# Patient Record
Sex: FEMALE | Race: WHITE | Employment: OTHER | URBAN - METROPOLITAN AREA
[De-identification: names, ages, dates, MRNs, and addresses within clinical notes are randomized per-mention and may not be internally consistent; named-entity substitution may affect disease eponyms.]

---

## 2017-01-11 DIAGNOSIS — E11.8 TYPE 2 DIABETES MELLITUS WITH COMPLICATION, WITHOUT LONG-TERM CURRENT USE OF INSULIN (HCC): ICD-10-CM

## 2017-01-11 DIAGNOSIS — I10 ESSENTIAL HYPERTENSION: ICD-10-CM

## 2017-01-11 DIAGNOSIS — E78.2 MIXED HYPERLIPIDEMIA: ICD-10-CM

## 2017-01-11 DIAGNOSIS — E03.9 ACQUIRED HYPOTHYROIDISM: ICD-10-CM

## 2017-01-11 LAB
ALBUMIN SERPL-MCNC: 3.8 G/DL (ref 3.9–4.9)
ALP BLD-CCNC: 85 U/L (ref 40–130)
ALT SERPL-CCNC: 8 U/L (ref 0–33)
ANION GAP SERPL CALCULATED.3IONS-SCNC: 12 MEQ/L (ref 7–13)
AST SERPL-CCNC: 10 U/L (ref 0–35)
BILIRUB SERPL-MCNC: 0.2 MG/DL (ref 0–1.2)
BUN BLDV-MCNC: 16 MG/DL (ref 8–23)
CALCIUM SERPL-MCNC: 9.1 MG/DL (ref 8.6–10.2)
CHLORIDE BLD-SCNC: 97 MEQ/L (ref 98–107)
CHOLESTEROL, TOTAL: 160 MG/DL (ref 0–199)
CO2: 31 MEQ/L (ref 22–29)
CREAT SERPL-MCNC: 0.54 MG/DL (ref 0.5–0.9)
CREATININE URINE: 80.9 MG/DL
GFR AFRICAN AMERICAN: >60
GFR NON-AFRICAN AMERICAN: >60
GLOBULIN: 3.4 G/DL (ref 2.3–3.5)
GLUCOSE BLD-MCNC: 153 MG/DL (ref 74–109)
HBA1C MFR BLD: 6.5 % (ref 4.8–5.9)
HDLC SERPL-MCNC: 37 MG/DL (ref 40–59)
LDL CHOLESTEROL CALCULATED: 90 MG/DL (ref 0–129)
MICROALBUMIN UR-MCNC: 4 MG/DL
MICROALBUMIN/CREAT UR-RTO: 49.4 MG/G (ref 0–30)
POTASSIUM SERPL-SCNC: 4.7 MEQ/L (ref 3.5–5.1)
SODIUM BLD-SCNC: 140 MEQ/L (ref 132–144)
T4 FREE: 1.28 NG/DL (ref 0.93–1.7)
TOTAL PROTEIN: 7.2 G/DL (ref 6.4–8.1)
TRIGL SERPL-MCNC: 167 MG/DL (ref 0–200)
TSH SERPL DL<=0.05 MIU/L-ACNC: 0.66 UIU/ML (ref 0.27–4.2)

## 2017-01-18 ENCOUNTER — OFFICE VISIT (OUTPATIENT)
Dept: FAMILY MEDICINE CLINIC | Age: 62
End: 2017-01-18

## 2017-01-18 VITALS
SYSTOLIC BLOOD PRESSURE: 138 MMHG | DIASTOLIC BLOOD PRESSURE: 70 MMHG | TEMPERATURE: 97.7 F | RESPIRATION RATE: 18 BRPM | HEIGHT: 66 IN | HEART RATE: 72 BPM | BODY MASS INDEX: 47.09 KG/M2 | WEIGHT: 293 LBS

## 2017-01-18 DIAGNOSIS — M54.16 LUMBAR RADICULOPATHY: ICD-10-CM

## 2017-01-18 DIAGNOSIS — I10 ESSENTIAL HYPERTENSION: ICD-10-CM

## 2017-01-18 DIAGNOSIS — E78.2 MIXED HYPERLIPIDEMIA: ICD-10-CM

## 2017-01-18 DIAGNOSIS — E11.8 TYPE 2 DIABETES MELLITUS WITH COMPLICATION, WITHOUT LONG-TERM CURRENT USE OF INSULIN (HCC): Primary | ICD-10-CM

## 2017-01-18 DIAGNOSIS — E03.9 ACQUIRED HYPOTHYROIDISM: ICD-10-CM

## 2017-01-18 DIAGNOSIS — R53.82 CHRONIC FATIGUE: ICD-10-CM

## 2017-01-18 PROCEDURE — 99214 OFFICE O/P EST MOD 30 MIN: CPT | Performed by: FAMILY MEDICINE

## 2017-01-18 RX ORDER — BLOOD-GLUCOSE METER
EACH MISCELLANEOUS
Qty: 1 KIT | Refills: 0 | Status: SHIPPED | OUTPATIENT
Start: 2017-01-18 | End: 2017-12-15 | Stop reason: ALTCHOICE

## 2017-01-18 RX ORDER — SIMVASTATIN 40 MG
TABLET ORAL
Qty: 90 TABLET | Refills: 0 | Status: SHIPPED | OUTPATIENT
Start: 2017-01-18 | End: 2017-12-15 | Stop reason: SDUPTHER

## 2017-01-18 RX ORDER — OXYBUTYNIN CHLORIDE 10 MG/1
10 TABLET, EXTENDED RELEASE ORAL DAILY
Qty: 90 TABLET | Refills: 0 | Status: SHIPPED | OUTPATIENT
Start: 2017-01-18 | End: 2017-04-18 | Stop reason: SDUPTHER

## 2017-01-24 ENCOUNTER — HOSPITAL ENCOUNTER (OUTPATIENT)
Dept: PHYSICAL THERAPY | Age: 62
Setting detail: THERAPIES SERIES
Discharge: HOME | End: 2017-01-24
Payer: COMMERCIAL

## 2017-01-24 PROCEDURE — G8978 MOBILITY CURRENT STATUS: HCPCS

## 2017-01-24 PROCEDURE — G8979 MOBILITY GOAL STATUS: HCPCS

## 2017-01-24 PROCEDURE — 97162 PT EVAL MOD COMPLEX 30 MIN: CPT

## 2017-01-24 ASSESSMENT — PAIN DESCRIPTION - LOCATION: LOCATION: BACK

## 2017-01-24 ASSESSMENT — PAIN DESCRIPTION - FREQUENCY: FREQUENCY: CONTINUOUS

## 2017-01-24 ASSESSMENT — PAIN DESCRIPTION - DESCRIPTORS: DESCRIPTORS: CONSTANT;RADIATING

## 2017-01-24 ASSESSMENT — PAIN SCALES - GENERAL: PAINLEVEL_OUTOF10: 5

## 2017-01-24 ASSESSMENT — PAIN DESCRIPTION - PAIN TYPE: TYPE: CHRONIC PAIN

## 2017-02-01 ENCOUNTER — TELEPHONE (OUTPATIENT)
Dept: FAMILY MEDICINE CLINIC | Age: 62
End: 2017-02-01

## 2017-02-09 ENCOUNTER — HOSPITAL ENCOUNTER (OUTPATIENT)
Dept: PHYSICAL THERAPY | Age: 62
Discharge: HOME | End: 2017-02-09

## 2017-02-13 ENCOUNTER — HOSPITAL ENCOUNTER (OUTPATIENT)
Dept: PHYSICAL THERAPY | Age: 62
Setting detail: THERAPIES SERIES
Discharge: HOME | End: 2017-02-13
Payer: COMMERCIAL

## 2017-02-13 PROCEDURE — 97110 THERAPEUTIC EXERCISES: CPT

## 2017-02-13 ASSESSMENT — PAIN SCALES - GENERAL: PAINLEVEL_OUTOF10: 9

## 2017-02-13 ASSESSMENT — PAIN DESCRIPTION - PAIN TYPE: TYPE: CHRONIC PAIN

## 2017-02-13 ASSESSMENT — PAIN DESCRIPTION - ORIENTATION: ORIENTATION: LOWER;LEFT;RIGHT

## 2017-02-13 ASSESSMENT — PAIN DESCRIPTION - LOCATION: LOCATION: BACK;LEG

## 2017-02-20 ENCOUNTER — HOSPITAL ENCOUNTER (OUTPATIENT)
Dept: PHYSICAL THERAPY | Age: 62
Setting detail: THERAPIES SERIES
Discharge: HOME | End: 2017-02-20
Payer: COMMERCIAL

## 2017-02-20 PROCEDURE — 97110 THERAPEUTIC EXERCISES: CPT

## 2017-02-20 ASSESSMENT — PAIN SCALES - GENERAL: PAINLEVEL_OUTOF10: 5

## 2017-02-20 ASSESSMENT — PAIN DESCRIPTION - LOCATION: LOCATION: BACK

## 2017-02-20 ASSESSMENT — PAIN DESCRIPTION - ORIENTATION: ORIENTATION: LOWER

## 2017-02-20 ASSESSMENT — PAIN DESCRIPTION - DESCRIPTORS: DESCRIPTORS: ACHING

## 2017-02-21 ENCOUNTER — OFFICE VISIT (OUTPATIENT)
Dept: FAMILY MEDICINE CLINIC | Age: 62
End: 2017-02-21

## 2017-02-21 VITALS
HEART RATE: 72 BPM | SYSTOLIC BLOOD PRESSURE: 118 MMHG | DIASTOLIC BLOOD PRESSURE: 70 MMHG | WEIGHT: 293 LBS | TEMPERATURE: 97.4 F | BODY MASS INDEX: 47.09 KG/M2 | RESPIRATION RATE: 18 BRPM | HEIGHT: 66 IN

## 2017-02-21 DIAGNOSIS — G89.29 CHRONIC RIGHT-SIDED LOW BACK PAIN WITH RIGHT-SIDED SCIATICA: ICD-10-CM

## 2017-02-21 DIAGNOSIS — E11.8 TYPE 2 DIABETES MELLITUS WITH COMPLICATION, WITHOUT LONG-TERM CURRENT USE OF INSULIN (HCC): Primary | ICD-10-CM

## 2017-02-21 DIAGNOSIS — I10 ESSENTIAL HYPERTENSION: ICD-10-CM

## 2017-02-21 DIAGNOSIS — M54.41 CHRONIC RIGHT-SIDED LOW BACK PAIN WITH RIGHT-SIDED SCIATICA: ICD-10-CM

## 2017-02-21 DIAGNOSIS — M54.16 LUMBAR RADICULOPATHY: ICD-10-CM

## 2017-02-21 DIAGNOSIS — E66.01 MORBID OBESITY WITH BMI OF 45.0-49.9, ADULT (HCC): ICD-10-CM

## 2017-02-21 DIAGNOSIS — E03.9 ACQUIRED HYPOTHYROIDISM: ICD-10-CM

## 2017-02-21 PROCEDURE — 99214 OFFICE O/P EST MOD 30 MIN: CPT | Performed by: FAMILY MEDICINE

## 2017-02-21 RX ORDER — PERPHENAZINE 8 MG
8 TABLET ORAL DAILY
COMMUNITY
Start: 2017-01-19 | End: 2018-03-29 | Stop reason: DRUGHIGH

## 2017-02-21 RX ORDER — LOSARTAN POTASSIUM 25 MG/1
25 TABLET ORAL DAILY
Qty: 90 TABLET | Refills: 1 | Status: SHIPPED | OUTPATIENT
Start: 2017-02-21 | End: 2017-10-05 | Stop reason: SDUPTHER

## 2017-02-21 RX ORDER — ARIPIPRAZOLE 5 MG/1
5 TABLET ORAL DAILY
COMMUNITY
Start: 2017-02-17 | End: 2017-04-21 | Stop reason: SINTOL

## 2017-02-21 RX ORDER — ARIPIPRAZOLE 2 MG/1
2 TABLET ORAL DAILY
COMMUNITY
Start: 2017-02-17 | End: 2017-02-21 | Stop reason: DRUGHIGH

## 2017-02-21 RX ORDER — PERPHENAZINE 4 MG/1
4 TABLET, FILM COATED ORAL DAILY
COMMUNITY
Start: 2017-02-17 | End: 2018-05-14 | Stop reason: ALTCHOICE

## 2017-02-21 RX ORDER — IBUPROFEN 600 MG/1
600 TABLET ORAL EVERY 8 HOURS PRN
Qty: 90 TABLET | Refills: 1 | Status: SHIPPED | OUTPATIENT
Start: 2017-02-21 | End: 2017-08-30 | Stop reason: ALTCHOICE

## 2017-02-21 RX ORDER — DULOXETIN HYDROCHLORIDE 60 MG/1
60 CAPSULE, DELAYED RELEASE ORAL DAILY
COMMUNITY
Start: 2017-02-17

## 2017-02-23 ENCOUNTER — TELEPHONE (OUTPATIENT)
Dept: FAMILY MEDICINE CLINIC | Age: 62
End: 2017-02-23

## 2017-02-23 ENCOUNTER — HOSPITAL ENCOUNTER (OUTPATIENT)
Dept: PHYSICAL THERAPY | Age: 62
Setting detail: THERAPIES SERIES
Discharge: HOME | End: 2017-02-23
Payer: COMMERCIAL

## 2017-02-23 DIAGNOSIS — M54.9 BACK PAIN, UNSPECIFIED BACK LOCATION, UNSPECIFIED BACK PAIN LATERALITY, UNSPECIFIED CHRONICITY: ICD-10-CM

## 2017-02-23 DIAGNOSIS — R26.81 UNSTEADY GAIT: Primary | ICD-10-CM

## 2017-02-23 PROCEDURE — G8978 MOBILITY CURRENT STATUS: HCPCS

## 2017-02-23 PROCEDURE — 97110 THERAPEUTIC EXERCISES: CPT

## 2017-02-23 PROCEDURE — G8979 MOBILITY GOAL STATUS: HCPCS

## 2017-02-23 ASSESSMENT — PAIN DESCRIPTION - LOCATION: LOCATION: BACK

## 2017-02-23 ASSESSMENT — PAIN DESCRIPTION - ORIENTATION: ORIENTATION: LOWER

## 2017-02-23 ASSESSMENT — PAIN DESCRIPTION - PAIN TYPE: TYPE: CHRONIC PAIN

## 2017-03-13 ENCOUNTER — HOSPITAL ENCOUNTER (OUTPATIENT)
Dept: PHYSICAL THERAPY | Age: 62
Setting detail: THERAPIES SERIES
Discharge: HOME | End: 2017-03-13
Payer: COMMERCIAL

## 2017-03-13 PROCEDURE — 97110 THERAPEUTIC EXERCISES: CPT

## 2017-03-13 ASSESSMENT — PAIN SCALES - GENERAL: PAINLEVEL_OUTOF10: 0

## 2017-03-21 ENCOUNTER — HOSPITAL ENCOUNTER (OUTPATIENT)
Dept: PHYSICAL THERAPY | Age: 62
Discharge: HOME | End: 2017-03-21

## 2017-03-23 ENCOUNTER — HOSPITAL ENCOUNTER (OUTPATIENT)
Dept: PHYSICAL THERAPY | Age: 62
Setting detail: THERAPIES SERIES
Discharge: HOME | End: 2017-03-23
Payer: COMMERCIAL

## 2017-03-23 PROCEDURE — 97110 THERAPEUTIC EXERCISES: CPT

## 2017-03-23 ASSESSMENT — PAIN SCALES - GENERAL: PAINLEVEL_OUTOF10: 0

## 2017-03-27 PROCEDURE — G8979 MOBILITY GOAL STATUS: HCPCS

## 2017-03-27 PROCEDURE — G8980 MOBILITY D/C STATUS: HCPCS

## 2017-04-04 ENCOUNTER — TELEPHONE (OUTPATIENT)
Dept: FAMILY MEDICINE CLINIC | Age: 62
End: 2017-04-04

## 2017-04-18 RX ORDER — OXYBUTYNIN CHLORIDE 10 MG/1
TABLET, EXTENDED RELEASE ORAL
Qty: 90 TABLET | Refills: 0 | Status: SHIPPED | OUTPATIENT
Start: 2017-04-18 | End: 2017-07-18 | Stop reason: SDUPTHER

## 2017-04-21 ENCOUNTER — OFFICE VISIT (OUTPATIENT)
Dept: FAMILY MEDICINE CLINIC | Age: 62
End: 2017-04-21

## 2017-04-21 VITALS
HEIGHT: 66 IN | HEART RATE: 84 BPM | SYSTOLIC BLOOD PRESSURE: 126 MMHG | BODY MASS INDEX: 47.09 KG/M2 | WEIGHT: 293 LBS | TEMPERATURE: 98.4 F | DIASTOLIC BLOOD PRESSURE: 78 MMHG | RESPIRATION RATE: 18 BRPM

## 2017-04-21 DIAGNOSIS — F33.1 MODERATE EPISODE OF RECURRENT MAJOR DEPRESSIVE DISORDER (HCC): ICD-10-CM

## 2017-04-21 DIAGNOSIS — I10 ESSENTIAL HYPERTENSION: ICD-10-CM

## 2017-04-21 DIAGNOSIS — G25.81 RESTLESS LEG SYNDROME: ICD-10-CM

## 2017-04-21 DIAGNOSIS — G47.33 OSA (OBSTRUCTIVE SLEEP APNEA): ICD-10-CM

## 2017-04-21 DIAGNOSIS — T14.8XXA WOUND INFECTION: ICD-10-CM

## 2017-04-21 DIAGNOSIS — L08.9 WOUND INFECTION: ICD-10-CM

## 2017-04-21 DIAGNOSIS — M10.072 ACUTE IDIOPATHIC GOUT OF LEFT FOOT: ICD-10-CM

## 2017-04-21 DIAGNOSIS — J44.9 COPD WITH CHRONIC BRONCHITIS (HCC): ICD-10-CM

## 2017-04-21 DIAGNOSIS — E66.01 MORBID OBESITY WITH BMI OF 50.0-59.9, ADULT (HCC): ICD-10-CM

## 2017-04-21 DIAGNOSIS — T81.31XA WOUND DEHISCENCE, INITIAL ENCOUNTER: ICD-10-CM

## 2017-04-21 DIAGNOSIS — M54.16 LUMBAR RADICULOPATHY: Primary | ICD-10-CM

## 2017-04-21 DIAGNOSIS — L03.311 CELLULITIS, ABDOMINAL WALL: ICD-10-CM

## 2017-04-21 PROCEDURE — 99214 OFFICE O/P EST MOD 30 MIN: CPT | Performed by: FAMILY MEDICINE

## 2017-04-21 RX ORDER — CEPHALEXIN 500 MG/1
500 CAPSULE ORAL 3 TIMES DAILY
Qty: 30 CAPSULE | Refills: 0 | Status: SHIPPED | OUTPATIENT
Start: 2017-04-21 | End: 2017-05-19 | Stop reason: ALTCHOICE

## 2017-04-21 RX ORDER — GENTAMICIN SULFATE 1 MG/G
CREAM TOPICAL
Qty: 15 G | Refills: 2 | Status: SHIPPED | OUTPATIENT
Start: 2017-04-21 | End: 2017-12-15 | Stop reason: ALTCHOICE

## 2017-04-21 RX ORDER — COLCHICINE 0.6 MG/1
TABLET ORAL
Qty: 20 TABLET | Refills: 0 | Status: SHIPPED | OUTPATIENT
Start: 2017-04-21 | End: 2017-05-19 | Stop reason: ALTCHOICE

## 2017-04-22 PROBLEM — G47.33 OSA (OBSTRUCTIVE SLEEP APNEA): Status: ACTIVE | Noted: 2017-04-22

## 2017-04-22 PROBLEM — E66.01 MORBID OBESITY WITH BMI OF 50.0-59.9, ADULT (HCC): Status: ACTIVE | Noted: 2017-04-22

## 2017-04-22 PROBLEM — F33.1 MODERATE EPISODE OF RECURRENT MAJOR DEPRESSIVE DISORDER (HCC): Status: ACTIVE | Noted: 2017-04-22

## 2017-04-22 PROBLEM — G25.81 RESTLESS LEG SYNDROME: Status: ACTIVE | Noted: 2017-04-22

## 2017-05-12 DIAGNOSIS — I10 ESSENTIAL HYPERTENSION: ICD-10-CM

## 2017-05-12 DIAGNOSIS — E03.9 ACQUIRED HYPOTHYROIDISM: ICD-10-CM

## 2017-05-12 DIAGNOSIS — M10.072 ACUTE IDIOPATHIC GOUT OF LEFT FOOT: ICD-10-CM

## 2017-05-12 DIAGNOSIS — R53.82 CHRONIC FATIGUE: ICD-10-CM

## 2017-05-12 DIAGNOSIS — E11.8 TYPE 2 DIABETES MELLITUS WITH COMPLICATION, WITHOUT LONG-TERM CURRENT USE OF INSULIN (HCC): ICD-10-CM

## 2017-05-12 DIAGNOSIS — E78.2 MIXED HYPERLIPIDEMIA: ICD-10-CM

## 2017-05-12 LAB
ALBUMIN SERPL-MCNC: 3.7 G/DL (ref 3.9–4.9)
ALP BLD-CCNC: 95 U/L (ref 40–130)
ALT SERPL-CCNC: 11 U/L (ref 0–33)
ANION GAP SERPL CALCULATED.3IONS-SCNC: 12 MEQ/L (ref 7–13)
AST SERPL-CCNC: 10 U/L (ref 0–35)
BASOPHILS ABSOLUTE: 0 K/UL (ref 0–0.2)
BASOPHILS RELATIVE PERCENT: 0.5 %
BILIRUB SERPL-MCNC: 0.3 MG/DL (ref 0–1.2)
BUN BLDV-MCNC: 19 MG/DL (ref 8–23)
CALCIUM SERPL-MCNC: 9.3 MG/DL (ref 8.6–10.2)
CHLORIDE BLD-SCNC: 98 MEQ/L (ref 98–107)
CHOLESTEROL, TOTAL: 130 MG/DL (ref 0–199)
CO2: 29 MEQ/L (ref 22–29)
CREAT SERPL-MCNC: 0.66 MG/DL (ref 0.5–0.9)
EOSINOPHILS ABSOLUTE: 0.1 K/UL (ref 0–0.7)
EOSINOPHILS RELATIVE PERCENT: 0.9 %
GFR AFRICAN AMERICAN: >60
GFR NON-AFRICAN AMERICAN: >60
GLOBULIN: 3.8 G/DL (ref 2.3–3.5)
GLUCOSE BLD-MCNC: 138 MG/DL (ref 74–109)
HBA1C MFR BLD: 6 % (ref 4.8–5.9)
HCT VFR BLD CALC: 44 % (ref 37–47)
HDLC SERPL-MCNC: 39 MG/DL (ref 40–59)
HEMOGLOBIN: 14.3 G/DL (ref 12–16)
LDL CHOLESTEROL CALCULATED: 71 MG/DL (ref 0–129)
LYMPHOCYTES ABSOLUTE: 2.4 K/UL (ref 1–4.8)
LYMPHOCYTES RELATIVE PERCENT: 24.1 %
MCH RBC QN AUTO: 30.6 PG (ref 27–31.3)
MCHC RBC AUTO-ENTMCNC: 32.5 % (ref 33–37)
MCV RBC AUTO: 94.3 FL (ref 82–100)
MONOCYTES ABSOLUTE: 0.6 K/UL (ref 0.2–0.8)
MONOCYTES RELATIVE PERCENT: 5.5 %
NEUTROPHILS ABSOLUTE: 6.8 K/UL (ref 1.4–6.5)
NEUTROPHILS RELATIVE PERCENT: 69 %
PDW BLD-RTO: 15.1 % (ref 11.5–14.5)
PLATELET # BLD: 175 K/UL (ref 130–400)
POTASSIUM SERPL-SCNC: 4.4 MEQ/L (ref 3.5–5.1)
RBC # BLD: 4.67 M/UL (ref 4.2–5.4)
SODIUM BLD-SCNC: 139 MEQ/L (ref 132–144)
T4 FREE: 1.43 NG/DL (ref 0.93–1.7)
TOTAL PROTEIN: 7.5 G/DL (ref 6.4–8.1)
TRIGL SERPL-MCNC: 100 MG/DL (ref 0–200)
TSH SERPL DL<=0.05 MIU/L-ACNC: 0.62 UIU/ML (ref 0.27–4.2)
URIC ACID, SERUM: 6.2 MG/DL (ref 2.4–5.7)
WBC # BLD: 9.9 K/UL (ref 4.8–10.8)

## 2017-05-13 LAB
FOLATE: >20 NG/ML (ref 7.3–26.1)
VITAMIN B-12: 412 PG/ML (ref 211–946)

## 2017-05-19 ENCOUNTER — OFFICE VISIT (OUTPATIENT)
Dept: FAMILY MEDICINE CLINIC | Age: 62
End: 2017-05-19

## 2017-05-19 VITALS
WEIGHT: 293 LBS | DIASTOLIC BLOOD PRESSURE: 64 MMHG | SYSTOLIC BLOOD PRESSURE: 112 MMHG | TEMPERATURE: 97.8 F | HEIGHT: 66 IN | RESPIRATION RATE: 16 BRPM | HEART RATE: 84 BPM | BODY MASS INDEX: 47.09 KG/M2

## 2017-05-19 DIAGNOSIS — E03.9 ACQUIRED HYPOTHYROIDISM: ICD-10-CM

## 2017-05-19 DIAGNOSIS — Z12.11 COLON CANCER SCREENING: ICD-10-CM

## 2017-05-19 DIAGNOSIS — E11.8 TYPE 2 DIABETES MELLITUS WITH COMPLICATION, WITHOUT LONG-TERM CURRENT USE OF INSULIN (HCC): Primary | ICD-10-CM

## 2017-05-19 DIAGNOSIS — Z12.31 ENCOUNTER FOR SCREENING MAMMOGRAM FOR BREAST CANCER: ICD-10-CM

## 2017-05-19 DIAGNOSIS — E78.2 MIXED HYPERLIPIDEMIA: ICD-10-CM

## 2017-05-19 DIAGNOSIS — Z12.4 SCREENING FOR CERVICAL CANCER: ICD-10-CM

## 2017-05-19 DIAGNOSIS — I10 ESSENTIAL HYPERTENSION: ICD-10-CM

## 2017-05-19 DIAGNOSIS — M1A.0710 IDIOPATHIC CHRONIC GOUT OF RIGHT FOOT WITHOUT TOPHUS: ICD-10-CM

## 2017-05-19 DIAGNOSIS — J44.9 COPD WITH CHRONIC BRONCHITIS (HCC): ICD-10-CM

## 2017-05-19 PROCEDURE — 99214 OFFICE O/P EST MOD 30 MIN: CPT | Performed by: FAMILY MEDICINE

## 2017-05-19 RX ORDER — UNDERPADS 23" X 36"
EACH MISCELLANEOUS
COMMUNITY
End: 2019-03-11 | Stop reason: ALTCHOICE

## 2017-05-19 RX ORDER — ALBUTEROL SULFATE 90 UG/1
2 AEROSOL, METERED RESPIRATORY (INHALATION) 3 TIMES DAILY
Qty: 3 INHALER | Refills: 5 | Status: SHIPPED | OUTPATIENT
Start: 2017-05-19 | End: 2017-10-18 | Stop reason: ALTCHOICE

## 2017-05-19 ASSESSMENT — PATIENT HEALTH QUESTIONNAIRE - PHQ9
SUM OF ALL RESPONSES TO PHQ9 QUESTIONS 1 & 2: 0
1. LITTLE INTEREST OR PLEASURE IN DOING THINGS: 0
SUM OF ALL RESPONSES TO PHQ QUESTIONS 1-9: 0
2. FEELING DOWN, DEPRESSED OR HOPELESS: 0

## 2017-05-25 ENCOUNTER — HOSPITAL ENCOUNTER (OUTPATIENT)
Dept: WOMENS IMAGING | Age: 62
Discharge: HOME | End: 2017-05-25
Payer: COMMERCIAL

## 2017-05-25 DIAGNOSIS — Z12.31 ENCOUNTER FOR SCREENING MAMMOGRAM FOR BREAST CANCER: ICD-10-CM

## 2017-05-25 DIAGNOSIS — Z12.11 COLON CANCER SCREENING: ICD-10-CM

## 2017-05-25 LAB
CONTROL: NORMAL
HEMOCCULT STL QL: NEGATIVE

## 2017-05-25 PROCEDURE — G0328 FECAL BLOOD SCRN IMMUNOASSAY: HCPCS | Performed by: FAMILY MEDICINE

## 2017-05-25 PROCEDURE — G0202 SCR MAMMO BI INCL CAD: HCPCS

## 2017-05-30 LAB — HBA1C MFR BLD: 6.4 %

## 2017-06-13 ENCOUNTER — OFFICE VISIT (OUTPATIENT)
Dept: OBGYN | Age: 62
End: 2017-06-13

## 2017-06-13 VITALS
HEIGHT: 67 IN | BODY MASS INDEX: 45.99 KG/M2 | DIASTOLIC BLOOD PRESSURE: 78 MMHG | SYSTOLIC BLOOD PRESSURE: 110 MMHG | WEIGHT: 293 LBS

## 2017-06-13 DIAGNOSIS — Z01.419 WELL WOMAN EXAM WITH ROUTINE GYNECOLOGICAL EXAM: ICD-10-CM

## 2017-06-13 DIAGNOSIS — Z11.51 SCREENING FOR HPV (HUMAN PAPILLOMAVIRUS): ICD-10-CM

## 2017-06-13 DIAGNOSIS — Z01.419 PAP SMEAR, AS PART OF ROUTINE GYNECOLOGICAL EXAMINATION: Primary | ICD-10-CM

## 2017-06-13 DIAGNOSIS — Z13.820 SCREENING FOR OSTEOPOROSIS: ICD-10-CM

## 2017-06-13 PROCEDURE — G0101 CA SCREEN;PELVIC/BREAST EXAM: HCPCS | Performed by: NURSE PRACTITIONER

## 2017-06-20 DIAGNOSIS — Z11.51 SCREENING FOR HPV (HUMAN PAPILLOMAVIRUS): ICD-10-CM

## 2017-06-20 DIAGNOSIS — Z01.419 PAP SMEAR, AS PART OF ROUTINE GYNECOLOGICAL EXAMINATION: ICD-10-CM

## 2017-06-29 ENCOUNTER — HOSPITAL ENCOUNTER (OUTPATIENT)
Dept: GENERAL RADIOLOGY | Age: 62
Discharge: HOME | End: 2017-06-29
Payer: COMMERCIAL

## 2017-06-29 DIAGNOSIS — Z13.820 SCREENING FOR OSTEOPOROSIS: ICD-10-CM

## 2017-06-29 PROCEDURE — 77080 DXA BONE DENSITY AXIAL: CPT

## 2017-07-03 ENCOUNTER — TELEPHONE (OUTPATIENT)
Dept: OBGYN | Age: 62
End: 2017-07-03

## 2017-07-18 RX ORDER — OXYBUTYNIN CHLORIDE 10 MG/1
TABLET, EXTENDED RELEASE ORAL
Qty: 90 TABLET | Refills: 1 | Status: SHIPPED | OUTPATIENT
Start: 2017-07-18 | End: 2017-12-15 | Stop reason: SDUPTHER

## 2017-08-23 DIAGNOSIS — E11.8 TYPE 2 DIABETES MELLITUS WITH COMPLICATION, WITHOUT LONG-TERM CURRENT USE OF INSULIN (HCC): ICD-10-CM

## 2017-08-23 DIAGNOSIS — I10 ESSENTIAL HYPERTENSION: ICD-10-CM

## 2017-08-23 DIAGNOSIS — E78.2 MIXED HYPERLIPIDEMIA: ICD-10-CM

## 2017-08-23 DIAGNOSIS — E03.9 ACQUIRED HYPOTHYROIDISM: ICD-10-CM

## 2017-08-23 LAB
ALBUMIN SERPL-MCNC: 3.4 G/DL (ref 3.9–4.9)
ALP BLD-CCNC: 99 U/L (ref 40–130)
ALT SERPL-CCNC: 10 U/L (ref 0–33)
ANION GAP SERPL CALCULATED.3IONS-SCNC: 15 MEQ/L (ref 7–13)
AST SERPL-CCNC: 11 U/L (ref 0–35)
BILIRUB SERPL-MCNC: 0.3 MG/DL (ref 0–1.2)
BUN BLDV-MCNC: 15 MG/DL (ref 8–23)
CALCIUM SERPL-MCNC: 9.4 MG/DL (ref 8.6–10.2)
CHLORIDE BLD-SCNC: 98 MEQ/L (ref 98–107)
CHOLESTEROL, TOTAL: 118 MG/DL (ref 0–199)
CO2: 32 MEQ/L (ref 22–29)
CREAT SERPL-MCNC: 0.64 MG/DL (ref 0.5–0.9)
GFR AFRICAN AMERICAN: >60
GFR NON-AFRICAN AMERICAN: >60
GLOBULIN: 4 G/DL (ref 2.3–3.5)
GLUCOSE BLD-MCNC: 110 MG/DL (ref 74–109)
HBA1C MFR BLD: 6.4 % (ref 4.8–5.9)
HDLC SERPL-MCNC: 31 MG/DL (ref 40–59)
LDL CHOLESTEROL CALCULATED: 55 MG/DL (ref 0–129)
POTASSIUM SERPL-SCNC: 4.2 MEQ/L (ref 3.5–5.1)
SODIUM BLD-SCNC: 145 MEQ/L (ref 132–144)
TOTAL PROTEIN: 7.4 G/DL (ref 6.4–8.1)
TRIGL SERPL-MCNC: 159 MG/DL (ref 0–200)
TSH SERPL DL<=0.05 MIU/L-ACNC: 3.79 UIU/ML (ref 0.27–4.2)

## 2017-08-30 ENCOUNTER — OFFICE VISIT (OUTPATIENT)
Dept: FAMILY MEDICINE CLINIC | Age: 62
End: 2017-08-30

## 2017-08-30 VITALS
TEMPERATURE: 97.6 F | HEIGHT: 66 IN | BODY MASS INDEX: 47.09 KG/M2 | SYSTOLIC BLOOD PRESSURE: 124 MMHG | HEART RATE: 70 BPM | WEIGHT: 293 LBS | DIASTOLIC BLOOD PRESSURE: 62 MMHG | RESPIRATION RATE: 18 BRPM

## 2017-08-30 DIAGNOSIS — G25.81 RESTLESS LEG SYNDROME: ICD-10-CM

## 2017-08-30 DIAGNOSIS — R53.82 CHRONIC FATIGUE: ICD-10-CM

## 2017-08-30 DIAGNOSIS — E03.9 ACQUIRED HYPOTHYROIDISM: ICD-10-CM

## 2017-08-30 DIAGNOSIS — C44.612 BASAL CELL CARCINOMA OF SHOULDER, RIGHT: ICD-10-CM

## 2017-08-30 DIAGNOSIS — F33.1 MODERATE EPISODE OF RECURRENT MAJOR DEPRESSIVE DISORDER (HCC): ICD-10-CM

## 2017-08-30 DIAGNOSIS — Z23 NEED FOR INFLUENZA VACCINATION: ICD-10-CM

## 2017-08-30 DIAGNOSIS — E11.8 TYPE 2 DIABETES MELLITUS WITH COMPLICATION, WITHOUT LONG-TERM CURRENT USE OF INSULIN (HCC): Primary | ICD-10-CM

## 2017-08-30 DIAGNOSIS — E78.2 MIXED HYPERLIPIDEMIA: ICD-10-CM

## 2017-08-30 DIAGNOSIS — E53.8 VITAMIN B12 DEFICIENCY: ICD-10-CM

## 2017-08-30 DIAGNOSIS — I10 ESSENTIAL HYPERTENSION: ICD-10-CM

## 2017-08-30 PROCEDURE — 90688 IIV4 VACCINE SPLT 0.5 ML IM: CPT | Performed by: FAMILY MEDICINE

## 2017-08-30 PROCEDURE — 99214 OFFICE O/P EST MOD 30 MIN: CPT | Performed by: FAMILY MEDICINE

## 2017-08-30 PROCEDURE — G0008 ADMIN INFLUENZA VIRUS VAC: HCPCS | Performed by: FAMILY MEDICINE

## 2017-08-30 RX ORDER — BENZONATATE 200 MG/1
200 CAPSULE ORAL 3 TIMES DAILY PRN
Qty: 30 CAPSULE | Refills: 0 | Status: SHIPPED | OUTPATIENT
Start: 2017-08-30 | End: 2017-09-14 | Stop reason: SDUPTHER

## 2017-08-30 RX ORDER — CEFUROXIME AXETIL 250 MG/1
250 TABLET ORAL 2 TIMES DAILY
Qty: 20 TABLET | Refills: 0 | Status: SHIPPED | OUTPATIENT
Start: 2017-08-30 | End: 2017-09-09

## 2017-08-30 ASSESSMENT — PATIENT HEALTH QUESTIONNAIRE - PHQ9
1. LITTLE INTEREST OR PLEASURE IN DOING THINGS: 0
SUM OF ALL RESPONSES TO PHQ QUESTIONS 1-9: 0
2. FEELING DOWN, DEPRESSED OR HOPELESS: 0
SUM OF ALL RESPONSES TO PHQ9 QUESTIONS 1 & 2: 0

## 2017-09-08 ENCOUNTER — TELEPHONE (OUTPATIENT)
Dept: FAMILY MEDICINE CLINIC | Age: 62
End: 2017-09-08

## 2017-09-08 RX ORDER — BUDESONIDE AND FORMOTEROL FUMARATE DIHYDRATE 160; 4.5 UG/1; UG/1
2 AEROSOL RESPIRATORY (INHALATION) 2 TIMES DAILY
Qty: 1 INHALER | Refills: 5 | Status: SHIPPED | OUTPATIENT
Start: 2017-09-08 | End: 2017-12-28 | Stop reason: SDUPTHER

## 2017-09-08 RX ORDER — LEVOTHYROXINE SODIUM 175 UG/1
TABLET ORAL
Qty: 90 TABLET | Refills: 1 | Status: SHIPPED | OUTPATIENT
Start: 2017-09-08 | End: 2017-12-17 | Stop reason: DRUGHIGH

## 2017-09-12 ENCOUNTER — TELEPHONE (OUTPATIENT)
Dept: FAMILY MEDICINE CLINIC | Age: 62
End: 2017-09-12

## 2017-09-12 RX ORDER — FLUCONAZOLE 150 MG/1
TABLET ORAL
Qty: 3 TABLET | Refills: 2 | Status: SHIPPED | OUTPATIENT
Start: 2017-09-12 | End: 2017-09-13

## 2017-09-12 RX ORDER — FLUCONAZOLE 100 MG/1
100 TABLET ORAL DAILY
Qty: 2 TABLET | Refills: 0 | Status: CANCELLED | OUTPATIENT
Start: 2017-09-12 | End: 2017-09-19

## 2017-09-14 RX ORDER — BENZONATATE 200 MG/1
200 CAPSULE ORAL 3 TIMES DAILY PRN
Qty: 30 CAPSULE | Refills: 0 | Status: SHIPPED | OUTPATIENT
Start: 2017-09-14 | End: 2017-10-18 | Stop reason: SDUPTHER

## 2017-10-05 ENCOUNTER — OFFICE VISIT (OUTPATIENT)
Dept: FAMILY MEDICINE CLINIC | Age: 62
End: 2017-10-05

## 2017-10-05 VITALS
SYSTOLIC BLOOD PRESSURE: 136 MMHG | HEART RATE: 88 BPM | TEMPERATURE: 96.4 F | BODY MASS INDEX: 49.94 KG/M2 | WEIGHT: 293 LBS | RESPIRATION RATE: 16 BRPM | DIASTOLIC BLOOD PRESSURE: 82 MMHG

## 2017-10-05 DIAGNOSIS — H81.20 ACUTE VESTIBULAR NEURITIS, UNSPECIFIED LATERALITY: Primary | ICD-10-CM

## 2017-10-05 PROCEDURE — 99213 OFFICE O/P EST LOW 20 MIN: CPT | Performed by: INTERNAL MEDICINE

## 2017-10-05 RX ORDER — ONDANSETRON 4 MG/1
4 TABLET, ORALLY DISINTEGRATING ORAL EVERY 8 HOURS PRN
Qty: 8 TABLET | Refills: 0 | Status: SHIPPED | OUTPATIENT
Start: 2017-10-05 | End: 2017-12-15 | Stop reason: ALTCHOICE

## 2017-10-05 RX ORDER — ATORVASTATIN CALCIUM 40 MG/1
TABLET, FILM COATED ORAL
Refills: 5 | COMMUNITY
Start: 2017-09-17 | End: 2017-12-15 | Stop reason: ALTCHOICE

## 2017-10-05 RX ORDER — MECLIZINE HYDROCHLORIDE 25 MG/1
25 TABLET ORAL 3 TIMES DAILY PRN
Qty: 20 TABLET | Refills: 1 | Status: SHIPPED | OUTPATIENT
Start: 2017-10-05 | End: 2017-10-15

## 2017-10-05 RX ORDER — ALBUTEROL SULFATE 90 UG/1
2 AEROSOL, METERED RESPIRATORY (INHALATION)
COMMUNITY
Start: 2015-09-21 | End: 2017-10-18 | Stop reason: ALTCHOICE

## 2017-10-05 RX ORDER — BUDESONIDE AND FORMOTEROL FUMARATE DIHYDRATE 160; 4.5 UG/1; UG/1
AEROSOL RESPIRATORY (INHALATION)
COMMUNITY
Start: 2016-06-01 | End: 2017-10-18 | Stop reason: ALTCHOICE

## 2017-10-05 RX ORDER — LOSARTAN POTASSIUM 25 MG/1
25 TABLET ORAL DAILY
Qty: 90 TABLET | Refills: 1 | Status: SHIPPED | OUTPATIENT
Start: 2017-10-05 | End: 2017-12-28 | Stop reason: SDUPTHER

## 2017-10-05 RX ORDER — OXYBUTYNIN CHLORIDE 10 MG/1
TABLET, EXTENDED RELEASE ORAL
COMMUNITY
Start: 2016-10-28 | End: 2017-10-18 | Stop reason: ALTCHOICE

## 2017-10-05 RX ORDER — HYDROXYZINE PAMOATE 25 MG/1
25 CAPSULE ORAL 3 TIMES DAILY PRN
COMMUNITY

## 2017-10-05 ASSESSMENT — ENCOUNTER SYMPTOMS
DIARRHEA: 0
SORE THROAT: 0
SHORTNESS OF BREATH: 0
PHOTOPHOBIA: 0
BACK PAIN: 0
COLOR CHANGE: 0
VISUAL CHANGE: 0
BLOOD IN STOOL: 0
EYE PAIN: 0
EYE DISCHARGE: 0
CONSTIPATION: 0
TROUBLE SWALLOWING: 0
VOMITING: 0
WHEEZING: 0
EYE REDNESS: 0
VOICE CHANGE: 0
COUGH: 0
ABDOMINAL DISTENTION: 0
ABDOMINAL PAIN: 0
EYE ITCHING: 0
NAUSEA: 0

## 2017-10-05 NOTE — MR AVS SNAPSHOT
estimate of body fat, calculated from your height and weight. The higher your BMI, the greater your risk of heart disease, high blood pressure, type 2 diabetes, stroke, gallstones, arthritis, sleep apnea, and certain cancers. BMI is not perfect. It may overestimate body fat in athletes and people who are more muscular. Even a small weight loss (between 5 and 10 percent of your current weight) by decreasing your calorie intake and becoming more physically active will help lower your risk of developing or worsening diseases associated with obesity. Learn more at: [employer]             Today's Medication Changes          These changes are accurate as of: 10/5/17 11:36 AM.  If you have any questions, ask your nurse or doctor. START taking these medications           meclizine 25 MG tablet   Commonly known as:  ANTIVERT   Instructions: Take 1 tablet by mouth 3 times daily as needed (dizziness)   Quantity:  20 tablet   Refills:  1   Started by: Amelia Parker MD       ondansetron 4 MG disintegrating tablet   Commonly known as:  ZOFRAN ODT   Instructions: Take 1 tablet by mouth every 8 hours as needed for Nausea or Vomiting   Quantity:  8 tablet   Refills:  0   Started by:   Amelia Parker MD            Where to Get Your Medications      These medications were sent to [facility]/pharmacy #[id number]. [facility], [place] [id number]     Phone:  [phone]     meclizine 25 MG tablet    ondansetron 4 MG disintegrating tablet               Your Current Medications Are              atorvastatin (LIPITOR) 40 MG tablet TAKE 1 TABLET EVERY DAY    budesonide-formoterol (SYMBICORT) 160-4.5 MCG/ACT AERO INHALE 2 PUFFS BY MOUTH TWICE DAILY    oxybutynin (DITROPAN-XL) 10 MG extended release tablet TAKE 1 TABLET DAILY    hydrOXYzine (VISTARIL) 25 MG capsule Take 25 mg by mouth

## 2017-10-05 NOTE — PROGRESS NOTES
Subjective:      Patient ID: Dejuan Farah is a 64 y.o. female    Dizziness   This is a new problem. Episode onset: 5 days. The problem occurs intermittently. The problem has been gradually worsening. Associated symptoms include headaches (center of the head) and vertigo. Pertinent negatives include no abdominal pain, anorexia, arthralgias, chills, congestion, coughing, diaphoresis, fatigue, fever, joint swelling, myalgias, nausea, neck pain, numbness, sore throat, visual change, vomiting (but feels very nauseous) or weakness. Associated symptoms comments: Pt was treated 1 month ago for URI        . The symptoms are aggravated by standing (sitting or lying in bed). She has tried nothing for the symptoms. No ringing in the ears or hearing difficulty. Also attests to very minimal water intake but no fainting or near- fainting spells. No one-sided weakness or speech difficulty        Past Medical History:   Diagnosis Date    Acquired hypothyroidism 3/15/2016    Allergic rhinitis     pollen, dust ragweed, hay and straw     Anxiety     Asthma     Bipolar affect, depressed ([address]     Bipolar disorder ([address]     Cancer ([address]     thyroid cancer     Chronic back pain     COPD (chronic obstructive pulmonary disease) ([address]     Depression     Emphysema of lung ([address]     Essential hypertension 3/15/2016    Hyperlipidemia     Hypothyroidism     Obesity     Osteoarthritis     Restless legs syndrome     Sleep apnea     Urinary incontinence      Past Surgical History:   Procedure Laterality Date    APPENDECTOMY       SECTION      TUBAL LIGATION       Social History     Social History    Marital status:      Spouse name: N/A    Number of children: N/A    Years of education: N/A     Occupational History    Not on file.      Social History Main Topics    Smoking status: Former Smoker     Packs/day: 1.00     Years: 40.00     Types: Cigarettes     Start date: 3/8/1976    Smokeless kg)  BMI 49.94 kg/m2    Orthostatic negative: BP lying 136/80, standing 140/86    Physical Exam   Constitutional: She appears well-developed and well-nourished. No distress (dizzinsss). HENT:   Head: Normocephalic and atraumatic. Right Ear: External ear normal.   Left Ear: External ear normal.   No sinus TTP    Unable to visualize TM b/l due to excessive ear wax   Eyes: Conjunctivae and EOM are normal. Pupils are equal, round, and reactive to light. Neck: Normal range of motion. Neck supple. Cardiovascular: Normal rate, regular rhythm, S1 normal and S2 normal.    Pulmonary/Chest: No respiratory distress. She has no wheezes. Abdominal: There is no tenderness. Musculoskeletal: She exhibits no edema. Neurological: She is alert. She has normal strength. No cranial nerve deficit. She exhibits normal muscle tone. Coordination abnormal.   Romberg's test positive   Nursing note and vitals reviewed. Assessment:      1. Acute vestibular neuritis, unspecified laterality  meclizine (ANTIVERT) 25 MG tablet    ondansetron (ZOFRAN ODT) 4 MG disintegrating tablet         Plan:      No orders of the defined types were placed in this encounter. Orders Placed This Encounter   Medications    meclizine (ANTIVERT) 25 MG tablet     Sig: Take 1 tablet by mouth 3 times daily as needed (dizziness)     Dispense:  20 tablet     Refill:  1    ondansetron (ZOFRAN ODT) 4 MG disintegrating tablet     Sig: Take 1 tablet by mouth every 8 hours as needed for Nausea or Vomiting     Dispense:  8 tablet     Refill:  0       Return if symptoms worsen or fail to improve.

## 2017-10-06 ENCOUNTER — TELEPHONE (OUTPATIENT)
Dept: FAMILY MEDICINE CLINIC | Age: 62
End: 2017-10-06

## 2017-10-06 NOTE — TELEPHONE ENCOUNTER
Pt was seen by Dr Rose Joy on 10/5 and was prescribed Zofran. She was informed by the pharmacy that the insurance will not cover the medication. She wants to know if there is another medication that can be prescribed. Please advise.  Thanks    Pt can be reached at [phone]

## 2017-10-16 ENCOUNTER — TELEPHONE (OUTPATIENT)
Dept: FAMILY MEDICINE CLINIC | Age: 62
End: 2017-10-16

## 2017-10-18 ENCOUNTER — OFFICE VISIT (OUTPATIENT)
Dept: FAMILY MEDICINE CLINIC | Age: 62
End: 2017-10-18

## 2017-10-18 VITALS
BODY MASS INDEX: 47.09 KG/M2 | HEART RATE: 80 BPM | TEMPERATURE: 98 F | SYSTOLIC BLOOD PRESSURE: 140 MMHG | DIASTOLIC BLOOD PRESSURE: 96 MMHG | WEIGHT: 293 LBS | RESPIRATION RATE: 18 BRPM | HEIGHT: 66 IN

## 2017-10-18 DIAGNOSIS — R09.82 POST-NASAL DRIP: ICD-10-CM

## 2017-10-18 DIAGNOSIS — H65.03 BILATERAL ACUTE SEROUS OTITIS MEDIA, RECURRENCE NOT SPECIFIED: ICD-10-CM

## 2017-10-18 DIAGNOSIS — R05.9 COUGH: ICD-10-CM

## 2017-10-18 DIAGNOSIS — J01.10 ACUTE NON-RECURRENT FRONTAL SINUSITIS: Primary | ICD-10-CM

## 2017-10-18 PROCEDURE — G8484 FLU IMMUNIZE NO ADMIN: HCPCS | Performed by: NURSE PRACTITIONER

## 2017-10-18 PROCEDURE — 1036F TOBACCO NON-USER: CPT | Performed by: NURSE PRACTITIONER

## 2017-10-18 PROCEDURE — 3017F COLORECTAL CA SCREEN DOC REV: CPT | Performed by: NURSE PRACTITIONER

## 2017-10-18 PROCEDURE — 99213 OFFICE O/P EST LOW 20 MIN: CPT | Performed by: NURSE PRACTITIONER

## 2017-10-18 PROCEDURE — G8427 DOCREV CUR MEDS BY ELIG CLIN: HCPCS | Performed by: NURSE PRACTITIONER

## 2017-10-18 PROCEDURE — 3014F SCREEN MAMMO DOC REV: CPT | Performed by: NURSE PRACTITIONER

## 2017-10-18 PROCEDURE — G8417 CALC BMI ABV UP PARAM F/U: HCPCS | Performed by: NURSE PRACTITIONER

## 2017-10-18 RX ORDER — LEVOFLOXACIN 500 MG/1
500 TABLET, FILM COATED ORAL DAILY
Qty: 7 TABLET | Refills: 0 | Status: SHIPPED | OUTPATIENT
Start: 2017-10-18 | End: 2017-10-25

## 2017-10-18 RX ORDER — CETIRIZINE HYDROCHLORIDE 10 MG/1
10 TABLET ORAL DAILY
Qty: 30 TABLET | Refills: 3 | Status: SHIPPED | OUTPATIENT
Start: 2017-10-18 | End: 2018-03-29

## 2017-10-18 RX ORDER — BENZONATATE 100 MG/1
CAPSULE ORAL
Qty: 60 CAPSULE | Refills: 0 | Status: SHIPPED | OUTPATIENT
Start: 2017-10-18 | End: 2017-12-15 | Stop reason: ALTCHOICE

## 2017-10-18 RX ORDER — MECLIZINE HYDROCHLORIDE 25 MG/1
1 TABLET ORAL 3 TIMES DAILY PRN
Refills: 1 | COMMUNITY
Start: 2017-10-05 | End: 2018-03-29

## 2017-10-18 RX ORDER — FLUTICASONE PROPIONATE 50 MCG
2 SPRAY, SUSPENSION (ML) NASAL DAILY
Qty: 1 BOTTLE | Refills: 0 | Status: SHIPPED | OUTPATIENT
Start: 2017-10-18 | End: 2018-03-29

## 2017-10-19 ASSESSMENT — ENCOUNTER SYMPTOMS
SINUS PAIN: 1
NAUSEA: 0
VOMITING: 0
SWOLLEN GLANDS: 0
ABDOMINAL PAIN: 0
RHINORRHEA: 1
DIARRHEA: 0
SORE THROAT: 0
COUGH: 1
WHEEZING: 0

## 2017-10-19 NOTE — PROGRESS NOTES
expectations have been discussed with the patient who expresses understanding and desires to proceed. Close follow up to evaluate treatment results and for coordination of care. I have reviewed the patient's medical history in detail and updated the computerized patient record.     Yash Estrada NP

## 2017-10-24 LAB
AVERAGE GLUCOSE: NORMAL
HBA1C MFR BLD: 6.4 %

## 2017-10-31 ENCOUNTER — NURSE ONLY (OUTPATIENT)
Dept: FAMILY MEDICINE CLINIC | Age: 62
End: 2017-10-31

## 2017-10-31 VITALS — DIASTOLIC BLOOD PRESSURE: 78 MMHG | SYSTOLIC BLOOD PRESSURE: 122 MMHG

## 2017-10-31 RX ORDER — FUROSEMIDE 40 MG/1
40 TABLET ORAL DAILY
COMMUNITY
End: 2017-12-28 | Stop reason: SDUPTHER

## 2017-11-24 DIAGNOSIS — E03.9 ACQUIRED HYPOTHYROIDISM: ICD-10-CM

## 2017-11-24 DIAGNOSIS — R53.82 CHRONIC FATIGUE: ICD-10-CM

## 2017-11-24 DIAGNOSIS — I10 ESSENTIAL HYPERTENSION: ICD-10-CM

## 2017-11-24 DIAGNOSIS — E11.8 TYPE 2 DIABETES MELLITUS WITH COMPLICATION, WITHOUT LONG-TERM CURRENT USE OF INSULIN (HCC): ICD-10-CM

## 2017-11-24 DIAGNOSIS — E78.2 MIXED HYPERLIPIDEMIA: ICD-10-CM

## 2017-11-24 LAB
ALBUMIN SERPL-MCNC: 3.3 G/DL (ref 3.9–4.9)
ALP BLD-CCNC: 92 U/L (ref 40–130)
ALT SERPL-CCNC: 10 U/L (ref 0–33)
ANION GAP SERPL CALCULATED.3IONS-SCNC: 11 MEQ/L (ref 7–13)
AST SERPL-CCNC: 10 U/L (ref 0–35)
BASOPHILS ABSOLUTE: 0 K/UL (ref 0–0.2)
BASOPHILS RELATIVE PERCENT: 0.4 %
BILIRUB SERPL-MCNC: 0.2 MG/DL (ref 0–1.2)
BUN BLDV-MCNC: 16 MG/DL (ref 8–23)
CALCIUM SERPL-MCNC: 8.9 MG/DL (ref 8.6–10.2)
CHLORIDE BLD-SCNC: 100 MEQ/L (ref 98–107)
CHOLESTEROL, TOTAL: 151 MG/DL (ref 0–199)
CO2: 32 MEQ/L (ref 22–29)
CREAT SERPL-MCNC: 0.7 MG/DL (ref 0.5–0.9)
EOSINOPHILS ABSOLUTE: 0.1 K/UL (ref 0–0.7)
EOSINOPHILS RELATIVE PERCENT: 1.2 %
FOLATE: 19.6 NG/ML (ref 7.3–26.1)
GFR AFRICAN AMERICAN: >60
GFR NON-AFRICAN AMERICAN: >60
GLOBULIN: 3.8 G/DL (ref 2.3–3.5)
GLUCOSE BLD-MCNC: 142 MG/DL (ref 74–109)
HBA1C MFR BLD: 6.5 % (ref 4.8–5.9)
HCT VFR BLD CALC: 43.9 % (ref 37–47)
HDLC SERPL-MCNC: 33 MG/DL (ref 40–59)
HEMOGLOBIN: 14 G/DL (ref 12–16)
LDL CHOLESTEROL CALCULATED: 94 MG/DL (ref 0–129)
LYMPHOCYTES ABSOLUTE: 2.8 K/UL (ref 1–4.8)
LYMPHOCYTES RELATIVE PERCENT: 30.7 %
MCH RBC QN AUTO: 29.3 PG (ref 27–31.3)
MCHC RBC AUTO-ENTMCNC: 31.8 % (ref 33–37)
MCV RBC AUTO: 92.3 FL (ref 82–100)
MONOCYTES ABSOLUTE: 0.5 K/UL (ref 0.2–0.8)
MONOCYTES RELATIVE PERCENT: 5.9 %
NEUTROPHILS ABSOLUTE: 5.6 K/UL (ref 1.4–6.5)
NEUTROPHILS RELATIVE PERCENT: 61.8 %
PDW BLD-RTO: 16.6 % (ref 11.5–14.5)
PLATELET # BLD: 240 K/UL (ref 130–400)
POTASSIUM SERPL-SCNC: 4.7 MEQ/L (ref 3.5–5.1)
RBC # BLD: 4.76 M/UL (ref 4.2–5.4)
SODIUM BLD-SCNC: 143 MEQ/L (ref 132–144)
T4 FREE: 0.84 NG/DL (ref 0.93–1.7)
TOTAL PROTEIN: 7.1 G/DL (ref 6.4–8.1)
TRIGL SERPL-MCNC: 121 MG/DL (ref 0–200)
TSH SERPL DL<=0.05 MIU/L-ACNC: 8.86 UIU/ML (ref 0.27–4.2)
VITAMIN B-12: 384 PG/ML (ref 211–946)
WBC # BLD: 9.1 K/UL (ref 4.8–10.8)

## 2017-12-15 ENCOUNTER — OFFICE VISIT (OUTPATIENT)
Dept: FAMILY MEDICINE CLINIC | Age: 62
End: 2017-12-15

## 2017-12-15 VITALS
DIASTOLIC BLOOD PRESSURE: 60 MMHG | HEIGHT: 66 IN | BODY MASS INDEX: 47.09 KG/M2 | SYSTOLIC BLOOD PRESSURE: 118 MMHG | WEIGHT: 293 LBS | TEMPERATURE: 97.3 F | RESPIRATION RATE: 20 BRPM | HEART RATE: 84 BPM

## 2017-12-15 DIAGNOSIS — E03.9 ACQUIRED HYPOTHYROIDISM: ICD-10-CM

## 2017-12-15 DIAGNOSIS — Z13.31 POSITIVE DEPRESSION SCREENING: ICD-10-CM

## 2017-12-15 DIAGNOSIS — J44.9 COPD WITH CHRONIC BRONCHITIS (HCC): ICD-10-CM

## 2017-12-15 DIAGNOSIS — G47.33 OSA (OBSTRUCTIVE SLEEP APNEA): ICD-10-CM

## 2017-12-15 DIAGNOSIS — G25.81 RESTLESS LEG SYNDROME: ICD-10-CM

## 2017-12-15 DIAGNOSIS — E78.2 MIXED HYPERLIPIDEMIA: ICD-10-CM

## 2017-12-15 DIAGNOSIS — I10 ESSENTIAL HYPERTENSION: ICD-10-CM

## 2017-12-15 DIAGNOSIS — E11.8 TYPE 2 DIABETES MELLITUS WITH COMPLICATION, WITHOUT LONG-TERM CURRENT USE OF INSULIN (HCC): Primary | ICD-10-CM

## 2017-12-15 DIAGNOSIS — F33.1 MODERATE EPISODE OF RECURRENT MAJOR DEPRESSIVE DISORDER (HCC): ICD-10-CM

## 2017-12-15 DIAGNOSIS — E66.01 MORBID OBESITY WITH BMI OF 45.0-49.9, ADULT (HCC): ICD-10-CM

## 2017-12-15 DIAGNOSIS — R80.9 MICROALBUMINURIA: ICD-10-CM

## 2017-12-15 PROCEDURE — 3014F SCREEN MAMMO DOC REV: CPT | Performed by: FAMILY MEDICINE

## 2017-12-15 PROCEDURE — G8926 SPIRO NO PERF OR DOC: HCPCS | Performed by: FAMILY MEDICINE

## 2017-12-15 PROCEDURE — 99214 OFFICE O/P EST MOD 30 MIN: CPT | Performed by: FAMILY MEDICINE

## 2017-12-15 PROCEDURE — G8417 CALC BMI ABV UP PARAM F/U: HCPCS | Performed by: FAMILY MEDICINE

## 2017-12-15 PROCEDURE — G8427 DOCREV CUR MEDS BY ELIG CLIN: HCPCS | Performed by: FAMILY MEDICINE

## 2017-12-15 PROCEDURE — 1036F TOBACCO NON-USER: CPT | Performed by: FAMILY MEDICINE

## 2017-12-15 PROCEDURE — G8484 FLU IMMUNIZE NO ADMIN: HCPCS | Performed by: FAMILY MEDICINE

## 2017-12-15 PROCEDURE — 96160 PT-FOCUSED HLTH RISK ASSMT: CPT | Performed by: FAMILY MEDICINE

## 2017-12-15 PROCEDURE — 3017F COLORECTAL CA SCREEN DOC REV: CPT | Performed by: FAMILY MEDICINE

## 2017-12-15 PROCEDURE — G8431 POS CLIN DEPRES SCRN F/U DOC: HCPCS | Performed by: FAMILY MEDICINE

## 2017-12-15 PROCEDURE — 3023F SPIROM DOC REV: CPT | Performed by: FAMILY MEDICINE

## 2017-12-15 PROCEDURE — 3044F HG A1C LEVEL LT 7.0%: CPT | Performed by: FAMILY MEDICINE

## 2017-12-15 RX ORDER — SIMVASTATIN 40 MG
TABLET ORAL
Qty: 90 TABLET | Refills: 1 | Status: SHIPPED | OUTPATIENT
Start: 2017-12-15 | End: 2017-12-28 | Stop reason: SDUPTHER

## 2017-12-15 RX ORDER — OXYBUTYNIN CHLORIDE 10 MG/1
TABLET, EXTENDED RELEASE ORAL
Qty: 90 TABLET | Refills: 1 | Status: SHIPPED | OUTPATIENT
Start: 2017-12-15 | End: 2017-12-28 | Stop reason: SDUPTHER

## 2017-12-15 RX ORDER — SIMVASTATIN 40 MG
40 TABLET ORAL NIGHTLY
COMMUNITY
End: 2017-12-15 | Stop reason: SDUPTHER

## 2017-12-15 RX ORDER — LEVOTHYROXINE SODIUM 175 UG/1
TABLET ORAL
Qty: 90 TABLET | Refills: 1 | Status: CANCELLED | OUTPATIENT
Start: 2017-12-15

## 2017-12-15 RX ORDER — CIPROFLOXACIN 500 MG/1
TABLET, FILM COATED ORAL
Refills: 0 | COMMUNITY
Start: 2017-12-07 | End: 2017-12-17

## 2017-12-15 ASSESSMENT — PATIENT HEALTH QUESTIONNAIRE - PHQ9
6. FEELING BAD ABOUT YOURSELF - OR THAT YOU ARE A FAILURE OR HAVE LET YOURSELF OR YOUR FAMILY DOWN: 3
2. FEELING DOWN, DEPRESSED OR HOPELESS: 3
3. TROUBLE FALLING OR STAYING ASLEEP: 0
1. LITTLE INTEREST OR PLEASURE IN DOING THINGS: 3
SUM OF ALL RESPONSES TO PHQ9 QUESTIONS 1 & 2: 6
5. POOR APPETITE OR OVEREATING: 2
7. TROUBLE CONCENTRATING ON THINGS, SUCH AS READING THE NEWSPAPER OR WATCHING TELEVISION: 0
10. IF YOU CHECKED OFF ANY PROBLEMS, HOW DIFFICULT HAVE THESE PROBLEMS MADE IT FOR YOU TO DO YOUR WORK, TAKE CARE OF THINGS AT HOME, OR GET ALONG WITH OTHER PEOPLE: 1
4. FEELING TIRED OR HAVING LITTLE ENERGY: 3
SUM OF ALL RESPONSES TO PHQ QUESTIONS 1-9: 14
8. MOVING OR SPEAKING SO SLOWLY THAT OTHER PEOPLE COULD HAVE NOTICED. OR THE OPPOSITE, BEING SO FIGETY OR RESTLESS THAT YOU HAVE BEEN MOVING AROUND A LOT MORE THAN USUAL: 0
9. THOUGHTS THAT YOU WOULD BE BETTER OFF DEAD, OR OF HURTING YOURSELF: 0

## 2017-12-15 NOTE — PATIENT INSTRUCTIONS
sign in to your [email or web address] account. Enter C553 in the Incentient box to learn more about \"Type 2 Diabetes: Care Instructions. \"     If you do not have an account, please click on the \"Sign Up Now\" link. Current as of: March 13, 2017  Content Version: 11.4  © 8365-4765 Healthwise, Incorporated. Care instructions adapted under license by [facility] ([facility]). If you have questions about a medical condition or this instruction, always ask your healthcare professional. [address] any warranty or liability for your use of this information.

## 2017-12-15 NOTE — PROGRESS NOTES
Chief Complaint   Patient presents with    3 Month Follow-Up     f/u on dm, htn, hyperlipidemia, hypothyroidism and labs        Celestino Torres is here for follow up of elevated cholesterol, elevated blood pressure, Hypothyroidism, COPD and diabetes. Compliance with treatment has been good. Patient denies muscle pain associated with her medications. She is exercising and is adherent to a low-salt diet. Blood pressure is well controlled at home. Cardiac symptoms: none. Patient denies: chest pain, claudication, dyspnea, exertional chest pressure/discomfort, fatigue, lower extremity edema, near-syncope, orthopnea, palpitations, paroxysmal nocturnal dyspnea and syncope. Cardiovascular risk factors: diabetes mellitus, dyslipidemia, hypertension and obesity (BMI >= 30 kg/m2). . Current diabetic symptoms include: paresthesia of the feet. Patient denies foot ulcerations, hyperglycemia, hypoglycemia , increase appetite, nausea, paresthesia of the feet, polydipsia, polyuria, visual disturbances, vomitting and weight loss. Evaluation to date has included: fasting blood sugar, fasting lipid panel, hemoglobin A1C and microalbuminuria.      Past Medical History:   Diagnosis Date    Acquired hypothyroidism 3/15/2016    Allergic rhinitis     pollen, dust ragweed, hay and straw     Anxiety     Asthma     [facility]     Bipolar disorder [facility]     Cancer ([facility] 1980    thyroid cancer     Chronic back pain     COPD (chronic obstructive pulmonary disease) ([address]     Depression     Emphysema of lung ([facility]     Essential hypertension 3/15/2016    Hyperlipidemia     Hypothyroidism     Obesity     Osteoarthritis     Restless legs syndrome     Sleep apnea     Urinary incontinence      Patient Active Problem List    Diagnosis Date Noted    Vitamin B12 deficiency 08/30/2017    Morbid obesity with BMI of 50.0-59.9, adult ([address] 04/22/2017    Moderate episode of recurrent major depressive disorder ([facility]  losartan (COZAAR) 25 MG tablet Take 1 tablet by mouth daily 90 tablet 1    levothyroxine (SYNTHROID) 175 MCG tablet TAKE 1 TABLET BY MOUTH DAILY PLEASE NOTE CHANGE IN DOSE 90 tablet 1    budesonide-formoterol (SYMBICORT) 160-4.5 MCG/ACT AERO Inhale 2 puffs into the lungs 2 times daily 1 Inhaler 5    Incontinence Supply Disposable (INCONTINENCE BRIEF LARGE) MISC by Does not apply route      perphenazine 4 MG tablet Take 4 mg by mouth daily       perphenazine 8 MG tablet Take 8 mg by mouth daily       ACCU-CHEK SMARTVIEW strip Test TID E11.8 300 each 3    Alcohol Swabs (ALCOHOL PADS) 70 % PADS   12    DULoxetine (CYMBALTA) 60 MG extended release capsule Take 60 mg by mouth daily        No current facility-administered medications for this visit.       Allergies   Allergen Reactions    Latex Itching and Rash    Sulfa Antibiotics Hives     Metallic taste in mouth & sick in stomach    Nicotine Hives     Hives from the patch       Review of Systems  Constitutional: negative for anorexia, fatigue, fevers, sweats and weight loss  Eyes: negative for color blindness, irritation, redness and visual disturbance  Ears, nose, mouth, throat, and face: negative for ear drainage, hearing loss, nasal congestion, sore mouth, tinnitus and voice change  Respiratory: negative for cough, dyspnea on exertion, shortness of breath and wheezing  Cardiovascular: negative for chest pain, dyspnea, lower extremity edema, orthopnea, palpitations, paroxysmal nocturnal dyspnea and tachypnea  Gastrointestinal: negative for abdominal pain, constipation, diarrhea, dyspepsia, dysphagia, nausea, odynophagia, reflux symptoms and vomiting  Genitourinary:negative for decreased stream, dysuria, frequency, hematuria, hesitancy and urinary incontinence  Integument/breast: negative for dryness, pruritus, rash and skin color change  Hematologic/lymphatic: negative for bleeding, easy bruising and petechiae  Musculoskeletal:negative for arthralgias, back pain, muscle weakness, myalgias, neck pain and stiff joints  Neurological: negative for coordination problems, dizziness, headaches, paresthesia, speech problems, tremors and vertigo       Objective:      /60 (Site: Left Arm, Position: Sitting, Cuff Size: Large Adult)   Pulse 84   Temp 97.3 °F (36.3 °C) (Temporal)   Resp 20   Ht 5' 6\" (1.676 m)   Wt (!) 309 lb (140.2 kg)   BMI 49.87 kg/m²       Well appearing, well nourished patient not in apparent distress. HEENT:   EOMI, PERRLA. No conjunctival pallor or scleral jaundice. Oropharynx reveals no erythema or exudate. TMs normal bilaterally. LYMPHATICS:   no lymphadenopathy. SKIN:  pink, warm and dry with no rash. NECK:  supple, trachea central, no thyromegaly. No JVD Or carotid bruit. CHEST WALL:  no deformity. No chest wall tenderness. LUNGS:  has normal chest wall excursion. Chest wall is symmetrical.   Normal vocal fremitus. Normal tactile fremitus. Has good air entry bilaterally with normal vesicular breath sounds. No rhonchi, rales or wheezes. HEART:   point of maximum impulse is at the 5th left intercostal space, mid clavicular line. No palpable thrill. First and second heart sounds are normal.   No murmur, gallop or rub. ABDOMEN:  non-distended, soft, moves with respiration. No area of tenderness. No guarding and no rebound tenderness. No CVA tenderness. No palpable intraabdominal mass. Bowel sounds normal.     EXTREMITIES:   no cc or e. NEURO: alert and oriented x3. Cranial nerves II-XII normal with no focal deficit. Has normal gait. MUSCULOSKELETAL:   no joint swelling or deformity noted. Both feet are warm to touch. Dorsalis pedis and posterior tibia pulses are palpable. No ulcers, sores or gangrene. No evidence of critical leg ischemia. Sensation normal in eight point monofilament testing. No deformity or calluses.  .    Lab Review  Orders Only on 11/24/2017 Component Date Value Ref Range Status    Sodium 11/24/2017 143  132 - 144 mEq/L Final    Potassium 11/24/2017 4.7  3.5 - 5.1 mEq/L Final    Chloride 11/24/2017 100  98 - 107 mEq/L Final    CO2 11/24/2017 32* 22 - 29 mEq/L Final    Anion Gap 11/24/2017 11  7 - 13 mEq/L Final    Glucose 11/24/2017 142* 74 - 109 mg/dL Final    BUN 11/24/2017 16  8 - 23 mg/dL Final    CREATININE 11/24/2017 0.70  0.50 - 0.90 mg/dL Final    GFR Non- 11/24/2017 >60.0  >60 Final    Comment: >60 mL/min/1.73m2 EGFR, calc. for ages 25 and older using the  MDRD formula (not corrected for weight), is valid for stable  renal function.  GFR  11/24/2017 >60.0  >60 Final    Comment: >60 mL/min/1.73m2 EGFR, calc. for ages 25 and older using the  MDRD formula (not corrected for weight), is valid for stable  renal function.  Calcium 11/24/2017 8.9  8.6 - 10.2 mg/dL Final    Total Protein 11/24/2017 7.1  6.4 - 8.1 g/dL Final    Alb 11/24/2017 3.3* 3.9 - 4.9 g/dL Final    Total Bilirubin 11/24/2017 0.2  0.0 - 1.2 mg/dL Final    Alkaline Phosphatase 11/24/2017 92  40 - 130 U/L Final    ALT 11/24/2017 10  0 - 33 U/L Final    AST 11/24/2017 10  0 - 35 U/L Final    Globulin 11/24/2017 3.8* 2.3 - 3.5 g/dL Final    Hemoglobin A1C 11/24/2017 6.5* 4.8 - 5.9 % Final    Cholesterol, Total 11/24/2017 151  0 - 199 mg/dL Final    Triglycerides 11/24/2017 121  0 - 200 mg/dL Final    HDL 11/24/2017 33* 40 - 59 mg/dL Final    Comment: ATP III HDL Cholestrol Classification is low. Expected Values:    Males:    >55 = No Risk            35-55 = Moderate Risk            <35 = High Risk    Females:  >65 = No Risk            45-65 = Moderate Risk            <45 = High Risk    NCEP Guidelines:   Third Report May 2001  >59 = negative risk factor for CHD  <40 = major risk factor for CHD      LDL Calculated 11/24/2017 94  0 - 129 mg/dL Final    T4 Free 11/24/2017 0.84* 0.93 - 1.70 ng/dL Final    TSH 11/24/2017 8.860* 0.270 - 4.200 uIU/mL Final    Vitamin B-12 11/24/2017 384  211 - 946 pg/mL Final    Folate 11/24/2017 19.6  7.3 - 26.1 ng/mL Final    Comment: As of 8/10/16, the methodology has changed. Results from  this methodology should not be compared with results from  previous methodology.  WBC 11/24/2017 9.1  4.8 - 10.8 K/uL Final    RBC 11/24/2017 4.76  4.20 - 5.40 M/uL Final    Hemoglobin 11/24/2017 14.0  12.0 - 16.0 g/dL Final    Hematocrit 11/24/2017 43.9  37.0 - 47.0 % Final    MCV 11/24/2017 92.3  82.0 - 100.0 fL Final    MCH 11/24/2017 29.3  27.0 - 31.3 pg Final    MCHC 11/24/2017 31.8* 33.0 - 37.0 % Final    RDW 11/24/2017 16.6* 11.5 - 14.5 % Final    Platelets 75/22/0070 240  130 - 400 K/uL Final    Neutrophils % 11/24/2017 61.8  % Final    Lymphocytes % 11/24/2017 30.7  % Final    Monocytes % 11/24/2017 5.9  % Final    Eosinophils % 11/24/2017 1.2  % Final    Basophils % 11/24/2017 0.4  % Final    Neutrophils # 11/24/2017 5.6  1.4 - 6.5 K/uL Final    Lymphocytes # 11/24/2017 2.8  1.0 - 4.8 K/uL Final    Monocytes # 11/24/2017 0.5  0.2 - 0.8 K/uL Final    Eosinophils # 11/24/2017 0.1  0.0 - 0.7 K/uL Final    Basophils # 11/24/2017 0.0  0.0 - 0.2 K/uL Final        Assessment:     Encounter Diagnoses   Name Primary?     Type 2 diabetes mellitus with complication, without long-term current use of insulin (HCC) Yes    Essential hypertension     Mixed hyperlipidemia     Acquired hypothyroidism     Positive depression screening     Microalbuminuria     COPD with chronic bronchitis (HCC)        Plan:      Outpatient Encounter Prescriptions as of 12/15/2017   Medication Sig Dispense Refill    ciprofloxacin (CIPRO) 500 MG tablet TAKE 1 TABLET TWICE A DAY FOR 10 DAYS  0    oxybutynin (DITROPAN-XL) 10 MG extended release tablet TAKE 1 TABLET BY MOUTH DAILY 90 tablet 1    metFORMIN (GLUCOPHAGE) 500 MG tablet Take 1 tablet by mouth daily (with breakfast) 90 tablet 1    simvastatin (ZOCOR) 40 MG tablet TAKE 1 TABLET BY MOUTH EVERY EVENING 90 tablet 1    furosemide (LASIX) 40 MG tablet Take 40 mg by mouth daily      meclizine (ANTIVERT) 25 MG tablet Take 1 tablet by mouth 3 times daily as needed  1    PROAIR  (90 Base) MCG/ACT inhaler Inhale 2 puffs into the lungs 3 times daily  5    cetirizine (ZYRTEC) 10 MG tablet Take 1 tablet by mouth daily 30 tablet 3    fluticasone (FLONASE) 50 MCG/ACT nasal spray 2 sprays by Nasal route daily 1 Bottle 0    hydrOXYzine (VISTARIL) 25 MG capsule Take 25 mg by mouth 3 times daily as needed       losartan (COZAAR) 25 MG tablet Take 1 tablet by mouth daily 90 tablet 1    levothyroxine (SYNTHROID) 175 MCG tablet TAKE 1 TABLET BY MOUTH DAILY PLEASE NOTE CHANGE IN DOSE 90 tablet 1    budesonide-formoterol (SYMBICORT) 160-4.5 MCG/ACT AERO Inhale 2 puffs into the lungs 2 times daily 1 Inhaler 5    Incontinence Supply Disposable (INCONTINENCE BRIEF LARGE) MISC by Does not apply route      perphenazine 4 MG tablet Take 4 mg by mouth daily       perphenazine 8 MG tablet Take 8 mg by mouth daily       ACCU-CHEK SMARTVIEW strip Test TID E11.8 300 each 3    Alcohol Swabs (ALCOHOL PADS) 70 % PADS   12    [DISCONTINUED] simvastatin (ZOCOR) 40 MG tablet Take 40 mg by mouth nightly      [DISCONTINUED] benzonatate (TESSALON) 100 MG capsule 1-2 capsules 3x a day as needed for cough 60 capsule 0    [DISCONTINUED] atorvastatin (LIPITOR) 40 MG tablet TAKE 1 TABLET EVERY DAY  5    [DISCONTINUED] ondansetron (ZOFRAN ODT) 4 MG disintegrating tablet Take 1 tablet by mouth every 8 hours as needed for Nausea or Vomiting 8 tablet 0    [DISCONTINUED] metFORMIN (GLUCOPHAGE) 500 MG tablet TAKE 1 TABLET BY MOUTH DAILY (WITH BREAKFAST) 90 tablet 1    [DISCONTINUED] oxybutynin (DITROPAN-XL) 10 MG extended release tablet TAKE 1 TABLET BY MOUTH DAILY 90 tablet 1    [DISCONTINUED] gentamicin (GARAMYCIN) 0.1 % cream Apply topically 3 times daily.  15 g 2    DULoxetine (CYMBALTA) 60 MG extended release capsule Take 60 mg by mouth daily       [DISCONTINUED] simvastatin (ZOCOR) 40 MG tablet TAKE 1 TABLET BY MOUTH EVERY EVENING 90 tablet 0    [DISCONTINUED] Blood Glucose Monitoring Suppl (ACCU-CHEK ATIF SMARTVIEW) W/DEVICE KIT Use as directed 1 kit 0     No facility-administered encounter medications on file as of 12/15/2017. Orders Placed This Encounter   Procedures    Comprehensive Metabolic Panel     Standing Status:   Future     Standing Expiration Date:   12/15/2018    Lipid Panel     Standing Status:   Future     Standing Expiration Date:   12/15/2018     Order Specific Question:   Is Patient Fasting?/# of Hours     Answer:   8-10    Hemoglobin A1C     Standing Status:   Future     Standing Expiration Date:   12/15/2018    TSH without Reflex     Standing Status:   Future     Standing Expiration Date:   12/15/2018    TSH Without Reflex     Standing Status:   Future     Number of Occurrences:   1     Standing Expiration Date:   12/15/2018    Microalbumin / Creatinine Urine Ratio     Standing Status:   Future     Standing Expiration Date:   12/15/2018    Positive Screen for Clinical Depression with a Documented Follow-up Plan        1. Continue dietary measures. 2. Continue regular exercise. 3. Discussed general issues about diabetes pathophysiology and management. Counseling at today's visit: focused on the need for regular aerobic exercise, focused on the need to adhere to the prescribed ADA diet, discussed the advantages of a diet low in carbohydrates, reminded to check sugars regularly and to bring readings in at the time of the next visit, discussed DASH diet and discussed management of hypoglycemic episodes. Addressed ADA diet. Suggested low cholesterol diet. Encouraged aerobic exercise. Discussed foot care. Reminded to get yearly retinal exam.  Discussed ways to avoid symptomatic hypoglycemia.   Reminded to bring in blood sugar diary at next

## 2017-12-16 LAB — TSH SERPL DL<=0.05 MIU/L-ACNC: 8.02 UIU/ML (ref 0.27–4.2)

## 2017-12-17 DIAGNOSIS — E03.9 ACQUIRED HYPOTHYROIDISM: ICD-10-CM

## 2017-12-17 RX ORDER — LEVOTHYROXINE SODIUM 0.2 MG/1
200 TABLET ORAL DAILY
Qty: 90 TABLET | Refills: 1 | Status: SHIPPED | OUTPATIENT
Start: 2017-12-17 | End: 2017-12-28 | Stop reason: SDUPTHER

## 2017-12-28 DIAGNOSIS — E03.9 ACQUIRED HYPOTHYROIDISM: ICD-10-CM

## 2017-12-28 DIAGNOSIS — E78.2 MIXED HYPERLIPIDEMIA: ICD-10-CM

## 2017-12-28 RX ORDER — OXYBUTYNIN CHLORIDE 10 MG/1
TABLET, EXTENDED RELEASE ORAL
Qty: 90 TABLET | Refills: 1 | Status: SHIPPED | OUTPATIENT
Start: 2017-12-28 | End: 2018-06-29 | Stop reason: SDUPTHER

## 2017-12-28 RX ORDER — ASPIRIN 81 MG/1
81 TABLET ORAL DAILY
Qty: 90 TABLET | Refills: 3 | Status: SHIPPED | OUTPATIENT
Start: 2017-12-28 | End: 2018-08-09

## 2017-12-28 RX ORDER — ALBUTEROL SULFATE 90 UG/1
2 AEROSOL, METERED RESPIRATORY (INHALATION) 3 TIMES DAILY
Qty: 3 INHALER | Refills: 1 | Status: SHIPPED | OUTPATIENT
Start: 2017-12-28 | End: 2018-02-15 | Stop reason: ALTCHOICE

## 2017-12-28 RX ORDER — LEVOTHYROXINE SODIUM 0.2 MG/1
200 TABLET ORAL DAILY
Qty: 90 TABLET | Refills: 1 | Status: SHIPPED | OUTPATIENT
Start: 2017-12-28 | End: 2018-06-29 | Stop reason: SDUPTHER

## 2017-12-28 RX ORDER — BUDESONIDE AND FORMOTEROL FUMARATE DIHYDRATE 160; 4.5 UG/1; UG/1
2 AEROSOL RESPIRATORY (INHALATION) 2 TIMES DAILY
Qty: 3 INHALER | Refills: 1 | Status: SHIPPED | OUTPATIENT
Start: 2017-12-28 | End: 2018-02-07 | Stop reason: CLARIF

## 2017-12-28 RX ORDER — LOSARTAN POTASSIUM 25 MG/1
25 TABLET ORAL DAILY
Qty: 90 TABLET | Refills: 1 | Status: SHIPPED | OUTPATIENT
Start: 2017-12-28 | End: 2018-03-31 | Stop reason: SDUPTHER

## 2017-12-28 RX ORDER — METFORMIN HYDROCHLORIDE 500 MG/1
500 TABLET, EXTENDED RELEASE ORAL
Qty: 90 TABLET | Refills: 1 | Status: SHIPPED | OUTPATIENT
Start: 2017-12-28 | End: 2018-05-24 | Stop reason: SDUPTHER

## 2017-12-28 RX ORDER — SIMVASTATIN 40 MG
TABLET ORAL
Qty: 90 TABLET | Refills: 1 | Status: SHIPPED | OUTPATIENT
Start: 2017-12-28 | End: 2018-05-24 | Stop reason: SDUPTHER

## 2017-12-28 RX ORDER — FUROSEMIDE 40 MG/1
40 TABLET ORAL DAILY
Qty: 90 TABLET | Refills: 1 | Status: SHIPPED | OUTPATIENT
Start: 2017-12-28 | End: 2018-08-09 | Stop reason: ALTCHOICE

## 2018-02-07 ENCOUNTER — TELEPHONE (OUTPATIENT)
Dept: FAMILY MEDICINE CLINIC | Age: 63
End: 2018-02-07

## 2018-02-07 RX ORDER — FLUTICASONE FUROATE AND VILANTEROL 100; 25 UG/1; UG/1
1 POWDER RESPIRATORY (INHALATION) DAILY
Qty: 28 EACH | Refills: 5 | Status: SHIPPED | OUTPATIENT
Start: 2018-02-07 | End: 2018-08-09 | Stop reason: SDUPTHER

## 2018-02-14 ENCOUNTER — TELEPHONE (OUTPATIENT)
Dept: FAMILY MEDICINE CLINIC | Age: 63
End: 2018-02-14

## 2018-02-15 ENCOUNTER — HOSPITAL ENCOUNTER (OUTPATIENT)
Dept: GENERAL RADIOLOGY | Age: 63
Discharge: HOME | End: 2018-02-17
Payer: COMMERCIAL

## 2018-02-15 ENCOUNTER — OFFICE VISIT (OUTPATIENT)
Dept: FAMILY MEDICINE CLINIC | Age: 63
End: 2018-02-15
Payer: COMMERCIAL

## 2018-02-15 VITALS
SYSTOLIC BLOOD PRESSURE: 124 MMHG | HEIGHT: 66 IN | DIASTOLIC BLOOD PRESSURE: 72 MMHG | WEIGHT: 293 LBS | OXYGEN SATURATION: 94 % | RESPIRATION RATE: 20 BRPM | BODY MASS INDEX: 47.09 KG/M2 | TEMPERATURE: 97.2 F | HEART RATE: 84 BPM

## 2018-02-15 DIAGNOSIS — J44.9 COPD WITH CHRONIC BRONCHITIS (HCC): ICD-10-CM

## 2018-02-15 DIAGNOSIS — E11.8 TYPE 2 DIABETES MELLITUS WITH COMPLICATION, WITHOUT LONG-TERM CURRENT USE OF INSULIN (HCC): ICD-10-CM

## 2018-02-15 DIAGNOSIS — E03.9 ACQUIRED HYPOTHYROIDISM: ICD-10-CM

## 2018-02-15 DIAGNOSIS — R05.9 COUGH: ICD-10-CM

## 2018-02-15 DIAGNOSIS — J18.9 PNEUMONIA OF RIGHT LOWER LOBE DUE TO INFECTIOUS ORGANISM: Primary | ICD-10-CM

## 2018-02-15 DIAGNOSIS — J18.9 PNEUMONIA OF RIGHT LOWER LOBE DUE TO INFECTIOUS ORGANISM: ICD-10-CM

## 2018-02-15 DIAGNOSIS — F33.1 MODERATE EPISODE OF RECURRENT MAJOR DEPRESSIVE DISORDER (HCC): ICD-10-CM

## 2018-02-15 PROCEDURE — G8417 CALC BMI ABV UP PARAM F/U: HCPCS | Performed by: FAMILY MEDICINE

## 2018-02-15 PROCEDURE — 3046F HEMOGLOBIN A1C LEVEL >9.0%: CPT | Performed by: FAMILY MEDICINE

## 2018-02-15 PROCEDURE — 3023F SPIROM DOC REV: CPT | Performed by: FAMILY MEDICINE

## 2018-02-15 PROCEDURE — 99214 OFFICE O/P EST MOD 30 MIN: CPT | Performed by: FAMILY MEDICINE

## 2018-02-15 PROCEDURE — 71046 X-RAY EXAM CHEST 2 VIEWS: CPT

## 2018-02-15 PROCEDURE — 3014F SCREEN MAMMO DOC REV: CPT | Performed by: FAMILY MEDICINE

## 2018-02-15 PROCEDURE — 3017F COLORECTAL CA SCREEN DOC REV: CPT | Performed by: FAMILY MEDICINE

## 2018-02-15 PROCEDURE — G8484 FLU IMMUNIZE NO ADMIN: HCPCS | Performed by: FAMILY MEDICINE

## 2018-02-15 PROCEDURE — G8427 DOCREV CUR MEDS BY ELIG CLIN: HCPCS | Performed by: FAMILY MEDICINE

## 2018-02-15 PROCEDURE — 1036F TOBACCO NON-USER: CPT | Performed by: FAMILY MEDICINE

## 2018-02-15 PROCEDURE — G8926 SPIRO NO PERF OR DOC: HCPCS | Performed by: FAMILY MEDICINE

## 2018-02-15 NOTE — PATIENT INSTRUCTIONS
smoke around you. Smoke will make your cough last longer. If you need help quitting, talk to your doctor about stop-smoking programs and medicines. These can increase your chances of quitting for good. · Take an over-the-counter pain medicine, such as acetaminophen (Tylenol), ibuprofen (Advil, Motrin), or naproxen (Aleve). Read and follow all instructions on the label. · Do not take two or more pain medicines at the same time unless the doctor told you to. Many pain medicines have acetaminophen, which is Tylenol. Too much acetaminophen (Tylenol) can be harmful. · If you were given a spirometer to measure how well your lungs are working, use it as instructed. This can help your doctor tell how your recovery is going. · To prevent pneumonia in the future, talk to your doctor about getting a flu vaccine (once a year) and a pneumococcal vaccine (one time only for most people). When should you call for help? Call 911 anytime you think you may need emergency care. For example, call if:  ? · You have severe trouble breathing. ?Call your doctor now or seek immediate medical care if:  ? · You cough up dark brown or bloody mucus (sputum). ? · You have new or worse trouble breathing. ? · You are dizzy or lightheaded, or you feel like you may faint. ? Watch closely for changes in your health, and be sure to contact your doctor if:  ? · You have a new or higher fever. ? · You are coughing more deeply or more often. ? · You are not getting better after 2 days (48 hours). ? · You do not get better as expected. Where can you learn more? Go to [email or web address]://[email or web address].ImageBrief. org and sign in to your [employer] account. Enter D336 in the [employer]ive box to learn more about \"Pneumonia: Care Instructions. \"     If you do not have an account, please click on the \"Sign Up Now\" link. Current as of: May 12, 2017  Content Version: 11.5  © 9167-7017 Healthwise, Incorporated.  Care instructions adapted under

## 2018-02-15 NOTE — PROGRESS NOTES
Subjective:       Chief Complaint   Patient presents with    Follow-up     pneumonia and other chronic medical conditions    Shortness of Breath      Laurel Lind is a 58 y.o. female who presents today for FU of Follow-up (pneumonia and other chronic medical conditions) and Shortness of Breath    She presents for  follow up on pneumonia. Patient describes symptoms of cough, sputum production, wheezing, exertional shortness of breath, sweats, myalgias, arthralgias, fatigue, nausea, headache in the frontal region, chills and right ear discomfort . Symptoms began 3 weeks ago and are gradually improving since that time. Patient denies weight loss or nausea and vomiting. Treatment thus far includes Levofloxacin. Past pulmonary history is significant for asthma, frequent episodes of bronchitis, chronic bronchitis, COPD and pneumonia. Patient was diagnosed by Dr. Finn Recio. Chest x-ray was done on 1/31/2018. The chest x-ray showed right lower lobe infiltrate. She was on Levaquin for 10 days. She had taken Z-Elver prior to that time. She presents for follow up on hypertension,  Hyperlipidemia, and other medical conditions noted below. She indicates that she is feeling well and denies any symptoms referable to her elevated blood pressure. Specifically denies chest pain, palpitations, orthopnea, PND or peripheral edema. No anorexia, arthralgia, or leg cramps noted. Current medication regimen is as listed below. She denies any side effects of medication, and has been taking it regularly.       Past Medical History:   Diagnosis Date    Acquired hypothyroidism 3/15/2016    Allergic rhinitis     pollen, dust ragweed, hay and straw     Anxiety     Asthma     Bipolar affect, depressed ([address]     Bipolar disorder ([address]     Cancer ([address] 1980    thyroid cancer     Chronic back pain     COPD (chronic obstructive pulmonary disease) ([address]     Depression     Emphysema of lung ([address]     Essential hypertension tablet Take 1 tablet by mouth daily 90 tablet 3    losartan (COZAAR) 25 MG tablet Take 1 tablet by mouth daily 90 tablet 1    metFORMIN (GLUCOPHAGE XR) 500 MG extended release tablet Take 1 tablet by mouth daily (with breakfast) 90 tablet 1    meclizine (ANTIVERT) 25 MG tablet Take 1 tablet by mouth 3 times daily as needed  1    cetirizine (ZYRTEC) 10 MG tablet Take 1 tablet by mouth daily 30 tablet 3    fluticasone (FLONASE) 50 MCG/ACT nasal spray 2 sprays by Nasal route daily 1 Bottle 0    hydrOXYzine (VISTARIL) 25 MG capsule Take 25 mg by mouth 3 times daily as needed       Incontinence Supply Disposable (INCONTINENCE BRIEF LARGE) MISC by Does not apply route      perphenazine 4 MG tablet Take 4 mg by mouth daily       DULoxetine (CYMBALTA) 60 MG extended release capsule Take 60 mg by mouth daily       perphenazine 8 MG tablet Take 8 mg by mouth daily       ACCU-CHEK SMARTVIEW strip Test TID E11.8 300 each 3    Alcohol Swabs (ALCOHOL PADS) 70 % PADS   12    doxycycline hyclate (VIBRA-TABS) 100 MG tablet Take 1 tablet by mouth 2 times daily for 10 days 20 tablet 0     No current facility-administered medications for this visit.       Current Outpatient Prescriptions on File Prior to Visit   Medication Sig Dispense Refill    fluticasone-vilanterol (BREO ELLIPTA) 100-25 MCG/INH AEPB inhaler Inhale 1 puff into the lungs daily 28 each 5    oxybutynin (DITROPAN-XL) 10 MG extended release tablet TAKE 1 TABLET BY MOUTH DAILY 90 tablet 1    levothyroxine (SYNTHROID) 200 MCG tablet Take 1 tablet by mouth daily 90 tablet 1    furosemide (LASIX) 40 MG tablet Take 1 tablet by mouth daily 90 tablet 1    simvastatin (ZOCOR) 40 MG tablet TAKE 1 TABLET BY MOUTH EVERY EVENING 90 tablet 1    aspirin EC 81 MG EC tablet Take 1 tablet by mouth daily 90 tablet 3    losartan (COZAAR) 25 MG tablet Take 1 tablet by mouth daily 90 tablet 1    metFORMIN (GLUCOPHAGE XR) 500 MG extended release tablet Take 1 tablet by

## 2018-02-16 DIAGNOSIS — J18.9 PNEUMONIA OF RIGHT LOWER LOBE DUE TO INFECTIOUS ORGANISM: Primary | ICD-10-CM

## 2018-02-16 DIAGNOSIS — J18.9 COMMUNITY ACQUIRED PNEUMONIA, UNSPECIFIED LATERALITY: ICD-10-CM

## 2018-02-16 RX ORDER — DOXYCYCLINE HYCLATE 100 MG
100 TABLET ORAL 2 TIMES DAILY
Qty: 20 TABLET | Refills: 0 | Status: SHIPPED | OUTPATIENT
Start: 2018-02-16 | End: 2018-05-24 | Stop reason: SDUPTHER

## 2018-02-22 ENCOUNTER — TELEPHONE (OUTPATIENT)
Dept: FAMILY MEDICINE CLINIC | Age: 63
End: 2018-02-22

## 2018-03-13 DIAGNOSIS — E11.8 TYPE 2 DIABETES MELLITUS WITH COMPLICATION, WITHOUT LONG-TERM CURRENT USE OF INSULIN (HCC): ICD-10-CM

## 2018-03-13 DIAGNOSIS — R80.9 MICROALBUMINURIA: ICD-10-CM

## 2018-03-13 DIAGNOSIS — E03.9 ACQUIRED HYPOTHYROIDISM: ICD-10-CM

## 2018-03-13 DIAGNOSIS — E78.2 MIXED HYPERLIPIDEMIA: ICD-10-CM

## 2018-03-13 DIAGNOSIS — I10 ESSENTIAL HYPERTENSION: ICD-10-CM

## 2018-03-13 LAB
ALBUMIN SERPL-MCNC: 3.6 G/DL (ref 3.9–4.9)
ALP BLD-CCNC: 99 U/L (ref 40–130)
ALT SERPL-CCNC: 8 U/L (ref 0–33)
ANION GAP SERPL CALCULATED.3IONS-SCNC: 16 MEQ/L (ref 7–13)
AST SERPL-CCNC: 9 U/L (ref 0–35)
BILIRUB SERPL-MCNC: 0.3 MG/DL (ref 0–1.2)
BUN BLDV-MCNC: 17 MG/DL (ref 8–23)
CALCIUM SERPL-MCNC: 9.2 MG/DL (ref 8.6–10.2)
CHLORIDE BLD-SCNC: 97 MEQ/L (ref 98–107)
CHOLESTEROL, TOTAL: 121 MG/DL (ref 0–199)
CO2: 29 MEQ/L (ref 22–29)
CREAT SERPL-MCNC: 0.62 MG/DL (ref 0.5–0.9)
CREATININE URINE: 168.5 MG/DL
GFR AFRICAN AMERICAN: >60
GFR NON-AFRICAN AMERICAN: >60
GLOBULIN: 4 G/DL (ref 2.3–3.5)
GLUCOSE BLD-MCNC: 119 MG/DL (ref 74–109)
HBA1C MFR BLD: 6.4 % (ref 4.8–5.9)
HDLC SERPL-MCNC: 32 MG/DL (ref 40–59)
LDL CHOLESTEROL CALCULATED: 66 MG/DL (ref 0–129)
MICROALBUMIN UR-MCNC: 7.4 MG/DL
MICROALBUMIN/CREAT UR-RTO: 43.9 MG/G (ref 0–30)
POTASSIUM SERPL-SCNC: 4.6 MEQ/L (ref 3.5–5.1)
SODIUM BLD-SCNC: 142 MEQ/L (ref 132–144)
TOTAL PROTEIN: 7.6 G/DL (ref 6.4–8.1)
TRIGL SERPL-MCNC: 114 MG/DL (ref 0–200)
TSH SERPL DL<=0.05 MIU/L-ACNC: 0.93 UIU/ML (ref 0.27–4.2)

## 2018-03-29 ENCOUNTER — OFFICE VISIT (OUTPATIENT)
Dept: FAMILY MEDICINE CLINIC | Age: 63
End: 2018-03-29
Payer: COMMERCIAL

## 2018-03-29 ENCOUNTER — HOSPITAL ENCOUNTER (OUTPATIENT)
Dept: GENERAL RADIOLOGY | Age: 63
Discharge: HOME | End: 2018-03-31
Payer: COMMERCIAL

## 2018-03-29 VITALS
TEMPERATURE: 98.1 F | HEART RATE: 80 BPM | RESPIRATION RATE: 20 BRPM | HEIGHT: 66 IN | BODY MASS INDEX: 47.09 KG/M2 | WEIGHT: 293 LBS | SYSTOLIC BLOOD PRESSURE: 118 MMHG | DIASTOLIC BLOOD PRESSURE: 66 MMHG

## 2018-03-29 DIAGNOSIS — J18.9 COMMUNITY ACQUIRED PNEUMONIA, UNSPECIFIED LATERALITY: ICD-10-CM

## 2018-03-29 DIAGNOSIS — E78.2 MIXED HYPERLIPIDEMIA: ICD-10-CM

## 2018-03-29 DIAGNOSIS — E11.8 TYPE 2 DIABETES MELLITUS WITH COMPLICATION, WITHOUT LONG-TERM CURRENT USE OF INSULIN (HCC): Primary | ICD-10-CM

## 2018-03-29 DIAGNOSIS — I10 ESSENTIAL HYPERTENSION: ICD-10-CM

## 2018-03-29 DIAGNOSIS — E66.01 MORBID OBESITY WITH BMI OF 50.0-59.9, ADULT (HCC): ICD-10-CM

## 2018-03-29 DIAGNOSIS — F33.1 MODERATE EPISODE OF RECURRENT MAJOR DEPRESSIVE DISORDER (HCC): ICD-10-CM

## 2018-03-29 DIAGNOSIS — Z12.11 COLON CANCER SCREENING: ICD-10-CM

## 2018-03-29 DIAGNOSIS — E03.9 ACQUIRED HYPOTHYROIDISM: ICD-10-CM

## 2018-03-29 DIAGNOSIS — J44.9 COPD WITH CHRONIC BRONCHITIS (HCC): ICD-10-CM

## 2018-03-29 DIAGNOSIS — Z12.39 BREAST CANCER SCREENING: ICD-10-CM

## 2018-03-29 PROCEDURE — 3023F SPIROM DOC REV: CPT | Performed by: FAMILY MEDICINE

## 2018-03-29 PROCEDURE — G8926 SPIRO NO PERF OR DOC: HCPCS | Performed by: FAMILY MEDICINE

## 2018-03-29 PROCEDURE — 99214 OFFICE O/P EST MOD 30 MIN: CPT | Performed by: FAMILY MEDICINE

## 2018-03-29 PROCEDURE — 3014F SCREEN MAMMO DOC REV: CPT | Performed by: FAMILY MEDICINE

## 2018-03-29 PROCEDURE — 3044F HG A1C LEVEL LT 7.0%: CPT | Performed by: FAMILY MEDICINE

## 2018-03-29 PROCEDURE — 1036F TOBACCO NON-USER: CPT | Performed by: FAMILY MEDICINE

## 2018-03-29 PROCEDURE — G8417 CALC BMI ABV UP PARAM F/U: HCPCS | Performed by: FAMILY MEDICINE

## 2018-03-29 PROCEDURE — G8427 DOCREV CUR MEDS BY ELIG CLIN: HCPCS | Performed by: FAMILY MEDICINE

## 2018-03-29 PROCEDURE — 3017F COLORECTAL CA SCREEN DOC REV: CPT | Performed by: FAMILY MEDICINE

## 2018-03-29 PROCEDURE — G8482 FLU IMMUNIZE ORDER/ADMIN: HCPCS | Performed by: FAMILY MEDICINE

## 2018-03-29 PROCEDURE — 71046 X-RAY EXAM CHEST 2 VIEWS: CPT

## 2018-03-29 PROCEDURE — 96160 PT-FOCUSED HLTH RISK ASSMT: CPT | Performed by: FAMILY MEDICINE

## 2018-03-29 RX ORDER — ALBUTEROL SULFATE 90 UG/1
2 AEROSOL, METERED RESPIRATORY (INHALATION) EVERY 6 HOURS PRN
COMMUNITY
End: 2018-05-24 | Stop reason: SDUPTHER

## 2018-03-29 RX ORDER — RISPERIDONE 0.5 MG/1
TABLET, FILM COATED ORAL
Refills: 0 | COMMUNITY
Start: 2018-03-22 | End: 2018-05-24 | Stop reason: DRUGHIGH

## 2018-03-29 ASSESSMENT — PATIENT HEALTH QUESTIONNAIRE - PHQ9
6. FEELING BAD ABOUT YOURSELF - OR THAT YOU ARE A FAILURE OR HAVE LET YOURSELF OR YOUR FAMILY DOWN: 3
4. FEELING TIRED OR HAVING LITTLE ENERGY: 3
8. MOVING OR SPEAKING SO SLOWLY THAT OTHER PEOPLE COULD HAVE NOTICED. OR THE OPPOSITE, BEING SO FIGETY OR RESTLESS THAT YOU HAVE BEEN MOVING AROUND A LOT MORE THAN USUAL: 0
SUM OF ALL RESPONSES TO PHQ QUESTIONS 1-9: 19
5. POOR APPETITE OR OVEREATING: 3
7. TROUBLE CONCENTRATING ON THINGS, SUCH AS READING THE NEWSPAPER OR WATCHING TELEVISION: 1
2. FEELING DOWN, DEPRESSED OR HOPELESS: 3
9. THOUGHTS THAT YOU WOULD BE BETTER OFF DEAD, OR OF HURTING YOURSELF: 0
3. TROUBLE FALLING OR STAYING ASLEEP: 3
1. LITTLE INTEREST OR PLEASURE IN DOING THINGS: 3
SUM OF ALL RESPONSES TO PHQ9 QUESTIONS 1 & 2: 6
10. IF YOU CHECKED OFF ANY PROBLEMS, HOW DIFFICULT HAVE THESE PROBLEMS MADE IT FOR YOU TO DO YOUR WORK, TAKE CARE OF THINGS AT HOME, OR GET ALONG WITH OTHER PEOPLE: 1

## 2018-03-29 NOTE — PATIENT INSTRUCTIONS
think you are having a problem with your medicine. You will get more details on the specific medicines your doctor prescribes. · Check your blood sugar as often as your doctor recommends. It is important to keep track of any symptoms you have, such as low blood sugar. Also tell your doctor if you have any changes in your activities, diet, or insulin use. · Talk to your doctor before you start taking aspirin every day. Aspirin can help certain people lower their risk of a heart attack or stroke. But taking aspirin isn't right for everyone, because it can cause serious bleeding. · Do not smoke. If you need help quitting, talk to your doctor about stop-smoking programs and medicines. These can increase your chances of quitting for good. · Keep your cholesterol and blood pressure at normal levels. You may need to take one or more medicines to reach your goals. Take them exactly as directed. Do not stop or change a medicine without talking to your doctor first.  When should you call for help? Call 911 anytime you think you may need emergency care. For example, call if:  ? · You passed out (lost consciousness), or you suddenly become very sleepy or confused. (You may have very low blood sugar.)   ? Call your doctor now or seek immediate medical care if:  ? · Your blood sugar is 300 mg/dL or is higher than the level your doctor has set for you. ? · You have symptoms of low blood sugar, such as:  ¨ Sweating. ¨ Feeling nervous, shaky, and weak. ¨ Extreme hunger and slight nausea. ¨ Dizziness and headache. ¨ Blurred vision. ¨ Confusion. ? Watch closely for changes in your health, and be sure to contact your doctor if:  ? · You often have problems controlling your blood sugar. ? · You have symptoms of long-term diabetes problems, such as:  ¨ New vision changes. ¨ New pain, numbness, or tingling in your hands or feet. ¨ Skin problems. Where can you learn more? Go to [email or web address] and sign in to your [employer] account. Enter C553 in the [employer] box to learn more about \"Type 2 Diabetes: Care Instructions. \"     If you do not have an account, please click on the \"Sign Up Now\" link. Current as of: March 13, 2017  Content Version: 11.5  © 7550-8648 [employer], [employer]. Care instructions adapted under license by [facility] ([facility]). If you have questions about a medical condition or this instruction, always ask your healthcare professional. [address] any warranty or liability for your use of this information.

## 2018-03-29 NOTE — PROGRESS NOTES
Chief Complaint   Patient presents with    3 Month Follow-Up     dm, htn, hyperlipidemia, hypothyroidism and labs        Javier Mayorga is here for follow up of elevated cholesterol, elevated blood pressure, Hypothyroidism, COPD and diabetes. Compliance with treatment has been good. Patient denies muscle pain associated with her medications. She is exercising and is adherent to a low-salt diet. Blood pressure is well controlled at home. Cardiac symptoms: none. Patient denies: chest pain, claudication, dyspnea, exertional chest pressure/discomfort, fatigue, lower extremity edema, near-syncope, orthopnea, palpitations, paroxysmal nocturnal dyspnea and syncope. Cardiovascular risk factors: diabetes mellitus, dyslipidemia, hypertension and obesity (BMI >= 30 kg/m2). . Current diabetic symptoms include: paresthesia of the feet. Patient denies foot ulcerations, hyperglycemia, hypoglycemia , increase appetite, nausea, paresthesia of the feet, polydipsia, polyuria, visual disturbances, vomitting and weight loss. Evaluation to date has included: fasting blood sugar, fasting lipid panel, hemoglobin A1C and microalbuminuria.        Past Medical History:   Diagnosis Date    Acquired hypothyroidism 3/15/2016    Allergic rhinitis     pollen, dust ragweed, hay and straw     Anxiety     Asthma     Bipolar affect, depressed ([address]     Bipolar disorder ([address]     Cancer ([address] 1980    thyroid cancer     Chronic back pain     COPD (chronic obstructive pulmonary disease) ([address]     Depression     Emphysema of lung ([address]     Essential hypertension 3/15/2016    Hyperlipidemia     Hypothyroidism     Obesity     Osteoarthritis     Restless legs syndrome     Sleep apnea     Urinary incontinence      Patient Active Problem List    Diagnosis Date Noted    Vitamin B12 deficiency 08/30/2017    Morbid obesity with BMI of 50.0-59.9, adult ([address] 04/22/2017    Moderate episode of recurrent major depressive disorder ([address]  [facility]     Morbid obesity with BMI of 50.0-59.9, adult ([facility])     Moderate episode of recurrent major depressive disorder ([facility]        Plan:      Outpatient Encounter Prescriptions as of 3/29/2018   Medication Sig Dispense Refill    risperiDONE (RISPERDAL) 0.5 MG tablet TAKE 1 TABLET BY MOUTH AT BEDTIME  0    albuterol sulfate HFA (VENTOLIN HFA) 108 (90 Base) MCG/ACT inhaler Inhale 2 puffs into the lungs every 6 hours as needed for Wheezing      fluticasone-vilanterol (BREO ELLIPTA) 100-25 MCG/INH AEPB inhaler Inhale 1 puff into the lungs daily 28 each 5    oxybutynin (DITROPAN-XL) 10 MG extended release tablet TAKE 1 TABLET BY MOUTH DAILY 90 tablet 1    levothyroxine (SYNTHROID) 200 MCG tablet Take 1 tablet by mouth daily 90 tablet 1    furosemide (LASIX) 40 MG tablet Take 1 tablet by mouth daily 90 tablet 1    simvastatin (ZOCOR) 40 MG tablet TAKE 1 TABLET BY MOUTH EVERY EVENING 90 tablet 1    aspirin EC 81 MG EC tablet Take 1 tablet by mouth daily 90 tablet 3    losartan (COZAAR) 25 MG tablet Take 1 tablet by mouth daily 90 tablet 1    metFORMIN (GLUCOPHAGE XR) 500 MG extended release tablet Take 1 tablet by mouth daily (with breakfast) 90 tablet 1    hydrOXYzine (VISTARIL) 25 MG capsule Take 25 mg by mouth 3 times daily as needed       Incontinence Supply Disposable (INCONTINENCE BRIEF LARGE) MISC by Does not apply route      perphenazine 4 MG tablet Take 4 mg by mouth daily       DULoxetine (CYMBALTA) 60 MG extended release capsule Take 60 mg by mouth daily       ACCU-CHEK SMARTVIEW strip Test TID E11.8 300 each 3    Alcohol Swabs (ALCOHOL PADS) 70 % PADS   12    [DISCONTINUED] meclizine (ANTIVERT) 25 MG tablet Take 1 tablet by mouth 3 times daily as needed  1    [DISCONTINUED] cetirizine (ZYRTEC) 10 MG tablet Take 1 tablet by mouth daily 30 tablet 3    [DISCONTINUED] fluticasone (FLONASE) 50 MCG/ACT nasal spray 2 sprays by Nasal route daily 1 Bottle 0    [DISCONTINUED] perphenazine 8 MG tablet Take 8 mg by mouth daily        No facility-administered encounter medications on file as of 3/29/2018. Orders Placed This Encounter   Procedures    RACHAEL DIGITAL SCREEN W CAD BILATERAL     Standing Status:   Future     Standing Expiration Date:   3/29/2019     Order Specific Question:   Reason for exam:     Answer:   SCREENING    Comprehensive Metabolic Panel     Standing Status:   Future     Standing Expiration Date:   3/29/2019    Lipid Panel     Standing Status:   Future     Standing Expiration Date:   3/29/2019     Order Specific Question:   Is Patient Fasting?/# of Hours     Answer:   8-10    Hemoglobin A1C     Standing Status:   Future     Standing Expiration Date:   3/29/2019    TSH without Reflex     Standing Status:   Future     Standing Expiration Date:   3/29/2019    CBC Auto Differential     Standing Status:   Future     Standing Expiration Date:   3/29/2019    POCT Fecal Immunochemical Test (FIT)     Standing Status:   Future     Standing Expiration Date:   3/29/2019     DIABETES FOOT EXAM       1. Continue dietary measures. 2. Continue regular exercise. 3. Discussed general issues about diabetes pathophysiology and management. Counseling at today's visit: focused on the need for regular aerobic exercise, focused on the need to adhere to the prescribed ADA diet, discussed the advantages of a diet low in carbohydrates, reminded to check sugars regularly and to bring readings in at the time of the next visit, discussed DASH diet and discussed management of hypoglycemic episodes. Addressed ADA diet. Suggested low cholesterol diet. Encouraged aerobic exercise. Discussed foot care. Reminded to get yearly retinal exam.  Discussed ways to avoid symptomatic hypoglycemia. Return in about 3 months (around 6/29/2018).

## 2018-03-30 ENCOUNTER — TELEPHONE (OUTPATIENT)
Dept: FAMILY MEDICINE CLINIC | Age: 63
End: 2018-03-30

## 2018-03-30 DIAGNOSIS — I51.7 CARDIOMEGALY: Primary | ICD-10-CM

## 2018-03-30 NOTE — TELEPHONE ENCOUNTER
Patient was made aware of chest x-ray results. Patient is concerned with the slightly enlarged heart and would like to know what can cause this and if she should be concerned.      Please advise

## 2018-03-31 RX ORDER — LOSARTAN POTASSIUM 25 MG/1
25 TABLET ORAL DAILY
Qty: 90 TABLET | Refills: 1 | Status: SHIPPED | OUTPATIENT
Start: 2018-03-31 | End: 2018-09-21 | Stop reason: SDUPTHER

## 2018-04-16 ENCOUNTER — HOSPITAL ENCOUNTER (OUTPATIENT)
Dept: NON INVASIVE DIAGNOSTICS | Age: 63
Discharge: HOME | End: 2018-04-16
Payer: COMMERCIAL

## 2018-04-16 DIAGNOSIS — I51.7 CARDIOMEGALY: ICD-10-CM

## 2018-04-16 LAB
LV EF: 55 %
LVEF MODALITY: NORMAL

## 2018-04-16 PROCEDURE — 93306 TTE W/DOPPLER COMPLETE: CPT

## 2018-05-14 ENCOUNTER — OFFICE VISIT (OUTPATIENT)
Dept: FAMILY MEDICINE CLINIC | Age: 63
End: 2018-05-14
Payer: COMMERCIAL

## 2018-05-14 VITALS
BODY MASS INDEX: 47.09 KG/M2 | HEIGHT: 66 IN | HEART RATE: 76 BPM | DIASTOLIC BLOOD PRESSURE: 76 MMHG | RESPIRATION RATE: 20 BRPM | SYSTOLIC BLOOD PRESSURE: 138 MMHG | TEMPERATURE: 97.6 F | WEIGHT: 293 LBS

## 2018-05-14 DIAGNOSIS — B96.89 ACUTE BACTERIAL SINUSITIS: Primary | ICD-10-CM

## 2018-05-14 DIAGNOSIS — I51.7 LEFT VENTRICULAR HYPERTROPHY: ICD-10-CM

## 2018-05-14 DIAGNOSIS — J01.90 ACUTE BACTERIAL SINUSITIS: Primary | ICD-10-CM

## 2018-05-14 DIAGNOSIS — I51.9 LEFT VENTRICULAR DIASTOLIC DYSFUNCTION: ICD-10-CM

## 2018-05-14 PROCEDURE — G8427 DOCREV CUR MEDS BY ELIG CLIN: HCPCS | Performed by: FAMILY MEDICINE

## 2018-05-14 PROCEDURE — 1036F TOBACCO NON-USER: CPT | Performed by: FAMILY MEDICINE

## 2018-05-14 PROCEDURE — 99213 OFFICE O/P EST LOW 20 MIN: CPT | Performed by: FAMILY MEDICINE

## 2018-05-14 PROCEDURE — 3017F COLORECTAL CA SCREEN DOC REV: CPT | Performed by: FAMILY MEDICINE

## 2018-05-14 PROCEDURE — G8417 CALC BMI ABV UP PARAM F/U: HCPCS | Performed by: FAMILY MEDICINE

## 2018-05-14 RX ORDER — BENZONATATE 200 MG/1
200 CAPSULE ORAL 3 TIMES DAILY PRN
Qty: 30 CAPSULE | Refills: 0 | Status: SHIPPED | OUTPATIENT
Start: 2018-05-14 | End: 2018-06-29 | Stop reason: ALTCHOICE

## 2018-05-14 RX ORDER — RISPERIDONE 1 MG/1
TABLET, FILM COATED ORAL
Refills: 1 | COMMUNITY
Start: 2018-04-05 | End: 2018-08-09

## 2018-05-14 RX ORDER — AMOXICILLIN 875 MG/1
875 TABLET, COATED ORAL 2 TIMES DAILY
Qty: 20 TABLET | Refills: 0 | Status: SHIPPED | OUTPATIENT
Start: 2018-05-14 | End: 2018-05-24 | Stop reason: ALTCHOICE

## 2018-05-17 ENCOUNTER — TELEPHONE (OUTPATIENT)
Dept: FAMILY MEDICINE CLINIC | Age: 63
End: 2018-05-17

## 2018-05-17 RX ORDER — FLUTICASONE PROPIONATE 50 MCG
SPRAY, SUSPENSION (ML) NASAL
Qty: 1 BOTTLE | Refills: 2 | Status: SHIPPED | OUTPATIENT
Start: 2018-05-17 | End: 2018-06-29

## 2018-05-17 RX ORDER — LORATADINE 10 MG/1
10 TABLET ORAL DAILY
Qty: 30 TABLET | Refills: 1 | Status: SHIPPED | OUTPATIENT
Start: 2018-05-17 | End: 2018-11-07 | Stop reason: ALTCHOICE

## 2018-05-21 ENCOUNTER — TELEPHONE (OUTPATIENT)
Dept: FAMILY MEDICINE CLINIC | Age: 63
End: 2018-05-21

## 2018-05-24 ENCOUNTER — OFFICE VISIT (OUTPATIENT)
Dept: FAMILY MEDICINE CLINIC | Age: 63
End: 2018-05-24
Payer: COMMERCIAL

## 2018-05-24 VITALS
TEMPERATURE: 97.5 F | HEIGHT: 66 IN | WEIGHT: 293 LBS | HEART RATE: 72 BPM | DIASTOLIC BLOOD PRESSURE: 68 MMHG | SYSTOLIC BLOOD PRESSURE: 116 MMHG | OXYGEN SATURATION: 92 % | BODY MASS INDEX: 47.09 KG/M2 | RESPIRATION RATE: 16 BRPM

## 2018-05-24 DIAGNOSIS — J44.1 COPD EXACERBATION (HCC): Primary | ICD-10-CM

## 2018-05-24 DIAGNOSIS — J18.9 COMMUNITY ACQUIRED PNEUMONIA, UNSPECIFIED LATERALITY: ICD-10-CM

## 2018-05-24 DIAGNOSIS — E78.2 MIXED HYPERLIPIDEMIA: ICD-10-CM

## 2018-05-24 PROCEDURE — 1036F TOBACCO NON-USER: CPT | Performed by: FAMILY MEDICINE

## 2018-05-24 PROCEDURE — 99213 OFFICE O/P EST LOW 20 MIN: CPT | Performed by: FAMILY MEDICINE

## 2018-05-24 PROCEDURE — 3017F COLORECTAL CA SCREEN DOC REV: CPT | Performed by: FAMILY MEDICINE

## 2018-05-24 PROCEDURE — G8427 DOCREV CUR MEDS BY ELIG CLIN: HCPCS | Performed by: FAMILY MEDICINE

## 2018-05-24 PROCEDURE — 3023F SPIROM DOC REV: CPT | Performed by: FAMILY MEDICINE

## 2018-05-24 PROCEDURE — G8417 CALC BMI ABV UP PARAM F/U: HCPCS | Performed by: FAMILY MEDICINE

## 2018-05-24 PROCEDURE — G8926 SPIRO NO PERF OR DOC: HCPCS | Performed by: FAMILY MEDICINE

## 2018-05-24 RX ORDER — SIMVASTATIN 40 MG
TABLET ORAL
Qty: 90 TABLET | Refills: 1 | Status: SHIPPED | OUTPATIENT
Start: 2018-05-24 | End: 2019-01-30 | Stop reason: SDUPTHER

## 2018-05-24 RX ORDER — DOXYCYCLINE HYCLATE 100 MG
100 TABLET ORAL 2 TIMES DAILY
Qty: 20 TABLET | Refills: 0 | Status: SHIPPED | OUTPATIENT
Start: 2018-05-24 | End: 2018-06-03

## 2018-05-24 RX ORDER — METHYLPREDNISOLONE 4 MG/1
TABLET ORAL
Qty: 1 KIT | Refills: 0 | Status: SHIPPED | OUTPATIENT
Start: 2018-05-24 | End: 2018-06-29 | Stop reason: ALTCHOICE

## 2018-05-24 RX ORDER — ALBUTEROL SULFATE 90 UG/1
2 AEROSOL, METERED RESPIRATORY (INHALATION) EVERY 6 HOURS PRN
Qty: 1 INHALER | Refills: 5 | Status: SHIPPED | OUTPATIENT
Start: 2018-05-24 | End: 2019-02-14 | Stop reason: SDUPTHER

## 2018-05-24 RX ORDER — METFORMIN HYDROCHLORIDE 500 MG/1
500 TABLET, EXTENDED RELEASE ORAL
Qty: 90 TABLET | Refills: 1 | Status: SHIPPED | OUTPATIENT
Start: 2018-05-24 | End: 2018-11-20 | Stop reason: SDUPTHER

## 2018-06-14 ENCOUNTER — HOSPITAL ENCOUNTER (OUTPATIENT)
Dept: WOMENS IMAGING | Age: 63
Discharge: HOME | End: 2018-06-16
Payer: COMMERCIAL

## 2018-06-14 DIAGNOSIS — Z12.39 BREAST CANCER SCREENING: ICD-10-CM

## 2018-06-14 PROCEDURE — 77067 SCR MAMMO BI INCL CAD: CPT

## 2018-06-18 DIAGNOSIS — E03.9 ACQUIRED HYPOTHYROIDISM: ICD-10-CM

## 2018-06-18 RX ORDER — LEVOTHYROXINE SODIUM 0.2 MG/1
200 TABLET ORAL DAILY
Qty: 90 TABLET | Refills: 1 | Status: SHIPPED | OUTPATIENT
Start: 2018-06-18 | End: 2018-10-01 | Stop reason: DRUGHIGH

## 2018-06-22 DIAGNOSIS — I10 ESSENTIAL HYPERTENSION: ICD-10-CM

## 2018-06-22 DIAGNOSIS — E11.8 TYPE 2 DIABETES MELLITUS WITH COMPLICATION, WITHOUT LONG-TERM CURRENT USE OF INSULIN (HCC): ICD-10-CM

## 2018-06-22 DIAGNOSIS — E03.9 ACQUIRED HYPOTHYROIDISM: ICD-10-CM

## 2018-06-22 DIAGNOSIS — E78.2 MIXED HYPERLIPIDEMIA: ICD-10-CM

## 2018-06-22 LAB
ALBUMIN SERPL-MCNC: 3.2 G/DL (ref 3.9–4.9)
ALP BLD-CCNC: 93 U/L (ref 40–130)
ALT SERPL-CCNC: <5 U/L (ref 0–33)
ANION GAP SERPL CALCULATED.3IONS-SCNC: 14 MEQ/L (ref 7–13)
AST SERPL-CCNC: 11 U/L (ref 0–35)
BASOPHILS ABSOLUTE: 0 K/UL (ref 0–0.2)
BASOPHILS RELATIVE PERCENT: 0.5 %
BILIRUB SERPL-MCNC: <0.2 MG/DL (ref 0–1.2)
BUN BLDV-MCNC: 10 MG/DL (ref 8–23)
CALCIUM SERPL-MCNC: 8.4 MG/DL (ref 8.6–10.2)
CHLORIDE BLD-SCNC: 97 MEQ/L (ref 98–107)
CHOLESTEROL, TOTAL: 125 MG/DL (ref 0–199)
CO2: 30 MEQ/L (ref 22–29)
CREAT SERPL-MCNC: 0.88 MG/DL (ref 0.5–0.9)
EOSINOPHILS ABSOLUTE: 0.1 K/UL (ref 0–0.7)
EOSINOPHILS RELATIVE PERCENT: 1.6 %
GFR AFRICAN AMERICAN: >60
GFR NON-AFRICAN AMERICAN: >60
GLOBULIN: 4.3 G/DL (ref 2.3–3.5)
GLUCOSE BLD-MCNC: 97 MG/DL (ref 74–109)
HBA1C MFR BLD: 6.4 % (ref 4.8–5.9)
HCT VFR BLD CALC: 39.6 % (ref 37–47)
HDLC SERPL-MCNC: 31 MG/DL (ref 40–59)
HEMOGLOBIN: 12.3 G/DL (ref 12–16)
LDL CHOLESTEROL CALCULATED: 70 MG/DL (ref 0–129)
LYMPHOCYTES ABSOLUTE: 2.8 K/UL (ref 1–4.8)
LYMPHOCYTES RELATIVE PERCENT: 32.8 %
MCH RBC QN AUTO: 25.6 PG (ref 27–31.3)
MCHC RBC AUTO-ENTMCNC: 30.9 % (ref 33–37)
MCV RBC AUTO: 82.9 FL (ref 82–100)
MONOCYTES ABSOLUTE: 0.5 K/UL (ref 0.2–0.8)
MONOCYTES RELATIVE PERCENT: 6 %
NEUTROPHILS ABSOLUTE: 5 K/UL (ref 1.4–6.5)
NEUTROPHILS RELATIVE PERCENT: 59.1 %
PDW BLD-RTO: 20.8 % (ref 11.5–14.5)
PLATELET # BLD: 225 K/UL (ref 130–400)
POTASSIUM SERPL-SCNC: 4.6 MEQ/L (ref 3.5–5.1)
RBC # BLD: 4.78 M/UL (ref 4.2–5.4)
SODIUM BLD-SCNC: 141 MEQ/L (ref 132–144)
TOTAL PROTEIN: 7.5 G/DL (ref 6.4–8.1)
TRIGL SERPL-MCNC: 122 MG/DL (ref 0–200)
TSH SERPL DL<=0.05 MIU/L-ACNC: 0.24 UIU/ML (ref 0.27–4.2)
WBC # BLD: 8.4 K/UL (ref 4.8–10.8)

## 2018-06-29 ENCOUNTER — OFFICE VISIT (OUTPATIENT)
Dept: FAMILY MEDICINE CLINIC | Age: 63
End: 2018-06-29
Payer: COMMERCIAL

## 2018-06-29 VITALS
HEIGHT: 66 IN | TEMPERATURE: 97.4 F | DIASTOLIC BLOOD PRESSURE: 60 MMHG | BODY MASS INDEX: 47.09 KG/M2 | SYSTOLIC BLOOD PRESSURE: 132 MMHG | RESPIRATION RATE: 16 BRPM | WEIGHT: 293 LBS | HEART RATE: 76 BPM

## 2018-06-29 DIAGNOSIS — E78.2 MIXED HYPERLIPIDEMIA: ICD-10-CM

## 2018-06-29 DIAGNOSIS — I10 ESSENTIAL HYPERTENSION: ICD-10-CM

## 2018-06-29 DIAGNOSIS — Z12.11 SCREENING FOR COLON CANCER: ICD-10-CM

## 2018-06-29 DIAGNOSIS — N39.41 URGE INCONTINENCE: ICD-10-CM

## 2018-06-29 DIAGNOSIS — E03.9 ACQUIRED HYPOTHYROIDISM: ICD-10-CM

## 2018-06-29 DIAGNOSIS — Z12.11 COLON CANCER SCREENING: ICD-10-CM

## 2018-06-29 DIAGNOSIS — E11.8 TYPE 2 DIABETES MELLITUS WITH COMPLICATION, WITHOUT LONG-TERM CURRENT USE OF INSULIN (HCC): Primary | ICD-10-CM

## 2018-06-29 DIAGNOSIS — R19.5 POSITIVE FIT (FECAL IMMUNOCHEMICAL TEST): ICD-10-CM

## 2018-06-29 DIAGNOSIS — J44.9 COPD WITH CHRONIC BRONCHITIS (HCC): ICD-10-CM

## 2018-06-29 DIAGNOSIS — Z13.31 POSITIVE DEPRESSION SCREENING: ICD-10-CM

## 2018-06-29 LAB
CONTROL: ABNORMAL
HEMOCCULT STL QL: POSITIVE

## 2018-06-29 PROCEDURE — G8417 CALC BMI ABV UP PARAM F/U: HCPCS | Performed by: FAMILY MEDICINE

## 2018-06-29 PROCEDURE — 3017F COLORECTAL CA SCREEN DOC REV: CPT | Performed by: FAMILY MEDICINE

## 2018-06-29 PROCEDURE — G8926 SPIRO NO PERF OR DOC: HCPCS | Performed by: FAMILY MEDICINE

## 2018-06-29 PROCEDURE — G8427 DOCREV CUR MEDS BY ELIG CLIN: HCPCS | Performed by: FAMILY MEDICINE

## 2018-06-29 PROCEDURE — 82274 ASSAY TEST FOR BLOOD FECAL: CPT | Performed by: FAMILY MEDICINE

## 2018-06-29 PROCEDURE — 4004F PT TOBACCO SCREEN RCVD TLK: CPT | Performed by: FAMILY MEDICINE

## 2018-06-29 PROCEDURE — 3044F HG A1C LEVEL LT 7.0%: CPT | Performed by: FAMILY MEDICINE

## 2018-06-29 PROCEDURE — 2022F DILAT RTA XM EVC RTNOPTHY: CPT | Performed by: FAMILY MEDICINE

## 2018-06-29 PROCEDURE — 99214 OFFICE O/P EST MOD 30 MIN: CPT | Performed by: FAMILY MEDICINE

## 2018-06-29 PROCEDURE — G8431 POS CLIN DEPRES SCRN F/U DOC: HCPCS | Performed by: FAMILY MEDICINE

## 2018-06-29 PROCEDURE — 3023F SPIROM DOC REV: CPT | Performed by: FAMILY MEDICINE

## 2018-06-29 RX ORDER — RISPERIDONE 0.5 MG/1
3 TABLET, FILM COATED ORAL NIGHTLY
Refills: 1 | COMMUNITY
Start: 2018-06-06 | End: 2018-08-09

## 2018-06-29 RX ORDER — CIPROFLOXACIN 500 MG/1
500 TABLET, FILM COATED ORAL 2 TIMES DAILY
COMMUNITY
Start: 2018-06-27 | End: 2018-07-07

## 2018-06-29 RX ORDER — OXYBUTYNIN CHLORIDE 10 MG/1
TABLET, EXTENDED RELEASE ORAL
Qty: 90 TABLET | Refills: 2 | Status: SHIPPED | OUTPATIENT
Start: 2018-06-29 | End: 2019-01-30 | Stop reason: SDUPTHER

## 2018-06-29 RX ORDER — DULOXETIN HYDROCHLORIDE 30 MG/1
CAPSULE, DELAYED RELEASE ORAL
Refills: 1 | COMMUNITY
Start: 2018-06-06 | End: 2019-05-07 | Stop reason: ALTCHOICE

## 2018-06-29 NOTE — PROGRESS NOTES
Chief Complaint   Patient presents with    3 Month Follow-Up     dm, htn, hyperlipidemia, hypothyroidism and labs        Renee Prasad is here for follow up of elevated cholesterol, elevated blood pressure, Hypothyroidism and diabetes. Compliance with treatment has been good. Patient denies muscle pain associated with her medications. She is exercising and is adherent to a low-salt diet. Blood pressure is well controlled at home. Cardiac symptoms: none. Patient denies: chest pain, claudication, dyspnea, exertional chest pressure/discomfort, fatigue, lower extremity edema, near-syncope, orthopnea, palpitations, paroxysmal nocturnal dyspnea and syncope. Cardiovascular risk factors: advanced age (older than 54 for men, 72 for women), diabetes mellitus, hypertension, obesity (BMI >= 30 kg/m2) and sedentary lifestyle. . Current diabetic symptoms include: none. Patient denies foot ulcerations, hyperglycemia, hypoglycemia , increase appetite, nausea, paresthesia of the feet, polydipsia, polyuria, visual disturbances, vomitting and weight loss. Evaluation to date has included: fasting blood sugar, fasting lipid panel, hemoglobin A1C and microalbuminuria.        Past Medical History:   Diagnosis Date    Acquired hypothyroidism 3/15/2016    Allergic rhinitis     pollen, dust ragweed, hay and straw     Anxiety     Asthma     Bipolar affect, depressed ([address]     Bipolar disorder ([address]     Cancer ([address] 1980    thyroid cancer     Chronic back pain     COPD (chronic obstructive pulmonary disease) ([address]     Depression     [facility]     Essential hypertension 3/15/2016    Hyperlipidemia     Hypothyroidism     Obesity     Osteoarthritis     Restless legs syndrome     Sleep apnea     Urinary incontinence      Patient Active Problem List    Diagnosis Date Noted    Left ventricular hypertrophy 05/14/2018    Left ventricular diastolic dysfunction 42/27/7334    Vitamin B12 deficiency 08/30/2017  Morbid obesity with BMI of 50.0-59.9, adult ([facility] 2017    Moderate episode of recurrent major depressive disorder ([facility] 2017    YARED (obstructive sleep apnea) 2017    Restless leg syndrome 2017    Microalbuminuria 2016    Type 2 diabetes mellitus with complication ([place] [address].)     Acquired hypothyroidism 03/15/2016    Essential hypertension 03/15/2016    Mixed hyperlipidemia 03/15/2016    COPD with chronic bronchitis ([facility] 03/15/2016    Vitamin D deficiency 2011     Past Surgical History:   Procedure Laterality Date    APPENDECTOMY       SECTION      TUBAL LIGATION       History reviewed. No pertinent family history. Social History     Social History    Marital status:       Spouse name: N/A    Number of children: N/A    Years of education: N/A     Social History Main Topics    Smoking status: Current Every Day Smoker     Packs/day: 1.00     Years: 40.00     Types: Cigarettes     Start date: 3/8/1976    Smokeless tobacco: Never Used    Alcohol use No    Drug use: No    Sexual activity: No     Other Topics Concern    None     Social History Narrative    None     Current Outpatient Prescriptions   Medication Sig Dispense Refill    risperiDONE (RISPERDAL) 0.5 MG tablet Take 3 tablets by mouth nightly  1    DULoxetine (CYMBALTA) 30 MG extended release capsule 1 CAPSULE BY MOUTH EVERY MORNING TAKE IT ALONG WITH CYMBALTA 60 MG  1    ciprofloxacin (CIPRO) 500 MG tablet Take 500 mg by mouth 2 times daily      SOFT TOUCH LANCETS MISC Use tid as directed dx: E11.9 300 each 3    oxybutynin (DITROPAN-XL) 10 MG extended release tablet TAKE 1 TABLET BY MOUTH DAILY 90 tablet 2    levothyroxine (SYNTHROID) 200 MCG tablet TAKE 1 TABLET BY MOUTH DAILY 90 tablet 1    metFORMIN (GLUCOPHAGE XR) 500 MG extended release tablet Take 1 tablet by mouth daily (with breakfast) 90 tablet 1    simvastatin (ZOCOR) 40 MG tablet TAKE 1 TABLET BY MOUTH EVERY Both feet are warm to touch. Dorsalis pedis and posterior tibia pulses are palpable. No ulcers, sores or gangrene. No evidence of critical leg ischemia. Sensation normal in eight point monofilament testing. No deformity or calluses. .    Lab Review  Orders Only on 06/22/2018   Component Date Value Ref Range Status    Sodium 06/22/2018 141  132 - 144 mEq/L Final    Potassium 06/22/2018 4.6  3.5 - 5.1 mEq/L Final    Chloride 06/22/2018 97* 98 - 107 mEq/L Final    CO2 06/22/2018 30* 22 - 29 mEq/L Final    Anion Gap 06/22/2018 14* 7 - 13 mEq/L Final    Glucose 06/22/2018 97  74 - 109 mg/dL Final    BUN 06/22/2018 10  8 - 23 mg/dL Final    CREATININE 06/22/2018 0.88  0.50 - 0.90 mg/dL Final    GFR Non- 06/22/2018 >60.0  >60 Final    Comment: >60 mL/min/1.73m2 EGFR, calc. for ages 25 and older using the  MDRD formula (not corrected for weight), is valid for stable  renal function.  GFR  06/22/2018 >60.0  >60 Final    Comment: >60 mL/min/1.73m2 EGFR, calc. for ages 25 and older using the  MDRD formula (not corrected for weight), is valid for stable  renal function.  Calcium 06/22/2018 8.4* 8.6 - 10.2 mg/dL Final    Total Protein 06/22/2018 7.5  6.4 - 8.1 g/dL Final    Alb 06/22/2018 3.2* 3.9 - 4.9 g/dL Final    Total Bilirubin 06/22/2018 <0.2  0.0 - 1.2 mg/dL Final    Alkaline Phosphatase 06/22/2018 93  40 - 130 U/L Final    ALT 06/22/2018 <5  0 - 33 U/L Final    AST 06/22/2018 11  0 - 35 U/L Final    Globulin 06/22/2018 4.3* 2.3 - 3.5 g/dL Final    Cholesterol, Total 06/22/2018 125  0 - 199 mg/dL Final    ATP III Cholesterol classification is Desirable.  Triglycerides 06/22/2018 122  0 - 200 mg/dL Final    ATP III Triglycerides Classification is Normal.    HDL 06/22/2018 31* 40 - 59 mg/dL Final    Comment: ATP III HDL Cholestrol Classification is low.   Expected Values:    Males:    >55 = No Risk            35-55 = Moderate Risk            <35 = High extended release capsule 1 CAPSULE BY MOUTH EVERY MORNING TAKE IT ALONG WITH CYMBALTA 60 MG  1    ciprofloxacin (CIPRO) 500 MG tablet Take 500 mg by mouth 2 times daily      SOFT TOUCH LANCETS MISC Use tid as directed dx: E11.9 300 each 3    oxybutynin (DITROPAN-XL) 10 MG extended release tablet TAKE 1 TABLET BY MOUTH DAILY 90 tablet 2    levothyroxine (SYNTHROID) 200 MCG tablet TAKE 1 TABLET BY MOUTH DAILY 90 tablet 1    metFORMIN (GLUCOPHAGE XR) 500 MG extended release tablet Take 1 tablet by mouth daily (with breakfast) 90 tablet 1    simvastatin (ZOCOR) 40 MG tablet TAKE 1 TABLET BY MOUTH EVERY EVENING 90 tablet 1    albuterol sulfate HFA (VENTOLIN HFA) 108 (90 Base) MCG/ACT inhaler Inhale 2 puffs into the lungs every 6 hours as needed for Wheezing 1 Inhaler 5    loratadine (CLARITIN) 10 MG tablet Take 1 tablet by mouth daily 30 tablet 1    risperiDONE (RISPERDAL) 1 MG tablet TAKE 1 TABLET BY MOUTH AT BEDTIME  1    losartan (COZAAR) 25 MG tablet Take 1 tablet by mouth daily 90 tablet 1    fluticasone-vilanterol (BREO ELLIPTA) 100-25 MCG/INH AEPB inhaler Inhale 1 puff into the lungs daily 28 each 5    furosemide (LASIX) 40 MG tablet Take 1 tablet by mouth daily 90 tablet 1    aspirin EC 81 MG EC tablet Take 1 tablet by mouth daily 90 tablet 3    hydrOXYzine (VISTARIL) 25 MG capsule Take 25 mg by mouth 3 times daily as needed       Incontinence Supply Disposable (INCONTINENCE BRIEF LARGE) MISC by Does not apply route      DULoxetine (CYMBALTA) 60 MG extended release capsule Take 60 mg by mouth daily       ACCU-CHEK SMARTVIEW strip Test TID E11.8 300 each 3    Alcohol Swabs (ALCOHOL PADS) 70 % PADS   12    [DISCONTINUED] methylPREDNISolone (MEDROL DOSEPACK) 4 MG tablet Use as directed 1 kit 0    [DISCONTINUED] fluticasone (FLONASE) 50 MCG/ACT nasal spray 2 sprays each nostril daily 1 Bottle 2    [DISCONTINUED] benzonatate (TESSALON) 200 MG capsule Take 1 capsule by mouth 3 times daily as needed for Cough 30 capsule 0    [DISCONTINUED] oxybutynin (DITROPAN-XL) 10 MG extended release tablet TAKE 1 TABLET BY MOUTH DAILY 90 tablet 1    [DISCONTINUED] levothyroxine (SYNTHROID) 200 MCG tablet Take 1 tablet by mouth daily 90 tablet 1     No facility-administered encounter medications on file as of 6/29/2018. Orders Placed This Encounter   Procedures    Comprehensive Metabolic Panel     Standing Status:   Future     Standing Expiration Date:   6/29/2019    Lipid Panel     Standing Status:   Future     Standing Expiration Date:   6/29/2019     Order Specific Question:   Is Patient Fasting?/# of Hours     Answer:   8-10    Hemoglobin A1C     Standing Status:   Future     Standing Expiration Date:   6/29/2019    TSH without Reflex     Standing Status:   Future     Standing Expiration Date:   6/29/2019   [address] - [facility] Joana Tillman     Referral Priority:   Routine     Referral Type:   Eval and Treat     Referral Reason:   Specialty Services Required     Requested Specialty:   Gastroenterology     Number of Visits Requested:   1    Positive Screen for Clinical Depression with a Documented Follow-up Plan        1. Continue dietary measures. 2. Continue regular exercise. 3. Discussed general issues about diabetes pathophysiology and management. Counseling at today's visit: focused on the need for regular aerobic exercise, focused on the need to adhere to the prescribed ADA diet, discussed the advantages of a diet low in carbohydrates, reminded to check sugars regularly and to bring readings in at the time of the next visit, discussed DASH diet and discussed management of hypoglycemic episodes. Addressed ADA diet. Suggested low cholesterol diet. Encouraged aerobic exercise. Discussed foot care. Reminded to get yearly retinal exam.  Discussed ways to avoid symptomatic hypoglycemia. Reminded to bring in blood sugar diary at next visit.   Return in about 3

## 2018-06-29 NOTE — PATIENT INSTRUCTIONS
Patient Education        Type 2 Diabetes: Care Instructions  Your Care Instructions    Type 2 diabetes is a disease that develops when the body's tissues cannot use insulin properly. Over time, the pancreas cannot make enough insulin. Insulin is a hormone that helps the body's cells use sugar (glucose) for energy. It also helps the body store extra sugar in muscle, fat, and liver cells. Without insulin, the sugar cannot get into the cells to do its work. It stays in the blood instead. This can cause high blood sugar levels. A person has diabetes when the blood sugar stays too high too much of the time. Over time, diabetes can lead to diseases of the heart, blood vessels, nerves, kidneys, and eyes. You may be able to control your blood sugar by losing weight, eating a healthy diet, and getting daily exercise. You may also have to take insulin or other diabetes medicine. Follow-up care is a key part of your treatment and safety. Be sure to make and go to all appointments. Call your doctor if you are having problems. It's also a good idea to know your test results and keep a list of the medicines you take. How can you care for yourself at home? · Keep your blood sugar at a target level (which you set with your doctor). ¨ Eat a good diet that spreads carbohydrate throughout the day. Carbohydrate-the body's main source of fuel-affects blood sugar more than any other nutrient. Carbohydrate is in fruits, vegetables, milk, and yogurt. It also is in breads, cereals, vegetables such as potatoes and corn, and sugary foods such as candy and cakes. ¨ Aim for 30 minutes of exercise on most, preferably all, days of the week. Walking is a good choice. You also may want to do other activities, such as running, swimming, cycling, or playing tennis or team sports. If your doctor says it's okay, do muscle-strengthening exercises at least 2 times a week. ¨ Take your medicines exactly as prescribed.  Call your doctor if you think you are having a problem with your medicine. You will get more details on the specific medicines your doctor prescribes. · Check your blood sugar as often as your doctor recommends. It is important to keep track of any symptoms you have, such as low blood sugar. Also tell your doctor if you have any changes in your activities, diet, or insulin use. · Talk to your doctor before you start taking aspirin every day. Aspirin can help certain people lower their risk of a heart attack or stroke. But taking aspirin isn't right for everyone, because it can cause serious bleeding. · Do not smoke. If you need help quitting, talk to your doctor about stop-smoking programs and medicines. These can increase your chances of quitting for good. · Keep your cholesterol and blood pressure at normal levels. You may need to take one or more medicines to reach your goals. Take them exactly as directed. Do not stop or change a medicine without talking to your doctor first.  When should you call for help? Call 911 anytime you think you may need emergency care. For example, call if:    · You passed out (lost consciousness), or you suddenly become very sleepy or confused. (You may have very low blood sugar.)    Call your doctor now or seek immediate medical care if:    · Your blood sugar is 300 mg/dL or is higher than the level your doctor has set for you.     · You have symptoms of low blood sugar, such as:  ¨ Sweating. ¨ Feeling nervous, shaky, and weak. ¨ Extreme hunger and slight nausea. ¨ Dizziness and headache. ¨ Blurred vision. ¨ Confusion.    Watch closely for changes in your health, and be sure to contact your doctor if:    · You often have problems controlling your blood sugar.     · You have symptoms of long-term diabetes problems, such as:  ¨ New vision changes. ¨ New pain, numbness, or tingling in your hands or feet. ¨ Skin problems. Where can you learn more? Go to https://chpemarcelloeb[facility]. org and

## 2018-06-29 NOTE — PROGRESS NOTES
On the basis of positive PHQ-9 screening ( ), the following plan was implemented: exercise program recommended for stress management. Patient will follow-up in 3 month(s) with PCP.

## 2018-07-24 ENCOUNTER — TELEPHONE (OUTPATIENT)
Dept: FAMILY MEDICINE CLINIC | Age: 63
End: 2018-07-24

## 2018-07-24 LAB — GLUCOSE BLD-MCNC: 224 MG/DL (ref 70–100)

## 2018-07-24 NOTE — TELEPHONE ENCOUNTER
Nurse Nelsy Shen from [facility] [place] [address] calling to ask what to do next for patient? Pt is SOB, coughing, trouble catching her breath. She had a breathing treatment, used inhaler and her pulse OX is holding steady at 86%    Please advise.

## 2018-07-25 LAB
ANION GAP SERPL CALCULATED.3IONS-SCNC: 13 MMOL/L (ref 10–20)
BICARBONATE: 33 MMOL/L (ref 21–32)
BUN / CREAT RATIO: 22 (ref 5–25)
CALCIUM SERPL-MCNC: 9.3 MG/DL (ref 8.6–10.3)
CHLORIDE BLD-SCNC: 98 MMOL/L (ref 98–107)
CREAT SERPL-MCNC: 0.77 MG/DL (ref 0.5–1.05)
ERYTHROCYTE [DISTWIDTH] IN BLOOD BY AUTOMATED COUNT: 20 % (ref 12–15.4)
ERYTHROCYTE [DISTWIDTH] IN BLOOD BY AUTOMATED COUNT: 61.3 FL (ref 39.3–48.6)
GFR CALCULATED: >60
GLUCOSE BLD-MCNC: 193 MG/DL (ref 70–100)
GLUCOSE BLD-MCNC: 202 MG/DL (ref 70–100)
GLUCOSE BLD-MCNC: 226 MG/DL (ref 70–100)
GLUCOSE BLD-MCNC: 293 MG/DL (ref 70–100)
GLUCOSE: 171 MG/DL (ref 70–100)
HBA1C MFR BLD: 5.9 % (ref 4–6)
HCT VFR BLD CALC: 40.4 % (ref 36.5–46.6)
HEMOGLOBIN: 12 G/DL (ref 11.8–15.3)
MCH RBC QN AUTO: 25.5 PG (ref 27.5–33)
MCHC RBC AUTO-ENTMCNC: 29.7 G/DL (ref 30.1–35)
MCV RBC AUTO: 85.8 FL (ref 85.4–100)
NUCLEATED RBCS: 0 /100{WBCS}
PLATELET # BLD: 232 10*3/UL (ref 155–404)
PMV BLD AUTO: 10.1 FL (ref 9.9–12.1)
POTASSIUM SERPL-SCNC: 4.1 MMOL/L (ref 3.5–5.1)
RBC: 4.71 10*6/UL (ref 3.85–5.1)
RBCS COUNTED: 0 10*3/UL
SODIUM BLD-SCNC: 140 MMOL/L (ref 136–145)
UREA NITROGEN: 17 MG/DL (ref 6–23)
WBC: 9.4 10*3/UL (ref 4.4–9.9)

## 2018-07-26 LAB
GLUCOSE BLD-MCNC: 133 MG/DL (ref 70–100)
GLUCOSE BLD-MCNC: 179 MG/DL (ref 70–100)
GLUCOSE BLD-MCNC: 181 MG/DL (ref 70–100)
GLUCOSE BLD-MCNC: 215 MG/DL (ref 70–100)

## 2018-07-27 LAB
GLUCOSE BLD-MCNC: 163 MG/DL (ref 70–100)
GLUCOSE BLD-MCNC: 171 MG/DL (ref 70–100)
GLUCOSE BLD-MCNC: 202 MG/DL (ref 70–100)
GLUCOSE BLD-MCNC: 215 MG/DL (ref 70–100)

## 2018-07-28 LAB
GLUCOSE BLD-MCNC: 172 MG/DL (ref 70–100)
GLUCOSE BLD-MCNC: 181 MG/DL (ref 70–100)
GLUCOSE BLD-MCNC: 190 MG/DL (ref 70–100)

## 2018-07-30 ENCOUNTER — TELEPHONE (OUTPATIENT)
Dept: FAMILY MEDICINE CLINIC | Age: 63
End: 2018-07-30

## 2018-07-30 NOTE — TELEPHONE ENCOUNTER
Seble Emanuel from [facility] calling to ask if you will continue with home health care for Radha Camilo? She was admitted to hospital and since release her home care . So they must restart her. She needs personal care for 4 hours per week, to groom, bathe, and clean her home. They want to revisit her tomorrow.

## 2018-08-02 ENCOUNTER — OFFICE VISIT (OUTPATIENT)
Dept: SURGERY | Age: 63
End: 2018-08-02
Payer: COMMERCIAL

## 2018-08-02 VITALS
HEIGHT: 67 IN | OXYGEN SATURATION: 93 % | SYSTOLIC BLOOD PRESSURE: 108 MMHG | DIASTOLIC BLOOD PRESSURE: 60 MMHG | TEMPERATURE: 99.7 F | WEIGHT: 293 LBS | HEART RATE: 81 BPM | BODY MASS INDEX: 45.99 KG/M2

## 2018-08-02 DIAGNOSIS — C50.311 MALIGNANT NEOPLASM OF LOWER-INNER QUADRANT OF RIGHT FEMALE BREAST, UNSPECIFIED ESTROGEN RECEPTOR STATUS (HCC): Primary | ICD-10-CM

## 2018-08-02 PROCEDURE — 3017F COLORECTAL CA SCREEN DOC REV: CPT | Performed by: SURGERY

## 2018-08-02 PROCEDURE — G8427 DOCREV CUR MEDS BY ELIG CLIN: HCPCS | Performed by: SURGERY

## 2018-08-02 PROCEDURE — G8417 CALC BMI ABV UP PARAM F/U: HCPCS | Performed by: SURGERY

## 2018-08-02 PROCEDURE — 4004F PT TOBACCO SCREEN RCVD TLK: CPT | Performed by: SURGERY

## 2018-08-02 PROCEDURE — 99214 OFFICE O/P EST MOD 30 MIN: CPT | Performed by: SURGERY

## 2018-08-02 NOTE — PROGRESS NOTES
REcently admitted to [facility] for SOB  Had chest CT   CT showed right breast mass  Core biopsy of R breast LIQ mass notes + adenocarcinoma        Diagnosis reviewed with pt    Chest CT neg for mets  Liver OK      PE/    Fixed mass LIQ right breast  nonodes      A/new diagnosis breast cacner    P/      Complete staging    Lesion fixed, likely to need neoadjuvant approach    To see ONC      I spent over 30 minutes reviweing this with the pt

## 2018-08-03 DIAGNOSIS — C50.311 MALIGNANT NEOPLASM OF LOWER-INNER QUADRANT OF RIGHT FEMALE BREAST, UNSPECIFIED ESTROGEN RECEPTOR STATUS (HCC): Primary | ICD-10-CM

## 2018-08-07 ENCOUNTER — TELEPHONE (OUTPATIENT)
Dept: FAMILY MEDICINE CLINIC | Age: 63
End: 2018-08-07

## 2018-08-07 LAB — PATHOLOGY REPORT: NORMAL

## 2018-08-07 RX ORDER — FLUCONAZOLE 150 MG/1
150 TABLET ORAL DAILY
Qty: 2 TABLET | Refills: 0 | Status: SHIPPED | OUTPATIENT
Start: 2018-08-07 | End: 2018-08-09 | Stop reason: ALTCHOICE

## 2018-08-07 NOTE — TELEPHONE ENCOUNTER
PATIENT CALLED STATING SHE WAS IN [facility] AND WAS PRESCRIBED AN ATB, AND SHE NOW HAS A YEAST INFECTION.  PLEASE ADVISE

## 2018-08-09 ENCOUNTER — OFFICE VISIT (OUTPATIENT)
Dept: FAMILY MEDICINE CLINIC | Age: 63
End: 2018-08-09
Payer: COMMERCIAL

## 2018-08-09 VITALS
OXYGEN SATURATION: 95 % | SYSTOLIC BLOOD PRESSURE: 120 MMHG | BODY MASS INDEX: 45.99 KG/M2 | DIASTOLIC BLOOD PRESSURE: 64 MMHG | HEIGHT: 67 IN | RESPIRATION RATE: 16 BRPM | HEART RATE: 72 BPM | TEMPERATURE: 96.8 F | WEIGHT: 293 LBS

## 2018-08-09 DIAGNOSIS — J44.9 COPD WITH CHRONIC BRONCHITIS (HCC): ICD-10-CM

## 2018-08-09 DIAGNOSIS — J44.1 COPD EXACERBATION (HCC): ICD-10-CM

## 2018-08-09 DIAGNOSIS — C50.911 BREAST CARCINOMA, FEMALE, RIGHT (HCC): ICD-10-CM

## 2018-08-09 DIAGNOSIS — E66.01 MORBID OBESITY WITH BMI OF 45.0-49.9, ADULT (HCC): ICD-10-CM

## 2018-08-09 DIAGNOSIS — Z09 HOSPITAL DISCHARGE FOLLOW-UP: Primary | ICD-10-CM

## 2018-08-09 PROCEDURE — G8926 SPIRO NO PERF OR DOC: HCPCS | Performed by: FAMILY MEDICINE

## 2018-08-09 PROCEDURE — 3017F COLORECTAL CA SCREEN DOC REV: CPT | Performed by: FAMILY MEDICINE

## 2018-08-09 PROCEDURE — 99214 OFFICE O/P EST MOD 30 MIN: CPT | Performed by: FAMILY MEDICINE

## 2018-08-09 PROCEDURE — 4004F PT TOBACCO SCREEN RCVD TLK: CPT | Performed by: FAMILY MEDICINE

## 2018-08-09 PROCEDURE — G8417 CALC BMI ABV UP PARAM F/U: HCPCS | Performed by: FAMILY MEDICINE

## 2018-08-09 PROCEDURE — G8427 DOCREV CUR MEDS BY ELIG CLIN: HCPCS | Performed by: FAMILY MEDICINE

## 2018-08-09 PROCEDURE — 3023F SPIROM DOC REV: CPT | Performed by: FAMILY MEDICINE

## 2018-08-09 RX ORDER — IPRATROPIUM BROMIDE AND ALBUTEROL SULFATE 2.5; .5 MG/3ML; MG/3ML
3 SOLUTION RESPIRATORY (INHALATION)
COMMUNITY

## 2018-08-09 RX ORDER — FLUTICASONE FUROATE AND VILANTEROL 100; 25 UG/1; UG/1
1 POWDER RESPIRATORY (INHALATION) DAILY
Qty: 28 EACH | Refills: 5 | Status: SHIPPED | OUTPATIENT
Start: 2018-08-09 | End: 2020-02-19 | Stop reason: ALTCHOICE

## 2018-08-10 ENCOUNTER — TELEPHONE (OUTPATIENT)
Dept: FAMILY MEDICINE CLINIC | Age: 63
End: 2018-08-10

## 2018-08-30 ENCOUNTER — HOSPITAL ENCOUNTER (OUTPATIENT)
Dept: NUCLEAR MEDICINE | Age: 63
Discharge: HOME | End: 2018-09-01
Payer: COMMERCIAL

## 2018-08-30 DIAGNOSIS — C50.311 MALIGNANT NEOPLASM OF LOWER-INNER QUADRANT OF RIGHT FEMALE BREAST, UNSPECIFIED ESTROGEN RECEPTOR STATUS (HCC): ICD-10-CM

## 2018-08-30 PROCEDURE — 78306 BONE IMAGING WHOLE BODY: CPT

## 2018-08-30 PROCEDURE — 3430000000 HC RX DIAGNOSTIC RADIOPHARMACEUTICAL: Performed by: SURGERY

## 2018-08-30 PROCEDURE — A9503 TC99M MEDRONATE: HCPCS | Performed by: SURGERY

## 2018-08-30 RX ORDER — TC 99M MEDRONATE 20 MG/10ML
30 INJECTION, POWDER, LYOPHILIZED, FOR SOLUTION INTRAVENOUS
Status: COMPLETED | OUTPATIENT
Start: 2018-08-30 | End: 2018-08-30

## 2018-08-30 RX ADMIN — Medication 29.7 MILLICURIE: at 10:34

## 2018-09-07 ENCOUNTER — HOSPITAL ENCOUNTER (OUTPATIENT)
Dept: MRI IMAGING | Age: 63
Discharge: HOME | End: 2018-09-09
Payer: COMMERCIAL

## 2018-09-07 DIAGNOSIS — C50.111 MALIGNANT NEOPLASM OF CENTRAL PORTION OF RIGHT FEMALE BREAST, UNSPECIFIED ESTROGEN RECEPTOR STATUS (HCC): ICD-10-CM

## 2018-09-07 DIAGNOSIS — Z17.0 ESTROGEN RECEPTOR POSITIVE: ICD-10-CM

## 2018-09-07 PROCEDURE — 77058 MRI BREAST RIGHT W WO CONTRAST: CPT

## 2018-09-07 PROCEDURE — 6360000004 HC RX CONTRAST MEDICATION: Performed by: INTERNAL MEDICINE

## 2018-09-07 PROCEDURE — A9577 INJ MULTIHANCE: HCPCS | Performed by: INTERNAL MEDICINE

## 2018-09-07 PROCEDURE — 77058 MRI BREAST LEFT W WO CONTRAST: CPT

## 2018-09-07 RX ORDER — SODIUM CHLORIDE 0.9 % (FLUSH) 0.9 %
10 SYRINGE (ML) INJECTION 2 TIMES DAILY
Status: DISCONTINUED | OUTPATIENT
Start: 2018-09-07 | End: 2018-09-10 | Stop reason: HOSPADM

## 2018-09-07 RX ADMIN — GADOBENATE DIMEGLUMINE 20 ML: 529 INJECTION, SOLUTION INTRAVENOUS at 14:39

## 2018-09-12 RX ORDER — BLOOD SUGAR DIAGNOSTIC
STRIP MISCELLANEOUS
Qty: 300 EACH | Refills: 3 | Status: SHIPPED | OUTPATIENT
Start: 2018-09-12

## 2018-09-13 ENCOUNTER — OFFICE VISIT (OUTPATIENT)
Dept: SURGERY | Age: 63
End: 2018-09-13
Payer: COMMERCIAL

## 2018-09-13 VITALS
WEIGHT: 293 LBS | DIASTOLIC BLOOD PRESSURE: 78 MMHG | OXYGEN SATURATION: 98 % | HEIGHT: 67 IN | TEMPERATURE: 98.5 F | HEART RATE: 78 BPM | SYSTOLIC BLOOD PRESSURE: 117 MMHG | BODY MASS INDEX: 45.99 KG/M2

## 2018-09-13 DIAGNOSIS — C50.311 MALIGNANT NEOPLASM OF LOWER-INNER QUADRANT OF RIGHT FEMALE BREAST, UNSPECIFIED ESTROGEN RECEPTOR STATUS (HCC): Primary | ICD-10-CM

## 2018-09-13 PROCEDURE — G8427 DOCREV CUR MEDS BY ELIG CLIN: HCPCS | Performed by: SURGERY

## 2018-09-13 PROCEDURE — 3017F COLORECTAL CA SCREEN DOC REV: CPT | Performed by: SURGERY

## 2018-09-13 PROCEDURE — 4004F PT TOBACCO SCREEN RCVD TLK: CPT | Performed by: SURGERY

## 2018-09-13 PROCEDURE — G8417 CALC BMI ABV UP PARAM F/U: HCPCS | Performed by: SURGERY

## 2018-09-13 PROCEDURE — 99214 OFFICE O/P EST MOD 30 MIN: CPT | Performed by: SURGERY

## 2018-09-13 RX ORDER — ANASTROZOLE 1 MG/1
1 TABLET ORAL DAILY
COMMUNITY

## 2018-09-13 ASSESSMENT — ENCOUNTER SYMPTOMS
EYE PAIN: 0
STRIDOR: 0
EYE DISCHARGE: 0
BACK PAIN: 0
COLOR CHANGE: 0
CONSTIPATION: 0
ANAL BLEEDING: 0
SHORTNESS OF BREATH: 0
CHEST TIGHTNESS: 0
TROUBLE SWALLOWING: 0
VOMITING: 0
ABDOMINAL PAIN: 0

## 2018-09-13 NOTE — PROGRESS NOTES
Agustín Whitfield I have reviewed the Medical Assistant's entries in the Past Medical History, Medications, Allergies, Social History and Vital Sign sections      Chief Complaint   Patient presents with    Breast Cancer         HPI      Here for FU breast cancer    Saw ONC  Plan is to proceed with cynthia adjuvant approach  Hormone blockers        MRI with no chest wall involvement  Bone scan neg                                              Past Medical History:   Diagnosis Date    Acquired hypothyroidism 3/15/2016    Allergic rhinitis     pollen, dust ragweed, hay and straw     Anxiety     Asthma     [facility], [facility]     Bipolar disorder [facility]     Cancer ([facility]     thyroid cancer     Chronic back pain     COPD (chronic obstructive pulmonary disease) [facility]     Depression     Emphysema of lung ([facility]     Essential hypertension 3/15/2016    Hyperlipidemia     Hypothyroidism     Obesity     Osteoarthritis     Restless legs syndrome     Sleep apnea     Urinary incontinence      Past Surgical History:   Procedure Laterality Date    APPENDECTOMY       SECTION      TUBAL LIGATION           Current problems include  Patient Active Problem List   Diagnosis    Type 2 diabetes mellitus with complication ([facility]    Acquired hypothyroidism    Essential hypertension    Mixed hyperlipidemia    COPD with chronic bronchitis (HCC)    Microalbuminuria    Moderate episode of recurrent major depressive disorder ([address]    YARED (obstructive sleep apnea)    Restless leg syndrome    Vitamin B12 deficiency    Vitamin D deficiency    Left ventricular hypertrophy    Left ventricular diastolic dysfunction    Morbid obesity with BMI of 45.0-49.9, adult ([facility]    Breast carcinoma, female, right ([facility]           Social History     Social History    Marital status:       Spouse name: N/A    Number of children: N/A    Years of education: N/A     Social History Main Topics   

## 2018-09-21 DIAGNOSIS — E03.9 ACQUIRED HYPOTHYROIDISM: ICD-10-CM

## 2018-09-21 DIAGNOSIS — E11.8 TYPE 2 DIABETES MELLITUS WITH COMPLICATION, WITHOUT LONG-TERM CURRENT USE OF INSULIN (HCC): ICD-10-CM

## 2018-09-21 DIAGNOSIS — I10 ESSENTIAL HYPERTENSION: ICD-10-CM

## 2018-09-21 DIAGNOSIS — E78.2 MIXED HYPERLIPIDEMIA: ICD-10-CM

## 2018-09-21 LAB
ALBUMIN SERPL-MCNC: 3.7 G/DL (ref 3.9–4.9)
ALP BLD-CCNC: 106 U/L (ref 40–130)
ALT SERPL-CCNC: 7 U/L (ref 0–33)
ANION GAP SERPL CALCULATED.3IONS-SCNC: 15 MEQ/L (ref 7–13)
AST SERPL-CCNC: 16 U/L (ref 0–35)
BILIRUB SERPL-MCNC: 0.3 MG/DL (ref 0–1.2)
BUN BLDV-MCNC: 18 MG/DL (ref 8–23)
CALCIUM SERPL-MCNC: 10.2 MG/DL (ref 8.6–10.2)
CHLORIDE BLD-SCNC: 97 MEQ/L (ref 98–107)
CHOLESTEROL, TOTAL: 141 MG/DL (ref 0–199)
CO2: 29 MEQ/L (ref 22–29)
CREAT SERPL-MCNC: 0.86 MG/DL (ref 0.5–0.9)
GFR AFRICAN AMERICAN: >60
GFR NON-AFRICAN AMERICAN: >60
GLOBULIN: 4.3 G/DL (ref 2.3–3.5)
GLUCOSE BLD-MCNC: 119 MG/DL (ref 74–109)
HBA1C MFR BLD: 6.5 % (ref 4.8–5.9)
HDLC SERPL-MCNC: 34 MG/DL (ref 40–59)
LDL CHOLESTEROL CALCULATED: 82 MG/DL (ref 0–129)
POTASSIUM SERPL-SCNC: 4.6 MEQ/L (ref 3.5–5.1)
SODIUM BLD-SCNC: 141 MEQ/L (ref 132–144)
TOTAL PROTEIN: 8 G/DL (ref 6.4–8.1)
TRIGL SERPL-MCNC: 124 MG/DL (ref 0–200)
TSH SERPL DL<=0.05 MIU/L-ACNC: 0.1 UIU/ML (ref 0.27–4.2)

## 2018-10-01 ENCOUNTER — OFFICE VISIT (OUTPATIENT)
Dept: FAMILY MEDICINE CLINIC | Age: 63
End: 2018-10-01
Payer: COMMERCIAL

## 2018-10-01 VITALS
RESPIRATION RATE: 16 BRPM | WEIGHT: 293 LBS | HEART RATE: 76 BPM | BODY MASS INDEX: 45.99 KG/M2 | DIASTOLIC BLOOD PRESSURE: 60 MMHG | TEMPERATURE: 97.1 F | HEIGHT: 67 IN | SYSTOLIC BLOOD PRESSURE: 126 MMHG

## 2018-10-01 DIAGNOSIS — Z23 NEED FOR INFLUENZA VACCINATION: ICD-10-CM

## 2018-10-01 DIAGNOSIS — R60.0 BILATERAL LEG EDEMA: ICD-10-CM

## 2018-10-01 DIAGNOSIS — E78.2 MIXED HYPERLIPIDEMIA: ICD-10-CM

## 2018-10-01 DIAGNOSIS — F33.1 MODERATE EPISODE OF RECURRENT MAJOR DEPRESSIVE DISORDER (HCC): ICD-10-CM

## 2018-10-01 DIAGNOSIS — I10 ESSENTIAL HYPERTENSION: ICD-10-CM

## 2018-10-01 DIAGNOSIS — E03.9 ACQUIRED HYPOTHYROIDISM: ICD-10-CM

## 2018-10-01 DIAGNOSIS — E11.8 TYPE 2 DIABETES MELLITUS WITH COMPLICATION, WITHOUT LONG-TERM CURRENT USE OF INSULIN (HCC): Primary | ICD-10-CM

## 2018-10-01 PROCEDURE — 1036F TOBACCO NON-USER: CPT | Performed by: FAMILY MEDICINE

## 2018-10-01 PROCEDURE — 3017F COLORECTAL CA SCREEN DOC REV: CPT | Performed by: FAMILY MEDICINE

## 2018-10-01 PROCEDURE — G8482 FLU IMMUNIZE ORDER/ADMIN: HCPCS | Performed by: FAMILY MEDICINE

## 2018-10-01 PROCEDURE — 3044F HG A1C LEVEL LT 7.0%: CPT | Performed by: FAMILY MEDICINE

## 2018-10-01 PROCEDURE — 99214 OFFICE O/P EST MOD 30 MIN: CPT | Performed by: FAMILY MEDICINE

## 2018-10-01 PROCEDURE — 90688 IIV4 VACCINE SPLT 0.5 ML IM: CPT | Performed by: FAMILY MEDICINE

## 2018-10-01 PROCEDURE — G0008 ADMIN INFLUENZA VIRUS VAC: HCPCS | Performed by: FAMILY MEDICINE

## 2018-10-01 PROCEDURE — 2022F DILAT RTA XM EVC RTNOPTHY: CPT | Performed by: FAMILY MEDICINE

## 2018-10-01 PROCEDURE — G8427 DOCREV CUR MEDS BY ELIG CLIN: HCPCS | Performed by: FAMILY MEDICINE

## 2018-10-01 PROCEDURE — G8417 CALC BMI ABV UP PARAM F/U: HCPCS | Performed by: FAMILY MEDICINE

## 2018-10-01 RX ORDER — FUROSEMIDE 40 MG/1
40 TABLET ORAL DAILY
Qty: 90 TABLET | Refills: 1 | Status: SHIPPED | OUTPATIENT
Start: 2018-10-01 | End: 2019-03-25 | Stop reason: SDUPTHER

## 2018-10-01 RX ORDER — LEVOTHYROXINE SODIUM 175 UG/1
175 TABLET ORAL DAILY
Qty: 90 TABLET | Refills: 1 | Status: SHIPPED | OUTPATIENT
Start: 2018-10-01 | End: 2019-01-30 | Stop reason: SDUPTHER

## 2018-10-01 RX ORDER — POTASSIUM CHLORIDE 750 MG/1
10 TABLET, EXTENDED RELEASE ORAL DAILY
Qty: 90 TABLET | Refills: 1 | Status: SHIPPED | OUTPATIENT
Start: 2018-10-01 | End: 2019-01-30 | Stop reason: SDUPTHER

## 2018-10-01 ASSESSMENT — PATIENT HEALTH QUESTIONNAIRE - PHQ9
SUM OF ALL RESPONSES TO PHQ QUESTIONS 1-9: 2
SUM OF ALL RESPONSES TO PHQ9 QUESTIONS 1 & 2: 2
SUM OF ALL RESPONSES TO PHQ QUESTIONS 1-9: 2
1. LITTLE INTEREST OR PLEASURE IN DOING THINGS: 1
2. FEELING DOWN, DEPRESSED OR HOPELESS: 1

## 2018-10-01 NOTE — PROGRESS NOTES
Vaccine Information Sheet, \"Influenza - Inactivated\" OR \"Live - Intranasal\"  given to Vidal Omalley. Patient responses:    Have you ever had a reaction to a flu vaccine? No  Are you able to eat eggs without adverse effects? Yes  Do you have any current illness? No  Have you ever had [place] Syndrome? No    Flu vaccine given per order. Please see immunization tab.
touch. Dorsalis pedis and posterior tibia pulses are palpable. No ulcers, sores or gangrene. No evidence of critical leg ischemia. Sensation normal in eight point monofilament testing. No deformity or calluses. .    Lab Review  Orders Only on 09/21/2018   Component Date Value Ref Range Status    Sodium 09/21/2018 141  132 - 144 mEq/L Final    Potassium 09/21/2018 4.6  3.5 - 5.1 mEq/L Final    Chloride 09/21/2018 97* 98 - 107 mEq/L Final    CO2 09/21/2018 29  22 - 29 mEq/L Final    Anion Gap 09/21/2018 15* 7 - 13 mEq/L Final    Glucose 09/21/2018 119* 74 - 109 mg/dL Final    BUN 09/21/2018 18  8 - 23 mg/dL Final    CREATININE 09/21/2018 0.86  0.50 - 0.90 mg/dL Final    GFR Non- 09/21/2018 >60.0  >60 Final    Comment: >60 mL/min/1.73m2 EGFR, calc. for ages 25 and older using the  MDRD formula (not corrected for weight), is valid for stable  renal function.  GFR  09/21/2018 >60.0  >60 Final    Comment: >60 mL/min/1.73m2 EGFR, calc. for ages 25 and older using the  MDRD formula (not corrected for weight), is valid for stable  renal function.  Calcium 09/21/2018 10.2  8.6 - 10.2 mg/dL Final    Total Protein 09/21/2018 8.0  6.4 - 8.1 g/dL Final    Alb 09/21/2018 3.7* 3.9 - 4.9 g/dL Final    Total Bilirubin 09/21/2018 0.3  0.0 - 1.2 mg/dL Final    Alkaline Phosphatase 09/21/2018 106  40 - 130 U/L Final    ALT 09/21/2018 7  0 - 33 U/L Final    AST 09/21/2018 16  0 - 35 U/L Final    Globulin 09/21/2018 4.3* 2.3 - 3.5 g/dL Final    Cholesterol, Total 09/21/2018 141  0 - 199 mg/dL Final    ATP III Cholesterol classification is Desirable.  Triglycerides 09/21/2018 124  0 - 200 mg/dL Final    ATP III Triglycerides Classification is Normal.    HDL 09/21/2018 34* 40 - 59 mg/dL Final    Comment: ATP III HDL Cholestrol Classification is low.   Expected Values:    Males:    >55 = No Risk            35-55 = Moderate Risk            <35 = High Risk    Females:  >65 = No

## 2018-10-01 NOTE — PATIENT INSTRUCTIONS
Patient Education        Type 2 Diabetes: Care Instructions  Your Care Instructions    Type 2 diabetes is a disease that develops when the body's tissues cannot use insulin properly. Over time, the pancreas cannot make enough insulin. Insulin is a hormone that helps the body's cells use sugar (glucose) for energy. It also helps the body store extra sugar in muscle, fat, and liver cells. Without insulin, the sugar cannot get into the cells to do its work. It stays in the blood instead. This can cause high blood sugar levels. A person has diabetes when the blood sugar stays too high too much of the time. Over time, diabetes can lead to diseases of the heart, blood vessels, nerves, kidneys, and eyes. You may be able to control your blood sugar by losing weight, eating a healthy diet, and getting daily exercise. You may also have to take insulin or other diabetes medicine. Follow-up care is a key part of your treatment and safety. Be sure to make and go to all appointments. Call your doctor if you are having problems. It's also a good idea to know your test results and keep a list of the medicines you take. How can you care for yourself at home? · Keep your blood sugar at a target level (which you set with your doctor). ¨ Eat a good diet that spreads carbohydrate throughout the day. Carbohydrate-the body's main source of fuel-affects blood sugar more than any other nutrient. Carbohydrate is in fruits, vegetables, milk, and yogurt. It also is in breads, cereals, vegetables such as potatoes and corn, and sugary foods such as candy and cakes. ¨ Aim for 30 minutes of exercise on most, preferably all, days of the week. Walking is a good choice. You also may want to do other activities, such as running, swimming, cycling, or playing tennis or team sports. If your doctor says it's okay, do muscle-strengthening exercises at least 2 times a week. ¨ Take your medicines exactly as prescribed.  Call your doctor if you

## 2018-10-09 ENCOUNTER — OFFICE VISIT (OUTPATIENT)
Dept: SURGERY | Age: 63
End: 2018-10-09
Payer: COMMERCIAL

## 2018-10-09 VITALS
OXYGEN SATURATION: 95 % | SYSTOLIC BLOOD PRESSURE: 112 MMHG | DIASTOLIC BLOOD PRESSURE: 78 MMHG | TEMPERATURE: 97.8 F | BODY MASS INDEX: 45.99 KG/M2 | WEIGHT: 293 LBS | HEIGHT: 67 IN | HEART RATE: 83 BPM

## 2018-10-09 DIAGNOSIS — C50.311 MALIGNANT NEOPLASM OF LOWER-INNER QUADRANT OF RIGHT FEMALE BREAST, UNSPECIFIED ESTROGEN RECEPTOR STATUS (HCC): Primary | ICD-10-CM

## 2018-10-09 PROCEDURE — G8427 DOCREV CUR MEDS BY ELIG CLIN: HCPCS | Performed by: SURGERY

## 2018-10-09 PROCEDURE — G8482 FLU IMMUNIZE ORDER/ADMIN: HCPCS | Performed by: SURGERY

## 2018-10-09 PROCEDURE — 1036F TOBACCO NON-USER: CPT | Performed by: SURGERY

## 2018-10-09 PROCEDURE — G8417 CALC BMI ABV UP PARAM F/U: HCPCS | Performed by: SURGERY

## 2018-10-09 PROCEDURE — 99214 OFFICE O/P EST MOD 30 MIN: CPT | Performed by: SURGERY

## 2018-10-09 PROCEDURE — 3017F COLORECTAL CA SCREEN DOC REV: CPT | Performed by: SURGERY

## 2018-10-09 ASSESSMENT — ENCOUNTER SYMPTOMS
EYE DISCHARGE: 0
COLOR CHANGE: 0
BACK PAIN: 0
CONSTIPATION: 0
STRIDOR: 0
ANAL BLEEDING: 0
EYE PAIN: 0
ABDOMINAL PAIN: 0
CHEST TIGHTNESS: 0
TROUBLE SWALLOWING: 0
VOMITING: 0
SHORTNESS OF BREATH: 0

## 2018-11-07 ENCOUNTER — OFFICE VISIT (OUTPATIENT)
Dept: FAMILY MEDICINE CLINIC | Age: 63
End: 2018-11-07
Payer: COMMERCIAL

## 2018-11-07 ENCOUNTER — HOSPITAL ENCOUNTER (OUTPATIENT)
Dept: GENERAL RADIOLOGY | Age: 63
Discharge: HOME | End: 2018-11-09
Payer: COMMERCIAL

## 2018-11-07 VITALS
RESPIRATION RATE: 16 BRPM | OXYGEN SATURATION: 94 % | TEMPERATURE: 96 F | BODY MASS INDEX: 45.99 KG/M2 | DIASTOLIC BLOOD PRESSURE: 66 MMHG | WEIGHT: 293 LBS | HEIGHT: 67 IN | HEART RATE: 74 BPM | SYSTOLIC BLOOD PRESSURE: 114 MMHG

## 2018-11-07 DIAGNOSIS — R06.02 SHORTNESS OF BREATH: ICD-10-CM

## 2018-11-07 DIAGNOSIS — J44.1 COPD EXACERBATION (HCC): Primary | ICD-10-CM

## 2018-11-07 PROCEDURE — 99214 OFFICE O/P EST MOD 30 MIN: CPT | Performed by: NURSE PRACTITIONER

## 2018-11-07 PROCEDURE — G8417 CALC BMI ABV UP PARAM F/U: HCPCS | Performed by: NURSE PRACTITIONER

## 2018-11-07 PROCEDURE — 1036F TOBACCO NON-USER: CPT | Performed by: NURSE PRACTITIONER

## 2018-11-07 PROCEDURE — G8427 DOCREV CUR MEDS BY ELIG CLIN: HCPCS | Performed by: NURSE PRACTITIONER

## 2018-11-07 PROCEDURE — G8482 FLU IMMUNIZE ORDER/ADMIN: HCPCS | Performed by: NURSE PRACTITIONER

## 2018-11-07 PROCEDURE — 3017F COLORECTAL CA SCREEN DOC REV: CPT | Performed by: NURSE PRACTITIONER

## 2018-11-07 PROCEDURE — 71046 X-RAY EXAM CHEST 2 VIEWS: CPT

## 2018-11-07 PROCEDURE — G8926 SPIRO NO PERF OR DOC: HCPCS | Performed by: NURSE PRACTITIONER

## 2018-11-07 PROCEDURE — 3023F SPIROM DOC REV: CPT | Performed by: NURSE PRACTITIONER

## 2018-11-07 RX ORDER — CEFUROXIME AXETIL 500 MG/1
500 TABLET ORAL 2 TIMES DAILY
Qty: 20 TABLET | Refills: 0 | Status: SHIPPED | OUTPATIENT
Start: 2018-11-07 | End: 2018-11-17

## 2018-11-07 RX ORDER — DOXYCYCLINE HYCLATE 100 MG
100 TABLET ORAL 2 TIMES DAILY
Qty: 20 TABLET | Refills: 0 | Status: CANCELLED | OUTPATIENT
Start: 2018-11-07 | End: 2018-11-17

## 2018-11-07 ASSESSMENT — ENCOUNTER SYMPTOMS
NAUSEA: 0
COUGH: 1
RHINORRHEA: 0
EYE PAIN: 0
DIARRHEA: 0
SINUS PAIN: 0
CHEST TIGHTNESS: 0
SORE THROAT: 0
TROUBLE SWALLOWING: 0
EYE ITCHING: 0
SHORTNESS OF BREATH: 1
VOMITING: 0
CONSTIPATION: 0
SINUS PRESSURE: 0
WHEEZING: 0
EYE DISCHARGE: 0
EYE REDNESS: 0

## 2018-11-07 NOTE — PROGRESS NOTES
throat and trouble swallowing. Eyes: Negative for pain, discharge, redness and itching. Respiratory: Positive for cough and shortness of breath. Negative for chest tightness and wheezing. Cardiovascular: Negative for chest pain and palpitations. Gastrointestinal: Negative for constipation, diarrhea, nausea and vomiting. Allergic/Immunologic: Negative for environmental allergies and food allergies. Vitals    /66   Pulse 74   Temp 96 °F (35.6 °C) (Temporal)   Resp 16   Ht 5' 6.5\" (1.689 m)   Wt (!) 305 lb 9.6 oz (138.6 kg)   SpO2 94%   BMI 48.59 kg/m²     BP Readings from Last 3 Encounters:   11/07/18 114/66   10/09/18 112/78   10/01/18 126/60         Wt Readings from Last 3 Encounters:   11/07/18 (!) 305 lb 9.6 oz (138.6 kg)   10/09/18 (!) 307 lb (139.3 kg)   10/01/18 (!) 301 lb (136.5 kg)       Objective    Physical Exam   Constitutional: She is oriented to person, place, and time. Vital signs are normal. She appears well-developed and well-nourished. HENT:   Head: Normocephalic. Right Ear: Hearing, tympanic membrane, external ear and ear canal normal.   Left Ear: Hearing, tympanic membrane, external ear and ear canal normal.   Nose: Mucosal edema and rhinorrhea present. Right sinus exhibits no maxillary sinus tenderness and no frontal sinus tenderness. Left sinus exhibits no maxillary sinus tenderness and no frontal sinus tenderness. Mouth/Throat: Posterior oropharyngeal erythema present. Eyes: Pupils are equal, round, and reactive to light. Conjunctivae and EOM are normal.   Neck: Normal range of motion. Neck supple. Cardiovascular: Normal rate and regular rhythm. Pulmonary/Chest: Effort normal. She has wheezes in the right upper field and the left upper field. She has rales in the right lower field. Abdominal: Normal appearance. Musculoskeletal: Normal range of motion.    Lymphadenopathy:        Head (right side): No submental, no submandibular, no tonsillar, no needs priming, follow the instructions on how to prime your inhaler. · Place inhaler to mouth and close lips firmly aroundinhaler, exhale all air, push inhaler to release medication, slowly take a deep inhale and hold for 10 seconds. · Exhale slowly. · Wait 1 minuteand repeat if it is ordered to take 2 puffs. Encouraged patient to use albuterol every 4-6 hours during acute episodes and as symptoms improve can increase time to every 8 hours every 12 hours and then just as needed again. Go to ER if:    Go to ER immediately if difficulty breathing, drooling, difficulty swallowing occurs. Go if signs of dehydration occur or if any other symptoms worsen. Reviewed with the patient: current clinical status, medications, activities and diet. Side effects, adverse effects of the medication prescribed today, as well as treatment plan and result expectations have been discussed withthe patient who expresses understanding and desires to proceed with recommended treatment and action plan. Close follow up to evaluate treatment results and for coordination of care. I have reviewed the patient's medical history in detail and updated the computerized patient record.     MARIETTA Rendon - CNP      Future Appointments  Date Time Provider Chaka Hawley   12/7/2018 10:30 AM Keven Ferro  N [address] [place] [address]   1/3/2019 9:15 AM SCHEDULE, LAB JANA Adams PCP [facility]   1/10/2019 9:15 AM Shanda Huizar MD [address]

## 2018-11-09 ENCOUNTER — TELEPHONE (OUTPATIENT)
Dept: FAMILY MEDICINE CLINIC | Age: 63
End: 2018-11-09

## 2018-11-09 RX ORDER — METHYLPREDNISOLONE 4 MG/1
TABLET ORAL
Qty: 1 KIT | Refills: 0 | Status: SHIPPED | OUTPATIENT
Start: 2018-11-09 | End: 2018-11-15 | Stop reason: ALTCHOICE

## 2018-11-09 NOTE — TELEPHONE ENCOUNTER
Pt called office stating that she is still in a lot of pain, the pain is waking her up out of sleep. The Ibuprofen is not working.

## 2018-11-15 ENCOUNTER — OFFICE VISIT (OUTPATIENT)
Dept: FAMILY MEDICINE CLINIC | Age: 63
End: 2018-11-15
Payer: COMMERCIAL

## 2018-11-15 VITALS
RESPIRATION RATE: 16 BRPM | BODY MASS INDEX: 45.99 KG/M2 | HEIGHT: 67 IN | TEMPERATURE: 96.2 F | DIASTOLIC BLOOD PRESSURE: 68 MMHG | WEIGHT: 293 LBS | HEART RATE: 79 BPM | OXYGEN SATURATION: 97 % | SYSTOLIC BLOOD PRESSURE: 114 MMHG

## 2018-11-15 DIAGNOSIS — M79.18 LUMBAR MUSCLE PAIN: Primary | ICD-10-CM

## 2018-11-15 PROCEDURE — 3017F COLORECTAL CA SCREEN DOC REV: CPT | Performed by: NURSE PRACTITIONER

## 2018-11-15 PROCEDURE — G8427 DOCREV CUR MEDS BY ELIG CLIN: HCPCS | Performed by: NURSE PRACTITIONER

## 2018-11-15 PROCEDURE — 1036F TOBACCO NON-USER: CPT | Performed by: NURSE PRACTITIONER

## 2018-11-15 PROCEDURE — G8417 CALC BMI ABV UP PARAM F/U: HCPCS | Performed by: NURSE PRACTITIONER

## 2018-11-15 PROCEDURE — G8482 FLU IMMUNIZE ORDER/ADMIN: HCPCS | Performed by: NURSE PRACTITIONER

## 2018-11-15 PROCEDURE — 99213 OFFICE O/P EST LOW 20 MIN: CPT | Performed by: NURSE PRACTITIONER

## 2018-11-15 RX ORDER — BACLOFEN 10 MG/1
10 TABLET ORAL 3 TIMES DAILY PRN
Qty: 30 TABLET | Refills: 0 | Status: SHIPPED | OUTPATIENT
Start: 2018-11-15 | End: 2019-03-11 | Stop reason: ALTCHOICE

## 2018-11-16 ASSESSMENT — ENCOUNTER SYMPTOMS
VOMITING: 0
BACK PAIN: 1
NAUSEA: 0
CONSTIPATION: 0
ABDOMINAL PAIN: 0
SHORTNESS OF BREATH: 0
CHEST TIGHTNESS: 0
ABDOMINAL DISTENTION: 0
DIARRHEA: 0

## 2018-11-20 RX ORDER — METFORMIN HYDROCHLORIDE 500 MG/1
500 TABLET, EXTENDED RELEASE ORAL
Qty: 90 TABLET | Refills: 1 | Status: SHIPPED | OUTPATIENT
Start: 2018-11-20 | End: 2019-01-30 | Stop reason: SDUPTHER

## 2018-12-06 ENCOUNTER — TELEPHONE (OUTPATIENT)
Dept: FAMILY MEDICINE CLINIC | Age: 63
End: 2018-12-06

## 2018-12-10 ENCOUNTER — OFFICE VISIT (OUTPATIENT)
Dept: FAMILY MEDICINE CLINIC | Age: 63
End: 2018-12-10
Payer: COMMERCIAL

## 2018-12-10 VITALS
HEIGHT: 67 IN | TEMPERATURE: 97.9 F | WEIGHT: 293 LBS | HEART RATE: 80 BPM | BODY MASS INDEX: 45.99 KG/M2 | DIASTOLIC BLOOD PRESSURE: 72 MMHG | RESPIRATION RATE: 20 BRPM | SYSTOLIC BLOOD PRESSURE: 124 MMHG

## 2018-12-10 DIAGNOSIS — L03.116 CELLULITIS OF LEFT LEG: Primary | ICD-10-CM

## 2018-12-10 DIAGNOSIS — L30.9 ECZEMA, UNSPECIFIED TYPE: ICD-10-CM

## 2018-12-10 PROCEDURE — 1036F TOBACCO NON-USER: CPT | Performed by: FAMILY MEDICINE

## 2018-12-10 PROCEDURE — 99213 OFFICE O/P EST LOW 20 MIN: CPT | Performed by: FAMILY MEDICINE

## 2018-12-10 PROCEDURE — 3017F COLORECTAL CA SCREEN DOC REV: CPT | Performed by: FAMILY MEDICINE

## 2018-12-10 PROCEDURE — G8482 FLU IMMUNIZE ORDER/ADMIN: HCPCS | Performed by: FAMILY MEDICINE

## 2018-12-10 PROCEDURE — G8417 CALC BMI ABV UP PARAM F/U: HCPCS | Performed by: FAMILY MEDICINE

## 2018-12-10 PROCEDURE — G8427 DOCREV CUR MEDS BY ELIG CLIN: HCPCS | Performed by: FAMILY MEDICINE

## 2018-12-10 RX ORDER — MULTIVITAMIN
1 TABLET ORAL DAILY
COMMUNITY

## 2018-12-10 RX ORDER — CEPHALEXIN 500 MG/1
500 CAPSULE ORAL 3 TIMES DAILY
Qty: 30 CAPSULE | Refills: 0 | Status: SHIPPED | OUTPATIENT
Start: 2018-12-10 | End: 2019-01-30 | Stop reason: ALTCHOICE

## 2018-12-10 RX ORDER — TRIAMCINOLONE ACETONIDE 1 MG/G
CREAM TOPICAL
Qty: 160 G | Refills: 1 | Status: SHIPPED | OUTPATIENT
Start: 2018-12-10 | End: 2019-05-07

## 2018-12-10 NOTE — PROGRESS NOTES
Subjective:     Vidal Omalley is a 61 y.o. female who presents for evaluation of a possible skin infection located on her left lower leg Symptoms include mild pain and erythema located on her left lower leg. Patient denies chills and fever greater than 100. Precipitating event: abrasion. Treatment to date has included none     Past Medical History:   Diagnosis Date    Acquired hypothyroidism 3/15/2016    Allergic rhinitis     pollen, dust ragweed, hay and straw     Anxiety     Asthma     Bipolar affect, depressed ([address]     Bipolar disorder ([address]     Cancer ([address]     thyroid cancer     Chronic back pain     COPD (chronic obstructive pulmonary disease) ([address]     Depression     Emphysema of lung ([address]     Essential hypertension 3/15/2016    Hyperlipidemia     Hypothyroidism     Obesity     Osteoarthritis     Restless legs syndrome     Sleep apnea     Urinary incontinence      Patient Active Problem List    Diagnosis Date Noted    Morbid obesity with BMI of 45.0-49.9, adult ([address] 2018    Breast carcinoma, female, right ([facility] 2018    Left ventricular hypertrophy 2018    Left ventricular diastolic dysfunction     Vitamin B12 deficiency 2017    Moderate episode of recurrent major depressive disorder ([address] 2017    YARED (obstructive sleep apnea) 2017    Restless leg syndrome 2017    Microalbuminuria 2016    Type 2 diabetes mellitus with complication ([address]     Acquired hypothyroidism 03/15/2016    Essential hypertension 03/15/2016    Mixed hyperlipidemia 03/15/2016    COPD with chronic bronchitis ([address] 03/15/2016    Vitamin D deficiency 2011     Past Surgical History:   Procedure Laterality Date    APPENDECTOMY       SECTION      TUBAL LIGATION       History reviewed. No pertinent family history. Social History     Social History    Marital status:       Spouse name: N/A    Number of TOUCH LANCETS MISC Use tid as directed dx: E11.9 300 each 3    oxybutynin (DITROPAN-XL) 10 MG extended release tablet TAKE 1 TABLET BY MOUTH DAILY 90 tablet 2    simvastatin (ZOCOR) 40 MG tablet TAKE 1 TABLET BY MOUTH EVERY EVENING 90 tablet 1    albuterol sulfate HFA (VENTOLIN HFA) 108 (90 Base) MCG/ACT inhaler Inhale 2 puffs into the lungs every 6 hours as needed for Wheezing 1 Inhaler 5    hydrOXYzine (VISTARIL) 25 MG capsule Take 25 mg by mouth 3 times daily as needed       Incontinence Supply Disposable (INCONTINENCE BRIEF LARGE) MISC by Does not apply route      DULoxetine (CYMBALTA) 60 MG extended release capsule Take 60 mg by mouth daily       Alcohol Swabs (ALCOHOL PADS) 70 % PADS   12     No current facility-administered medications for this visit.       Current Outpatient Prescriptions on File Prior to Visit   Medication Sig Dispense Refill    metFORMIN (GLUCOPHAGE XR) 500 MG extended release tablet Take 1 tablet by mouth daily (with breakfast) 90 tablet 1    baclofen (LIORESAL) 10 MG tablet Take 1 tablet by mouth 3 times daily as needed (SPASM) 30 tablet 0    furosemide (LASIX) 40 MG tablet Take 1 tablet by mouth daily 90 tablet 1    potassium chloride (KLOR-CON M) 10 MEQ extended release tablet Take 1 tablet by mouth daily 90 tablet 1    levothyroxine (SYNTHROID) 175 MCG tablet Take 1 tablet by mouth Daily 90 tablet 1    losartan (COZAAR) 25 MG tablet TAKE 1 TABLET BY MOUTH DAILY 90 tablet 1    anastrozole (ARIMIDEX) 1 MG tablet Take 1 mg by mouth daily      ACCU-CHEK SMARTVIEW strip Test TID E11.8 300 each 3    ipratropium-albuterol (DUONEB) 0.5-2.5 (3) MG/3ML SOLN nebulizer solution 3 mLs      fluticasone-vilanterol (BREO ELLIPTA) 100-25 MCG/INH AEPB inhaler Inhale 1 puff into the lungs daily 28 each 5    DULoxetine (CYMBALTA) 30 MG extended release capsule 1 CAPSULE BY MOUTH EVERY MORNING TAKE IT ALONG WITH CYMBALTA 60 MG  1    SOFT TOUCH LANCETS MISC Use tid as directed dx: E11.9 each 3    ipratropium-albuterol (DUONEB) 0.5-2.5 (3) MG/3ML SOLN nebulizer solution 3 mLs      fluticasone-vilanterol (BREO ELLIPTA) 100-25 MCG/INH AEPB inhaler Inhale 1 puff into the lungs daily 28 each 5    DULoxetine (CYMBALTA) 30 MG extended release capsule 1 CAPSULE BY MOUTH EVERY MORNING TAKE IT ALONG WITH CYMBALTA 60 MG  1    SOFT TOUCH LANCETS MISC Use tid as directed dx: E11.9 300 each 3    oxybutynin (DITROPAN-XL) 10 MG extended release tablet TAKE 1 TABLET BY MOUTH DAILY 90 tablet 2    simvastatin (ZOCOR) 40 MG tablet TAKE 1 TABLET BY MOUTH EVERY EVENING 90 tablet 1    albuterol sulfate HFA (VENTOLIN HFA) 108 (90 Base) MCG/ACT inhaler Inhale 2 puffs into the lungs every 6 hours as needed for Wheezing 1 Inhaler 5    hydrOXYzine (VISTARIL) 25 MG capsule Take 25 mg by mouth 3 times daily as needed       Incontinence Supply Disposable (INCONTINENCE BRIEF LARGE) MISC by Does not apply route      DULoxetine (CYMBALTA) 60 MG extended release capsule Take 60 mg by mouth daily       Alcohol Swabs (ALCOHOL PADS) 70 % PADS   12     No facility-administered encounter medications on file as of 12/10/2018. Natural history and expected course discussed. Questions answered. Educational materials distributed. They understand and agree with this course of treatment. They will return with new or worsening symptoms.    Follow up if persistent or worsening symptoms otherwise prn

## 2018-12-10 NOTE — PATIENT INSTRUCTIONS
scrapes, or other injuries to your skin. Cellulitis most often occurs where there is a break in the skin. · If you get a scrape, cut, mild burn, or bite, wash the wound with clean water as soon as you can to help avoid infection. Don't use hydrogen peroxide or alcohol, which can slow healing. · If you have swelling in your legs (edema), support stockings and good skin care may help prevent leg sores and cellulitis. · Take care of your feet, especially if you have diabetes or other conditions that increase the risk of infection. Wear shoes and socks. Do not go barefoot. If you have athlete's foot or other skin problems on your feet, talk to your doctor about how to treat them. When should you call for help? Call your doctor now or seek immediate medical care if:    · You have signs that your infection is getting worse, such as:  ? Increased pain, swelling, warmth, or redness. ? Red streaks leading from the area. ? Pus draining from the area. ? A fever.     · You get a rash.    Watch closely for changes in your health, and be sure to contact your doctor if:    · You do not get better as expected. Where can you learn more? Go to https://[employer]. org and sign in to your [facility] account. Enter H356 in the Framebench box to learn more about \"Cellulitis: Care Instructions. \"     If you do not have an account, please click on the \"Sign Up Now\" link. Current as of: April 18, 2018  Content Version: 11.8  © 7354-8620 [employer]. Care instructions adapted under license by [facility] ([facility]). If you have questions about a medical condition or this instruction, always ask your healthcare professional. [place] [address] any warranty or liability for your use of this information.

## 2018-12-12 NOTE — PROGRESS NOTES
Subjective:      Patient ID: Silvino Hernandez is a 61 y.o. female. HPI  She is here today to discuss surgical management of right breast cancer. She underwent US guided core biopsy of a right breast lesion per Dr Marta Villa on . An invasive ductal cancer was noted. Chest, abdomen and pelvis CT scans were negative for mets. Breast MRI showed no involvement of the chest wall by tumor. She was seen by Dr Durga Ya and started on neoadjuvant anastrozole 1 mg daily. The plan was for her to undergo surgery per Dr Marta Villa after shrinkage of the tumor. Past Medical History:   Diagnosis Date    Acquired hypothyroidism 3/15/2016    Allergic rhinitis     pollen, dust ragweed, hay and straw     Anxiety     Asthma     Bipolar affect, depressed ([address]     Bipolar disorder ([address]     Cancer ([address] 1980    thyroid cancer     Chronic back pain     COPD (chronic obstructive pulmonary disease) ([address]     Depression     [facility]     Essential hypertension 3/15/2016    Hyperlipidemia     Hypothyroidism     Obesity     Osteoarthritis     Restless legs syndrome     Sleep apnea     Urinary incontinence      Past Surgical History:   Procedure Laterality Date    APPENDECTOMY       SECTION      TUBAL LIGATION       Current Outpatient Prescriptions on File Prior to Visit   Medication Sig Dispense Refill    Multiple Vitamin (MULTIVITAMIN) tablet Take 1 tablet by mouth daily      Multiple Vitamins-Minerals (ICAPS AREDS 2 PO) Take 1 tablet by mouth daily      vitamin D (CHOLECALCIFEROL) 1000 UNIT TABS tablet Take 1,000 Units by mouth daily      Melatonin 5 MG CAPS Take 1 capsule by mouth daily      cephALEXin (KEFLEX) 500 MG capsule Take 1 capsule by mouth 3 times daily 30 capsule 0    triamcinolone (KENALOG) 0.1 % cream Apply topically 2 times daily.  160 g 1    metFORMIN (GLUCOPHAGE XR) 500 MG extended release tablet Take 1 tablet by mouth daily (with breakfast) 90 tablet 1    baclofen (LIORESAL) 10 MG tablet Take 1 tablet by mouth 3 times daily as needed (SPASM) 30 tablet 0    furosemide (LASIX) 40 MG tablet Take 1 tablet by mouth daily 90 tablet 1    potassium chloride (KLOR-CON M) 10 MEQ extended release tablet Take 1 tablet by mouth daily 90 tablet 1    levothyroxine (SYNTHROID) 175 MCG tablet Take 1 tablet by mouth Daily 90 tablet 1    losartan (COZAAR) 25 MG tablet TAKE 1 TABLET BY MOUTH DAILY 90 tablet 1    anastrozole (ARIMIDEX) 1 MG tablet Take 1 mg by mouth daily      ACCU-CHEK SMARTVIEW strip Test TID E11.8 300 each 3    ipratropium-albuterol (DUONEB) 0.5-2.5 (3) MG/3ML SOLN nebulizer solution 3 mLs      fluticasone-vilanterol (BREO ELLIPTA) 100-25 MCG/INH AEPB inhaler Inhale 1 puff into the lungs daily 28 each 5    DULoxetine (CYMBALTA) 30 MG extended release capsule 1 CAPSULE BY MOUTH EVERY MORNING TAKE IT ALONG WITH CYMBALTA 60 MG  1    SOFT TOUCH LANCETS MISC Use tid as directed dx: E11.9 300 each 3    oxybutynin (DITROPAN-XL) 10 MG extended release tablet TAKE 1 TABLET BY MOUTH DAILY 90 tablet 2    simvastatin (ZOCOR) 40 MG tablet TAKE 1 TABLET BY MOUTH EVERY EVENING 90 tablet 1    albuterol sulfate HFA (VENTOLIN HFA) 108 (90 Base) MCG/ACT inhaler Inhale 2 puffs into the lungs every 6 hours as needed for Wheezing 1 Inhaler 5    hydrOXYzine (VISTARIL) 25 MG capsule Take 25 mg by mouth 3 times daily as needed       Incontinence Supply Disposable (INCONTINENCE BRIEF LARGE) MISC by Does not apply route      DULoxetine (CYMBALTA) 60 MG extended release capsule Take 60 mg by mouth daily       Alcohol Swabs (ALCOHOL PADS) 70 % PADS   12     No current facility-administered medications on file prior to visit. Allergies   Allergen Reactions    Latex Itching and Rash    Sulfa Antibiotics Hives     Metallic taste in mouth & sick in stomach    Nicotine Hives     Hives from the patch     No family history on file.   Social History   Substance Use Topics    Smoking status: Former Smoker     Packs/day: 1.00     Years: 40.00     Types: Cigarettes     Start date: 3/8/1976     Quit date: 2018    Smokeless tobacco: Never Used    Alcohol use No     Review of Systems  She has no other complaints today. Her appetite is good. There is no nausea or vomiting. There is no change in her bowel habits. She believes the tumor has gotten smaller. She is unemployed. She does not drive and used [employer] today. She lives alone. She started her periods at age 15 and had her last period at age 54. She used OCs for 9 years starting at age 15. She is a . She had a core biopsy of the right breast cancer earlier this year. There is no family history of breast or ovarian cancer. Objective:   Physical Exam  She appears well. Breath sounds are clear. The cardiac exam shows a Grade I systolic murmur. There is a regular rate and rhythm noted. The abdomen is obese and benign. The contour of both breasts is normal. The nipples are symmetric. There are no skin lesions, dimpling or skin edema noted in either breast. There is a firm mass noted in the lower inner quadrant of the right breast, just above the infra mammary crease. The mass is not adherent to the skin or the underlying chest wall. It is about 15 mm in size. I reviewed the biopsy report from [address] done 2018. Invasive ductal adenocarcinoma was noted. The tumor was ER positive (98%) and RI positive (98%). I reviewed the MRI films and reports from 2018. An irregular 3.3 cm solid mass is noted in the 6 o'clock position of the right breast. The left breast is unremarkable. Assessment:      Right breast cancer which has responded to neoadjuvant hormonal therapy with a clinical decrease in size. She is ready to proceed with surgery. She would like to have a mastectomy with immediate reconstruction. Plan:      Details of mastectomy with sentinel node biopsy were discussed.  She will be

## 2018-12-14 ENCOUNTER — OFFICE VISIT (OUTPATIENT)
Dept: SURGERY | Age: 63
End: 2018-12-14
Payer: COMMERCIAL

## 2018-12-14 VITALS
WEIGHT: 293 LBS | BODY MASS INDEX: 45.99 KG/M2 | TEMPERATURE: 98 F | HEIGHT: 67 IN | SYSTOLIC BLOOD PRESSURE: 136 MMHG | DIASTOLIC BLOOD PRESSURE: 72 MMHG

## 2018-12-14 DIAGNOSIS — C50.311 MALIGNANT NEOPLASM OF LOWER-INNER QUADRANT OF RIGHT BREAST OF FEMALE, ESTROGEN RECEPTOR POSITIVE (HCC): Primary | ICD-10-CM

## 2018-12-14 DIAGNOSIS — Z17.0 MALIGNANT NEOPLASM OF LOWER-INNER QUADRANT OF RIGHT BREAST OF FEMALE, ESTROGEN RECEPTOR POSITIVE (HCC): Primary | ICD-10-CM

## 2018-12-14 PROCEDURE — G8482 FLU IMMUNIZE ORDER/ADMIN: HCPCS | Performed by: SURGERY

## 2018-12-14 PROCEDURE — 1036F TOBACCO NON-USER: CPT | Performed by: SURGERY

## 2018-12-14 PROCEDURE — 99213 OFFICE O/P EST LOW 20 MIN: CPT | Performed by: SURGERY

## 2018-12-14 PROCEDURE — G8427 DOCREV CUR MEDS BY ELIG CLIN: HCPCS | Performed by: SURGERY

## 2018-12-14 PROCEDURE — G8417 CALC BMI ABV UP PARAM F/U: HCPCS | Performed by: SURGERY

## 2018-12-14 PROCEDURE — 3017F COLORECTAL CA SCREEN DOC REV: CPT | Performed by: SURGERY

## 2018-12-18 ENCOUNTER — TELEPHONE (OUTPATIENT)
Dept: FAMILY MEDICINE CLINIC | Age: 63
End: 2018-12-18

## 2018-12-18 DIAGNOSIS — K59.09 CHRONIC CONSTIPATION: Primary | ICD-10-CM

## 2018-12-18 NOTE — TELEPHONE ENCOUNTER
Patient called requesting a rx for constipation. She states she ate \"roughage\" yesterday but still wasn't able to have a complete BM, she states she feels bloated and that she needs to go more.  Please advise     Last seen 12/10/18

## 2018-12-18 NOTE — TELEPHONE ENCOUNTER
Left message on machine at [phone] advising rx called in to pharmacy and to follow up if her symptoms persist or worsen

## 2019-01-03 DIAGNOSIS — E78.2 MIXED HYPERLIPIDEMIA: ICD-10-CM

## 2019-01-03 DIAGNOSIS — E11.8 TYPE 2 DIABETES MELLITUS WITH COMPLICATION, WITHOUT LONG-TERM CURRENT USE OF INSULIN (HCC): ICD-10-CM

## 2019-01-03 DIAGNOSIS — E03.9 ACQUIRED HYPOTHYROIDISM: ICD-10-CM

## 2019-01-03 DIAGNOSIS — I10 ESSENTIAL HYPERTENSION: ICD-10-CM

## 2019-01-03 LAB
ALBUMIN SERPL-MCNC: 3.5 G/DL (ref 3.9–4.9)
ALP BLD-CCNC: 99 U/L (ref 40–130)
ALT SERPL-CCNC: <5 U/L (ref 0–33)
ANION GAP SERPL CALCULATED.3IONS-SCNC: 16 MEQ/L (ref 7–13)
AST SERPL-CCNC: 16 U/L (ref 0–35)
BILIRUB SERPL-MCNC: 0.3 MG/DL (ref 0–1.2)
BUN BLDV-MCNC: 15 MG/DL (ref 8–23)
CALCIUM SERPL-MCNC: 9.4 MG/DL (ref 8.6–10.2)
CHLORIDE BLD-SCNC: 98 MEQ/L (ref 98–107)
CHOLESTEROL, TOTAL: 106 MG/DL (ref 0–199)
CO2: 25 MEQ/L (ref 22–29)
CREAT SERPL-MCNC: 0.79 MG/DL (ref 0.5–0.9)
GFR AFRICAN AMERICAN: >60
GFR NON-AFRICAN AMERICAN: >60
GLOBULIN: 4.3 G/DL (ref 2.3–3.5)
GLUCOSE BLD-MCNC: 130 MG/DL (ref 74–109)
HBA1C MFR BLD: 6.4 % (ref 4.8–5.9)
HDLC SERPL-MCNC: 34 MG/DL (ref 40–59)
LDL CHOLESTEROL CALCULATED: 51 MG/DL (ref 0–129)
POTASSIUM SERPL-SCNC: 4.5 MEQ/L (ref 3.5–5.1)
SODIUM BLD-SCNC: 139 MEQ/L (ref 132–144)
TOTAL PROTEIN: 7.8 G/DL (ref 6.4–8.1)
TRIGL SERPL-MCNC: 104 MG/DL (ref 0–200)
TSH SERPL DL<=0.05 MIU/L-ACNC: 1.19 UIU/ML (ref 0.27–4.2)

## 2019-01-11 ENCOUNTER — TELEPHONE (OUTPATIENT)
Dept: SURGERY | Age: 64
End: 2019-01-11

## 2019-01-23 ENCOUNTER — OFFICE VISIT (OUTPATIENT)
Dept: SURGERY | Age: 64
End: 2019-01-23
Payer: COMMERCIAL

## 2019-01-23 VITALS
DIASTOLIC BLOOD PRESSURE: 76 MMHG | SYSTOLIC BLOOD PRESSURE: 134 MMHG | HEIGHT: 67 IN | TEMPERATURE: 98.1 F | BODY MASS INDEX: 45.99 KG/M2 | WEIGHT: 293 LBS

## 2019-01-23 DIAGNOSIS — C50.911 BREAST CARCINOMA, FEMALE, RIGHT (HCC): ICD-10-CM

## 2019-01-23 PROCEDURE — 3017F COLORECTAL CA SCREEN DOC REV: CPT | Performed by: SURGERY

## 2019-01-23 PROCEDURE — G8427 DOCREV CUR MEDS BY ELIG CLIN: HCPCS | Performed by: SURGERY

## 2019-01-23 PROCEDURE — G8417 CALC BMI ABV UP PARAM F/U: HCPCS | Performed by: SURGERY

## 2019-01-23 PROCEDURE — G8482 FLU IMMUNIZE ORDER/ADMIN: HCPCS | Performed by: SURGERY

## 2019-01-23 PROCEDURE — 99213 OFFICE O/P EST LOW 20 MIN: CPT | Performed by: SURGERY

## 2019-01-23 PROCEDURE — 1036F TOBACCO NON-USER: CPT | Performed by: SURGERY

## 2019-01-23 RX ORDER — VORTIOXETINE 5 MG/1
TABLET, FILM COATED ORAL
Refills: 1 | COMMUNITY
Start: 2019-01-18 | End: 2019-05-07

## 2019-01-23 RX ORDER — ARIPIPRAZOLE 10 MG/1
TABLET ORAL
Refills: 3 | COMMUNITY
Start: 2019-01-13

## 2019-01-30 ENCOUNTER — OFFICE VISIT (OUTPATIENT)
Dept: FAMILY MEDICINE CLINIC | Age: 64
End: 2019-01-30
Payer: COMMERCIAL

## 2019-01-30 VITALS
RESPIRATION RATE: 16 BRPM | WEIGHT: 293 LBS | BODY MASS INDEX: 45.99 KG/M2 | DIASTOLIC BLOOD PRESSURE: 68 MMHG | SYSTOLIC BLOOD PRESSURE: 118 MMHG | HEART RATE: 76 BPM | HEIGHT: 67 IN | TEMPERATURE: 96.9 F

## 2019-01-30 DIAGNOSIS — E11.8 TYPE 2 DIABETES MELLITUS WITH COMPLICATION, WITHOUT LONG-TERM CURRENT USE OF INSULIN (HCC): Primary | ICD-10-CM

## 2019-01-30 DIAGNOSIS — I10 ESSENTIAL HYPERTENSION: ICD-10-CM

## 2019-01-30 DIAGNOSIS — E78.2 MIXED HYPERLIPIDEMIA: ICD-10-CM

## 2019-01-30 DIAGNOSIS — E03.9 ACQUIRED HYPOTHYROIDISM: ICD-10-CM

## 2019-01-30 DIAGNOSIS — R60.0 BILATERAL LEG EDEMA: ICD-10-CM

## 2019-01-30 PROCEDURE — 2022F DILAT RTA XM EVC RTNOPTHY: CPT | Performed by: FAMILY MEDICINE

## 2019-01-30 PROCEDURE — G8417 CALC BMI ABV UP PARAM F/U: HCPCS | Performed by: FAMILY MEDICINE

## 2019-01-30 PROCEDURE — 99214 OFFICE O/P EST MOD 30 MIN: CPT | Performed by: FAMILY MEDICINE

## 2019-01-30 PROCEDURE — 3017F COLORECTAL CA SCREEN DOC REV: CPT | Performed by: FAMILY MEDICINE

## 2019-01-30 PROCEDURE — G8482 FLU IMMUNIZE ORDER/ADMIN: HCPCS | Performed by: FAMILY MEDICINE

## 2019-01-30 PROCEDURE — 1036F TOBACCO NON-USER: CPT | Performed by: FAMILY MEDICINE

## 2019-01-30 PROCEDURE — 3044F HG A1C LEVEL LT 7.0%: CPT | Performed by: FAMILY MEDICINE

## 2019-01-30 PROCEDURE — G8427 DOCREV CUR MEDS BY ELIG CLIN: HCPCS | Performed by: FAMILY MEDICINE

## 2019-01-30 RX ORDER — OXYBUTYNIN CHLORIDE 10 MG/1
TABLET, EXTENDED RELEASE ORAL
Qty: 90 TABLET | Refills: 2 | Status: SHIPPED | OUTPATIENT
Start: 2019-01-30 | End: 2019-02-13 | Stop reason: ALTCHOICE

## 2019-01-30 RX ORDER — SIMVASTATIN 40 MG
TABLET ORAL
Qty: 90 TABLET | Refills: 1 | Status: SHIPPED | OUTPATIENT
Start: 2019-01-30

## 2019-01-30 RX ORDER — POTASSIUM CHLORIDE 750 MG/1
10 TABLET, EXTENDED RELEASE ORAL DAILY
Qty: 90 TABLET | Refills: 1 | Status: SHIPPED | OUTPATIENT
Start: 2019-01-30

## 2019-01-30 RX ORDER — LEVOTHYROXINE SODIUM 175 UG/1
175 TABLET ORAL DAILY
Qty: 90 TABLET | Refills: 1 | Status: SHIPPED | OUTPATIENT
Start: 2019-01-30

## 2019-01-30 RX ORDER — LOSARTAN POTASSIUM 25 MG/1
25 TABLET ORAL DAILY
Qty: 90 TABLET | Refills: 1 | Status: SHIPPED | OUTPATIENT
Start: 2019-01-30

## 2019-01-30 RX ORDER — METFORMIN HYDROCHLORIDE 500 MG/1
1000 TABLET, EXTENDED RELEASE ORAL
Qty: 180 TABLET | Refills: 1 | Status: SHIPPED | OUTPATIENT
Start: 2019-01-30

## 2019-01-30 ASSESSMENT — PATIENT HEALTH QUESTIONNAIRE - PHQ9
1. LITTLE INTEREST OR PLEASURE IN DOING THINGS: 0
SUM OF ALL RESPONSES TO PHQ QUESTIONS 1-9: 0
SUM OF ALL RESPONSES TO PHQ QUESTIONS 1-9: 0
2. FEELING DOWN, DEPRESSED OR HOPELESS: 0
SUM OF ALL RESPONSES TO PHQ9 QUESTIONS 1 & 2: 0

## 2019-01-31 ENCOUNTER — HOSPITAL ENCOUNTER (OUTPATIENT)
Dept: PREADMISSION TESTING | Age: 64
Discharge: HOME | End: 2019-02-04
Payer: COMMERCIAL

## 2019-01-31 VITALS
DIASTOLIC BLOOD PRESSURE: 69 MMHG | HEART RATE: 80 BPM | WEIGHT: 293 LBS | OXYGEN SATURATION: 95 % | RESPIRATION RATE: 16 BRPM | TEMPERATURE: 98.1 F | HEIGHT: 67 IN | SYSTOLIC BLOOD PRESSURE: 147 MMHG | BODY MASS INDEX: 45.99 KG/M2

## 2019-01-31 LAB
ALBUMIN SERPL-MCNC: 3.2 G/DL (ref 3.9–4.9)
ALP BLD-CCNC: 91 U/L (ref 40–130)
ALT SERPL-CCNC: 8 U/L (ref 0–33)
ANION GAP SERPL CALCULATED.3IONS-SCNC: 10 MEQ/L (ref 7–13)
AST SERPL-CCNC: 10 U/L (ref 0–35)
BILIRUB SERPL-MCNC: <0.2 MG/DL (ref 0–1.2)
BUN BLDV-MCNC: 16 MG/DL (ref 8–23)
CALCIUM SERPL-MCNC: 9.1 MG/DL (ref 8.6–10.2)
CHLORIDE BLD-SCNC: 98 MEQ/L (ref 98–107)
CO2: 32 MEQ/L (ref 22–29)
CREAT SERPL-MCNC: 0.78 MG/DL (ref 0.5–0.9)
EKG ATRIAL RATE: 79 BPM
EKG P AXIS: 72 DEGREES
EKG P-R INTERVAL: 140 MS
EKG Q-T INTERVAL: 356 MS
EKG QRS DURATION: 80 MS
EKG QTC CALCULATION (BAZETT): 408 MS
EKG R AXIS: 29 DEGREES
EKG T AXIS: 42 DEGREES
EKG VENTRICULAR RATE: 79 BPM
GFR AFRICAN AMERICAN: >60
GFR NON-AFRICAN AMERICAN: >60
GLOBULIN: 4.9 G/DL (ref 2.3–3.5)
GLUCOSE BLD-MCNC: 119 MG/DL (ref 74–109)
HCT VFR BLD CALC: 21.6 % (ref 37–47)
HEMOGLOBIN: 6.7 G/DL (ref 12–16)
MCH RBC QN AUTO: 21.1 PG (ref 27–31.3)
MCHC RBC AUTO-ENTMCNC: 30.9 % (ref 33–37)
MCV RBC AUTO: 68.4 FL (ref 82–100)
PDW BLD-RTO: 20.2 % (ref 11.5–14.5)
PLATELET # BLD: 331 K/UL (ref 130–400)
POTASSIUM SERPL-SCNC: 4.1 MEQ/L (ref 3.5–5.1)
RBC # BLD: 3.15 M/UL (ref 4.2–5.4)
SODIUM BLD-SCNC: 140 MEQ/L (ref 132–144)
TOTAL PROTEIN: 8.1 G/DL (ref 6.4–8.1)
WBC # BLD: 8.2 K/UL (ref 4.8–10.8)

## 2019-01-31 PROCEDURE — 85027 COMPLETE CBC AUTOMATED: CPT

## 2019-01-31 PROCEDURE — 86850 RBC ANTIBODY SCREEN: CPT

## 2019-01-31 PROCEDURE — 80053 COMPREHEN METABOLIC PANEL: CPT

## 2019-01-31 PROCEDURE — 86901 BLOOD TYPING SEROLOGIC RH(D): CPT

## 2019-01-31 PROCEDURE — 93005 ELECTROCARDIOGRAM TRACING: CPT

## 2019-01-31 PROCEDURE — 86900 BLOOD TYPING SEROLOGIC ABO: CPT

## 2019-01-31 RX ORDER — LIDOCAINE HYDROCHLORIDE 10 MG/ML
1 INJECTION, SOLUTION EPIDURAL; INFILTRATION; INTRACAUDAL; PERINEURAL
Status: CANCELLED | OUTPATIENT
Start: 2019-01-31 | End: 2019-01-31

## 2019-01-31 RX ORDER — SODIUM CHLORIDE 0.9 % (FLUSH) 0.9 %
10 SYRINGE (ML) INJECTION EVERY 12 HOURS SCHEDULED
Status: CANCELLED | OUTPATIENT
Start: 2019-01-31

## 2019-01-31 RX ORDER — SODIUM CHLORIDE 0.9 % (FLUSH) 0.9 %
10 SYRINGE (ML) INJECTION PRN
Status: CANCELLED | OUTPATIENT
Start: 2019-01-31

## 2019-01-31 RX ORDER — SODIUM CHLORIDE, SODIUM LACTATE, POTASSIUM CHLORIDE, CALCIUM CHLORIDE 600; 310; 30; 20 MG/100ML; MG/100ML; MG/100ML; MG/100ML
INJECTION, SOLUTION INTRAVENOUS CONTINUOUS
Status: CANCELLED | OUTPATIENT
Start: 2019-01-31

## 2019-02-01 DIAGNOSIS — D50.8 OTHER IRON DEFICIENCY ANEMIA: Primary | ICD-10-CM

## 2019-02-01 LAB
ABO/RH: NORMAL
ANTIBODY SCREEN: NORMAL

## 2019-02-01 PROCEDURE — 93010 ELECTROCARDIOGRAM REPORT: CPT | Performed by: INTERNAL MEDICINE

## 2019-02-04 ENCOUNTER — HOSPITAL ENCOUNTER (OUTPATIENT)
Dept: INFUSION THERAPY | Age: 64
Setting detail: INFUSION SERIES
Discharge: HOME | DRG: 580 | End: 2019-02-04
Payer: COMMERCIAL

## 2019-02-04 VITALS
OXYGEN SATURATION: 16 % | TEMPERATURE: 98.1 F | SYSTOLIC BLOOD PRESSURE: 114 MMHG | DIASTOLIC BLOOD PRESSURE: 56 MMHG | RESPIRATION RATE: 18 BRPM | HEART RATE: 80 BPM

## 2019-02-04 DIAGNOSIS — D50.8 IRON DEFICIENCY ANEMIA SECONDARY TO INADEQUATE DIETARY IRON INTAKE: ICD-10-CM

## 2019-02-04 LAB
ABO/RH: NORMAL
ANTIBODY SCREEN: NORMAL
BLOOD BANK DISPENSE STATUS: NORMAL
BLOOD BANK DISPENSE STATUS: NORMAL
BLOOD BANK PRODUCT CODE: NORMAL
BLOOD BANK PRODUCT CODE: NORMAL
BPU ID: NORMAL
BPU ID: NORMAL
DESCRIPTION BLOOD BANK: NORMAL
DESCRIPTION BLOOD BANK: NORMAL
HCT VFR BLD CALC: 22.5 % (ref 37–47)
HEMOGLOBIN: 6.7 G/DL (ref 12–16)

## 2019-02-04 PROCEDURE — 86923 COMPATIBILITY TEST ELECTRIC: CPT

## 2019-02-04 PROCEDURE — 86850 RBC ANTIBODY SCREEN: CPT

## 2019-02-04 PROCEDURE — 85014 HEMATOCRIT: CPT

## 2019-02-04 PROCEDURE — 86901 BLOOD TYPING SEROLOGIC RH(D): CPT

## 2019-02-04 PROCEDURE — 36430 TRANSFUSION BLD/BLD COMPNT: CPT

## 2019-02-04 PROCEDURE — P9016 RBC LEUKOCYTES REDUCED: HCPCS

## 2019-02-04 PROCEDURE — 85018 HEMOGLOBIN: CPT

## 2019-02-04 PROCEDURE — 86900 BLOOD TYPING SEROLOGIC ABO: CPT

## 2019-02-04 PROCEDURE — 2580000003 HC RX 258

## 2019-02-04 RX ORDER — SODIUM CHLORIDE 9 MG/ML
INJECTION, SOLUTION INTRAVENOUS
Status: COMPLETED
Start: 2019-02-04 | End: 2019-02-04

## 2019-02-04 RX ADMIN — SODIUM CHLORIDE 250 ML: 9 INJECTION, SOLUTION INTRAVENOUS at 08:33

## 2019-02-04 NOTE — PROGRESS NOTES
Unit of blood initiated. Observation for first 15 minutes begins. Nurse at bedside for the first 15 minute observation.

## 2019-02-04 NOTE — FLOWSHEET NOTE
Patient left the unit via wheelchair to wait in the er for a taxi to pick her up. All equipment used in the care for this patient has been cleaned.

## 2019-02-04 NOTE — FLOWSHEET NOTE
Patient to the floor via wheelchair for a blood transfusion. Vital signs taken. Denies any discomfort. Call light within reach. Consents signed.

## 2019-02-04 NOTE — FLOWSHEET NOTE
Blood transfusion is complete. Tolerated well. Post H/H obtained and sent. Awaiting i to pick her up.

## 2019-02-05 ENCOUNTER — ANESTHESIA (OUTPATIENT)
Dept: OPERATING ROOM | Age: 64
DRG: 580 | End: 2019-02-05
Payer: COMMERCIAL

## 2019-02-05 ENCOUNTER — ANESTHESIA EVENT (OUTPATIENT)
Dept: OPERATING ROOM | Age: 64
DRG: 580 | End: 2019-02-05
Payer: COMMERCIAL

## 2019-02-05 ENCOUNTER — HOSPITAL ENCOUNTER (INPATIENT)
Age: 64
LOS: 1 days | Discharge: HOME HEALTH | DRG: 580 | End: 2019-02-07
Attending: SURGERY | Admitting: SURGERY
Payer: COMMERCIAL

## 2019-02-05 VITALS — DIASTOLIC BLOOD PRESSURE: 63 MMHG | TEMPERATURE: 98.4 F | SYSTOLIC BLOOD PRESSURE: 150 MMHG | OXYGEN SATURATION: 100 %

## 2019-02-05 DIAGNOSIS — G47.33 OSA (OBSTRUCTIVE SLEEP APNEA): ICD-10-CM

## 2019-02-05 DIAGNOSIS — J44.9 COPD WITH CHRONIC BRONCHITIS (HCC): ICD-10-CM

## 2019-02-05 DIAGNOSIS — C50.911 BREAST CARCINOMA, FEMALE, RIGHT (HCC): Primary | ICD-10-CM

## 2019-02-05 DIAGNOSIS — G89.18 POST-OP PAIN: ICD-10-CM

## 2019-02-05 PROBLEM — C50.311 BREAST CANCER OF LOWER-INNER QUADRANT OF RIGHT FEMALE BREAST (HCC): Status: ACTIVE | Noted: 2019-02-05

## 2019-02-05 LAB
ABO/RH: NORMAL
ANTIBODY SCREEN: NORMAL
GLUCOSE BLD-MCNC: 128 MG/DL (ref 60–115)
GLUCOSE BLD-MCNC: 162 MG/DL (ref 60–115)
GLUCOSE BLD-MCNC: 165 MG/DL (ref 60–115)
GLUCOSE BLD-MCNC: 204 MG/DL (ref 60–115)
HCT VFR BLD CALC: 24.6 % (ref 37–47)
HEMOGLOBIN: 7.6 G/DL (ref 12–16)
PERFORMED ON: ABNORMAL

## 2019-02-05 PROCEDURE — 19303 MAST SIMPLE COMPLETE: CPT | Performed by: SURGERY

## 2019-02-05 PROCEDURE — 88307 TISSUE EXAM BY PATHOLOGIST: CPT

## 2019-02-05 PROCEDURE — 2780000010 HC IMPLANT OTHER: Performed by: SURGERY

## 2019-02-05 PROCEDURE — 2500000003 HC RX 250 WO HCPCS: Performed by: NURSE ANESTHETIST, CERTIFIED REGISTERED

## 2019-02-05 PROCEDURE — 3700000000 HC ANESTHESIA ATTENDED CARE: Performed by: SURGERY

## 2019-02-05 PROCEDURE — 2580000003 HC RX 258: Performed by: NURSE PRACTITIONER

## 2019-02-05 PROCEDURE — 86901 BLOOD TYPING SEROLOGIC RH(D): CPT

## 2019-02-05 PROCEDURE — 7100000000 HC PACU RECOVERY - FIRST 15 MIN: Performed by: SURGERY

## 2019-02-05 PROCEDURE — 07B50ZX EXCISION OF RIGHT AXILLARY LYMPHATIC, OPEN APPROACH, DIAGNOSTIC: ICD-10-PCS | Performed by: SURGERY

## 2019-02-05 PROCEDURE — 6360000002 HC RX W HCPCS: Performed by: NURSE ANESTHETIST, CERTIFIED REGISTERED

## 2019-02-05 PROCEDURE — 7100000001 HC PACU RECOVERY - ADDTL 15 MIN: Performed by: SURGERY

## 2019-02-05 PROCEDURE — 2580000003 HC RX 258

## 2019-02-05 PROCEDURE — 38500 BIOPSY/REMOVAL LYMPH NODES: CPT | Performed by: SURGERY

## 2019-02-05 PROCEDURE — 3600000003 HC SURGERY LEVEL 3 BASE: Performed by: SURGERY

## 2019-02-05 PROCEDURE — 6370000000 HC RX 637 (ALT 250 FOR IP): Performed by: SURGERY

## 2019-02-05 PROCEDURE — 6360000002 HC RX W HCPCS: Performed by: SURGERY

## 2019-02-05 PROCEDURE — C1729 CATH, DRAINAGE: HCPCS | Performed by: SURGERY

## 2019-02-05 PROCEDURE — 86900 BLOOD TYPING SEROLOGIC ABO: CPT

## 2019-02-05 PROCEDURE — 2580000003 HC RX 258: Performed by: SURGERY

## 2019-02-05 PROCEDURE — 2709999900 HC NON-CHARGEABLE SUPPLY: Performed by: SURGERY

## 2019-02-05 PROCEDURE — 94150 VITAL CAPACITY TEST: CPT

## 2019-02-05 PROCEDURE — 86850 RBC ANTIBODY SCREEN: CPT

## 2019-02-05 PROCEDURE — 0HTT0ZZ RESECTION OF RIGHT BREAST, OPEN APPROACH: ICD-10-PCS | Performed by: SURGERY

## 2019-02-05 PROCEDURE — 88329 PATH CONSLTJ DRG SURG: CPT

## 2019-02-05 PROCEDURE — 3700000001 HC ADD 15 MINUTES (ANESTHESIA): Performed by: SURGERY

## 2019-02-05 PROCEDURE — 94640 AIRWAY INHALATION TREATMENT: CPT

## 2019-02-05 PROCEDURE — 88331 PATH CONSLTJ SURG 1 BLK 1SPC: CPT

## 2019-02-05 PROCEDURE — 85014 HEMATOCRIT: CPT

## 2019-02-05 PROCEDURE — 3600000013 HC SURGERY LEVEL 3 ADDTL 15MIN: Performed by: SURGERY

## 2019-02-05 PROCEDURE — 6360000002 HC RX W HCPCS: Performed by: ANESTHESIOLOGY

## 2019-02-05 PROCEDURE — 88305 TISSUE EXAM BY PATHOLOGIST: CPT

## 2019-02-05 PROCEDURE — 38900 IO MAP OF SENT LYMPH NODE: CPT | Performed by: SURGERY

## 2019-02-05 PROCEDURE — 85018 HEMOGLOBIN: CPT

## 2019-02-05 DEVICE — SEALANT HEMSTAT 2ML HUM FBRNGN THROM PREFIL SYR EVICEL 10: Type: IMPLANTABLE DEVICE | Site: BREAST | Status: FUNCTIONAL

## 2019-02-05 RX ORDER — SODIUM CHLORIDE 9 MG/ML
INJECTION, SOLUTION INTRAVENOUS CONTINUOUS
Status: DISCONTINUED | OUTPATIENT
Start: 2019-02-05 | End: 2019-02-05

## 2019-02-05 RX ORDER — FENTANYL CITRATE 50 UG/ML
50 INJECTION, SOLUTION INTRAMUSCULAR; INTRAVENOUS EVERY 10 MIN PRN
Status: DISCONTINUED | OUTPATIENT
Start: 2019-02-05 | End: 2019-02-05 | Stop reason: HOSPADM

## 2019-02-05 RX ORDER — WOUND DRESSING ADHESIVE - LIQUID
LIQUID MISCELLANEOUS PRN
Status: DISCONTINUED | OUTPATIENT
Start: 2019-02-05 | End: 2019-02-05 | Stop reason: HOSPADM

## 2019-02-05 RX ORDER — METFORMIN HYDROCHLORIDE 500 MG/1
1000 TABLET, EXTENDED RELEASE ORAL
Status: DISCONTINUED | OUTPATIENT
Start: 2019-02-06 | End: 2019-02-07

## 2019-02-05 RX ORDER — ONDANSETRON 2 MG/ML
4 INJECTION INTRAMUSCULAR; INTRAVENOUS EVERY 6 HOURS PRN
Status: DISCONTINUED | OUTPATIENT
Start: 2019-02-05 | End: 2019-02-07 | Stop reason: HOSPADM

## 2019-02-05 RX ORDER — DEXAMETHASONE SODIUM PHOSPHATE 4 MG/ML
INJECTION, SOLUTION INTRA-ARTICULAR; INTRALESIONAL; INTRAMUSCULAR; INTRAVENOUS; SOFT TISSUE PRN
Status: DISCONTINUED | OUTPATIENT
Start: 2019-02-05 | End: 2019-02-05 | Stop reason: SDUPTHER

## 2019-02-05 RX ORDER — LEVOTHYROXINE SODIUM 175 UG/1
175 TABLET ORAL DAILY
Status: DISCONTINUED | OUTPATIENT
Start: 2019-02-05 | End: 2019-02-07 | Stop reason: HOSPADM

## 2019-02-05 RX ORDER — ACETAMINOPHEN 650 MG/1
650 SUPPOSITORY RECTAL EVERY 4 HOURS PRN
Status: DISCONTINUED | OUTPATIENT
Start: 2019-02-05 | End: 2019-02-07 | Stop reason: HOSPADM

## 2019-02-05 RX ORDER — ROCURONIUM BROMIDE 10 MG/ML
INJECTION, SOLUTION INTRAVENOUS PRN
Status: DISCONTINUED | OUTPATIENT
Start: 2019-02-05 | End: 2019-02-05 | Stop reason: SDUPTHER

## 2019-02-05 RX ORDER — OXYBUTYNIN CHLORIDE 5 MG/1
TABLET, EXTENDED RELEASE ORAL DAILY
Status: DISCONTINUED | OUTPATIENT
Start: 2019-02-05 | End: 2019-02-07 | Stop reason: HOSPADM

## 2019-02-05 RX ORDER — OXYCODONE HYDROCHLORIDE AND ACETAMINOPHEN 5; 325 MG/1; MG/1
1 TABLET ORAL EVERY 4 HOURS PRN
Status: DISCONTINUED | OUTPATIENT
Start: 2019-02-05 | End: 2019-02-07 | Stop reason: HOSPADM

## 2019-02-05 RX ORDER — DEXTROSE MONOHYDRATE 25 G/50ML
12.5 INJECTION, SOLUTION INTRAVENOUS PRN
Status: DISCONTINUED | OUTPATIENT
Start: 2019-02-05 | End: 2019-02-07 | Stop reason: HOSPADM

## 2019-02-05 RX ORDER — DEXTROSE MONOHYDRATE 50 MG/ML
100 INJECTION, SOLUTION INTRAVENOUS PRN
Status: DISCONTINUED | OUTPATIENT
Start: 2019-02-05 | End: 2019-02-07 | Stop reason: HOSPADM

## 2019-02-05 RX ORDER — ONDANSETRON 2 MG/ML
INJECTION INTRAMUSCULAR; INTRAVENOUS PRN
Status: DISCONTINUED | OUTPATIENT
Start: 2019-02-05 | End: 2019-02-05 | Stop reason: SDUPTHER

## 2019-02-05 RX ORDER — SODIUM CHLORIDE 9 MG/ML
INJECTION, SOLUTION INTRAVENOUS
Status: COMPLETED
Start: 2019-02-05 | End: 2019-02-05

## 2019-02-05 RX ORDER — SODIUM CHLORIDE 0.9 % (FLUSH) 0.9 %
10 SYRINGE (ML) INJECTION PRN
Status: DISCONTINUED | OUTPATIENT
Start: 2019-02-05 | End: 2019-02-05 | Stop reason: HOSPADM

## 2019-02-05 RX ORDER — LIDOCAINE HYDROCHLORIDE 10 MG/ML
1 INJECTION, SOLUTION EPIDURAL; INFILTRATION; INTRACAUDAL; PERINEURAL
Status: DISCONTINUED | OUTPATIENT
Start: 2019-02-05 | End: 2019-02-05 | Stop reason: HOSPADM

## 2019-02-05 RX ORDER — SODIUM CHLORIDE 0.9 % (FLUSH) 0.9 %
10 SYRINGE (ML) INJECTION EVERY 12 HOURS SCHEDULED
Status: DISCONTINUED | OUTPATIENT
Start: 2019-02-05 | End: 2019-02-07 | Stop reason: HOSPADM

## 2019-02-05 RX ORDER — DOCUSATE SODIUM 100 MG/1
100 CAPSULE, LIQUID FILLED ORAL DAILY
Status: DISCONTINUED | OUTPATIENT
Start: 2019-02-06 | End: 2019-02-07 | Stop reason: HOSPADM

## 2019-02-05 RX ORDER — SODIUM CHLORIDE 0.9 % (FLUSH) 0.9 %
10 SYRINGE (ML) INJECTION PRN
Status: DISCONTINUED | OUTPATIENT
Start: 2019-02-05 | End: 2019-02-07 | Stop reason: HOSPADM

## 2019-02-05 RX ORDER — DULOXETIN HYDROCHLORIDE 60 MG/1
60 CAPSULE, DELAYED RELEASE ORAL DAILY
Status: DISCONTINUED | OUTPATIENT
Start: 2019-02-05 | End: 2019-02-07 | Stop reason: HOSPADM

## 2019-02-05 RX ORDER — MIDAZOLAM HYDROCHLORIDE 1 MG/ML
INJECTION INTRAMUSCULAR; INTRAVENOUS PRN
Status: DISCONTINUED | OUTPATIENT
Start: 2019-02-05 | End: 2019-02-05 | Stop reason: SDUPTHER

## 2019-02-05 RX ORDER — MAGNESIUM HYDROXIDE 1200 MG/15ML
LIQUID ORAL CONTINUOUS PRN
Status: COMPLETED | OUTPATIENT
Start: 2019-02-05 | End: 2019-02-05

## 2019-02-05 RX ORDER — DIPHENHYDRAMINE HYDROCHLORIDE 50 MG/ML
12.5 INJECTION INTRAMUSCULAR; INTRAVENOUS
Status: DISCONTINUED | OUTPATIENT
Start: 2019-02-05 | End: 2019-02-05 | Stop reason: HOSPADM

## 2019-02-05 RX ORDER — METOCLOPRAMIDE HYDROCHLORIDE 5 MG/ML
10 INJECTION INTRAMUSCULAR; INTRAVENOUS
Status: DISCONTINUED | OUTPATIENT
Start: 2019-02-05 | End: 2019-02-05 | Stop reason: HOSPADM

## 2019-02-05 RX ORDER — DULOXETIN HYDROCHLORIDE 30 MG/1
30 CAPSULE, DELAYED RELEASE ORAL DAILY
Status: DISCONTINUED | OUTPATIENT
Start: 2019-02-05 | End: 2019-02-07 | Stop reason: HOSPADM

## 2019-02-05 RX ORDER — FLUTICASONE FUROATE AND VILANTEROL 100; 25 UG/1; UG/1
1 POWDER RESPIRATORY (INHALATION) DAILY
Status: DISCONTINUED | OUTPATIENT
Start: 2019-02-05 | End: 2019-02-05 | Stop reason: CLARIF

## 2019-02-05 RX ORDER — FENTANYL CITRATE 50 UG/ML
INJECTION, SOLUTION INTRAMUSCULAR; INTRAVENOUS PRN
Status: DISCONTINUED | OUTPATIENT
Start: 2019-02-05 | End: 2019-02-05 | Stop reason: SDUPTHER

## 2019-02-05 RX ORDER — HYDROCODONE BITARTRATE AND ACETAMINOPHEN 5; 325 MG/1; MG/1
1 TABLET ORAL PRN
Status: DISCONTINUED | OUTPATIENT
Start: 2019-02-05 | End: 2019-02-05 | Stop reason: HOSPADM

## 2019-02-05 RX ORDER — MEPERIDINE HYDROCHLORIDE 25 MG/ML
12.5 INJECTION INTRAMUSCULAR; INTRAVENOUS; SUBCUTANEOUS EVERY 5 MIN PRN
Status: DISCONTINUED | OUTPATIENT
Start: 2019-02-05 | End: 2019-02-05 | Stop reason: HOSPADM

## 2019-02-05 RX ORDER — ACETAMINOPHEN 325 MG/1
650 TABLET ORAL EVERY 4 HOURS PRN
Status: DISCONTINUED | OUTPATIENT
Start: 2019-02-05 | End: 2019-02-07 | Stop reason: HOSPADM

## 2019-02-05 RX ORDER — LIDOCAINE HYDROCHLORIDE 20 MG/ML
INJECTION, SOLUTION INFILTRATION; PERINEURAL PRN
Status: DISCONTINUED | OUTPATIENT
Start: 2019-02-05 | End: 2019-02-05 | Stop reason: SDUPTHER

## 2019-02-05 RX ORDER — DIPHENHYDRAMINE HYDROCHLORIDE 50 MG/ML
INJECTION INTRAMUSCULAR; INTRAVENOUS PRN
Status: DISCONTINUED | OUTPATIENT
Start: 2019-02-05 | End: 2019-02-05 | Stop reason: SDUPTHER

## 2019-02-05 RX ORDER — NICOTINE POLACRILEX 4 MG
15 LOZENGE BUCCAL PRN
Status: DISCONTINUED | OUTPATIENT
Start: 2019-02-05 | End: 2019-02-07 | Stop reason: HOSPADM

## 2019-02-05 RX ORDER — SODIUM CHLORIDE, SODIUM LACTATE, POTASSIUM CHLORIDE, CALCIUM CHLORIDE 600; 310; 30; 20 MG/100ML; MG/100ML; MG/100ML; MG/100ML
INJECTION, SOLUTION INTRAVENOUS CONTINUOUS
Status: DISCONTINUED | OUTPATIENT
Start: 2019-02-05 | End: 2019-02-05

## 2019-02-05 RX ORDER — SODIUM CHLORIDE 0.9 % (FLUSH) 0.9 %
10 SYRINGE (ML) INJECTION EVERY 12 HOURS SCHEDULED
Status: DISCONTINUED | OUTPATIENT
Start: 2019-02-05 | End: 2019-02-05 | Stop reason: HOSPADM

## 2019-02-05 RX ORDER — MORPHINE SULFATE 4 MG/ML
4 INJECTION, SOLUTION INTRAMUSCULAR; INTRAVENOUS
Status: DISCONTINUED | OUTPATIENT
Start: 2019-02-05 | End: 2019-02-07 | Stop reason: HOSPADM

## 2019-02-05 RX ORDER — ONDANSETRON 2 MG/ML
4 INJECTION INTRAMUSCULAR; INTRAVENOUS
Status: DISCONTINUED | OUTPATIENT
Start: 2019-02-05 | End: 2019-02-05 | Stop reason: HOSPADM

## 2019-02-05 RX ORDER — OXYCODONE HYDROCHLORIDE AND ACETAMINOPHEN 5; 325 MG/1; MG/1
2 TABLET ORAL EVERY 4 HOURS PRN
Status: DISCONTINUED | OUTPATIENT
Start: 2019-02-05 | End: 2019-02-07 | Stop reason: HOSPADM

## 2019-02-05 RX ORDER — PROPOFOL 10 MG/ML
INJECTION, EMULSION INTRAVENOUS PRN
Status: DISCONTINUED | OUTPATIENT
Start: 2019-02-05 | End: 2019-02-05 | Stop reason: SDUPTHER

## 2019-02-05 RX ORDER — MORPHINE SULFATE 2 MG/ML
2 INJECTION, SOLUTION INTRAMUSCULAR; INTRAVENOUS
Status: DISCONTINUED | OUTPATIENT
Start: 2019-02-05 | End: 2019-02-07 | Stop reason: HOSPADM

## 2019-02-05 RX ORDER — ARIPIPRAZOLE 10 MG/1
10 TABLET ORAL DAILY
Status: DISCONTINUED | OUTPATIENT
Start: 2019-02-05 | End: 2019-02-07 | Stop reason: HOSPADM

## 2019-02-05 RX ORDER — HYDROCODONE BITARTRATE AND ACETAMINOPHEN 5; 325 MG/1; MG/1
2 TABLET ORAL PRN
Status: DISCONTINUED | OUTPATIENT
Start: 2019-02-05 | End: 2019-02-05 | Stop reason: HOSPADM

## 2019-02-05 RX ORDER — LOSARTAN POTASSIUM 25 MG/1
25 TABLET ORAL DAILY
Status: DISCONTINUED | OUTPATIENT
Start: 2019-02-05 | End: 2019-02-07 | Stop reason: HOSPADM

## 2019-02-05 RX ADMIN — ROCURONIUM BROMIDE 20 MG: 10 INJECTION, SOLUTION INTRAVENOUS at 10:22

## 2019-02-05 RX ADMIN — OXYBUTYNIN CHLORIDE 10 MG: 5 TABLET, EXTENDED RELEASE ORAL at 14:40

## 2019-02-05 RX ADMIN — LEVOTHYROXINE SODIUM 175 MCG: 175 TABLET ORAL at 14:40

## 2019-02-05 RX ADMIN — INSULIN LISPRO 2 UNITS: 100 INJECTION, SOLUTION INTRAVENOUS; SUBCUTANEOUS at 17:25

## 2019-02-05 RX ADMIN — DIPHENHYDRAMINE HYDROCHLORIDE 12.5 MG: 50 INJECTION, SOLUTION INTRAMUSCULAR; INTRAVENOUS at 09:28

## 2019-02-05 RX ADMIN — FENTANYL CITRATE 50 MCG: 50 INJECTION, SOLUTION INTRAMUSCULAR; INTRAVENOUS at 08:56

## 2019-02-05 RX ADMIN — Medication 2 PUFF: at 18:53

## 2019-02-05 RX ADMIN — SODIUM CHLORIDE 1000 ML: 9 INJECTION, SOLUTION INTRAVENOUS at 08:28

## 2019-02-05 RX ADMIN — FENTANYL CITRATE 50 MCG: 50 INJECTION, SOLUTION INTRAMUSCULAR; INTRAVENOUS at 09:39

## 2019-02-05 RX ADMIN — LOSARTAN POTASSIUM 25 MG: 25 TABLET ORAL at 14:40

## 2019-02-05 RX ADMIN — SUGAMMADEX 300 MG: 100 INJECTION, SOLUTION INTRAVENOUS at 10:52

## 2019-02-05 RX ADMIN — FENTANYL CITRATE 50 MCG: 50 INJECTION, SOLUTION INTRAMUSCULAR; INTRAVENOUS at 12:00

## 2019-02-05 RX ADMIN — DEXAMETHASONE SODIUM PHOSPHATE 4 MG: 4 INJECTION, SOLUTION INTRA-ARTICULAR; INTRALESIONAL; INTRAMUSCULAR; INTRAVENOUS; SOFT TISSUE at 09:28

## 2019-02-05 RX ADMIN — ONDANSETRON 4 MG: 2 INJECTION INTRAMUSCULAR; INTRAVENOUS at 10:25

## 2019-02-05 RX ADMIN — ARIPIPRAZOLE 10 MG: 10 TABLET ORAL at 14:40

## 2019-02-05 RX ADMIN — ROCURONIUM BROMIDE 10 MG: 10 INJECTION, SOLUTION INTRAVENOUS at 09:39

## 2019-02-05 RX ADMIN — MIDAZOLAM HYDROCHLORIDE 1 MG: 1 INJECTION, SOLUTION INTRAMUSCULAR; INTRAVENOUS at 08:56

## 2019-02-05 RX ADMIN — LIDOCAINE HYDROCHLORIDE 80 MG: 20 INJECTION, SOLUTION INFILTRATION; PERINEURAL at 08:56

## 2019-02-05 RX ADMIN — ROCURONIUM BROMIDE 40 MG: 10 INJECTION, SOLUTION INTRAVENOUS at 08:56

## 2019-02-05 RX ADMIN — Medication 2 PUFF: at 13:54

## 2019-02-05 RX ADMIN — DULOXETINE HYDROCHLORIDE 60 MG: 60 CAPSULE, DELAYED RELEASE ORAL at 17:20

## 2019-02-05 RX ADMIN — SODIUM CHLORIDE, POTASSIUM CHLORIDE, SODIUM LACTATE AND CALCIUM CHLORIDE: 600; 310; 30; 20 INJECTION, SOLUTION INTRAVENOUS at 08:49

## 2019-02-05 RX ADMIN — SODIUM CHLORIDE, POTASSIUM CHLORIDE, SODIUM LACTATE AND CALCIUM CHLORIDE: 600; 310; 30; 20 INJECTION, SOLUTION INTRAVENOUS at 09:52

## 2019-02-05 RX ADMIN — MORPHINE SULFATE 4 MG: 4 INJECTION, SOLUTION INTRAMUSCULAR; INTRAVENOUS at 14:40

## 2019-02-05 RX ADMIN — Medication 10 ML: at 21:43

## 2019-02-05 RX ADMIN — Medication 10 ML: at 14:43

## 2019-02-05 RX ADMIN — CEFTRIAXONE SODIUM 1 G: 1 INJECTION, POWDER, FOR SOLUTION INTRAMUSCULAR; INTRAVENOUS at 09:01

## 2019-02-05 RX ADMIN — PROPOFOL 150 MG: 10 INJECTION, EMULSION INTRAVENOUS at 08:56

## 2019-02-05 ASSESSMENT — PULMONARY FUNCTION TESTS
PIF_VALUE: 34
PIF_VALUE: 30
PIF_VALUE: 34
PIF_VALUE: 29
PIF_VALUE: 30
PIF_VALUE: 28
PIF_VALUE: 32
PIF_VALUE: 28
PIF_VALUE: 30
PIF_VALUE: 0
PIF_VALUE: 30
PIF_VALUE: 14
PIF_VALUE: 36
PIF_VALUE: 33
PIF_VALUE: 32
PIF_VALUE: 30
PIF_VALUE: 30
PIF_VALUE: 28
PIF_VALUE: 28
PIF_VALUE: 31
PIF_VALUE: 29
PIF_VALUE: 32
PIF_VALUE: 13
PIF_VALUE: 30
PIF_VALUE: 30
PIF_VALUE: 33
PIF_VALUE: 33
PIF_VALUE: 32
PIF_VALUE: 33
PIF_VALUE: 33
PIF_VALUE: 35
PIF_VALUE: 29
PIF_VALUE: 30
PIF_VALUE: 30
PIF_VALUE: 34
PIF_VALUE: 30
PIF_VALUE: 30
PIF_VALUE: 28
PIF_VALUE: 32
PIF_VALUE: 0
PIF_VALUE: 33
PIF_VALUE: 32
PIF_VALUE: 34
PIF_VALUE: 30
PIF_VALUE: 30
PIF_VALUE: 29
PIF_VALUE: 33
PIF_VALUE: 35
PIF_VALUE: 34
PIF_VALUE: 30
PIF_VALUE: 30
PIF_VALUE: 28
PIF_VALUE: 34
PIF_VALUE: 30
PIF_VALUE: 29
PIF_VALUE: 32
PIF_VALUE: 0
PIF_VALUE: 30
PIF_VALUE: 31
PIF_VALUE: 33
PIF_VALUE: 5
PIF_VALUE: 28
PIF_VALUE: 30
PIF_VALUE: 28
PIF_VALUE: 0
PIF_VALUE: 18
PIF_VALUE: 35
PIF_VALUE: 3
PIF_VALUE: 30
PIF_VALUE: 28
PIF_VALUE: 30
PIF_VALUE: 32
PIF_VALUE: 28
PIF_VALUE: 32
PIF_VALUE: 29
PIF_VALUE: 32
PIF_VALUE: 30
PIF_VALUE: 27
PIF_VALUE: 32
PIF_VALUE: 28
PIF_VALUE: 33
PIF_VALUE: 34
PIF_VALUE: 30
PIF_VALUE: 33
PIF_VALUE: 0
PIF_VALUE: 32
PIF_VALUE: 33
PIF_VALUE: 32
PIF_VALUE: 30
PIF_VALUE: 2
PIF_VALUE: 35
PIF_VALUE: 30
PIF_VALUE: 30
PIF_VALUE: 33
PIF_VALUE: 29
PIF_VALUE: 30
PIF_VALUE: 27
PIF_VALUE: 32
PIF_VALUE: 30
PIF_VALUE: 6
PIF_VALUE: 33
PIF_VALUE: 33
PIF_VALUE: 28
PIF_VALUE: 32
PIF_VALUE: 32
PIF_VALUE: 29
PIF_VALUE: 33
PIF_VALUE: 23
PIF_VALUE: 15
PIF_VALUE: 33
PIF_VALUE: 30
PIF_VALUE: 38
PIF_VALUE: 7
PIF_VALUE: 3
PIF_VALUE: 21
PIF_VALUE: 35
PIF_VALUE: 2
PIF_VALUE: 33
PIF_VALUE: 32
PIF_VALUE: 28
PIF_VALUE: 35
PIF_VALUE: 29
PIF_VALUE: 0
PIF_VALUE: 24
PIF_VALUE: 28
PIF_VALUE: 30
PIF_VALUE: 28
PIF_VALUE: 34
PIF_VALUE: 4
PIF_VALUE: 28
PIF_VALUE: 28
PIF_VALUE: 2
PIF_VALUE: 32
PIF_VALUE: 30
PIF_VALUE: 29
PIF_VALUE: 30
PIF_VALUE: 32
PIF_VALUE: 34
PIF_VALUE: 33
PIF_VALUE: 33

## 2019-02-05 ASSESSMENT — PAIN DESCRIPTION - PROGRESSION: CLINICAL_PROGRESSION: NOT CHANGED

## 2019-02-05 ASSESSMENT — PAIN SCALES - GENERAL
PAINLEVEL_OUTOF10: 5
PAINLEVEL_OUTOF10: 7

## 2019-02-05 ASSESSMENT — PAIN DESCRIPTION - ORIENTATION
ORIENTATION: RIGHT
ORIENTATION: RIGHT

## 2019-02-05 ASSESSMENT — PAIN DESCRIPTION - DESCRIPTORS: DESCRIPTORS: ACHING

## 2019-02-05 ASSESSMENT — PAIN DESCRIPTION - LOCATION
LOCATION: BREAST
LOCATION: BREAST

## 2019-02-05 ASSESSMENT — PAIN DESCRIPTION - PAIN TYPE
TYPE: SURGICAL PAIN
TYPE: SURGICAL PAIN

## 2019-02-05 ASSESSMENT — PAIN DESCRIPTION - FREQUENCY: FREQUENCY: CONTINUOUS

## 2019-02-05 ASSESSMENT — COPD QUESTIONNAIRES: CAT_SEVERITY: MILD

## 2019-02-06 ENCOUNTER — APPOINTMENT (OUTPATIENT)
Dept: GENERAL RADIOLOGY | Age: 64
DRG: 580 | End: 2019-02-06
Attending: SURGERY
Payer: COMMERCIAL

## 2019-02-06 LAB
ALBUMIN SERPL-MCNC: 3.2 G/DL (ref 3.9–4.9)
ANION GAP SERPL CALCULATED.3IONS-SCNC: 9 MEQ/L (ref 7–13)
BLOOD BANK DISPENSE STATUS: NORMAL
BLOOD BANK DISPENSE STATUS: NORMAL
BLOOD BANK PRODUCT CODE: NORMAL
BLOOD BANK PRODUCT CODE: NORMAL
BPU ID: NORMAL
BPU ID: NORMAL
BUN BLDV-MCNC: 24 MG/DL (ref 8–23)
CALCIUM SERPL-MCNC: 8.9 MG/DL (ref 8.6–10.2)
CHLORIDE BLD-SCNC: 99 MEQ/L (ref 98–107)
CO2: 31 MEQ/L (ref 22–29)
CREAT SERPL-MCNC: 0.84 MG/DL (ref 0.5–0.9)
DESCRIPTION BLOOD BANK: NORMAL
DESCRIPTION BLOOD BANK: NORMAL
GFR AFRICAN AMERICAN: >60
GFR NON-AFRICAN AMERICAN: >60
GLUCOSE BLD-MCNC: 116 MG/DL (ref 60–115)
GLUCOSE BLD-MCNC: 116 MG/DL (ref 74–109)
GLUCOSE BLD-MCNC: 119 MG/DL (ref 60–115)
GLUCOSE BLD-MCNC: 131 MG/DL (ref 60–115)
GLUCOSE BLD-MCNC: 307 MG/DL (ref 60–115)
HCT VFR BLD CALC: 22.8 % (ref 37–47)
HEMOGLOBIN: 6.8 G/DL (ref 12–16)
MCH RBC QN AUTO: 22.2 PG (ref 27–31.3)
MCHC RBC AUTO-ENTMCNC: 29.7 % (ref 33–37)
MCV RBC AUTO: 74.6 FL (ref 82–100)
PDW BLD-RTO: 21.9 % (ref 11.5–14.5)
PERFORMED ON: ABNORMAL
PHOSPHORUS: 3.1 MG/DL (ref 2.5–4.5)
PLATELET # BLD: 277 K/UL (ref 130–400)
POTASSIUM SERPL-SCNC: 4.1 MEQ/L (ref 3.5–5.1)
RBC # BLD: 3.06 M/UL (ref 4.2–5.4)
SODIUM BLD-SCNC: 139 MEQ/L (ref 132–144)
WBC # BLD: 9.8 K/UL (ref 4.8–10.8)

## 2019-02-06 PROCEDURE — 94760 N-INVAS EAR/PLS OXIMETRY 1: CPT

## 2019-02-06 PROCEDURE — 6360000002 HC RX W HCPCS: Performed by: SURGERY

## 2019-02-06 PROCEDURE — 6370000000 HC RX 637 (ALT 250 FOR IP): Performed by: SURGERY

## 2019-02-06 PROCEDURE — 6370000000 HC RX 637 (ALT 250 FOR IP): Performed by: INTERNAL MEDICINE

## 2019-02-06 PROCEDURE — 96374 THER/PROPH/DIAG INJ IV PUSH: CPT

## 2019-02-06 PROCEDURE — G0378 HOSPITAL OBSERVATION PER HR: HCPCS

## 2019-02-06 PROCEDURE — 2580000003 HC RX 258

## 2019-02-06 PROCEDURE — 6360000002 HC RX W HCPCS: Performed by: INTERNAL MEDICINE

## 2019-02-06 PROCEDURE — 85027 COMPLETE CBC AUTOMATED: CPT

## 2019-02-06 PROCEDURE — 94150 VITAL CAPACITY TEST: CPT

## 2019-02-06 PROCEDURE — 86923 COMPATIBILITY TEST ELECTRIC: CPT

## 2019-02-06 PROCEDURE — 96372 THER/PROPH/DIAG INJ SC/IM: CPT

## 2019-02-06 PROCEDURE — 94667 MNPJ CHEST WALL 1ST: CPT

## 2019-02-06 PROCEDURE — 94664 DEMO&/EVAL PT USE INHALER: CPT

## 2019-02-06 PROCEDURE — 94640 AIRWAY INHALATION TREATMENT: CPT

## 2019-02-06 PROCEDURE — 99213 OFFICE O/P EST LOW 20 MIN: CPT

## 2019-02-06 PROCEDURE — 99223 1ST HOSP IP/OBS HIGH 75: CPT | Performed by: INTERNAL MEDICINE

## 2019-02-06 PROCEDURE — 94660 CPAP INITIATION&MGMT: CPT

## 2019-02-06 PROCEDURE — 2580000003 HC RX 258: Performed by: SURGERY

## 2019-02-06 PROCEDURE — 80069 RENAL FUNCTION PANEL: CPT

## 2019-02-06 PROCEDURE — 36430 TRANSFUSION BLD/BLD COMPNT: CPT

## 2019-02-06 PROCEDURE — P9016 RBC LEUKOCYTES REDUCED: HCPCS

## 2019-02-06 PROCEDURE — 36415 COLL VENOUS BLD VENIPUNCTURE: CPT

## 2019-02-06 PROCEDURE — 71046 X-RAY EXAM CHEST 2 VIEWS: CPT

## 2019-02-06 RX ORDER — IPRATROPIUM BROMIDE AND ALBUTEROL SULFATE 2.5; .5 MG/3ML; MG/3ML
1 SOLUTION RESPIRATORY (INHALATION)
Status: DISCONTINUED | OUTPATIENT
Start: 2019-02-06 | End: 2019-02-06

## 2019-02-06 RX ORDER — 0.9 % SODIUM CHLORIDE 0.9 %
250 INTRAVENOUS SOLUTION INTRAVENOUS ONCE
Status: COMPLETED | OUTPATIENT
Start: 2019-02-06 | End: 2019-02-06

## 2019-02-06 RX ORDER — IPRATROPIUM BROMIDE AND ALBUTEROL SULFATE 2.5; .5 MG/3ML; MG/3ML
1 SOLUTION RESPIRATORY (INHALATION) 3 TIMES DAILY
Status: DISCONTINUED | OUTPATIENT
Start: 2019-02-06 | End: 2019-02-07 | Stop reason: HOSPADM

## 2019-02-06 RX ORDER — OXYCODONE HYDROCHLORIDE AND ACETAMINOPHEN 5; 325 MG/1; MG/1
1 TABLET ORAL EVERY 6 HOURS PRN
Qty: 16 TABLET | Refills: 0 | Status: SHIPPED | OUTPATIENT
Start: 2019-02-06 | End: 2019-02-14

## 2019-02-06 RX ORDER — ACETAMINOPHEN 80 MG
TABLET,CHEWABLE ORAL ONCE
Status: COMPLETED | OUTPATIENT
Start: 2019-02-06 | End: 2019-02-06

## 2019-02-06 RX ORDER — ALBUTEROL SULFATE 2.5 MG/3ML
2.5 SOLUTION RESPIRATORY (INHALATION)
Status: DISCONTINUED | OUTPATIENT
Start: 2019-02-06 | End: 2019-02-07 | Stop reason: HOSPADM

## 2019-02-06 RX ORDER — BUDESONIDE 0.5 MG/2ML
0.5 INHALANT ORAL 2 TIMES DAILY
Status: DISCONTINUED | OUTPATIENT
Start: 2019-02-06 | End: 2019-02-07 | Stop reason: HOSPADM

## 2019-02-06 RX ORDER — SODIUM CHLORIDE 9 MG/ML
INJECTION, SOLUTION INTRAVENOUS
Status: COMPLETED
Start: 2019-02-06 | End: 2019-02-06

## 2019-02-06 RX ORDER — FUROSEMIDE 10 MG/ML
20 INJECTION INTRAMUSCULAR; INTRAVENOUS ONCE
Status: COMPLETED | OUTPATIENT
Start: 2019-02-06 | End: 2019-02-06

## 2019-02-06 RX ADMIN — METFORMIN HYDROCHLORIDE 1000 MG: 500 TABLET, EXTENDED RELEASE ORAL at 09:11

## 2019-02-06 RX ADMIN — FUROSEMIDE 20 MG: 10 INJECTION, SOLUTION INTRAVENOUS at 17:57

## 2019-02-06 RX ADMIN — INSULIN LISPRO 4 UNITS: 100 INJECTION, SOLUTION INTRAVENOUS; SUBCUTANEOUS at 11:47

## 2019-02-06 RX ADMIN — IPRATROPIUM BROMIDE AND ALBUTEROL SULFATE 1 AMPULE: .5; 3 SOLUTION RESPIRATORY (INHALATION) at 19:59

## 2019-02-06 RX ADMIN — LOSARTAN POTASSIUM 25 MG: 25 TABLET ORAL at 09:11

## 2019-02-06 RX ADMIN — LEVOTHYROXINE SODIUM 175 MCG: 175 TABLET ORAL at 05:22

## 2019-02-06 RX ADMIN — Medication 250 ML: at 10:45

## 2019-02-06 RX ADMIN — Medication: at 11:52

## 2019-02-06 RX ADMIN — DULOXETINE HYDROCHLORIDE 60 MG: 60 CAPSULE, DELAYED RELEASE ORAL at 09:11

## 2019-02-06 RX ADMIN — Medication 2 PUFF: at 07:56

## 2019-02-06 RX ADMIN — IPRATROPIUM BROMIDE AND ALBUTEROL SULFATE 1 AMPULE: .5; 3 SOLUTION RESPIRATORY (INHALATION) at 16:06

## 2019-02-06 RX ADMIN — ARIPIPRAZOLE 10 MG: 10 TABLET ORAL at 09:11

## 2019-02-06 RX ADMIN — VORTIOXETINE 5 MG: 10 TABLET, FILM COATED ORAL at 09:56

## 2019-02-06 RX ADMIN — DOCUSATE SODIUM 100 MG: 100 CAPSULE, LIQUID FILLED ORAL at 09:11

## 2019-02-06 RX ADMIN — SODIUM CHLORIDE 250 ML: 9 INJECTION, SOLUTION INTRAVENOUS at 10:45

## 2019-02-06 RX ADMIN — BUDESONIDE 500 MCG: 0.5 INHALANT RESPIRATORY (INHALATION) at 19:59

## 2019-02-06 RX ADMIN — DULOXETINE HYDROCHLORIDE 30 MG: 30 CAPSULE, DELAYED RELEASE ORAL at 09:56

## 2019-02-06 RX ADMIN — OXYCODONE AND ACETAMINOPHEN 1 TABLET: 5; 325 TABLET ORAL at 05:22

## 2019-02-06 RX ADMIN — Medication 10 ML: at 09:12

## 2019-02-06 RX ADMIN — Medication 10 ML: at 23:10

## 2019-02-06 RX ADMIN — OXYBUTYNIN CHLORIDE 10 MG: 5 TABLET, EXTENDED RELEASE ORAL at 09:11

## 2019-02-06 RX ADMIN — ENOXAPARIN SODIUM 40 MG: 40 INJECTION SUBCUTANEOUS at 09:56

## 2019-02-06 ASSESSMENT — PAIN SCALES - GENERAL
PAINLEVEL_OUTOF10: 4
PAINLEVEL_OUTOF10: 0
PAINLEVEL_OUTOF10: 3
PAINLEVEL_OUTOF10: 0

## 2019-02-07 ENCOUNTER — APPOINTMENT (OUTPATIENT)
Dept: CT IMAGING | Age: 64
DRG: 580 | End: 2019-02-07
Attending: SURGERY
Payer: COMMERCIAL

## 2019-02-07 VITALS
SYSTOLIC BLOOD PRESSURE: 117 MMHG | WEIGHT: 293 LBS | TEMPERATURE: 98.6 F | OXYGEN SATURATION: 95 % | DIASTOLIC BLOOD PRESSURE: 41 MMHG | HEIGHT: 67 IN | HEART RATE: 84 BPM | BODY MASS INDEX: 45.99 KG/M2 | RESPIRATION RATE: 18 BRPM

## 2019-02-07 PROBLEM — D64.9 POSTOPERATIVE ANEMIA: Status: ACTIVE | Noted: 2019-02-07

## 2019-02-07 LAB
ALBUMIN SERPL-MCNC: 3.1 G/DL (ref 3.9–4.9)
ANION GAP SERPL CALCULATED.3IONS-SCNC: 12 MEQ/L (ref 7–13)
BASOPHILS ABSOLUTE: 0.1 K/UL (ref 0–0.2)
BASOPHILS RELATIVE PERCENT: 0.9 %
BUN BLDV-MCNC: 21 MG/DL (ref 8–23)
CALCIUM SERPL-MCNC: 8.8 MG/DL (ref 8.6–10.2)
CHLORIDE BLD-SCNC: 100 MEQ/L (ref 98–107)
CO2: 31 MEQ/L (ref 22–29)
CREAT SERPL-MCNC: 0.57 MG/DL (ref 0.5–0.9)
EOSINOPHILS ABSOLUTE: 0.1 K/UL (ref 0–0.7)
EOSINOPHILS RELATIVE PERCENT: 0.6 %
GFR AFRICAN AMERICAN: >60
GFR NON-AFRICAN AMERICAN: >60
GLUCOSE BLD-MCNC: 108 MG/DL (ref 74–109)
GLUCOSE BLD-MCNC: 113 MG/DL (ref 60–115)
GLUCOSE BLD-MCNC: 122 MG/DL (ref 60–115)
GLUCOSE BLD-MCNC: 158 MG/DL (ref 60–115)
HCT VFR BLD CALC: 28.6 % (ref 37–47)
HEMOGLOBIN: 8.7 G/DL (ref 12–16)
LYMPHOCYTES ABSOLUTE: 2.2 K/UL (ref 1–4.8)
LYMPHOCYTES RELATIVE PERCENT: 24.5 %
MCH RBC QN AUTO: 23.2 PG (ref 27–31.3)
MCHC RBC AUTO-ENTMCNC: 30.3 % (ref 33–37)
MCV RBC AUTO: 76.5 FL (ref 82–100)
MONOCYTES ABSOLUTE: 0.6 K/UL (ref 0.2–0.8)
MONOCYTES RELATIVE PERCENT: 7 %
NEUTROPHILS ABSOLUTE: 6 K/UL (ref 1.4–6.5)
NEUTROPHILS RELATIVE PERCENT: 67 %
PDW BLD-RTO: 22.4 % (ref 11.5–14.5)
PERFORMED ON: ABNORMAL
PERFORMED ON: ABNORMAL
PERFORMED ON: NORMAL
PHOSPHORUS: 3.4 MG/DL (ref 2.5–4.5)
PLATELET # BLD: 289 K/UL (ref 130–400)
POTASSIUM SERPL-SCNC: 4 MEQ/L (ref 3.5–5.1)
RBC # BLD: 3.74 M/UL (ref 4.2–5.4)
SODIUM BLD-SCNC: 143 MEQ/L (ref 132–144)
WBC # BLD: 9 K/UL (ref 4.8–10.8)

## 2019-02-07 PROCEDURE — 6360000002 HC RX W HCPCS: Performed by: INTERNAL MEDICINE

## 2019-02-07 PROCEDURE — 6360000002 HC RX W HCPCS: Performed by: SURGERY

## 2019-02-07 PROCEDURE — 80069 RENAL FUNCTION PANEL: CPT

## 2019-02-07 PROCEDURE — 99232 SBSQ HOSP IP/OBS MODERATE 35: CPT | Performed by: INTERNAL MEDICINE

## 2019-02-07 PROCEDURE — 71275 CT ANGIOGRAPHY CHEST: CPT

## 2019-02-07 PROCEDURE — 6370000000 HC RX 637 (ALT 250 FOR IP): Performed by: SURGERY

## 2019-02-07 PROCEDURE — 1210000000 HC MED SURG R&B

## 2019-02-07 PROCEDURE — 96372 THER/PROPH/DIAG INJ SC/IM: CPT

## 2019-02-07 PROCEDURE — 94760 N-INVAS EAR/PLS OXIMETRY 1: CPT

## 2019-02-07 PROCEDURE — 94618 PULMONARY STRESS TESTING: CPT

## 2019-02-07 PROCEDURE — 2700000000 HC OXYGEN THERAPY PER DAY

## 2019-02-07 PROCEDURE — 85025 COMPLETE CBC W/AUTO DIFF WBC: CPT

## 2019-02-07 PROCEDURE — 36415 COLL VENOUS BLD VENIPUNCTURE: CPT

## 2019-02-07 PROCEDURE — 94640 AIRWAY INHALATION TREATMENT: CPT

## 2019-02-07 PROCEDURE — 94668 MNPJ CHEST WALL SBSQ: CPT

## 2019-02-07 PROCEDURE — 6370000000 HC RX 637 (ALT 250 FOR IP): Performed by: INTERNAL MEDICINE

## 2019-02-07 PROCEDURE — 6360000004 HC RX CONTRAST MEDICATION: Performed by: INTERNAL MEDICINE

## 2019-02-07 PROCEDURE — 2580000003 HC RX 258: Performed by: SURGERY

## 2019-02-07 RX ADMIN — ENOXAPARIN SODIUM 40 MG: 40 INJECTION SUBCUTANEOUS at 08:41

## 2019-02-07 RX ADMIN — DULOXETINE HYDROCHLORIDE 60 MG: 60 CAPSULE, DELAYED RELEASE ORAL at 08:41

## 2019-02-07 RX ADMIN — BUDESONIDE 500 MCG: 0.5 INHALANT RESPIRATORY (INHALATION) at 07:53

## 2019-02-07 RX ADMIN — LEVOTHYROXINE SODIUM 175 MCG: 175 TABLET ORAL at 06:20

## 2019-02-07 RX ADMIN — IPRATROPIUM BROMIDE AND ALBUTEROL SULFATE 1 AMPULE: .5; 3 SOLUTION RESPIRATORY (INHALATION) at 11:06

## 2019-02-07 RX ADMIN — VORTIOXETINE 5 MG: 10 TABLET, FILM COATED ORAL at 08:43

## 2019-02-07 RX ADMIN — IPRATROPIUM BROMIDE AND ALBUTEROL SULFATE 1 AMPULE: .5; 3 SOLUTION RESPIRATORY (INHALATION) at 07:53

## 2019-02-07 RX ADMIN — DOCUSATE SODIUM 100 MG: 100 CAPSULE, LIQUID FILLED ORAL at 08:41

## 2019-02-07 RX ADMIN — DULOXETINE HYDROCHLORIDE 30 MG: 30 CAPSULE, DELAYED RELEASE ORAL at 08:42

## 2019-02-07 RX ADMIN — LOSARTAN POTASSIUM 25 MG: 25 TABLET ORAL at 08:44

## 2019-02-07 RX ADMIN — OXYBUTYNIN CHLORIDE 10 MG: 5 TABLET, EXTENDED RELEASE ORAL at 08:41

## 2019-02-07 RX ADMIN — IOPAMIDOL 100 ML: 612 INJECTION, SOLUTION INTRAVENOUS at 09:24

## 2019-02-07 RX ADMIN — Medication 10 ML: at 08:42

## 2019-02-07 ASSESSMENT — PAIN SCALES - GENERAL
PAINLEVEL_OUTOF10: 0
PAINLEVEL_OUTOF10: 4
PAINLEVEL_OUTOF10: 0

## 2019-02-07 ASSESSMENT — PAIN DESCRIPTION - LOCATION: LOCATION: STERNUM

## 2019-02-07 ASSESSMENT — PAIN DESCRIPTION - PAIN TYPE: TYPE: ACUTE PAIN

## 2019-02-07 ASSESSMENT — PAIN DESCRIPTION - DESCRIPTORS: DESCRIPTORS: PRESSURE

## 2019-02-11 ENCOUNTER — TELEPHONE (OUTPATIENT)
Dept: FAMILY MEDICINE CLINIC | Age: 64
End: 2019-02-11

## 2019-02-13 DIAGNOSIS — C50.911 BREAST CARCINOMA, FEMALE, RIGHT (HCC): ICD-10-CM

## 2019-02-13 PROBLEM — Z17.0 MALIGNANT NEOPLASM OF CENTRAL PORTION OF RIGHT BREAST IN FEMALE, ESTROGEN RECEPTOR POSITIVE (HCC): Status: ACTIVE | Noted: 2019-02-13

## 2019-02-13 PROBLEM — C50.111 MALIGNANT NEOPLASM OF CENTRAL PORTION OF RIGHT BREAST IN FEMALE, ESTROGEN RECEPTOR POSITIVE (HCC): Status: ACTIVE | Noted: 2019-02-13

## 2019-02-13 PROBLEM — D50.0 IRON DEFICIENCY ANEMIA DUE TO CHRONIC BLOOD LOSS: Status: ACTIVE | Noted: 2019-02-13

## 2019-02-13 LAB
ALBUMIN SERPL-MCNC: 3.1 G/DL (ref 3.5–4.6)
ALP BLD-CCNC: 78 U/L (ref 40–130)
ALT SERPL-CCNC: 13 U/L (ref 0–33)
ANION GAP SERPL CALCULATED.3IONS-SCNC: 11 MEQ/L (ref 9–15)
AST SERPL-CCNC: 21 U/L (ref 0–35)
BILIRUB SERPL-MCNC: 0.3 MG/DL (ref 0.2–0.7)
BUN BLDV-MCNC: 16 MG/DL (ref 8–23)
CALCIUM SERPL-MCNC: 9.4 MG/DL (ref 8.5–9.9)
CHLORIDE BLD-SCNC: 102 MEQ/L (ref 95–107)
CO2: 31 MEQ/L (ref 20–31)
CREAT SERPL-MCNC: 0.6 MG/DL (ref 0.5–0.9)
FERRITIN: 22.2 NG/ML (ref 13–150)
GFR AFRICAN AMERICAN: >60
GFR NON-AFRICAN AMERICAN: >60
GLOBULIN: 4.2 G/DL (ref 2.3–3.5)
GLUCOSE BLD-MCNC: 141 MG/DL (ref 70–99)
IRON SATURATION: 7 % (ref 11–46)
IRON: 29 UG/DL (ref 37–145)
POTASSIUM SERPL-SCNC: 4.7 MEQ/L (ref 3.4–4.9)
SODIUM BLD-SCNC: 144 MEQ/L (ref 135–144)
TOTAL IRON BINDING CAPACITY: 392 UG/DL (ref 178–450)
TOTAL PROTEIN: 7.3 G/DL (ref 6.3–8)

## 2019-02-14 ENCOUNTER — OFFICE VISIT (OUTPATIENT)
Dept: FAMILY MEDICINE CLINIC | Age: 64
End: 2019-02-14
Payer: COMMERCIAL

## 2019-02-14 DIAGNOSIS — Z12.11 SCREENING FOR COLON CANCER: ICD-10-CM

## 2019-02-14 DIAGNOSIS — Z09 HOSPITAL DISCHARGE FOLLOW-UP: Primary | ICD-10-CM

## 2019-02-14 DIAGNOSIS — Z17.0 MALIGNANT NEOPLASM OF CENTRAL PORTION OF RIGHT BREAST IN FEMALE, ESTROGEN RECEPTOR POSITIVE (HCC): ICD-10-CM

## 2019-02-14 DIAGNOSIS — C50.111 MALIGNANT NEOPLASM OF CENTRAL PORTION OF RIGHT BREAST IN FEMALE, ESTROGEN RECEPTOR POSITIVE (HCC): ICD-10-CM

## 2019-02-14 DIAGNOSIS — D64.9 CHRONIC ANEMIA: ICD-10-CM

## 2019-02-14 DIAGNOSIS — J44.1 COPD EXACERBATION (HCC): ICD-10-CM

## 2019-02-14 DIAGNOSIS — Z90.11 S/P RIGHT MASTECTOMY: ICD-10-CM

## 2019-02-14 DIAGNOSIS — J44.9 COPD WITH CHRONIC BRONCHITIS (HCC): ICD-10-CM

## 2019-02-14 DIAGNOSIS — R19.5 POSITIVE FIT (FECAL IMMUNOCHEMICAL TEST): ICD-10-CM

## 2019-02-14 PROCEDURE — G8482 FLU IMMUNIZE ORDER/ADMIN: HCPCS | Performed by: FAMILY MEDICINE

## 2019-02-14 PROCEDURE — G8427 DOCREV CUR MEDS BY ELIG CLIN: HCPCS | Performed by: FAMILY MEDICINE

## 2019-02-14 PROCEDURE — 1111F DSCHRG MED/CURRENT MED MERGE: CPT | Performed by: FAMILY MEDICINE

## 2019-02-14 PROCEDURE — G8417 CALC BMI ABV UP PARAM F/U: HCPCS | Performed by: FAMILY MEDICINE

## 2019-02-14 PROCEDURE — 1036F TOBACCO NON-USER: CPT | Performed by: FAMILY MEDICINE

## 2019-02-14 PROCEDURE — 3017F COLORECTAL CA SCREEN DOC REV: CPT | Performed by: FAMILY MEDICINE

## 2019-02-14 PROCEDURE — 3023F SPIROM DOC REV: CPT | Performed by: FAMILY MEDICINE

## 2019-02-14 PROCEDURE — 99214 OFFICE O/P EST MOD 30 MIN: CPT | Performed by: FAMILY MEDICINE

## 2019-02-14 PROCEDURE — G8926 SPIRO NO PERF OR DOC: HCPCS | Performed by: FAMILY MEDICINE

## 2019-02-14 RX ORDER — ALBUTEROL SULFATE 90 UG/1
2 AEROSOL, METERED RESPIRATORY (INHALATION) EVERY 6 HOURS PRN
Qty: 1 INHALER | Refills: 5 | Status: SHIPPED | OUTPATIENT
Start: 2019-02-14

## 2019-02-16 VITALS
BODY MASS INDEX: 45.99 KG/M2 | SYSTOLIC BLOOD PRESSURE: 126 MMHG | WEIGHT: 293 LBS | RESPIRATION RATE: 14 BRPM | HEART RATE: 80 BPM | TEMPERATURE: 97.1 F | HEIGHT: 67 IN | DIASTOLIC BLOOD PRESSURE: 64 MMHG

## 2019-02-18 ENCOUNTER — TELEPHONE (OUTPATIENT)
Dept: FAMILY MEDICINE CLINIC | Age: 64
End: 2019-02-18

## 2019-02-19 ENCOUNTER — HOSPITAL ENCOUNTER (OUTPATIENT)
Dept: PULMONOLOGY | Age: 64
Discharge: HOME | End: 2019-02-19
Payer: COMMERCIAL

## 2019-02-19 DIAGNOSIS — J44.9 COPD WITH CHRONIC BRONCHITIS (HCC): ICD-10-CM

## 2019-02-19 PROCEDURE — 94060 EVALUATION OF WHEEZING: CPT

## 2019-02-19 PROCEDURE — 94729 DIFFUSING CAPACITY: CPT | Performed by: INTERNAL MEDICINE

## 2019-02-19 PROCEDURE — 6360000002 HC RX W HCPCS: Performed by: INTERNAL MEDICINE

## 2019-02-19 PROCEDURE — 94726 PLETHYSMOGRAPHY LUNG VOLUMES: CPT

## 2019-02-19 PROCEDURE — 94726 PLETHYSMOGRAPHY LUNG VOLUMES: CPT | Performed by: INTERNAL MEDICINE

## 2019-02-19 PROCEDURE — 94729 DIFFUSING CAPACITY: CPT

## 2019-02-19 PROCEDURE — 94060 EVALUATION OF WHEEZING: CPT | Performed by: INTERNAL MEDICINE

## 2019-02-19 RX ORDER — ALBUTEROL SULFATE 2.5 MG/3ML
2.5 SOLUTION RESPIRATORY (INHALATION) ONCE
Status: COMPLETED | OUTPATIENT
Start: 2019-02-19 | End: 2019-02-19

## 2019-02-19 RX ADMIN — ALBUTEROL SULFATE 2.5 MG: 2.5 SOLUTION RESPIRATORY (INHALATION) at 15:32

## 2019-02-20 ENCOUNTER — OFFICE VISIT (OUTPATIENT)
Dept: SURGERY | Age: 64
End: 2019-02-20

## 2019-02-20 VITALS
HEIGHT: 66 IN | TEMPERATURE: 96.5 F | WEIGHT: 293 LBS | BODY MASS INDEX: 47.09 KG/M2 | SYSTOLIC BLOOD PRESSURE: 138 MMHG | DIASTOLIC BLOOD PRESSURE: 86 MMHG

## 2019-02-20 DIAGNOSIS — Z09 SURGICAL FOLLOW-UP CARE: Primary | ICD-10-CM

## 2019-02-20 PROCEDURE — 99024 POSTOP FOLLOW-UP VISIT: CPT | Performed by: SURGERY

## 2019-02-21 ENCOUNTER — HOSPITAL ENCOUNTER (OUTPATIENT)
Dept: WOUND CARE | Age: 64
Discharge: HOME | End: 2019-02-21
Payer: COMMERCIAL

## 2019-02-21 VITALS
HEIGHT: 66 IN | BODY MASS INDEX: 47.09 KG/M2 | RESPIRATION RATE: 18 BRPM | HEART RATE: 81 BPM | WEIGHT: 293 LBS | TEMPERATURE: 98.2 F | SYSTOLIC BLOOD PRESSURE: 124 MMHG | DIASTOLIC BLOOD PRESSURE: 59 MMHG

## 2019-02-21 DIAGNOSIS — I87.2 CHRONIC VENOUS INSUFFICIENCY: ICD-10-CM

## 2019-02-21 DIAGNOSIS — L97.812 NON-PRESSURE CHRONIC ULCER OF OTHER PART OF RIGHT LOWER LEG WITH FAT LAYER EXPOSED (HCC): ICD-10-CM

## 2019-02-21 PROCEDURE — 87147 CULTURE TYPE IMMUNOLOGIC: CPT

## 2019-02-21 PROCEDURE — 11042 DBRDMT SUBQ TIS 1ST 20SQCM/<: CPT

## 2019-02-21 PROCEDURE — 87070 CULTURE OTHR SPECIMN AEROBIC: CPT

## 2019-02-21 PROCEDURE — 29580 STRAPPING UNNA BOOT: CPT

## 2019-02-21 PROCEDURE — 87077 CULTURE AEROBIC IDENTIFY: CPT

## 2019-02-21 PROCEDURE — 11045 DBRDMT SUBQ TISS EACH ADDL: CPT

## 2019-02-21 PROCEDURE — 87186 SC STD MICRODIL/AGAR DIL: CPT

## 2019-02-21 PROCEDURE — 87205 SMEAR GRAM STAIN: CPT

## 2019-02-21 PROCEDURE — 99213 OFFICE O/P EST LOW 20 MIN: CPT

## 2019-02-21 RX ORDER — CEPHALEXIN 500 MG/1
500 CAPSULE ORAL 3 TIMES DAILY
Qty: 30 CAPSULE | Refills: 0 | Status: SHIPPED | OUTPATIENT
Start: 2019-02-21 | End: 2019-03-11 | Stop reason: ALTCHOICE

## 2019-02-21 RX ORDER — MAGNESIUM HYDROXIDE 1200 MG/15ML
LIQUID ORAL
Qty: 1000 ML | Refills: 5 | Status: SHIPPED | OUTPATIENT
Start: 2019-02-21 | End: 2021-03-25

## 2019-02-23 LAB
GRAM STAIN RESULT: ABNORMAL
ORGANISM: ABNORMAL
WOUND/ABSCESS: ABNORMAL
WOUND/ABSCESS: ABNORMAL

## 2019-02-25 ENCOUNTER — HOSPITAL ENCOUNTER (OUTPATIENT)
Dept: INFUSION THERAPY | Age: 64
Setting detail: INFUSION SERIES
Discharge: HOME | End: 2019-02-25
Payer: COMMERCIAL

## 2019-02-25 VITALS
DIASTOLIC BLOOD PRESSURE: 74 MMHG | TEMPERATURE: 98.1 F | SYSTOLIC BLOOD PRESSURE: 155 MMHG | HEART RATE: 78 BPM | RESPIRATION RATE: 18 BRPM

## 2019-02-25 DIAGNOSIS — C50.111 MALIGNANT NEOPLASM OF CENTRAL PORTION OF RIGHT BREAST IN FEMALE, ESTROGEN RECEPTOR POSITIVE (HCC): ICD-10-CM

## 2019-02-25 DIAGNOSIS — D50.0 IRON DEFICIENCY ANEMIA DUE TO CHRONIC BLOOD LOSS: ICD-10-CM

## 2019-02-25 DIAGNOSIS — Z17.0 MALIGNANT NEOPLASM OF CENTRAL PORTION OF RIGHT BREAST IN FEMALE, ESTROGEN RECEPTOR POSITIVE (HCC): ICD-10-CM

## 2019-02-25 PROCEDURE — 2580000003 HC RX 258: Performed by: INTERNAL MEDICINE

## 2019-02-25 PROCEDURE — 96365 THER/PROPH/DIAG IV INF INIT: CPT

## 2019-02-25 PROCEDURE — 6360000002 HC RX W HCPCS: Performed by: INTERNAL MEDICINE

## 2019-02-25 RX ORDER — 0.9 % SODIUM CHLORIDE 0.9 %
10 VIAL (ML) INJECTION ONCE
Status: CANCELLED | OUTPATIENT
Start: 2019-02-25 | End: 2019-02-25

## 2019-02-25 RX ORDER — EPINEPHRINE 1 MG/ML
0.3 INJECTION, SOLUTION, CONCENTRATE INTRAVENOUS PRN
Status: CANCELLED | OUTPATIENT
Start: 2019-02-25

## 2019-02-25 RX ORDER — HEPARIN SODIUM (PORCINE) LOCK FLUSH IV SOLN 100 UNIT/ML 100 UNIT/ML
500 SOLUTION INTRAVENOUS PRN
Status: CANCELLED | OUTPATIENT
Start: 2019-02-25

## 2019-02-25 RX ORDER — SODIUM CHLORIDE 0.9 % (FLUSH) 0.9 %
10 SYRINGE (ML) INJECTION PRN
Status: CANCELLED | OUTPATIENT
Start: 2019-02-25

## 2019-02-25 RX ORDER — SODIUM CHLORIDE 9 MG/ML
INJECTION, SOLUTION INTRAVENOUS CONTINUOUS
Status: CANCELLED | OUTPATIENT
Start: 2019-02-25

## 2019-02-25 RX ORDER — SODIUM CHLORIDE 0.9 % (FLUSH) 0.9 %
5 SYRINGE (ML) INJECTION PRN
Status: CANCELLED | OUTPATIENT
Start: 2019-02-25

## 2019-02-25 RX ORDER — SODIUM CHLORIDE 9 MG/ML
INJECTION, SOLUTION INTRAVENOUS ONCE
Status: DISCONTINUED | OUTPATIENT
Start: 2019-02-25 | End: 2019-02-26 | Stop reason: HOSPADM

## 2019-02-25 RX ORDER — SODIUM CHLORIDE 9 MG/ML
INJECTION, SOLUTION INTRAVENOUS ONCE
Status: CANCELLED | OUTPATIENT
Start: 2019-02-25 | End: 2019-02-25

## 2019-02-25 RX ORDER — METHYLPREDNISOLONE SODIUM SUCCINATE 125 MG/2ML
125 INJECTION, POWDER, LYOPHILIZED, FOR SOLUTION INTRAMUSCULAR; INTRAVENOUS ONCE
Status: CANCELLED | OUTPATIENT
Start: 2019-02-25 | End: 2019-02-25

## 2019-02-25 RX ORDER — DIPHENHYDRAMINE HYDROCHLORIDE 50 MG/ML
50 INJECTION INTRAMUSCULAR; INTRAVENOUS ONCE
Status: CANCELLED | OUTPATIENT
Start: 2019-02-25 | End: 2019-02-25

## 2019-02-25 RX ADMIN — FERRIC CARBOXYMALTOSE INJECTION 750 MG: 50 INJECTION, SOLUTION INTRAVENOUS at 14:50

## 2019-02-25 NOTE — PROGRESS NOTES
No s/s of reaction and discharged via wheelchair  All equipment used in the care for this patient has been cleaned.

## 2019-02-25 NOTE — FLOWSHEET NOTE
Patient to the floor via wheelchair for a injectafer infusion. Vital signs taken. Denies any discomfort. Call light within reach.

## 2019-02-25 NOTE — FLOWSHEET NOTE
Attempted to start this iv multiple times x 2 nurses which was unsuccessful. Will attempt to get someone from icu or er to attempt.

## 2019-02-26 ENCOUNTER — TELEPHONE (OUTPATIENT)
Dept: FAMILY MEDICINE CLINIC | Age: 64
End: 2019-02-26

## 2019-02-26 DIAGNOSIS — J44.9 COPD WITH CHRONIC BRONCHITIS (HCC): Primary | ICD-10-CM

## 2019-02-26 DIAGNOSIS — I10 ESSENTIAL HYPERTENSION: ICD-10-CM

## 2019-02-26 RX ORDER — BLOOD PRESSURE TEST KIT
KIT MISCELLANEOUS
Qty: 1 KIT | Refills: 0 | Status: SHIPPED | OUTPATIENT
Start: 2019-02-26 | End: 2019-03-11 | Stop reason: ALTCHOICE

## 2019-03-01 RX ORDER — CLINDAMYCIN HYDROCHLORIDE 300 MG/1
300 CAPSULE ORAL 3 TIMES DAILY
COMMUNITY
End: 2019-03-28 | Stop reason: ALTCHOICE

## 2019-03-04 ENCOUNTER — HOSPITAL ENCOUNTER (OUTPATIENT)
Dept: INFUSION THERAPY | Age: 64
Setting detail: INFUSION SERIES
Discharge: HOME | End: 2019-03-04
Payer: COMMERCIAL

## 2019-03-04 VITALS
RESPIRATION RATE: 18 BRPM | SYSTOLIC BLOOD PRESSURE: 113 MMHG | TEMPERATURE: 97.7 F | DIASTOLIC BLOOD PRESSURE: 53 MMHG | HEART RATE: 67 BPM

## 2019-03-04 DIAGNOSIS — C50.111 MALIGNANT NEOPLASM OF CENTRAL PORTION OF RIGHT BREAST IN FEMALE, ESTROGEN RECEPTOR POSITIVE (HCC): ICD-10-CM

## 2019-03-04 DIAGNOSIS — D50.0 IRON DEFICIENCY ANEMIA DUE TO CHRONIC BLOOD LOSS: Primary | ICD-10-CM

## 2019-03-04 DIAGNOSIS — Z17.0 MALIGNANT NEOPLASM OF CENTRAL PORTION OF RIGHT BREAST IN FEMALE, ESTROGEN RECEPTOR POSITIVE (HCC): ICD-10-CM

## 2019-03-04 PROCEDURE — 2580000003 HC RX 258: Performed by: INTERNAL MEDICINE

## 2019-03-04 PROCEDURE — 96365 THER/PROPH/DIAG IV INF INIT: CPT

## 2019-03-04 PROCEDURE — 6360000002 HC RX W HCPCS: Performed by: INTERNAL MEDICINE

## 2019-03-04 RX ORDER — DIPHENHYDRAMINE HYDROCHLORIDE 50 MG/ML
50 INJECTION INTRAMUSCULAR; INTRAVENOUS ONCE
Status: CANCELLED | OUTPATIENT
Start: 2019-03-04 | End: 2019-03-04

## 2019-03-04 RX ORDER — SODIUM CHLORIDE 0.9 % (FLUSH) 0.9 %
5 SYRINGE (ML) INJECTION PRN
Status: CANCELLED | OUTPATIENT
Start: 2019-03-04

## 2019-03-04 RX ORDER — HEPARIN SODIUM (PORCINE) LOCK FLUSH IV SOLN 100 UNIT/ML 100 UNIT/ML
500 SOLUTION INTRAVENOUS PRN
Status: CANCELLED | OUTPATIENT
Start: 2019-03-04

## 2019-03-04 RX ORDER — METHYLPREDNISOLONE SODIUM SUCCINATE 125 MG/2ML
125 INJECTION, POWDER, LYOPHILIZED, FOR SOLUTION INTRAMUSCULAR; INTRAVENOUS ONCE
Status: CANCELLED | OUTPATIENT
Start: 2019-03-04 | End: 2019-03-04

## 2019-03-04 RX ORDER — SODIUM CHLORIDE 0.9 % (FLUSH) 0.9 %
10 SYRINGE (ML) INJECTION PRN
Status: DISCONTINUED | OUTPATIENT
Start: 2019-03-04 | End: 2019-03-05 | Stop reason: HOSPADM

## 2019-03-04 RX ORDER — EPINEPHRINE 1 MG/ML
0.3 INJECTION, SOLUTION, CONCENTRATE INTRAVENOUS PRN
Status: CANCELLED | OUTPATIENT
Start: 2019-03-04

## 2019-03-04 RX ORDER — SODIUM CHLORIDE 9 MG/ML
INJECTION, SOLUTION INTRAVENOUS ONCE
Status: CANCELLED | OUTPATIENT
Start: 2019-03-04 | End: 2019-03-04

## 2019-03-04 RX ORDER — 0.9 % SODIUM CHLORIDE 0.9 %
10 VIAL (ML) INJECTION ONCE
Status: CANCELLED | OUTPATIENT
Start: 2019-03-04 | End: 2019-03-04

## 2019-03-04 RX ORDER — SODIUM CHLORIDE 9 MG/ML
INJECTION, SOLUTION INTRAVENOUS CONTINUOUS
Status: CANCELLED | OUTPATIENT
Start: 2019-03-04

## 2019-03-04 RX ORDER — SODIUM CHLORIDE 0.9 % (FLUSH) 0.9 %
10 SYRINGE (ML) INJECTION PRN
Status: CANCELLED | OUTPATIENT
Start: 2019-03-04

## 2019-03-04 RX ADMIN — Medication 10 ML: at 14:36

## 2019-03-04 RX ADMIN — Medication 10 ML: at 13:52

## 2019-03-04 RX ADMIN — FERRIC CARBOXYMALTOSE INJECTION 750 MG: 50 INJECTION, SOLUTION INTRAVENOUS at 13:52

## 2019-03-04 NOTE — PROGRESS NOTES
Infusion complete  Tolerated well  left unit via wheelchair  All equipment used in the care for this patient has been cleaned.

## 2019-03-05 ENCOUNTER — TELEPHONE (OUTPATIENT)
Dept: FAMILY MEDICINE CLINIC | Age: 64
End: 2019-03-05

## 2019-03-11 ENCOUNTER — OFFICE VISIT (OUTPATIENT)
Dept: FAMILY MEDICINE CLINIC | Age: 64
End: 2019-03-11
Payer: COMMERCIAL

## 2019-03-11 VITALS
RESPIRATION RATE: 14 BRPM | SYSTOLIC BLOOD PRESSURE: 116 MMHG | TEMPERATURE: 96.9 F | WEIGHT: 293 LBS | DIASTOLIC BLOOD PRESSURE: 62 MMHG | BODY MASS INDEX: 47.09 KG/M2 | HEART RATE: 76 BPM | HEIGHT: 66 IN

## 2019-03-11 DIAGNOSIS — Z00.00 HEALTHCARE MAINTENANCE: Primary | ICD-10-CM

## 2019-03-11 DIAGNOSIS — Z00.00 HEALTHCARE MAINTENANCE: ICD-10-CM

## 2019-03-11 LAB
ALBUMIN SERPL-MCNC: 3.6 G/DL (ref 3.5–4.6)
ALP BLD-CCNC: 94 U/L (ref 40–130)
ALT SERPL-CCNC: 9 U/L (ref 0–33)
ANION GAP SERPL CALCULATED.3IONS-SCNC: 11 MEQ/L (ref 9–15)
ANISOCYTOSIS: ABNORMAL
AST SERPL-CCNC: 17 U/L (ref 0–35)
BASOPHILS ABSOLUTE: 0 K/UL (ref 0–0.2)
BASOPHILS RELATIVE PERCENT: 0.5 %
BILIRUB SERPL-MCNC: <0.2 MG/DL (ref 0.2–0.7)
BUN BLDV-MCNC: 17 MG/DL (ref 8–23)
CALCIUM SERPL-MCNC: 9.5 MG/DL (ref 8.5–9.9)
CHLORIDE BLD-SCNC: 99 MEQ/L (ref 95–107)
CO2: 30 MEQ/L (ref 20–31)
CREAT SERPL-MCNC: 0.68 MG/DL (ref 0.5–0.9)
EOSINOPHILS ABSOLUTE: 0.1 K/UL (ref 0–0.7)
EOSINOPHILS RELATIVE PERCENT: 1.5 %
GFR AFRICAN AMERICAN: >60
GFR NON-AFRICAN AMERICAN: >60
GLOBULIN: 4.4 G/DL (ref 2.3–3.5)
GLUCOSE BLD-MCNC: 164 MG/DL (ref 70–99)
HCT VFR BLD CALC: 35.4 % (ref 37–47)
HEMOGLOBIN: 10.7 G/DL (ref 12–16)
HYPOCHROMIA: ABNORMAL
LYMPHOCYTES ABSOLUTE: 2 K/UL (ref 1–4.8)
LYMPHOCYTES RELATIVE PERCENT: 26.3 %
MCH RBC QN AUTO: 26.3 PG (ref 27–31.3)
MCHC RBC AUTO-ENTMCNC: 30.3 % (ref 33–37)
MCV RBC AUTO: 87 FL (ref 82–100)
MONOCYTES ABSOLUTE: 0.5 K/UL (ref 0.2–0.8)
MONOCYTES RELATIVE PERCENT: 5.9 %
NEUTROPHILS ABSOLUTE: 5.1 K/UL (ref 1.4–6.5)
NEUTROPHILS RELATIVE PERCENT: 65.8 %
PDW BLD-RTO: 31.8 % (ref 11.5–14.5)
PLATELET # BLD: 210 K/UL (ref 130–400)
POTASSIUM SERPL-SCNC: 4.4 MEQ/L (ref 3.4–4.9)
RBC # BLD: 4.07 M/UL (ref 4.2–5.4)
SLIDE REVIEW: ABNORMAL
SODIUM BLD-SCNC: 140 MEQ/L (ref 135–144)
TOTAL PROTEIN: 8 G/DL (ref 6.3–8)
WBC # BLD: 7.7 K/UL (ref 4.8–10.8)

## 2019-03-11 PROCEDURE — G8482 FLU IMMUNIZE ORDER/ADMIN: HCPCS | Performed by: FAMILY MEDICINE

## 2019-03-11 PROCEDURE — G0439 PPPS, SUBSEQ VISIT: HCPCS | Performed by: FAMILY MEDICINE

## 2019-03-14 ENCOUNTER — HOSPITAL ENCOUNTER (OUTPATIENT)
Dept: WOUND CARE | Age: 64
Discharge: HOME | End: 2019-03-14
Payer: COMMERCIAL

## 2019-03-14 VITALS — BODY MASS INDEX: 47.09 KG/M2 | HEIGHT: 66 IN | WEIGHT: 293 LBS

## 2019-03-14 PROCEDURE — 11042 DBRDMT SUBQ TIS 1ST 20SQCM/<: CPT

## 2019-03-14 PROCEDURE — 11045 DBRDMT SUBQ TISS EACH ADDL: CPT

## 2019-03-14 PROCEDURE — 29580 STRAPPING UNNA BOOT: CPT

## 2019-03-14 RX ORDER — MAGNESIUM HYDROXIDE 1200 MG/15ML
LIQUID ORAL
Qty: 1000 ML | Refills: 5 | Status: SHIPPED | OUTPATIENT
Start: 2019-03-14 | End: 2021-03-25

## 2019-03-14 ASSESSMENT — PAIN SCALES - GENERAL: PAINLEVEL_OUTOF10: 0

## 2019-03-28 ENCOUNTER — HOSPITAL ENCOUNTER (OUTPATIENT)
Dept: WOUND CARE | Age: 64
Discharge: HOME | End: 2019-03-28
Payer: COMMERCIAL

## 2019-03-28 VITALS
DIASTOLIC BLOOD PRESSURE: 53 MMHG | SYSTOLIC BLOOD PRESSURE: 108 MMHG | TEMPERATURE: 98.9 F | RESPIRATION RATE: 8 BRPM | HEART RATE: 71 BPM

## 2019-03-28 PROCEDURE — 29580 STRAPPING UNNA BOOT: CPT

## 2019-04-01 ENCOUNTER — TELEPHONE (OUTPATIENT)
Dept: FAMILY MEDICINE CLINIC | Age: 64
End: 2019-04-01

## 2019-04-03 DIAGNOSIS — Z17.0 MALIGNANT NEOPLASM OF CENTRAL PORTION OF RIGHT BREAST IN FEMALE, ESTROGEN RECEPTOR POSITIVE (HCC): ICD-10-CM

## 2019-04-03 DIAGNOSIS — C50.111 MALIGNANT NEOPLASM OF CENTRAL PORTION OF RIGHT BREAST IN FEMALE, ESTROGEN RECEPTOR POSITIVE (HCC): ICD-10-CM

## 2019-04-03 DIAGNOSIS — D50.0 IRON DEFICIENCY ANEMIA DUE TO CHRONIC BLOOD LOSS: ICD-10-CM

## 2019-04-03 LAB
ALBUMIN SERPL-MCNC: 3.8 G/DL (ref 3.5–4.6)
ALP BLD-CCNC: 100 U/L (ref 40–130)
ALT SERPL-CCNC: <5 U/L (ref 0–33)
ANION GAP SERPL CALCULATED.3IONS-SCNC: 12 MEQ/L (ref 9–15)
AST SERPL-CCNC: 11 U/L (ref 0–35)
BILIRUB SERPL-MCNC: 0.3 MG/DL (ref 0.2–0.7)
BUN BLDV-MCNC: 19 MG/DL (ref 8–23)
CALCIUM SERPL-MCNC: 10.1 MG/DL (ref 8.5–9.9)
CHLORIDE BLD-SCNC: 96 MEQ/L (ref 95–107)
CO2: 33 MEQ/L (ref 20–31)
CREAT SERPL-MCNC: 0.74 MG/DL (ref 0.5–0.9)
FERRITIN: 151.3 NG/ML (ref 13–150)
GFR AFRICAN AMERICAN: >60
GFR NON-AFRICAN AMERICAN: >60
GLOBULIN: 4.1 G/DL (ref 2.3–3.5)
GLUCOSE BLD-MCNC: 136 MG/DL (ref 70–99)
IRON SATURATION: 15 % (ref 11–46)
IRON: 43 UG/DL (ref 37–145)
POTASSIUM SERPL-SCNC: 4.2 MEQ/L (ref 3.4–4.9)
SODIUM BLD-SCNC: 141 MEQ/L (ref 135–144)
TOTAL IRON BINDING CAPACITY: 295 UG/DL (ref 178–450)
TOTAL PROTEIN: 7.9 G/DL (ref 6.3–8)

## 2019-04-16 ENCOUNTER — OFFICE VISIT (OUTPATIENT)
Dept: GASTROENTEROLOGY | Age: 64
End: 2019-04-16
Payer: COMMERCIAL

## 2019-04-16 VITALS
HEIGHT: 67 IN | HEART RATE: 77 BPM | TEMPERATURE: 98.6 F | WEIGHT: 293 LBS | BODY MASS INDEX: 45.99 KG/M2 | DIASTOLIC BLOOD PRESSURE: 70 MMHG | OXYGEN SATURATION: 92 % | SYSTOLIC BLOOD PRESSURE: 134 MMHG

## 2019-04-16 DIAGNOSIS — K62.5 GASTROINTESTINAL HEMORRHAGE ASSOCIATED WITH ANORECTAL SOURCE: Primary | ICD-10-CM

## 2019-04-16 PROCEDURE — G8417 CALC BMI ABV UP PARAM F/U: HCPCS | Performed by: SPECIALIST

## 2019-04-16 PROCEDURE — G8427 DOCREV CUR MEDS BY ELIG CLIN: HCPCS | Performed by: SPECIALIST

## 2019-04-16 PROCEDURE — 1036F TOBACCO NON-USER: CPT | Performed by: SPECIALIST

## 2019-04-16 PROCEDURE — 3017F COLORECTAL CA SCREEN DOC REV: CPT | Performed by: SPECIALIST

## 2019-04-16 PROCEDURE — 99202 OFFICE O/P NEW SF 15 MIN: CPT | Performed by: SPECIALIST

## 2019-04-16 RX ORDER — POLYETHYLENE GLYCOL 3350, SODIUM CHLORIDE, SODIUM BICARBONATE, POTASSIUM CHLORIDE 420; 11.2; 5.72; 1.48 G/4L; G/4L; G/4L; G/4L
4000 POWDER, FOR SOLUTION ORAL ONCE
Qty: 1 BOTTLE | Refills: 0 | Status: SHIPPED | OUTPATIENT
Start: 2019-04-16 | End: 2019-04-16

## 2019-04-16 ASSESSMENT — ENCOUNTER SYMPTOMS
CONSTIPATION: 1
ABDOMINAL DISTENTION: 0
NAUSEA: 0
ANAL BLEEDING: 0
VOMITING: 0
EYES NEGATIVE: 1
BACK PAIN: 1
DIARRHEA: 0
ABDOMINAL PAIN: 0
SHORTNESS OF BREATH: 1
BLOOD IN STOOL: 1
RECTAL PAIN: 0

## 2019-04-16 NOTE — PROGRESS NOTES
Medications on File Prior to Visit   Medication Sig Dispense Refill    furosemide (LASIX) 40 MG tablet TAKE 1 TABLET BY MOUTH EVERY DAY 90 tablet 1    sodium chloride 0.9 % irrigation Irrigate with as directed for 1 dose. 1000 mL 5    sodium chloride 0.9 % irrigation Irrigate with as directed for 1 dose. 1000 mL 5    albuterol sulfate HFA (VENTOLIN HFA) 108 (90 Base) MCG/ACT inhaler Inhale 2 puffs into the lungs every 6 hours as needed for Wheezing 1 Inhaler 5    oxybutynin (DITROPAN-XL) 10 MG extended release tablet Take 10 mg by mouth daily      simvastatin (ZOCOR) 40 MG tablet TAKE 1 TABLET BY MOUTH EVERY EVENING 90 tablet 1    losartan (COZAAR) 25 MG tablet Take 1 tablet by mouth daily 90 tablet 1    levothyroxine (SYNTHROID) 175 MCG tablet Take 1 tablet by mouth Daily 90 tablet 1    potassium chloride (KLOR-CON M) 10 MEQ extended release tablet Take 1 tablet by mouth daily 90 tablet 1    metFORMIN (GLUCOPHAGE XR) 500 MG extended release tablet Take 2 tablets by mouth daily (with breakfast) 180 tablet 1    TRINTELLIX 5 MG tablet TAKE 1 TABLET EVERY MORNING WITH MEALS  1    ARIPiprazole (ABILIFY) 10 MG tablet TAKE 1 TABLET BY MOUTH EVERY DAY IN THE EVENING AS DIRECTED  3    Multiple Vitamin (MULTIVITAMIN) tablet Take 1 tablet by mouth daily      Multiple Vitamins-Minerals (ICAPS AREDS 2 PO) Take 1 tablet by mouth daily      vitamin D (CHOLECALCIFEROL) 1000 UNIT TABS tablet Take 1,000 Units by mouth daily      Melatonin 5 MG CAPS Take 1 capsule by mouth daily      triamcinolone (KENALOG) 0.1 % cream Apply topically 2 times daily.  (Patient taking differently: Apply topically as needed Apply topically 2 times daily.) 160 g 1    anastrozole (ARIMIDEX) 1 MG tablet Take 1 mg by mouth daily      ACCU-CHEK SMARTVIEW strip Test TID E11.8 300 each 3    ipratropium-albuterol (DUONEB) 0.5-2.5 (3) MG/3ML SOLN nebulizer solution 3 mLs      fluticasone-vilanterol (BREO ELLIPTA) 100-25 MCG/INH AEPB inhaler Inhale 1 puff into the lungs daily 28 each 5    DULoxetine (CYMBALTA) 30 MG extended release capsule 1 CAPSULE BY MOUTH EVERY MORNING TAKE IT ALONG WITH CYMBALTA 60 MG  1    SOFT TOUCH LANCETS MISC Use tid as directed dx: E11.9 300 each 3    hydrOXYzine (VISTARIL) 25 MG capsule Take 25 mg by mouth 3 times daily as needed       DULoxetine (CYMBALTA) 60 MG extended release capsule Take 60 mg by mouth daily        No current facility-administered medications on file prior to visit. History reviewed. No pertinent family history. Social History     Socioeconomic History    Marital status:       Spouse name: None    Number of children: None    Years of education: None    Highest education level: None   Occupational History    None   Social Needs    Financial resource strain: None    Food insecurity:     Worry: None     Inability: None    Transportation needs:     Medical: None     Non-medical: None   Tobacco Use    Smoking status: Former Smoker     Packs/day: 1.00     Years: 40.00     Pack years: 40.00     Types: Cigarettes     Start date: 3/8/1976     Last attempt to quit: 2018     Years since quittin.7    Smokeless tobacco: Never Used   Substance and Sexual Activity    Alcohol use: No     Alcohol/week: 0.0 oz    Drug use: No    Sexual activity: Never   Lifestyle    Physical activity:     Days per week: None     Minutes per session: None    Stress: None   Relationships    Social connections:     Talks on phone: None     Gets together: None     Attends Taoism service: None     Active member of club or organization: None     Attends meetings of clubs or organizations: None     Relationship status: None    Intimate partner violence:     Fear of current or ex partner: None     Emotionally abused: None     Physically abused: None     Forced sexual activity: None   Other Topics Concern    None   Social History Narrative    None       Blood pressure 134/70, pulse 77, temperature 98.6 °F (37 °C), height 5' 6.5\" (1.689 m), weight (!) 311 lb (141.1 kg), SpO2 92 %, not currently breastfeeding. Physical Exam   Constitutional: She appears well-developed and well-nourished. HENT:   Head: Normocephalic and atraumatic. Eyes: Pupils are equal, round, and reactive to light. Conjunctivae and EOM are normal.   Neck: Normal range of motion. Cardiovascular: Normal rate. Pulmonary/Chest: Effort normal.   Abdominal: Soft. Bowel sounds are normal.   Surgical scar   Musculoskeletal: Normal range of motion. Neurological: She is alert. Skin: Skin is warm. Psychiatric: She has a normal mood and affect. Laboratory, Pathology, Radiology reviewed indetail with relevant important investigations summarized below:  Lab Results   Component Value Date    WBC 6.7 04/03/2019    HGB 12.3 04/03/2019    HCT 39.1 04/03/2019    MCV 87.0 03/11/2019     04/03/2019     Lab Results   Component Value Date    ALT <5 04/03/2019    AST 11 04/03/2019    ALKPHOS 100 04/03/2019    BILITOT 0.3 04/03/2019       No results found. Endoscopic investigations:     Assessmentand Plan:  61 y.o. female with history of constipation and heme-positive stool. Rule out neoplasm. Plan is to do a colonoscopy. Diagnosis Orders   1. Gastrointestinal hemorrhage associated with anorectal source  Endoscopy, colon, diagnostic     Return in about 3 weeks (around 5/7/2019) for Follow Up Visit. Saba Nolan MD   Staff Gastroenterologist  [facility]    Please note this report has been partially produced using speech recognition software and may cause contain errors related to thatsystem including grammar, punctuation and spelling as well as words and phrases that may seem inappropriate. If there are questions or concerns please feel free to contact me to clarify.

## 2019-04-18 ENCOUNTER — HOSPITAL ENCOUNTER (OUTPATIENT)
Dept: WOUND CARE | Age: 64
Discharge: HOME | End: 2019-04-18
Payer: COMMERCIAL

## 2019-04-18 VITALS
DIASTOLIC BLOOD PRESSURE: 57 MMHG | TEMPERATURE: 99.8 F | RESPIRATION RATE: 18 BRPM | HEART RATE: 65 BPM | SYSTOLIC BLOOD PRESSURE: 115 MMHG

## 2019-04-18 PROCEDURE — 29580 STRAPPING UNNA BOOT: CPT

## 2019-04-18 RX ORDER — DOXYCYCLINE HYCLATE 100 MG
100 TABLET ORAL 2 TIMES DAILY
Qty: 20 TABLET | Refills: 0 | Status: SHIPPED | OUTPATIENT
Start: 2019-04-18 | End: 2019-04-28

## 2019-04-18 ASSESSMENT — PAIN SCALES - GENERAL: PAINLEVEL_OUTOF10: 0

## 2019-04-18 NOTE — CODE DOCUMENTATION
[id number] [facility] Physician Billing Sheet. Mat Solis  AGE: 61 y.o.    GENDER: female  : 1955  TODAY'S DATE:  2019    ICD-10 2000 [facility] [address] Problems    Diagnosis Date Noted    Non-pressure chronic ulcer of other part of right lower leg with fat layer exposed ([facility] [L97.812] 2019    Chronic venous insufficiency [I87.2] 2019    Type 2 diabetes mellitus with complication ([facility] [G64.6] 03/15/2016       PHYSICIAN PROCEDURES    CPT CODE  70507      Electronically signed by Leonard Ledezma DPM on 2019 at 2:25 PM

## 2019-04-18 NOTE — PROGRESS NOTES
Anna Watkins 37   Progress Note and Procedure Note      [address] Gonzalez RECORD NUMBER:  [id number]  AGE: 61 y.o. GENDER: female  : 1955  EPISODE DATE:  2019    Subjective:     Chief Complaint   Patient presents with    Wound Check     right lower anterior leg         HISTORY of PRESENT ILLNESS HPI     Bubba Acevedo is a 61 y.o. female who presents today for wound/ulcer evaluation. History of Wound Context: Presents with multiple BLE wounds. Patient was instructed to have BLE in unna boot but instructed C to stop applying to LLE. States she has a new ulcer that opened above her left knee. Admits to minor erythema to wounds. Denies any constitutional symptoms.   Wound/Ulcer Pain Timing/Severity: intermittent  Quality of pain: dull  Severity:  2 / 10   Modifying Factors: Pain worsens with walking  Associated Signs/Symptoms: edema and erythema    Ulcer Identification:  Ulcer Type: venous and diabetic  Contributing Factors: edema, venous stasis and diabetes    Wound: N/A        PAST MEDICAL HISTORY        Diagnosis Date    Acquired hypothyroidism 3/15/2016    Allergic rhinitis     pollen, dust ragweed, hay and straw     Anxiety     Asthma     Bipolar affect, depressed (HCC)     Bipolar disorder ([address]     Breast cancer ([address] 2018    Invasive ductal cancer right breast    Cancer ([address] 1980    thyroid cancer     Chronic back pain     COPD (chronic obstructive pulmonary disease) ([address]     Depression     Diabetes mellitus ([address]     Emphysema of lung ([address]     Essential hypertension 3/15/2016    Hyperlipidemia     Hypothyroidism     Obesity     Osteoarthritis     Restless legs syndrome     Sleep apnea     Urinary incontinence        PAST SURGICAL HISTORY    Past Surgical History:   Procedure Laterality Date    APPENDECTOMY      BREAST BIOPSY Right 2018     SECTION      CHOLECYSTECTOMY      MASTECTOMY Right 2019    Simple mastectomy with sentinel node biopsy    TUBAL LIGATION         FAMILY HISTORY    No family history on file. SOCIAL HISTORY    Social History     Tobacco Use    Smoking status: Former Smoker     Packs/day: 1.00     Years: 40.00     Pack years: 40.00     Types: Cigarettes     Start date: 3/8/1976     Last attempt to quit: 2018     Years since quittin.8    Smokeless tobacco: Never Used   Substance Use Topics    Alcohol use: No     Alcohol/week: 0.0 oz    Drug use: No       ALLERGIES    Allergies   Allergen Reactions    Latex Itching and Rash    Sulfa Antibiotics Hives     Metallic taste in mouth & sick in stomach    Bactrim [Sulfamethoxazole-Trimethoprim] Other (See Comments)     Metallic taste    Nicotine Hives     Hives from the patch       MEDICATIONS    Current Outpatient Medications on File Prior to Encounter   Medication Sig Dispense Refill    furosemide (LASIX) 40 MG tablet TAKE 1 TABLET BY MOUTH EVERY DAY 90 tablet 1    sodium chloride 0.9 % irrigation Irrigate with as directed for 1 dose. 1000 mL 5    sodium chloride 0.9 % irrigation Irrigate with as directed for 1 dose.  1000 mL 5    albuterol sulfate HFA (VENTOLIN HFA) 108 (90 Base) MCG/ACT inhaler Inhale 2 puffs into the lungs every 6 hours as needed for Wheezing 1 Inhaler 5    oxybutynin (DITROPAN-XL) 10 MG extended release tablet Take 10 mg by mouth daily      simvastatin (ZOCOR) 40 MG tablet TAKE 1 TABLET BY MOUTH EVERY EVENING 90 tablet 1    losartan (COZAAR) 25 MG tablet Take 1 tablet by mouth daily 90 tablet 1    levothyroxine (SYNTHROID) 175 MCG tablet Take 1 tablet by mouth Daily 90 tablet 1    potassium chloride (KLOR-CON M) 10 MEQ extended release tablet Take 1 tablet by mouth daily 90 tablet 1    metFORMIN (GLUCOPHAGE XR) 500 MG extended release tablet Take 2 tablets by mouth daily (with breakfast) 180 tablet 1    TRINTELLIX 5 MG tablet TAKE 1 TABLET EVERY MORNING WITH MEALS  1    ARIPiprazole (ABILIFY) 10 MG tablet TAKE 1 TABLET BY MOUTH EVERY DAY IN THE EVENING AS DIRECTED  3    Multiple Vitamins-Minerals (ICAPS AREDS 2 PO) Take 1 tablet by mouth daily      vitamin D (CHOLECALCIFEROL) 1000 UNIT TABS tablet Take 1,000 Units by mouth daily      triamcinolone (KENALOG) 0.1 % cream Apply topically 2 times daily. (Patient taking differently: Apply topically as needed Apply topically 2 times daily.) 160 g 1    anastrozole (ARIMIDEX) 1 MG tablet Take 1 mg by mouth daily      ACCU-CHEK SMARTVIEW strip Test TID E11.8 300 each 3    ipratropium-albuterol (DUONEB) 0.5-2.5 (3) MG/3ML SOLN nebulizer solution 3 mLs      fluticasone-vilanterol (BREO ELLIPTA) 100-25 MCG/INH AEPB inhaler Inhale 1 puff into the lungs daily 28 each 5    SOFT TOUCH LANCETS MISC Use tid as directed dx: E11.9 300 each 3    hydrOXYzine (VISTARIL) 25 MG capsule Take 25 mg by mouth 3 times daily as needed       DULoxetine (CYMBALTA) 60 MG extended release capsule Take 60 mg by mouth daily       Multiple Vitamin (MULTIVITAMIN) tablet Take 1 tablet by mouth daily      Melatonin 5 MG CAPS Take 1 capsule by mouth daily      DULoxetine (CYMBALTA) 30 MG extended release capsule 1 CAPSULE BY MOUTH EVERY MORNING TAKE IT ALONG WITH CYMBALTA 60 MG  1     No current facility-administered medications on file prior to encounter. REVIEW OF SYSTEMS    Pertinent items are noted in HPI. Objective:      BP (!) 115/57   Pulse 65   Temp 99.8 °F (37.7 °C) (Temporal)   Resp 18     Wt Readings from Last 3 Encounters:   04/16/19 (!) 311 lb (141.1 kg)   04/03/19 (!) 311 lb 6.4 oz (141.3 kg)   03/14/19 (!) 307 lb (139.3 kg)       PHYSICAL EXAM    Constitutional:   Well nourished and well developed. Appears neat and clean. Patient is alert, oriented x3, and in no apparent distress. Respiratory:  Respiratory effort is easy and symmetric bilaterally. Rate is normal at rest and on room air. Vascular:  Pedal Pulses is palpable and audible with doppler. Capillary refill is <3 sec to digits bilateral.  Extremities negative for pitting edema. Neurological:  Cranial nerves grossly intact. Deep tendon reflexes of the lower extremities are intact and symmetrical bilaterally. Sensation normal to touch and vibration. Sensation intact to 10 gram monofilament in extremities. Musculoskeletal:  Gait and station stable. Muscle strength +5/5 to all extrinsic muscles to the foot bilateral.  Full range of motion of ankle, subtalar, and midtarsal joints noted without crepitation. No cyanosis, clubbing or edema noted. Dermatological:  Wound description noted in wound assessment. Otherwise, skin is warm, dry, and well-hydrated with normal turgor, texture, and pigmentation. Psychiatric:  Judgement and insight intact. Short and long term memory intact. No evidence of depression, anxiety, or agitation. Patient is calm, cooperative, and communicative. Appropriate interactions and affect. Wound 02/05/19 Leg Lower;Right; Anterior #1  (Active)   Wound Image   4/18/2019  1:59 PM   Wound Venous 4/18/2019  1:59 PM   Wound Cleansed Rinsed/Irrigated with saline 4/18/2019  1:59 PM   Wound Length (cm) 0.5 cm 4/18/2019  1:59 PM   Wound Width (cm) 0.3 cm 4/18/2019  1:59 PM   Wound Depth (cm) 0.1 cm 4/18/2019  1:59 PM   Wound Surface Area (cm^2) 0.15 cm^2 4/18/2019  1:59 PM   Change in Wound Size % (l*w) 99.82 4/18/2019  1:59 PM   Wound Volume (cm^3) 0.02 cm^3 4/18/2019  1:59 PM   Wound Healing % 100 4/18/2019  1:59 PM   Post-Procedure Length (cm) 11 cm 3/14/2019  1:17 PM   Post-Procedure Width (cm) 7.2 cm 3/14/2019  1:17 PM   Post-Procedure Depth (cm) 0.2 cm 3/14/2019  1:17 PM   Post-Procedure Surface Area (cm^2) 79.2 cm^2 3/14/2019  1:17 PM   Post-Procedure Volume (cm^3) 15.84 cm^3 3/14/2019  1:17 PM   Drainage Amount Moderate 4/18/2019  1:59 PM   Drainage Description Yellow 4/18/2019  1:59 PM   Odor None 4/18/2019  1:59 PM   Margins Defined edges 3/28/2019  2:34 PM Tamara-wound Assessment Red 4/18/2019  1:59 PM   Non-staged Wound Description Full thickness 3/28/2019  2:34 PM   [place]%Wound Bed 100 4/18/2019  1:59 PM   Red%Wound Bed 60 3/28/2019  2:34 PM   Yellow%Wound Bed 40 3/28/2019  2:34 PM   Number of days: 71       Wound 04/18/19 Knee Left #2  (Active)   Wound Image   4/18/2019  1:59 PM   Wound Traumatic 4/18/2019  1:59 PM   Wound Cleansed Rinsed/Irrigated with saline 4/18/2019  1:59 PM   Wound Length (cm) 1.5 cm 4/18/2019  1:59 PM   Wound Width (cm) 1 cm 4/18/2019  1:59 PM   Wound Depth (cm) 0.1 cm 4/18/2019  1:59 PM   Wound Surface Area (cm^2) 1.5 cm^2 4/18/2019  1:59 PM   Wound Volume (cm^3) 0.15 cm^3 4/18/2019  1:59 PM   Drainage Amount Small 4/18/2019  1:59 PM   Drainage Description Serosanguinous 4/18/2019  1:59 PM   Odor None 4/18/2019  1:59 PM   Margins Defined edges 4/18/2019  1:59 PM   Tamara-wound Assessment Dry 4/18/2019  1:59 PM   Non-staged Wound Description Full thickness 4/18/2019  1:59 PM   Red%Wound Bed 100 4/18/2019  1:59 PM   Number of days: 0       Wound 04/18/19 Leg Left; Lower #3 (Active)   Wound Image   4/18/2019  2:08 PM   Wound Traumatic 4/18/2019  2:08 PM   Wound Cleansed Rinsed/Irrigated with saline 4/18/2019  2:08 PM   Wound Length (cm) 1.2 cm 4/18/2019  2:08 PM   Wound Width (cm) 0.6 cm 4/18/2019  2:08 PM   Wound Depth (cm) 0.1 cm 4/18/2019  2:08 PM   Wound Surface Area (cm^2) 0.72 cm^2 4/18/2019  2:08 PM   Wound Volume (cm^3) 0.07 cm^3 4/18/2019  2:08 PM   Drainage Amount Small 4/18/2019  2:08 PM   Drainage Description Serosanguinous 4/18/2019  2:08 PM   Odor None 4/18/2019  2:08 PM   Margins Defined edges 4/18/2019  2:08 PM   Tamara-wound Assessment Dry 4/18/2019  2:08 PM   Non-staged Wound Description Full thickness 4/18/2019  2:08 PM   Red%Wound Bed 100 4/18/2019  2:08 PM   Number of days: 0       Incision (Active)   Number of days:        Assessment:      Patient Active Problem List   Diagnosis Code    Type 2 diabetes mellitus with complication ([facility]) Y41.5    Acquired hypothyroidism E03.9    Essential hypertension I10    Mixed hyperlipidemia E78.2    COPD with chronic bronchitis ([facility]) J44.9    Microalbuminuria R80.9    Moderate episode of recurrent major depressive disorder ([facility]) F33.1    YARED (obstructive sleep apnea) G47.33    Restless leg syndrome G25.81    Vitamin B12 deficiency E53.8    Vitamin D deficiency E55.9    Left ventricular hypertrophy I51.7    Left ventricular diastolic dysfunction P08.5    Morbid obesity with BMI of 45.0-49.9, adult ([facility]) E66.01, Z68.42    Breast carcinoma, female, right ([facility] C50.911    Iron deficiency anemia secondary to inadequate dietary iron intake D50.8    Breast cancer of lower-inner quadrant of right female breast ([facility]) C50.311    Postoperative anemia D64.9    Malignant neoplasm of central portion of right breast in female, estrogen receptor positive ([facility]) C50.111, Z17.0    Iron deficiency anemia due to chronic blood loss D50.0    Non-pressure chronic ulcer of other part of right lower leg with fat layer exposed ([facility] L97.812    Chronic venous insufficiency I87.2        Procedure Note  Indications:  Based on my examination of this patient's wound(s)/ulcer(s) today, debridement is not required to promote healing and evaluate the wound base. Plan:     Examined  Discussed importance of unna boots to BLE  Rx PO doxy for 10 days  Rx mupirocin to wound above left knee, explained unna boots cannot go above the knee. RTC 3 weeks      Treatment Note please see attached Discharge Instructions    In my professional opinion this patient would benefit from HBO Therapy: Yes    Written patient dismissal instructions given to patient and signed by patient or POA.          Discharge Instructions         Discharge Instructions              Discharge Instructions        Visit Discharge/Physician Orders:     Home Care: [facility] (Home Care to order dressings as needed)     Wound Location:  Left and Right Legs     Dressing orders:  1.Cleanse wound(s) with normal saline.    2. Apply dry SILVERCEL OR CALCIUM ALGINATE WITH Ag or eqivalent to wound bed  3. Cover with 4x4's, calamine unna boots and coban wrap or ace wraps  4. Change  Dressings Thursday and Monday or twice weekly    Wound Location:  Left knee    Dressing orders:  Cleanse with saline and dry. Mupirocin and dry gauze. May use piece of gauze and bandaid. Change daily     Compression: see above     Other Instructions:   Dr Salazar to E-scribe Doxycycline and Mupriocin     Keep all dressings clean & dry.  Keep pressure off the wound(s) at all times. Do not shower, take baths or get wound wet, unless otherwise instructed by your Wound Care doctor.     Follow up visit   3 weeks  May 9, 2019  1:45     For Diabetic patients keep blood sugars below 150 for optimal wound healing.     If you experience any of the following, please call the Wound Care Service during business hours: [phone]         *Increase in pain   *Temperature over 101   *Increase in drainage from your wound or a foul odor   *Uncontrolled swelling   *Need for compression bandage changes due to slippage, breakthrough drainage     If you need medical attention outside of business hours, please contact your Primary Care Doctor or go to the nearest emergency room. Keep next scheduled appointment. Aleksandr Delgado give 24 hour notice if unable to keep appointment. [phone]     PLEASE NOTE: IF YOU ARE UNABLE TO OBTAIN WOUND SUPPLIES, CONTINUE TO USE THE SUPPLIES YOU HAVE AVAILABLE UNTIL YOU ARE ABLE TO REACH US.  IT IS MOST IMPORTANT TO KEEP THE WOUND COVERED AT ALL TIMES       Electronically signed by Faisal Estevez DPM on 4/18/2019 at 2:20 PM            Electronically signed by Faisal Estevez DPM on 4/18/2019 at 2:22 PM

## 2019-04-18 NOTE — PLAN OF CARE
See flow sheet for wound assessment, patient has new wounds, states she picked her legs due to itching.

## 2019-04-24 ENCOUNTER — ANESTHESIA EVENT (OUTPATIENT)
Dept: ENDOSCOPY | Age: 64
End: 2019-04-24
Payer: COMMERCIAL

## 2019-04-25 ENCOUNTER — HOSPITAL ENCOUNTER (OUTPATIENT)
Age: 64
Setting detail: OUTPATIENT SURGERY
Discharge: HOME | End: 2019-04-25
Attending: SPECIALIST | Admitting: SPECIALIST
Payer: COMMERCIAL

## 2019-04-25 ENCOUNTER — ANESTHESIA (OUTPATIENT)
Dept: ENDOSCOPY | Age: 64
End: 2019-04-25
Payer: COMMERCIAL

## 2019-04-25 VITALS
RESPIRATION RATE: 11 BRPM | SYSTOLIC BLOOD PRESSURE: 106 MMHG | OXYGEN SATURATION: 100 % | DIASTOLIC BLOOD PRESSURE: 67 MMHG

## 2019-04-25 VITALS
SYSTOLIC BLOOD PRESSURE: 128 MMHG | HEIGHT: 67 IN | DIASTOLIC BLOOD PRESSURE: 70 MMHG | TEMPERATURE: 99.1 F | OXYGEN SATURATION: 96 % | RESPIRATION RATE: 16 BRPM | WEIGHT: 293 LBS | BODY MASS INDEX: 45.99 KG/M2 | HEART RATE: 62 BPM

## 2019-04-25 DIAGNOSIS — K62.5 GASTROINTESTINAL HEMORRHAGE ASSOCIATED WITH ANORECTAL SOURCE: ICD-10-CM

## 2019-04-25 PROCEDURE — 45384 COLONOSCOPY W/LESION REMOVAL: CPT | Performed by: SPECIALIST

## 2019-04-25 PROCEDURE — 2580000003 HC RX 258: Performed by: SPECIALIST

## 2019-04-25 PROCEDURE — 3700000000 HC ANESTHESIA ATTENDED CARE: Performed by: SPECIALIST

## 2019-04-25 PROCEDURE — 2500000003 HC RX 250 WO HCPCS: Performed by: NURSE ANESTHETIST, CERTIFIED REGISTERED

## 2019-04-25 PROCEDURE — 7100000011 HC PHASE II RECOVERY - ADDTL 15 MIN: Performed by: SPECIALIST

## 2019-04-25 PROCEDURE — 45381 COLONOSCOPY SUBMUCOUS NJX: CPT | Performed by: SPECIALIST

## 2019-04-25 PROCEDURE — 7100000010 HC PHASE II RECOVERY - FIRST 15 MIN: Performed by: SPECIALIST

## 2019-04-25 PROCEDURE — 3700000001 HC ADD 15 MINUTES (ANESTHESIA): Performed by: SPECIALIST

## 2019-04-25 PROCEDURE — 3609009500 HC COLONOSCOPY DIAGNOSTIC OR SCREENING: Performed by: SPECIALIST

## 2019-04-25 PROCEDURE — 6360000002 HC RX W HCPCS: Performed by: NURSE ANESTHETIST, CERTIFIED REGISTERED

## 2019-04-25 PROCEDURE — 45385 COLONOSCOPY W/LESION REMOVAL: CPT | Performed by: SPECIALIST

## 2019-04-25 PROCEDURE — 88305 TISSUE EXAM BY PATHOLOGIST: CPT

## 2019-04-25 RX ORDER — SODIUM CHLORIDE 0.9 % (FLUSH) 0.9 %
10 SYRINGE (ML) INJECTION PRN
Status: DISCONTINUED | OUTPATIENT
Start: 2019-04-25 | End: 2019-04-25 | Stop reason: HOSPADM

## 2019-04-25 RX ORDER — LIDOCAINE HYDROCHLORIDE 10 MG/ML
1 INJECTION, SOLUTION EPIDURAL; INFILTRATION; INTRACAUDAL; PERINEURAL
Status: DISCONTINUED | OUTPATIENT
Start: 2019-04-25 | End: 2019-04-25 | Stop reason: HOSPADM

## 2019-04-25 RX ORDER — SODIUM CHLORIDE 9 MG/ML
INJECTION, SOLUTION INTRAVENOUS CONTINUOUS
Status: DISCONTINUED | OUTPATIENT
Start: 2019-04-25 | End: 2019-04-25 | Stop reason: HOSPADM

## 2019-04-25 RX ORDER — PROPOFOL 10 MG/ML
INJECTION, EMULSION INTRAVENOUS PRN
Status: DISCONTINUED | OUTPATIENT
Start: 2019-04-25 | End: 2019-04-25 | Stop reason: SDUPTHER

## 2019-04-25 RX ORDER — SODIUM CHLORIDE 0.9 % (FLUSH) 0.9 %
10 SYRINGE (ML) INJECTION EVERY 12 HOURS SCHEDULED
Status: DISCONTINUED | OUTPATIENT
Start: 2019-04-25 | End: 2019-04-25 | Stop reason: HOSPADM

## 2019-04-25 RX ORDER — LIDOCAINE HYDROCHLORIDE 20 MG/ML
INJECTION, SOLUTION INFILTRATION; PERINEURAL PRN
Status: DISCONTINUED | OUTPATIENT
Start: 2019-04-25 | End: 2019-04-25 | Stop reason: SDUPTHER

## 2019-04-25 RX ORDER — ONDANSETRON 2 MG/ML
4 INJECTION INTRAMUSCULAR; INTRAVENOUS
Status: DISCONTINUED | OUTPATIENT
Start: 2019-04-25 | End: 2019-04-25 | Stop reason: HOSPADM

## 2019-04-25 RX ADMIN — PROPOFOL 10 MG: 10 INJECTION, EMULSION INTRAVENOUS at 11:29

## 2019-04-25 RX ADMIN — PROPOFOL 10 MG: 10 INJECTION, EMULSION INTRAVENOUS at 11:17

## 2019-04-25 RX ADMIN — PROPOFOL 10 MG: 10 INJECTION, EMULSION INTRAVENOUS at 11:19

## 2019-04-25 RX ADMIN — PROPOFOL 20 MG: 10 INJECTION, EMULSION INTRAVENOUS at 11:12

## 2019-04-25 RX ADMIN — PROPOFOL 10 MG: 10 INJECTION, EMULSION INTRAVENOUS at 11:31

## 2019-04-25 RX ADMIN — PROPOFOL 30 MG: 10 INJECTION, EMULSION INTRAVENOUS at 11:06

## 2019-04-25 RX ADMIN — PROPOFOL 10 MG: 10 INJECTION, EMULSION INTRAVENOUS at 11:23

## 2019-04-25 RX ADMIN — PROPOFOL 10 MG: 10 INJECTION, EMULSION INTRAVENOUS at 11:14

## 2019-04-25 RX ADMIN — PROPOFOL 10 MG: 10 INJECTION, EMULSION INTRAVENOUS at 11:18

## 2019-04-25 RX ADMIN — PROPOFOL 10 MG: 10 INJECTION, EMULSION INTRAVENOUS at 11:27

## 2019-04-25 RX ADMIN — PROPOFOL 30 MG: 10 INJECTION, EMULSION INTRAVENOUS at 11:08

## 2019-04-25 RX ADMIN — PROPOFOL 40 MG: 10 INJECTION, EMULSION INTRAVENOUS at 11:15

## 2019-04-25 RX ADMIN — SODIUM CHLORIDE: 9 INJECTION, SOLUTION INTRAVENOUS at 11:00

## 2019-04-25 RX ADMIN — PROPOFOL 20 MG: 10 INJECTION, EMULSION INTRAVENOUS at 11:11

## 2019-04-25 RX ADMIN — LIDOCAINE HYDROCHLORIDE 60 MG: 20 INJECTION, SOLUTION INFILTRATION; PERINEURAL at 11:05

## 2019-04-25 RX ADMIN — PROPOFOL 20 MG: 10 INJECTION, EMULSION INTRAVENOUS at 11:26

## 2019-04-25 RX ADMIN — PROPOFOL 10 MG: 10 INJECTION, EMULSION INTRAVENOUS at 11:22

## 2019-04-25 RX ADMIN — SODIUM CHLORIDE: 9 INJECTION, SOLUTION INTRAVENOUS at 10:49

## 2019-04-25 RX ADMIN — PROPOFOL 10 MG: 10 INJECTION, EMULSION INTRAVENOUS at 11:25

## 2019-04-25 RX ADMIN — PROPOFOL 30 MG: 10 INJECTION, EMULSION INTRAVENOUS at 11:10

## 2019-04-25 RX ADMIN — PROPOFOL 30 MG: 10 INJECTION, EMULSION INTRAVENOUS at 11:21

## 2019-04-25 ASSESSMENT — ENCOUNTER SYMPTOMS: SHORTNESS OF BREATH: 1

## 2019-04-25 NOTE — ANESTHESIA PRE PROCEDURE
Department of Anesthesiology  Preprocedure Note       Name:  Bertin Mccall   Age:  61 y.o.  :  1955                                          MRN:  [id number]         Date:  2019      Surgeon: Alverto Bhatia):  Willow Lane MD    Procedure: COLONOSCOPY DIAGNOSTIC (N/A )    Medications prior to admission:   Prior to Admission medications    Medication Sig Start Date End Date Taking? Authorizing Provider   doxycycline hyclate (VIBRA-TABS) 100 MG tablet Take 1 tablet by mouth 2 times daily for 10 days 19  Sidney Salazar DPM   mupirocin (BACTROBAN) 2 % ointment Apply 3 times daily. 19  Dahlia Johnson DPM   furosemide (LASIX) 40 MG tablet TAKE 1 TABLET BY MOUTH EVERY DAY 3/25/19   Flo Morales MD   sodium chloride 0.9 % irrigation Irrigate with as directed for 1 dose.  3/14/19   Dahlia Johnson DPM   sodium chloride 0.9 % irrigation Irrigate with as directed for 1 dose. 19   Dahlia Johnson DPM   albuterol sulfate HFA (VENTOLIN HFA) 108 (90 Base) MCG/ACT inhaler Inhale 2 puffs into the lungs every 6 hours as needed for Wheezing 19   Flo Morales MD   oxybutynin (DITROPAN-XL) 10 MG extended release tablet Take 10 mg by mouth daily    Historical Provider, MD   simvastatin (ZOCOR) 40 MG tablet TAKE 1 TABLET BY MOUTH EVERY EVENING 19   Flo Morales MD   losartan (COZAAR) 25 MG tablet Take 1 tablet by mouth daily 19   Flo Morales MD   levothyroxine (SYNTHROID) 175 MCG tablet Take 1 tablet by mouth Daily 19   Flo Morales MD   potassium chloride (KLOR-CON M) 10 MEQ extended release tablet Take 1 tablet by mouth daily 19   Flo Morales MD   metFORMIN (GLUCOPHAGE XR) 500 MG extended release tablet Take 2 tablets by mouth daily (with breakfast) 19   Flo Morales MD   TRINTELLIX 5 MG tablet TAKE 1 TABLET EVERY MORNING WITH MEALS 19   Historical Provider, MD   ARIPiprazole (ABILIFY) 10 MG tablet TAKE 1 TABLET BY MOUTH EVERY DAY IN THE EVENING AS DIRECTED 1/13/19   Historical Provider, MD   Multiple Vitamin (MULTIVITAMIN) tablet Take 1 tablet by mouth daily    Historical Provider, MD   Multiple Vitamins-Minerals (ICAPS AREDS 2 PO) Take 1 tablet by mouth daily    Historical Provider, MD   vitamin D (CHOLECALCIFEROL) 1000 UNIT TABS tablet Take 1,000 Units by mouth daily    Historical Provider, MD   Melatonin 5 MG CAPS Take 1 capsule by mouth daily    Historical Provider, MD   triamcinolone (KENALOG) 0.1 % cream Apply topically 2 times daily. Patient taking differently: Apply topically as needed Apply topically 2 times daily. 12/10/18   Jose Rincon MD   anastrozole (ARIMIDEX) 1 MG tablet Take 1 mg by mouth daily    Historical Provider, MD   ACCUAyushCHEK SMARTVIEW strip Test TID E11.8 9/12/18   MARIETTA Heart - ASHWINI   ipratropium-albuterol (DUONEB) 0.5-2.5 (3) MG/3ML SOLN nebulizer solution 3 mLs    Historical Provider, MD   fluticasone-vilanterol (BREO ELLIPTA) 100-25 MCG/INH AEPB inhaler Inhale 1 puff into the lungs daily 8/9/18   Jose Rincon MD   DULoxetine (CYMBALTA) 30 MG extended release capsule 1 CAPSULE BY MOUTH EVERY MORNING TAKE IT ALONG WITH CYMBALTA 60 MG 6/6/18   Historical Provider, MD   SOFT TOUCH LANCETS MISC Use tid as directed dx: E11.9 6/29/18   Jose Rincon MD   hydrOXYzine (VISTARIL) 25 MG capsule Take 25 mg by mouth 3 times daily as needed     Historical Provider, MD   DULoxetine (CYMBALTA) 60 MG extended release capsule Take 60 mg by mouth daily  2/17/17   Historical Provider, MD       Current medications:    Current Facility-Administered Medications   Medication Dose Route Frequency Provider Last Rate Last Dose    ondansetron (ZOFRAN) injection 4 mg  4 mg Intravenous Once PRN Davis Loco MD           Allergies:     Allergies   Allergen Reactions    Latex Itching and Rash    Sulfa Antibiotics Hives     Metallic taste in mouth & sick in stomach    Bactrim [Sulfamethoxazole-Trimethoprim] Other (See Comments) Metallic taste    Nicotine Hives     Hives from the patch       Problem List:    Patient Active Problem List   Diagnosis Code    Type 2 diabetes mellitus with complication ([facility]) W38.8    Acquired hypothyroidism E03.9    Essential hypertension I10    Mixed hyperlipidemia E78.2    COPD with chronic bronchitis ([facility]) J44.9    Microalbuminuria R80.9    Moderate episode of recurrent major depressive disorder ([address] F33.1    YARED (obstructive sleep apnea) G47.33    Restless leg syndrome G25.81    Vitamin B12 deficiency E53.8    Vitamin D deficiency E55.9    Left ventricular hypertrophy I51.7    Left ventricular diastolic dysfunction F47.8    Morbid obesity with BMI of 45.0-49.9, adult ([facility]) E66.01, Z68.42    Breast carcinoma, female, right ([facility]) C50.911    Iron deficiency anemia secondary to inadequate dietary iron intake D50.8    Breast cancer of lower-inner quadrant of right female breast ([facility]) C50.311    Postoperative anemia D64.9    Malignant neoplasm of central portion of right breast in female, estrogen receptor positive ([facility]) C50.111, Z17.0    Iron deficiency anemia due to chronic blood loss D50.0    Non-pressure chronic ulcer of other part of right lower leg with fat layer exposed ([address] L97.812    Chronic venous insufficiency I87.2       Past Medical History:        Diagnosis Date    Acquired hypothyroidism 3/15/2016    Allergic rhinitis     pollen, dust ragweed, hay and straw     Anxiety     Asthma     Bipolar affect, depressed ([address]     Bipolar disorder ([address]     Breast cancer ([address] 2018    Invasive ductal cancer right breast    Cancer ([address] 1980    thyroid cancer     Chronic back pain     COPD (chronic obstructive pulmonary disease) ([address]     Depression     Diabetes mellitus ([facility])     Emphysema of lung ([address]     Essential hypertension 3/15/2016    Hyperlipidemia     Hypothyroidism     Obesity     Osteoarthritis     Restless legs syndrome     Sleep apnea     Urinary incontinence        Past Surgical History:        Procedure Laterality Date    APPENDECTOMY      BREAST BIOPSY Right 2018     SECTION      CHOLECYSTECTOMY      MASTECTOMY Right 2019    Simple mastectomy with sentinel node biopsy    TUBAL LIGATION         Social History:    Social History     Tobacco Use    Smoking status: Former Smoker     Packs/day: 1.00     Years: 40.00     Pack years: 40.00     Types: Cigarettes     Start date: 3/8/1976     Last attempt to quit: 2018     Years since quittin.8    Smokeless tobacco: Never Used   Substance Use Topics    Alcohol use: No     Alcohol/week: 0.0 oz                                Counseling given: Not Answered      Vital Signs (Current): There were no vitals filed for this visit.                                            BP Readings from Last 3 Encounters:   19 (!) 115/57   19 134/70   19 (!) 147/64       NPO Status:                                                                                 BMI:   Wt Readings from Last 3 Encounters:   19 (!) 311 lb (141.1 kg)   19 (!) 311 lb 6.4 oz (141.3 kg)   19 (!) 307 lb (139.3 kg)     There is no height or weight on file to calculate BMI.    CBC:   Lab Results   Component Value Date    WBC 6.7 2019    RBC 4.07 2019    RBC 4.63 2018    HGB 12.3 2019    HCT 39.1 2019    MCV 87.0 2019    RDW 31.8 2019     2019       CMP:   Lab Results   Component Value Date     2019    K 4.2 2019    CL 96 2019    CO2 33 2019    BUN 19 2019    CREATININE 0.74 2019    GFRAA >60.0 2019    AGRATIO 0.9 2018    LABGLOM >60.0 2019    GLUCOSE 136 2019    GLUCOSE 171 2018    PROT 7.9 2019    CALCIUM 10.1 2019    BILITOT 0.3 2019    ALKPHOS 100 2019    AST 11 2019    ALT <5 2019       POC Tests: No results for input(s): POCGLU,

## 2019-04-25 NOTE — ANESTHESIA POSTPROCEDURE EVALUATION
Department of Anesthesiology  Postprocedure Note    Patient: Bertin Mccall  MRN: [id number]  YOB: 1955  Date of evaluation: 4/25/2019  Time:  11:37 AM     Procedure Summary     Date:  04/25/19 Room / Location:  [address]    Anesthesia Start:  1100 Anesthesia Stop:  9414    Procedure:  COLONOSCOPY DIAGNOSTIC (N/A ) Diagnosis:  (GASTROINTESTINAL HEMORRHAGE K62.5 (CPT 37082))    Surgeon:  Willow Lane MD Responsible Provider:  MARIETTA Nuno CRNA    Anesthesia Type:  MAC ASA Status:  4          Anesthesia Type: MAC    Kristie Phase I:      Kristie Phase II:      Last vitals: Reviewed and per EMR flowsheets.        Anesthesia Post Evaluation    Patient location during evaluation: PACU  Patient participation: complete - patient participated  Level of consciousness: sleepy but conscious  Pain score: 0  Airway patency: patent  Nausea & Vomiting: no nausea and no vomiting  Complications: no  Cardiovascular status: hemodynamically stable  Respiratory status: acceptable  Hydration status: euvolemic

## 2019-04-30 ENCOUNTER — OFFICE VISIT (OUTPATIENT)
Dept: SURGERY | Age: 64
End: 2019-04-30
Payer: COMMERCIAL

## 2019-04-30 VITALS
TEMPERATURE: 96.8 F | OXYGEN SATURATION: 94 % | BODY MASS INDEX: 45.99 KG/M2 | HEART RATE: 73 BPM | HEIGHT: 67 IN | WEIGHT: 293 LBS

## 2019-04-30 DIAGNOSIS — D12.2 ADENOMATOUS POLYP OF ASCENDING COLON: Primary | ICD-10-CM

## 2019-04-30 DIAGNOSIS — D12.2 ADENOMATOUS POLYP OF ASCENDING COLON: ICD-10-CM

## 2019-04-30 LAB
ALBUMIN SERPL-MCNC: 3.7 G/DL (ref 3.5–4.6)
ALP BLD-CCNC: 91 U/L (ref 40–130)
ALT SERPL-CCNC: 11 U/L (ref 0–33)
ANION GAP SERPL CALCULATED.3IONS-SCNC: 14 MEQ/L (ref 9–15)
AST SERPL-CCNC: 11 U/L (ref 0–35)
BILIRUB SERPL-MCNC: <0.2 MG/DL (ref 0.2–0.7)
BUN BLDV-MCNC: 21 MG/DL (ref 8–23)
CALCIUM SERPL-MCNC: 9.7 MG/DL (ref 8.5–9.9)
CHLORIDE BLD-SCNC: 94 MEQ/L (ref 95–107)
CO2: 32 MEQ/L (ref 20–31)
CREAT SERPL-MCNC: 0.74 MG/DL (ref 0.5–0.9)
GFR AFRICAN AMERICAN: >60
GFR NON-AFRICAN AMERICAN: >60
GLOBULIN: 4.8 G/DL (ref 2.3–3.5)
GLUCOSE BLD-MCNC: 100 MG/DL (ref 70–99)
HCT VFR BLD CALC: 38.2 % (ref 37–47)
HEMOGLOBIN: 12.9 G/DL (ref 12–16)
MCH RBC QN AUTO: 29.6 PG (ref 27–31.3)
MCHC RBC AUTO-ENTMCNC: 33.7 % (ref 33–37)
MCV RBC AUTO: 87.7 FL (ref 82–100)
PDW BLD-RTO: 19.8 % (ref 11.5–14.5)
PLATELET # BLD: 220 K/UL (ref 130–400)
POTASSIUM SERPL-SCNC: 3.8 MEQ/L (ref 3.4–4.9)
RBC # BLD: 4.35 M/UL (ref 4.2–5.4)
SODIUM BLD-SCNC: 140 MEQ/L (ref 135–144)
TOTAL PROTEIN: 8.5 G/DL (ref 6.3–8)
WBC # BLD: 9.7 K/UL (ref 4.8–10.8)

## 2019-04-30 PROCEDURE — 1036F TOBACCO NON-USER: CPT | Performed by: COLON & RECTAL SURGERY

## 2019-04-30 PROCEDURE — G8427 DOCREV CUR MEDS BY ELIG CLIN: HCPCS | Performed by: COLON & RECTAL SURGERY

## 2019-04-30 PROCEDURE — G8417 CALC BMI ABV UP PARAM F/U: HCPCS | Performed by: COLON & RECTAL SURGERY

## 2019-04-30 PROCEDURE — 3017F COLORECTAL CA SCREEN DOC REV: CPT | Performed by: COLON & RECTAL SURGERY

## 2019-04-30 PROCEDURE — 99204 OFFICE O/P NEW MOD 45 MIN: CPT | Performed by: COLON & RECTAL SURGERY

## 2019-04-30 ASSESSMENT — ENCOUNTER SYMPTOMS
RECTAL PAIN: 0
ANAL BLEEDING: 0
BLOOD IN STOOL: 0
VOMITING: 0
DIARRHEA: 0
ABDOMINAL DISTENTION: 0
SHORTNESS OF BREATH: 0
CHEST TIGHTNESS: 0
ABDOMINAL PAIN: 0
CONSTIPATION: 0
APNEA: 0

## 2019-04-30 NOTE — PROGRESS NOTES
Subjective:      Patient ID: Nathalia Mattson is a 61 y.o. female who presents for:  Chief Complaint   Patient presents with    New Patient       This is a 80-year-old female who had a colonoscopy due to heme positive stool. The colonoscopy showed a large flat cecal polyp along with an additional polyp in the distal descending colon. Biopsies show these to be tubular adenomas. No evidence of invasive cancer. There were a number of other smaller polyps completely removed there are: In the details of that colonoscopy was reviewed with her. She's had a previous open cholecystectomy along with a hysterectomy through a Pfannenstiel incision. She denies rectal bleeding or abdominal pain. She is overweight      Past Medical History:   Diagnosis Date    Acquired hypothyroidism 3/15/2016    Allergic rhinitis     pollen, dust ragweed, hay and straw     Anxiety     Asthma     Bipolar affect, depressed (HCC)     Bipolar disorder ([address]     Breast cancer ([address]     Invasive ductal cancer right breast    Cancer ([address] 1980    thyroid cancer     Chronic back pain     COPD (chronic obstructive pulmonary disease) ([address]     Depression     Diabetes mellitus (HCC)     Emphysema of lung ([address]     Essential hypertension 3/15/2016    Hyperlipidemia     Hypothyroidism     Obesity     Osteoarthritis     Restless legs syndrome     Sleep apnea     Urinary incontinence      Past Surgical History:   Procedure Laterality Date    APPENDECTOMY      BREAST BIOPSY Right 2018     SECTION      CHOLECYSTECTOMY      COLONOSCOPY N/A 2019    COLONOSCOPY DIAGNOSTIC performed by Melody He MD at [address] [place] [address] Right 2019    Simple mastectomy with sentinel node biopsy    TUBAL LIGATION       Social History     Socioeconomic History    Marital status:       Spouse name: Not on file    Number of children: Not on file    Years of education: Not on file    Highest education level: Not on file   Occupational History    Not on file   Social Needs    Financial resource strain: Not on file    Food insecurity:     Worry: Not on file     Inability: Not on file    Transportation needs:     Medical: Not on file     Non-medical: Not on file   Tobacco Use    Smoking status: Former Smoker     Packs/day: 1.00     Years: 40.00     Pack years: 40.00     Types: Cigarettes     Start date: 3/8/1976     Last attempt to quit: 2018     Years since quittin.8    Smokeless tobacco: Never Used   Substance and Sexual Activity    Alcohol use: No     Alcohol/week: 0.0 oz    Drug use: No    Sexual activity: Never   Lifestyle    Physical activity:     Days per week: Not on file     Minutes per session: Not on file    Stress: Not on file   Relationships    Social connections:     Talks on phone: Not on file     Gets together: Not on file     Attends [employer] service: Not on file     Active member of club or organization: Not on file     Attends meetings of clubs or organizations: Not on file     Relationship status: Not on file    Intimate partner violence:     Fear of current or ex partner: Not on file     Emotionally abused: Not on file     Physically abused: Not on file     Forced sexual activity: Not on file   Other Topics Concern    Not on file   Social History Narrative    Not on file     History reviewed. No pertinent family history. Allergies:  Latex; Sulfa antibiotics; Bactrim [sulfamethoxazole-trimethoprim]; and Nicotine    Review of Systems   Constitutional: Negative for activity change, appetite change, fatigue and unexpected weight change. Respiratory: Negative for apnea, chest tightness and shortness of breath. Cardiovascular: Negative for chest pain and leg swelling. Gastrointestinal: Negative for abdominal distention, abdominal pain, anal bleeding, blood in stool, constipation, diarrhea, rectal pain and vomiting.    Genitourinary: Negative for difficulty urinating. Musculoskeletal: Negative for arthralgias. Neurological: Negative for dizziness and headaches. Hematological: Does not bruise/bleed easily. Psychiatric/Behavioral: Negative for agitation and confusion. Objective:    Pulse 73   Temp 96.8 °F (36 °C) (Temporal)   Ht 5' 6.5\" (1.689 m)   Wt (!) 312 lb (141.5 kg)   SpO2 94%   BMI 49.60 kg/m²     Physical Exam   Constitutional: She is oriented to person, place, and time. She appears well-developed and well-nourished. HENT:   Head: Normocephalic and atraumatic. Eyes: Pupils are equal, round, and reactive to light. Neck: Normal range of motion. Neck supple. Cardiovascular: Normal rate, regular rhythm and normal heart sounds. Pulmonary/Chest: Effort normal and breath sounds normal. No respiratory distress. She has no wheezes. Abdominal: She exhibits no distension and no mass. There is no tenderness. There is no guarding. No hernia. Musculoskeletal: Normal range of motion. Neurological: She is alert and oriented to person, place, and time. Skin: Skin is warm and dry. No rash noted. No erythema. No pallor. Psychiatric: She has a normal mood and affect. Her behavior is normal. Judgment normal.            Assessment/Plan:          Diagnosis Orders   1. Adenomatous polyp of ascending colon  CBC    Comprehensive Metabolic Panel      With diagrams and pictures taken from the colonoscopy, I discussed the risks and benefits of laparoscopic right colectomy. Risks of the procedure including infection, bleeding, damage to the surrounding intestine, anastomotic leak, reoperation as well as other complications such as arrhythmia heart attack stroke and potential death were described. Spiked these risks she understands the benefits and wishes to proceed. Consent obtained. Blood work ordered. Bowel prep instructions given. Pre-admit testing appointment made.                 Please note this report has beenpartially produced using speech recognition software and may cause contain errors related to that system including grammar, punctuation and spelling as well as words and phrases that may seem inappropriate.  If there arequestions or concerns please feel free to contact me to clarify

## 2019-05-06 ENCOUNTER — HOSPITAL ENCOUNTER (OUTPATIENT)
Dept: CT IMAGING | Age: 64
Discharge: HOME | End: 2019-05-08
Payer: COMMERCIAL

## 2019-05-06 VITALS — WEIGHT: 293 LBS | BODY MASS INDEX: 45.99 KG/M2 | HEIGHT: 67 IN

## 2019-05-06 PROCEDURE — 6360000004 HC RX CONTRAST MEDICATION: Performed by: SPECIALIST

## 2019-05-06 PROCEDURE — 74177 CT ABD & PELVIS W/CONTRAST: CPT

## 2019-05-06 RX ORDER — SODIUM CHLORIDE 0.9 % (FLUSH) 0.9 %
10 SYRINGE (ML) INJECTION
Status: DISPENSED | OUTPATIENT
Start: 2019-05-06 | End: 2019-05-06

## 2019-05-06 RX ADMIN — IOPAMIDOL 100 ML: 612 INJECTION, SOLUTION INTRAVENOUS at 13:43

## 2019-05-07 ENCOUNTER — HOSPITAL ENCOUNTER (OUTPATIENT)
Dept: PREADMISSION TESTING | Age: 64
Discharge: HOME | DRG: 330 | End: 2019-05-11
Payer: COMMERCIAL

## 2019-05-07 VITALS
WEIGHT: 293 LBS | OXYGEN SATURATION: 97 % | SYSTOLIC BLOOD PRESSURE: 144 MMHG | HEART RATE: 70 BPM | RESPIRATION RATE: 16 BRPM | HEIGHT: 67 IN | BODY MASS INDEX: 45.99 KG/M2 | TEMPERATURE: 97.2 F | DIASTOLIC BLOOD PRESSURE: 61 MMHG

## 2019-05-07 LAB
ABO/RH: NORMAL
ANTIBODY SCREEN: NORMAL

## 2019-05-07 PROCEDURE — 86850 RBC ANTIBODY SCREEN: CPT

## 2019-05-07 PROCEDURE — 86900 BLOOD TYPING SEROLOGIC ABO: CPT

## 2019-05-07 PROCEDURE — 86901 BLOOD TYPING SEROLOGIC RH(D): CPT

## 2019-05-07 RX ORDER — SODIUM CHLORIDE 0.9 % (FLUSH) 0.9 %
10 SYRINGE (ML) INJECTION EVERY 12 HOURS SCHEDULED
Status: CANCELLED | OUTPATIENT
Start: 2019-05-07

## 2019-05-07 RX ORDER — POLYETHYLENE GLYCOL-3350, SODIUM CHLORIDE, POTASSIUM CHLORIDE AND SODIUM BICARBONATE 420; 11.2; 5.72; 1.48 G/438.4G; G/438.4G; G/438.4G; G/438.4G
POWDER, FOR SOLUTION ORAL
Refills: 0 | COMMUNITY
Start: 2019-04-16 | End: 2020-01-23 | Stop reason: ALTCHOICE

## 2019-05-07 RX ORDER — SODIUM CHLORIDE, SODIUM LACTATE, POTASSIUM CHLORIDE, CALCIUM CHLORIDE 600; 310; 30; 20 MG/100ML; MG/100ML; MG/100ML; MG/100ML
INJECTION, SOLUTION INTRAVENOUS CONTINUOUS
Status: CANCELLED | OUTPATIENT
Start: 2019-05-07

## 2019-05-07 RX ORDER — SODIUM CHLORIDE 0.9 % (FLUSH) 0.9 %
10 SYRINGE (ML) INJECTION PRN
Status: CANCELLED | OUTPATIENT
Start: 2019-05-07

## 2019-05-07 RX ORDER — LIDOCAINE HYDROCHLORIDE 10 MG/ML
1 INJECTION, SOLUTION EPIDURAL; INFILTRATION; INTRACAUDAL; PERINEURAL
Status: CANCELLED | OUTPATIENT
Start: 2019-05-07 | End: 2019-05-07

## 2019-05-10 ENCOUNTER — ANESTHESIA EVENT (OUTPATIENT)
Dept: OPERATING ROOM | Age: 64
DRG: 330 | End: 2019-05-10
Payer: COMMERCIAL

## 2019-05-13 ENCOUNTER — ANESTHESIA (OUTPATIENT)
Dept: OPERATING ROOM | Age: 64
DRG: 330 | End: 2019-05-13
Payer: COMMERCIAL

## 2019-05-13 ENCOUNTER — HOSPITAL ENCOUNTER (INPATIENT)
Age: 64
LOS: 7 days | Discharge: HOME | DRG: 330 | End: 2019-05-20
Attending: COLON & RECTAL SURGERY | Admitting: COLON & RECTAL SURGERY
Payer: COMMERCIAL

## 2019-05-13 VITALS — SYSTOLIC BLOOD PRESSURE: 121 MMHG | OXYGEN SATURATION: 100 % | DIASTOLIC BLOOD PRESSURE: 59 MMHG | TEMPERATURE: 96.6 F

## 2019-05-13 DIAGNOSIS — K63.5 DYSPLASTIC COLON POLYP: Primary | ICD-10-CM

## 2019-05-13 LAB
GLUCOSE BLD-MCNC: 146 MG/DL (ref 60–115)
GLUCOSE BLD-MCNC: 151 MG/DL (ref 60–115)
GLUCOSE BLD-MCNC: 180 MG/DL (ref 60–115)
PERFORMED ON: ABNORMAL

## 2019-05-13 PROCEDURE — 94760 N-INVAS EAR/PLS OXIMETRY 1: CPT

## 2019-05-13 PROCEDURE — 6360000002 HC RX W HCPCS: Performed by: FAMILY MEDICINE

## 2019-05-13 PROCEDURE — 2500000003 HC RX 250 WO HCPCS: Performed by: NURSE ANESTHETIST, CERTIFIED REGISTERED

## 2019-05-13 PROCEDURE — 94150 VITAL CAPACITY TEST: CPT

## 2019-05-13 PROCEDURE — 7100000000 HC PACU RECOVERY - FIRST 15 MIN: Performed by: COLON & RECTAL SURGERY

## 2019-05-13 PROCEDURE — 2720000010 HC SURG SUPPLY STERILE: Performed by: COLON & RECTAL SURGERY

## 2019-05-13 PROCEDURE — 94640 AIRWAY INHALATION TREATMENT: CPT

## 2019-05-13 PROCEDURE — 2709999900 HC NON-CHARGEABLE SUPPLY: Performed by: COLON & RECTAL SURGERY

## 2019-05-13 PROCEDURE — 3700000000 HC ANESTHESIA ATTENDED CARE: Performed by: COLON & RECTAL SURGERY

## 2019-05-13 PROCEDURE — 44205 LAP COLECTOMY PART W/ILEUM: CPT | Performed by: COLON & RECTAL SURGERY

## 2019-05-13 PROCEDURE — 0DBK4ZZ EXCISION OF ASCENDING COLON, PERCUTANEOUS ENDOSCOPIC APPROACH: ICD-10-PCS | Performed by: COLON & RECTAL SURGERY

## 2019-05-13 PROCEDURE — 94770 HC ETCO2 MONITOR DAILY: CPT

## 2019-05-13 PROCEDURE — 6360000002 HC RX W HCPCS: Performed by: COLON & RECTAL SURGERY

## 2019-05-13 PROCEDURE — 2580000003 HC RX 258: Performed by: NURSE PRACTITIONER

## 2019-05-13 PROCEDURE — 0D1B4ZH BYPASS ILEUM TO CECUM, PERCUTANEOUS ENDOSCOPIC APPROACH: ICD-10-PCS | Performed by: COLON & RECTAL SURGERY

## 2019-05-13 PROCEDURE — 7100000001 HC PACU RECOVERY - ADDTL 15 MIN: Performed by: COLON & RECTAL SURGERY

## 2019-05-13 PROCEDURE — 88307 TISSUE EXAM BY PATHOLOGIST: CPT

## 2019-05-13 PROCEDURE — 2500000003 HC RX 250 WO HCPCS: Performed by: COLON & RECTAL SURGERY

## 2019-05-13 PROCEDURE — 6360000002 HC RX W HCPCS: Performed by: NURSE ANESTHETIST, CERTIFIED REGISTERED

## 2019-05-13 PROCEDURE — 3600000004 HC SURGERY LEVEL 4 BASE: Performed by: COLON & RECTAL SURGERY

## 2019-05-13 PROCEDURE — 3600000014 HC SURGERY LEVEL 4 ADDTL 15MIN: Performed by: COLON & RECTAL SURGERY

## 2019-05-13 PROCEDURE — 3700000001 HC ADD 15 MINUTES (ANESTHESIA): Performed by: COLON & RECTAL SURGERY

## 2019-05-13 PROCEDURE — 6370000000 HC RX 637 (ALT 250 FOR IP): Performed by: COLON & RECTAL SURGERY

## 2019-05-13 PROCEDURE — 64488 TAP BLOCK BI INJECTION: CPT | Performed by: ANESTHESIOLOGY

## 2019-05-13 PROCEDURE — 2500000003 HC RX 250 WO HCPCS: Performed by: ANESTHESIOLOGY

## 2019-05-13 PROCEDURE — 2500000003 HC RX 250 WO HCPCS: Performed by: NURSE PRACTITIONER

## 2019-05-13 PROCEDURE — 2580000003 HC RX 258: Performed by: FAMILY MEDICINE

## 2019-05-13 PROCEDURE — 2580000003 HC RX 258: Performed by: COLON & RECTAL SURGERY

## 2019-05-13 PROCEDURE — 1210000000 HC MED SURG R&B

## 2019-05-13 RX ORDER — FENTANYL CITRATE 50 UG/ML
INJECTION, SOLUTION INTRAMUSCULAR; INTRAVENOUS PRN
Status: DISCONTINUED | OUTPATIENT
Start: 2019-05-13 | End: 2019-05-13 | Stop reason: SDUPTHER

## 2019-05-13 RX ORDER — DEXTROSE MONOHYDRATE 50 MG/ML
100 INJECTION, SOLUTION INTRAVENOUS PRN
Status: DISCONTINUED | OUTPATIENT
Start: 2019-05-13 | End: 2019-05-20 | Stop reason: HOSPADM

## 2019-05-13 RX ORDER — PROPOFOL 10 MG/ML
INJECTION, EMULSION INTRAVENOUS PRN
Status: DISCONTINUED | OUTPATIENT
Start: 2019-05-13 | End: 2019-05-13 | Stop reason: SDUPTHER

## 2019-05-13 RX ORDER — NICOTINE POLACRILEX 4 MG
15 LOZENGE BUCCAL PRN
Status: DISCONTINUED | OUTPATIENT
Start: 2019-05-13 | End: 2019-05-20 | Stop reason: HOSPADM

## 2019-05-13 RX ORDER — DEXTROSE MONOHYDRATE 25 G/50ML
12.5 INJECTION, SOLUTION INTRAVENOUS PRN
Status: DISCONTINUED | OUTPATIENT
Start: 2019-05-13 | End: 2019-05-20 | Stop reason: HOSPADM

## 2019-05-13 RX ORDER — FUROSEMIDE 40 MG/1
40 TABLET ORAL DAILY
Status: DISCONTINUED | OUTPATIENT
Start: 2019-05-13 | End: 2019-05-20 | Stop reason: HOSPADM

## 2019-05-13 RX ORDER — LEVOTHYROXINE SODIUM 175 UG/1
175 TABLET ORAL DAILY
Status: DISCONTINUED | OUTPATIENT
Start: 2019-05-13 | End: 2019-05-20 | Stop reason: HOSPADM

## 2019-05-13 RX ORDER — HYDROCODONE BITARTRATE AND ACETAMINOPHEN 5; 325 MG/1; MG/1
2 TABLET ORAL PRN
Status: ACTIVE | OUTPATIENT
Start: 2019-05-13 | End: 2019-05-13

## 2019-05-13 RX ORDER — DULOXETIN HYDROCHLORIDE 60 MG/1
60 CAPSULE, DELAYED RELEASE ORAL DAILY
Status: DISCONTINUED | OUTPATIENT
Start: 2019-05-13 | End: 2019-05-20 | Stop reason: HOSPADM

## 2019-05-13 RX ORDER — SODIUM CHLORIDE 0.9 % (FLUSH) 0.9 %
10 SYRINGE (ML) INJECTION PRN
Status: DISCONTINUED | OUTPATIENT
Start: 2019-05-13 | End: 2019-05-20 | Stop reason: HOSPADM

## 2019-05-13 RX ORDER — FENTANYL CITRATE 50 UG/ML
50 INJECTION, SOLUTION INTRAMUSCULAR; INTRAVENOUS EVERY 10 MIN PRN
Status: DISCONTINUED | OUTPATIENT
Start: 2019-05-13 | End: 2019-05-20 | Stop reason: HOSPADM

## 2019-05-13 RX ORDER — ARIPIPRAZOLE 5 MG/1
10 TABLET ORAL DAILY
Status: DISCONTINUED | OUTPATIENT
Start: 2019-05-13 | End: 2019-05-20 | Stop reason: HOSPADM

## 2019-05-13 RX ORDER — LIDOCAINE HYDROCHLORIDE 10 MG/ML
1 INJECTION, SOLUTION EPIDURAL; INFILTRATION; INTRACAUDAL; PERINEURAL
Status: COMPLETED | OUTPATIENT
Start: 2019-05-13 | End: 2019-05-13

## 2019-05-13 RX ORDER — MAGNESIUM HYDROXIDE 1200 MG/15ML
LIQUID ORAL CONTINUOUS PRN
Status: COMPLETED | OUTPATIENT
Start: 2019-05-13 | End: 2019-05-13

## 2019-05-13 RX ORDER — HYDROCODONE BITARTRATE AND ACETAMINOPHEN 5; 325 MG/1; MG/1
1 TABLET ORAL PRN
Status: ACTIVE | OUTPATIENT
Start: 2019-05-13 | End: 2019-05-13

## 2019-05-13 RX ORDER — DIPHENHYDRAMINE HYDROCHLORIDE 50 MG/ML
12.5 INJECTION INTRAMUSCULAR; INTRAVENOUS
Status: ACTIVE | OUTPATIENT
Start: 2019-05-13 | End: 2019-05-13

## 2019-05-13 RX ORDER — LOSARTAN POTASSIUM 25 MG/1
25 TABLET ORAL DAILY
Status: DISCONTINUED | OUTPATIENT
Start: 2019-05-13 | End: 2019-05-20 | Stop reason: HOSPADM

## 2019-05-13 RX ORDER — DEXAMETHASONE SODIUM PHOSPHATE 4 MG/ML
INJECTION, SOLUTION INTRA-ARTICULAR; INTRALESIONAL; INTRAMUSCULAR; INTRAVENOUS; SOFT TISSUE PRN
Status: DISCONTINUED | OUTPATIENT
Start: 2019-05-13 | End: 2019-05-13 | Stop reason: SDUPTHER

## 2019-05-13 RX ORDER — METOCLOPRAMIDE HYDROCHLORIDE 5 MG/ML
10 INJECTION INTRAMUSCULAR; INTRAVENOUS
Status: ACTIVE | OUTPATIENT
Start: 2019-05-13 | End: 2019-05-13

## 2019-05-13 RX ORDER — BUPIVACAINE HYDROCHLORIDE 5 MG/ML
INJECTION, SOLUTION EPIDURAL; INTRACAUDAL PRN
Status: DISCONTINUED | OUTPATIENT
Start: 2019-05-13 | End: 2019-05-13 | Stop reason: SDUPTHER

## 2019-05-13 RX ORDER — SODIUM CHLORIDE 0.9 % (FLUSH) 0.9 %
10 SYRINGE (ML) INJECTION EVERY 12 HOURS SCHEDULED
Status: DISCONTINUED | OUTPATIENT
Start: 2019-05-13 | End: 2019-05-20 | Stop reason: HOSPADM

## 2019-05-13 RX ORDER — SODIUM CHLORIDE 9 MG/ML
INJECTION, SOLUTION INTRAVENOUS CONTINUOUS
Status: DISCONTINUED | OUTPATIENT
Start: 2019-05-13 | End: 2019-05-19

## 2019-05-13 RX ORDER — MIDAZOLAM HYDROCHLORIDE 1 MG/ML
INJECTION INTRAMUSCULAR; INTRAVENOUS PRN
Status: DISCONTINUED | OUTPATIENT
Start: 2019-05-13 | End: 2019-05-13 | Stop reason: SDUPTHER

## 2019-05-13 RX ORDER — ANASTROZOLE 1 MG/1
1 TABLET ORAL DAILY
Status: DISCONTINUED | OUTPATIENT
Start: 2019-05-13 | End: 2019-05-20 | Stop reason: HOSPADM

## 2019-05-13 RX ORDER — SODIUM CHLORIDE, SODIUM LACTATE, POTASSIUM CHLORIDE, CALCIUM CHLORIDE 600; 310; 30; 20 MG/100ML; MG/100ML; MG/100ML; MG/100ML
INJECTION, SOLUTION INTRAVENOUS CONTINUOUS
Status: DISCONTINUED | OUTPATIENT
Start: 2019-05-13 | End: 2019-05-20 | Stop reason: HOSPADM

## 2019-05-13 RX ORDER — NALOXONE HYDROCHLORIDE 0.4 MG/ML
0.4 INJECTION, SOLUTION INTRAMUSCULAR; INTRAVENOUS; SUBCUTANEOUS PRN
Status: DISCONTINUED | OUTPATIENT
Start: 2019-05-13 | End: 2019-05-15

## 2019-05-13 RX ORDER — ALBUTEROL SULFATE 90 UG/1
2 AEROSOL, METERED RESPIRATORY (INHALATION) EVERY 6 HOURS PRN
Status: DISCONTINUED | OUTPATIENT
Start: 2019-05-13 | End: 2019-05-20 | Stop reason: HOSPADM

## 2019-05-13 RX ORDER — ONDANSETRON 2 MG/ML
4 INJECTION INTRAMUSCULAR; INTRAVENOUS
Status: ACTIVE | OUTPATIENT
Start: 2019-05-13 | End: 2019-05-13

## 2019-05-13 RX ORDER — ROCURONIUM BROMIDE 10 MG/ML
INJECTION, SOLUTION INTRAVENOUS PRN
Status: DISCONTINUED | OUTPATIENT
Start: 2019-05-13 | End: 2019-05-13 | Stop reason: SDUPTHER

## 2019-05-13 RX ORDER — ONDANSETRON 2 MG/ML
INJECTION INTRAMUSCULAR; INTRAVENOUS PRN
Status: DISCONTINUED | OUTPATIENT
Start: 2019-05-13 | End: 2019-05-13 | Stop reason: SDUPTHER

## 2019-05-13 RX ORDER — MEPERIDINE HYDROCHLORIDE 25 MG/ML
12.5 INJECTION INTRAMUSCULAR; INTRAVENOUS; SUBCUTANEOUS EVERY 5 MIN PRN
Status: DISCONTINUED | OUTPATIENT
Start: 2019-05-13 | End: 2019-05-19

## 2019-05-13 RX ORDER — ONDANSETRON 2 MG/ML
4 INJECTION INTRAMUSCULAR; INTRAVENOUS EVERY 6 HOURS PRN
Status: DISCONTINUED | OUTPATIENT
Start: 2019-05-13 | End: 2019-05-20 | Stop reason: HOSPADM

## 2019-05-13 RX ORDER — LIDOCAINE HYDROCHLORIDE 20 MG/ML
INJECTION, SOLUTION INTRAVENOUS PRN
Status: DISCONTINUED | OUTPATIENT
Start: 2019-05-13 | End: 2019-05-13 | Stop reason: SDUPTHER

## 2019-05-13 RX ADMIN — Medication: at 14:20

## 2019-05-13 RX ADMIN — ONDANSETRON 4 MG: 2 INJECTION INTRAMUSCULAR; INTRAVENOUS at 10:01

## 2019-05-13 RX ADMIN — FENTANYL CITRATE 50 MCG: 50 INJECTION, SOLUTION INTRAMUSCULAR; INTRAVENOUS at 09:25

## 2019-05-13 RX ADMIN — BUPIVACAINE HYDROCHLORIDE 20 ML: 5 INJECTION, SOLUTION EPIDURAL; INTRACAUDAL; PERINEURAL at 10:02

## 2019-05-13 RX ADMIN — SUGAMMADEX 200 MG: 100 INJECTION, SOLUTION INTRAVENOUS at 10:08

## 2019-05-13 RX ADMIN — MOMETASONE FUROATE AND FORMOTEROL FUMARATE DIHYDRATE 2 PUFF: 200; 5 AEROSOL RESPIRATORY (INHALATION) at 20:27

## 2019-05-13 RX ADMIN — PROPOFOL 300 MG: 10 INJECTION, EMULSION INTRAVENOUS at 07:38

## 2019-05-13 RX ADMIN — SODIUM CHLORIDE, POTASSIUM CHLORIDE, SODIUM LACTATE AND CALCIUM CHLORIDE: 600; 310; 30; 20 INJECTION, SOLUTION INTRAVENOUS at 09:25

## 2019-05-13 RX ADMIN — ALBUTEROL SULFATE 2 PUFF: 90 AEROSOL, METERED RESPIRATORY (INHALATION) at 20:27

## 2019-05-13 RX ADMIN — METRONIDAZOLE 500 MG: 500 INJECTION, SOLUTION INTRAVENOUS at 07:30

## 2019-05-13 RX ADMIN — ENOXAPARIN SODIUM 40 MG: 40 INJECTION SUBCUTANEOUS at 19:58

## 2019-05-13 RX ADMIN — FENTANYL CITRATE 50 MCG: 50 INJECTION, SOLUTION INTRAMUSCULAR; INTRAVENOUS at 09:09

## 2019-05-13 RX ADMIN — DEXTROSE MONOHYDRATE 3 G: 50 INJECTION, SOLUTION INTRAVENOUS at 07:29

## 2019-05-13 RX ADMIN — SODIUM CHLORIDE, POTASSIUM CHLORIDE, SODIUM LACTATE AND CALCIUM CHLORIDE: 600; 310; 30; 20 INJECTION, SOLUTION INTRAVENOUS at 10:12

## 2019-05-13 RX ADMIN — ROCURONIUM BROMIDE 50 MG: 10 INJECTION INTRAVENOUS at 07:38

## 2019-05-13 RX ADMIN — LIDOCAINE HYDROCHLORIDE 50 MG: 20 INJECTION, SOLUTION INTRAVENOUS at 07:38

## 2019-05-13 RX ADMIN — BUPIVACAINE HYDROCHLORIDE 20 ML: 5 INJECTION, SOLUTION EPIDURAL; INTRACAUDAL; PERINEURAL at 09:58

## 2019-05-13 RX ADMIN — FENTANYL CITRATE 50 MCG: 50 INJECTION, SOLUTION INTRAMUSCULAR; INTRAVENOUS at 07:38

## 2019-05-13 RX ADMIN — SODIUM CHLORIDE: 9 INJECTION, SOLUTION INTRAVENOUS at 14:42

## 2019-05-13 RX ADMIN — FENTANYL CITRATE 50 MCG: 50 INJECTION, SOLUTION INTRAMUSCULAR; INTRAVENOUS at 08:01

## 2019-05-13 RX ADMIN — SODIUM CHLORIDE, POTASSIUM CHLORIDE, SODIUM LACTATE AND CALCIUM CHLORIDE: 600; 310; 30; 20 INJECTION, SOLUTION INTRAVENOUS at 08:32

## 2019-05-13 RX ADMIN — MIDAZOLAM HYDROCHLORIDE 2 MG: 1 INJECTION, SOLUTION INTRAMUSCULAR; INTRAVENOUS at 07:27

## 2019-05-13 RX ADMIN — DEXAMETHASONE SODIUM PHOSPHATE 4 MG: 4 INJECTION, SOLUTION INTRA-ARTICULAR; INTRALESIONAL; INTRAMUSCULAR; INTRAVENOUS; SOFT TISSUE at 07:38

## 2019-05-13 RX ADMIN — LIDOCAINE HYDROCHLORIDE 0.1 ML: 10 INJECTION, SOLUTION EPIDURAL; INFILTRATION; INTRACAUDAL; PERINEURAL at 07:00

## 2019-05-13 RX ADMIN — SODIUM CHLORIDE, POTASSIUM CHLORIDE, SODIUM LACTATE AND CALCIUM CHLORIDE 1000 ML: 600; 310; 30; 20 INJECTION, SOLUTION INTRAVENOUS at 07:00

## 2019-05-13 ASSESSMENT — PULMONARY FUNCTION TESTS
PIF_VALUE: 22
PIF_VALUE: 23
PIF_VALUE: 22
PIF_VALUE: 20
PIF_VALUE: 31
PIF_VALUE: 31
PIF_VALUE: 20
PIF_VALUE: 22
PIF_VALUE: 23
PIF_VALUE: 25
PIF_VALUE: 21
PIF_VALUE: 20
PIF_VALUE: 1
PIF_VALUE: 21
PIF_VALUE: 22
PIF_VALUE: 33
PIF_VALUE: 34
PIF_VALUE: 31
PIF_VALUE: 31
PIF_VALUE: 22
PIF_VALUE: 31
PIF_VALUE: 21
PIF_VALUE: 31
PIF_VALUE: 22
PIF_VALUE: 20
PIF_VALUE: 22
PIF_VALUE: 33
PIF_VALUE: 31
PIF_VALUE: 0
PIF_VALUE: 33
PIF_VALUE: 24
PIF_VALUE: 20
PIF_VALUE: 20
PIF_VALUE: 0
PIF_VALUE: 0
PIF_VALUE: 31
PIF_VALUE: 23
PIF_VALUE: 31
PIF_VALUE: 6
PIF_VALUE: 0
PIF_VALUE: 25
PIF_VALUE: 23
PIF_VALUE: 25
PIF_VALUE: 21
PIF_VALUE: 22
PIF_VALUE: 21
PIF_VALUE: 30
PIF_VALUE: 21
PIF_VALUE: 22
PIF_VALUE: 21
PIF_VALUE: 29
PIF_VALUE: 31
PIF_VALUE: 1
PIF_VALUE: 1
PIF_VALUE: 21
PIF_VALUE: 22
PIF_VALUE: 20
PIF_VALUE: 4
PIF_VALUE: 1
PIF_VALUE: 19
PIF_VALUE: 32
PIF_VALUE: 23
PIF_VALUE: 21
PIF_VALUE: 22
PIF_VALUE: 34
PIF_VALUE: 23
PIF_VALUE: 31
PIF_VALUE: 29
PIF_VALUE: 24
PIF_VALUE: 23
PIF_VALUE: 21
PIF_VALUE: 22
PIF_VALUE: 21
PIF_VALUE: 31
PIF_VALUE: 30
PIF_VALUE: 26
PIF_VALUE: 33
PIF_VALUE: 21
PIF_VALUE: 31
PIF_VALUE: 22
PIF_VALUE: 0
PIF_VALUE: 31
PIF_VALUE: 21
PIF_VALUE: 21
PIF_VALUE: 31
PIF_VALUE: 21
PIF_VALUE: 20
PIF_VALUE: 31
PIF_VALUE: 22
PIF_VALUE: 23
PIF_VALUE: 30
PIF_VALUE: 31
PIF_VALUE: 29
PIF_VALUE: 2
PIF_VALUE: 21
PIF_VALUE: 22
PIF_VALUE: 0
PIF_VALUE: 21
PIF_VALUE: 31
PIF_VALUE: 23
PIF_VALUE: 21
PIF_VALUE: 20
PIF_VALUE: 24
PIF_VALUE: 21
PIF_VALUE: 31
PIF_VALUE: 22
PIF_VALUE: 26
PIF_VALUE: 21
PIF_VALUE: 22
PIF_VALUE: 21
PIF_VALUE: 23
PIF_VALUE: 22
PIF_VALUE: 23
PIF_VALUE: 22
PIF_VALUE: 33
PIF_VALUE: 24
PIF_VALUE: 22
PIF_VALUE: 22
PIF_VALUE: 1
PIF_VALUE: 23
PIF_VALUE: 21
PIF_VALUE: 31
PIF_VALUE: 21
PIF_VALUE: 1
PIF_VALUE: 31
PIF_VALUE: 9
PIF_VALUE: 21
PIF_VALUE: 23
PIF_VALUE: 22
PIF_VALUE: 22
PIF_VALUE: 31
PIF_VALUE: 21
PIF_VALUE: 31
PIF_VALUE: 33
PIF_VALUE: 4
PIF_VALUE: 22
PIF_VALUE: 33
PIF_VALUE: 20
PIF_VALUE: 31
PIF_VALUE: 34
PIF_VALUE: 33
PIF_VALUE: 20
PIF_VALUE: 22
PIF_VALUE: 29
PIF_VALUE: 34
PIF_VALUE: 22
PIF_VALUE: 33
PIF_VALUE: 22
PIF_VALUE: 34
PIF_VALUE: 31
PIF_VALUE: 1
PIF_VALUE: 22
PIF_VALUE: 24
PIF_VALUE: 21
PIF_VALUE: 1
PIF_VALUE: 24
PIF_VALUE: 21
PIF_VALUE: 23
PIF_VALUE: 31
PIF_VALUE: 31
PIF_VALUE: 22
PIF_VALUE: 31
PIF_VALUE: 33
PIF_VALUE: 22
PIF_VALUE: 32
PIF_VALUE: 23
PIF_VALUE: 31

## 2019-05-13 ASSESSMENT — COPD QUESTIONNAIRES: CAT_SEVERITY: MILD

## 2019-05-13 ASSESSMENT — PAIN DESCRIPTION - LOCATION
LOCATION: ABDOMEN
LOCATION: ABDOMEN

## 2019-05-13 ASSESSMENT — PAIN DESCRIPTION - PAIN TYPE: TYPE: SURGICAL PAIN

## 2019-05-13 ASSESSMENT — PAIN SCALES - GENERAL
PAINLEVEL_OUTOF10: 8
PAINLEVEL_OUTOF10: 5
PAINLEVEL_OUTOF10: 7
PAINLEVEL_OUTOF10: 5

## 2019-05-13 ASSESSMENT — PAIN - FUNCTIONAL ASSESSMENT: PAIN_FUNCTIONAL_ASSESSMENT: 0-10

## 2019-05-13 NOTE — PROGRESS NOTES
Dr Fausto Freed aware of pt stating abd pain 7/10 and Dr Fausto Freed is at bedside with pt resting quietly, no further orders for pain

## 2019-05-13 NOTE — ANESTHESIA POSTPROCEDURE EVALUATION
Department of Anesthesiology  Postprocedure Note    Patient: Rosie Bartlett  MRN: [id number]  YOB: 1955  Date of evaluation: 5/13/2019  Time:  10:31 AM     Procedure Summary     Date:  05/13/19 Room / Location:  [facility] OR  / [facility] OR    Anesthesia Start:  1449 Anesthesia Stop:      Procedure:  LAPAROSCOPIC RIGHT COLECTOMY, LYSIS OF ADHESIONS. (Right Abdomen) Diagnosis:  (COLON POLYPS)    Surgeon:  Sonia Goldberg MD Responsible Provider:  Jelly Black MD    Anesthesia Type:  general, regional ASA Status:  3          Anesthesia Type: general, regional    Kristie Phase I:      Kristie Phase II:      Last vitals: Reviewed and per EMR flowsheets.        Anesthesia Post Evaluation    Patient location during evaluation: PACU  Patient participation: complete - patient participated  Level of consciousness: awake  Pain score: 0  Airway patency: patent  Nausea & Vomiting: no vomiting and no nausea  Complications: no  Cardiovascular status: hemodynamically stable  Respiratory status: face mask  Hydration status: stable

## 2019-05-13 NOTE — ANESTHESIA PRE PROCEDURE
Department of Anesthesiology  Preprocedure Note       Name:  Yasmeen Walker   Age:  61 y.o.  :  1955                                          MRN:  [id number]         Date:  2019      Surgeon: Luz Marina Santana):  Maddy Cummings MD    Procedure: LAPAROSCOPIC RIGHT COLECTOMY, LATEX ALLERGY (Right )    Medications prior to admission:   Prior to Admission medications    Medication Sig Start Date End Date Taking?  Authorizing Provider   GAVILYTE-N WITH FLAVOR PACK 420 g solution TAKE 4,000 ML BY MOUTH AS DIRECTED 19  Yes Historical Provider, MD   VORTIoxetine (TRINTELLIX) 10 MG TABS tablet Take 10 mg by mouth   Yes Historical Provider, MD   furosemide (LASIX) 40 MG tablet TAKE 1 TABLET BY MOUTH EVERY DAY 3/25/19  Yes Jana Gabriel MD   albuterol sulfate HFA (VENTOLIN HFA) 108 (90 Base) MCG/ACT inhaler Inhale 2 puffs into the lungs every 6 hours as needed for Wheezing 19  Yes Jana Gabriel MD   oxybutynin (DITROPAN-XL) 10 MG extended release tablet Take 10 mg by mouth daily   Yes Historical Provider, MD   simvastatin (ZOCOR) 40 MG tablet TAKE 1 TABLET BY MOUTH EVERY EVENING 19  Yes Jana Gabriel MD   losartan (COZAAR) 25 MG tablet Take 1 tablet by mouth daily 19  Yes Jana Gabriel MD   levothyroxine (SYNTHROID) 175 MCG tablet Take 1 tablet by mouth Daily 19  Yes Jana Gabriel MD   potassium chloride (KLOR-CON M) 10 MEQ extended release tablet Take 1 tablet by mouth daily 19  Yes Jana Gabriel MD   metFORMIN (GLUCOPHAGE XR) 500 MG extended release tablet Take 2 tablets by mouth daily (with breakfast) 19  Yes Jana Gabriel MD   ARIPiprazole (ABILIFY) 10 MG tablet TAKE 1 TABLET BY MOUTH EVERY DAY IN THE EVENING AS DIRECTED 19  Yes Historical Provider, MD   vitamin D (CHOLECALCIFEROL) 1000 UNIT TABS tablet Take 1,000 Units by mouth daily   Yes Historical Provider, MD   anastrozole (ARIMIDEX) 1 MG tablet Take 1 mg by mouth daily   Yes Historical Provider, MD ACCU-CHEK SMARTVIEW strip Test TID E11.8 9/12/18  Yes MARIETTA Heart CNP   ipratropium-albuterol (DUONEB) 0.5-2.5 (3) MG/3ML SOLN nebulizer solution 3 mLs   Yes Historical Provider, MD   fluticasone-vilanterol (BREO ELLIPTA) 100-25 MCG/INH AEPB inhaler Inhale 1 puff into the lungs daily 8/9/18  Yes Young Singh MD   SOFT TOUCH LANCETS MISC Use tid as directed dx: E11.9 6/29/18  Yes Young Singh MD   DULoxetine (CYMBALTA) 60 MG extended release capsule Take 60 mg by mouth daily  2/17/17  Yes Historical Provider, MD   sodium chloride 0.9 % irrigation Irrigate with as directed for 1 dose. 3/14/19   Alejo Branch DPM   sodium chloride 0.9 % irrigation Irrigate with as directed for 1 dose. 2/21/19   Alejo Branch DPM   Multiple Vitamin (MULTIVITAMIN) tablet Take 1 tablet by mouth daily    Historical Provider, MD   Multiple Vitamins-Minerals (ICAPS AREDS 2 PO) Take 1 tablet by mouth daily    Historical Provider, MD   hydrOXYzine (VISTARIL) 25 MG capsule Take 25 mg by mouth 3 times daily as needed     Historical Provider, MD       Current medications:    Current Facility-Administered Medications   Medication Dose Route Frequency Provider Last Rate Last Dose    lactated ringers infusion   Intravenous Continuous MARIETTA Olguin -  mL/hr at 05/13/19 0700 1,000 mL at 05/13/19 0700    sodium chloride flush 0.9 % injection 10 mL  10 mL Intravenous 2 times per day Eva Stanley APRN - CNP        sodium chloride flush 0.9 % injection 10 mL  10 mL Intravenous PRN Eva Stanley APRN - CNP        ceFAZolin (ANCEF) 3 g in dextrose 5 % 100 mL IVPB  3 g Intravenous Once Sharyle Se, MD        metronidazole (FLAGYL) 500 mg in NaCl 100 mL IVPB premix  500 mg Intravenous Once Gaetano Brown MD           Allergies:     Allergies   Allergen Reactions    Latex Itching and Rash    Sulfa Antibiotics Hives    Bactrim [Sulfamethoxazole-Trimethoprim] Hives and Other (See Comments)     Metallic taste  Nicotine Hives     Hives from the patch       Problem List:    Patient Active Problem List   Diagnosis Code    Type 2 diabetes mellitus with complication ([facility]) J64.8    Acquired hypothyroidism E03.9    Essential hypertension I10    Mixed hyperlipidemia E78.2    COPD with chronic bronchitis ([facility]) J44.9    Microalbuminuria R80.9    Moderate episode of recurrent major depressive disorder ([address] F33.1    YARED (obstructive sleep apnea) G47.33    Restless leg syndrome G25.81    Vitamin B12 deficiency E53.8    Vitamin D deficiency E55.9    Left ventricular hypertrophy I51.7    Left ventricular diastolic dysfunction Q91.1    Morbid obesity with BMI of 45.0-49.9, adult ([facility]) E66.01, Z68.42    Breast carcinoma, female, right ([facility]) C50.911    Iron deficiency anemia secondary to inadequate dietary iron intake D50.8    Breast cancer of lower-inner quadrant of right female breast ([facility]) C50.311    Postoperative anemia D64.9    Malignant neoplasm of central portion of right breast in female, estrogen receptor positive ([facility]) C50.111, Z17.0    Iron deficiency anemia due to chronic blood loss D50.0    Non-pressure chronic ulcer of other part of right lower leg with fat layer exposed ([address] L97.812    Chronic venous insufficiency I87.2    Adenomatous polyp of ascending colon D12.2    Adenomatous polyp of sigmoid colon D12.5       Past Medical History:        Diagnosis Date    Acquired hypothyroidism 3/15/2016    Allergic rhinitis     pollen, dust ragweed, hay and straw     Anxiety     Asthma     Bipolar affect, depressed ([address]     Bipolar disorder ([address]     Breast cancer ([address] 2018    Invasive ductal cancer right breast    Cancer ([address] 1980    thyroid cancer     Chronic back pain     COPD (chronic obstructive pulmonary disease) ([address]     Depression     Diabetes mellitus ([address]     Emphysema of lung ([address]     Essential hypertension 3/15/2016    History of blood transfusion     Feb 4 2019    Hyperlipidemia     Hypothyroidism     Obesity     On home O2     3L NC at night    YARED (obstructive sleep apnea)     3 L NC    Osteoarthritis     Restless legs syndrome     Sleep apnea     Urinary incontinence        Past Surgical History:        Procedure Laterality Date    APPENDECTOMY      BREAST BIOPSY Right 2018     SECTION      CHOLECYSTECTOMY      COLONOSCOPY N/A 2019    COLONOSCOPY DIAGNOSTIC performed by Izzy Levy MD at [address] [place] [address] Street Right 2019    Simple mastectomy with sentinel node biopsy    TUBAL LIGATION         Social History:    Social History     Tobacco Use    Smoking status: Former Smoker     Packs/day: 1.00     Years: 40.00     Pack years: 40.00     Types: Cigarettes     Start date: 3/8/1976     Last attempt to quit: 2018     Years since quittin.8    Smokeless tobacco: Never Used   Substance Use Topics    Alcohol use: No     Alcohol/week: 0.0 oz                                Counseling given: Not Answered      Vital Signs (Current):   Vitals:    19 0613   BP: (!) 124/59   Pulse: 73   Resp: 20   Temp: 98.6 °F (37 °C)   SpO2: 94%   Weight: (!) 313 lb (142 kg)   Height: 5' 6.5\" (1.689 m)                                              BP Readings from Last 3 Encounters:   19 (!) 124/59   19 (!) 144/61   19 106/67       NPO Status: Time of last liquid consumption:                         Time of last solid consumption:                         Date of last liquid consumption: 19                        Date of last solid food consumption: 19    BMI:   Wt Readings from Last 3 Encounters:   19 (!) 313 lb (142 kg)   19 (!) 313 lb (142 kg)   19 (!) 311 lb (141.1 kg)     Body mass index is 49.76 kg/m².     CBC:   Lab Results   Component Value Date    WBC 9.7 2019    RBC 4.35 2019    RBC 4.63 2018    HGB 12.9 2019    HCT 38.2 2019    MCV 87.7 04/30/2019    RDW 19.8 04/30/2019     04/30/2019       CMP:   Lab Results   Component Value Date     04/30/2019    K 3.8 04/30/2019    CL 94 04/30/2019    CO2 32 04/30/2019    BUN 21 04/30/2019    CREATININE 0.74 04/30/2019    GFRAA >60.0 04/30/2019    AGRATIO 0.9 07/24/2018    LABGLOM >60.0 04/30/2019    GLUCOSE 100 04/30/2019    GLUCOSE 171 07/25/2018    PROT 8.5 04/30/2019    CALCIUM 9.7 04/30/2019    BILITOT <0.2 04/30/2019    ALKPHOS 91 04/30/2019    AST 11 04/30/2019    ALT 11 04/30/2019       POC Tests:   Recent Labs     05/13/19  0703   POCGLU 146*       Coags:   Lab Results   Component Value Date    PROTIME 12.1 07/24/2018    INR 1.09 07/24/2018       HCG (If Applicable): No results found for: PREGTESTUR, PREGSERUM, HCG, HCGQUANT     ABGs:   Lab Results   Component Value Date    PHART 7.46 07/24/2018    PO2ART 44 07/24/2018    XPP5NPE 54 07/24/2018    HVH6YUJ 38 07/24/2018    BEART 12 07/24/2018    S4PVVAOS 85 07/24/2018        Type & Screen (If Applicable):  No results found for: LABABO, LABRH    Anesthesia Evaluation    Airway: Mallampati: II  TM distance: >3 FB   Neck ROM: full  Mouth opening: > = 3 FB Dental:    (+) upper dentures and lower dentures      Pulmonary: breath sounds clear to auscultation  (+) COPD: mild,  asthma:                           ROS comment: Former smoker quit 10 months ago   Cardiovascular:    (+) hypertension:,       ECG reviewed  Rhythm: regular    Echocardiogram reviewed                  Neuro/Psych:   (+) psychiatric history:            GI/Hepatic/Renal:   (+) morbid obesity          Endo/Other:    (+) DiabetesType II DM, well controlled, , hypothyroidism::., .                 Abdominal:           Vascular: negative vascular ROS. Anesthesia Plan      general and regional     ASA 3     (US guided TAP BLOCK)  Induction: intravenous. MIPS: Postoperative opioids intended and Prophylactic antiemetics administered.   Anesthetic plan and risks discussed with patient. Use of blood products discussed with patient whom consented to blood products. Plan discussed with CRNA.     Attending anesthesiologist reviewed and agrees with Pre Eval content              Davon Garza MD   5/13/2019

## 2019-05-13 NOTE — ANESTHESIA PROCEDURE NOTES
Peripheral Block    Patient location during procedure: OR  Start time: 5/13/2019 9:58 AM  End time: 5/13/2019 10:05 AM  Staffing  Anesthesiologist: Cyrus Rizzo MD  Performed: anesthesiologist   Preanesthetic Checklist  Completed: patient identified, site marked, surgical consent, pre-op evaluation, timeout performed, IV checked, risks and benefits discussed, monitors and equipment checked, anesthesia consent given, oxygen available and patient being monitored  Peripheral Block  Patient position: supine  Prep: ChloraPrep  Patient monitoring: cardiac monitor, continuous pulse ox, frequent blood pressure checks and IV access  Block type: TAP  Laterality: bilateral  Injection technique: single-shot  Procedures: ultrasound guided and nerve stimulator  Local infiltration: bupivacaine  Infiltration strength: 0.25 %  Dose: 40 mL  Provider prep: mask and sterile gloves  Local infiltration: bupivacaine  Needle  Needle type: combined needle/nerve stimulator   Needle gauge: 21 G  Needle length: 10 cm  Needle localization: anatomical landmarks and ultrasound guidance  Assessment  Injection assessment: negative aspiration for heme, no paresthesia on injection and local visualized surrounding nerve on ultrasound  Paresthesia pain: immediately resolved  Slow fractionated injection: yes  Hemodynamics: stable  Additional Notes  Ultrasound image printed and saved in patient chart    Reason for block: post-op pain management and at surgeon's request

## 2019-05-13 NOTE — BRIEF OP NOTE
Department of General Surgery - Adult  Surgical Service colorectal surgery  Brief Operative Report      Pre-operative Diagnosis:  Cecal polyp    Post-operative Diagnosis:  Same    Procedure:  Laparoscopic-assisted right colectomy    Surgeon:  Arely Rapp     Assistant(s):  Linda    Anesthesia:  General endotrachial anesthesia/TAP block    Estimated blood loss:  200 ml    Blood Transfusion?:  No    Total Urine Output:  200     Drains:  None    Specimens:  Right colon    Implants:  None      Findings:  Large cecal polyp    Complications:  None    Condition:  stable    See dictated operative report for full details.

## 2019-05-13 NOTE — CONSULTS
Consult Note    Reason for Consult:  Post operative mgmt    Requesting Physician:  Hugh Womack MD    HISTORY OF PRESENT ILLNESS:    The patient is a 61 y.o. female w/hx breast and thyroid cancers, HTN, HLD, COPD w/chronic hypoxia, DMII, YARED who presents s/p R colectomy. Pt currently on PCA pump and denies CP, palp, SOB. Hospitalist service consulted for mgmt of underlying DMII, HTN, HLD, COPD. Pt currently on PCA pump and CO2 elevated to 57 intermittently. She remains lucent and conversation is coherent. Past Medical History:   Diagnosis Date    Acquired hypothyroidism 3/15/2016    Allergic rhinitis     pollen, dust ragweed, hay and straw     Anxiety     Asthma     Bipolar affect, depressed ([address]     Bipolar disorder ([address]     Breast cancer ([address]     Invasive ductal cancer right breast    Cancer ([address]     thyroid cancer     Chronic back pain     COPD (chronic obstructive pulmonary disease) ([address]     Depression     Diabetes mellitus ([address]     Emphysema of lung ([address]     Essential hypertension 3/15/2016    History of blood transfusion     2019    Hyperlipidemia     Hypothyroidism     Obesity     On home O2     3L NC at night    YARED (obstructive sleep apnea)     3 L NC    Osteoarthritis     Restless legs syndrome     Sleep apnea     Urinary incontinence        Past Surgical History:   Procedure Laterality Date    APPENDECTOMY      BREAST BIOPSY Right 2018     SECTION      CHOLECYSTECTOMY      COLONOSCOPY N/A 2019    COLONOSCOPY DIAGNOSTIC performed by Irvin Carpenter MD at [address] [place] [address] Right 2019    Simple mastectomy with sentinel node biopsy    TUBAL LIGATION         Prior to Admission medications    Medication Sig Start Date End Date Taking?  Authorizing Provider   GAEVANGELINA-N WITH FLAVOR PACK 420 g solution TAKE 4,000 ML BY MOUTH AS DIRECTED 19  Yes Historical Provider, MD   VORTIoxetine (TRINTELLIX) 10 MG TABS tablet Take 10 mg by mouth   Yes Historical Provider, MD   furosemide (LASIX) 40 MG tablet TAKE 1 TABLET BY MOUTH EVERY DAY 3/25/19  Yes Melisa Stevenson MD   albuterol sulfate HFA (VENTOLIN HFA) 108 (90 Base) MCG/ACT inhaler Inhale 2 puffs into the lungs every 6 hours as needed for Wheezing 2/14/19  Yes Melisa Stevenson MD   oxybutynin (DITROPAN-XL) 10 MG extended release tablet Take 10 mg by mouth daily   Yes Historical Provider, MD   simvastatin (ZOCOR) 40 MG tablet TAKE 1 TABLET BY MOUTH EVERY EVENING 1/30/19  Yes Melisa Stevenson MD   losartan (COZAAR) 25 MG tablet Take 1 tablet by mouth daily 1/30/19  Yes Melisa Stevenson MD   levothyroxine (SYNTHROID) 175 MCG tablet Take 1 tablet by mouth Daily 1/30/19  Yes Melisa Stevenson MD   potassium chloride (KLOR-CON M) 10 MEQ extended release tablet Take 1 tablet by mouth daily 1/30/19  Yes Melisa Stevenson MD   metFORMIN (GLUCOPHAGE XR) 500 MG extended release tablet Take 2 tablets by mouth daily (with breakfast) 1/30/19  Yes Melisa Stevenson MD   ARIPiprazole (ABILIFY) 10 MG tablet TAKE 1 TABLET BY MOUTH EVERY DAY IN THE EVENING AS DIRECTED 1/13/19  Yes Historical Provider, MD   vitamin D (CHOLECALCIFEROL) 1000 UNIT TABS tablet Take 1,000 Units by mouth daily   Yes Historical Provider, MD   anastrozole (ARIMIDEX) 1 MG tablet Take 1 mg by mouth daily   Yes Historical Provider, MD   ACCU-CHEK SMARTVIEW strip Test TID E11.8 9/12/18  Yes MARIETTA Pritchett - CNP   ipratropium-albuterol (DUONEB) 0.5-2.5 (3) MG/3ML SOLN nebulizer solution 3 mLs   Yes Historical Provider, MD   fluticasone-vilanterol (BREO ELLIPTA) 100-25 MCG/INH AEPB inhaler Inhale 1 puff into the lungs daily 8/9/18  Yes Melisa Stevenson MD   SOFT TOUCH LANCETS MISC Use tid as directed dx: E11.9 6/29/18  Yes Melisa Stevenson MD   DULoxetine (CYMBALTA) 60 MG extended release capsule Take 60 mg by mouth daily  2/17/17  Yes Historical Provider, MD   sodium chloride 0.9 % irrigation Irrigate with as directed for 1 dose. 3/14/19   Juaquin La DPM   sodium chloride 0.9 % irrigation Irrigate with as directed for 1 dose. 2/21/19   Juaquin La DPM   Multiple Vitamin (MULTIVITAMIN) tablet Take 1 tablet by mouth daily    Historical Provider, MD   Multiple Vitamins-Minerals (ICAPS AREDS 2 PO) Take 1 tablet by mouth daily    Historical Provider, MD   hydrOXYzine (VISTARIL) 25 MG capsule Take 25 mg by mouth 3 times daily as needed     Historical Provider, MD       Scheduled Meds:   sodium chloride flush  10 mL Intravenous 2 times per day    anastrozole  1 mg Oral Daily    ARIPiprazole  10 mg Oral Daily    DULoxetine  60 mg Oral Daily    mometasone-formoterol  2 puff Inhalation BID    furosemide  40 mg Oral Daily    levothyroxine  175 mcg Oral Daily    losartan  25 mg Oral Daily    sodium chloride flush  10 mL Intravenous 2 times per day    enoxaparin  40 mg Subcutaneous Daily    insulin lispro  0-6 Units Subcutaneous Q6H     Continuous Infusions:   lactated ringers 1,000 mL (05/13/19 0700)    sodium chloride 100 mL/hr at 05/13/19 1442    HYDROmorphone      dextrose       PRN Meds:sodium chloride flush, fentanNYL, HYDROmorphone, HYDROcodone 5 mg - acetaminophen **OR** HYDROcodone 5 mg - acetaminophen, diphenhydrAMINE, ondansetron, metoclopramide, meperidine, albuterol sulfate HFA, sodium chloride flush, ondansetron, naloxone, glucose, dextrose, glucagon (rDNA), dextrose    Allergies   Allergen Reactions    Latex Itching and Rash    Sulfa Antibiotics Hives    Bactrim [Sulfamethoxazole-Trimethoprim] Hives and Other (See Comments)     Metallic taste    Nicotine Hives     Hives from the patch       Social History     Socioeconomic History    Marital status:       Spouse name: Not on file    Number of children: Not on file    Years of education: Not on file    Highest education level: Not on file   Occupational History    Not on file   Social Needs    Financial resource strain: Not on file   [place] insecurity:     Worry: Not on file     Inability: Not on file    Transportation needs:     Medical: Not on file     Non-medical: Not on file   Tobacco Use    Smoking status: Former Smoker     Packs/day: 1.00     Years: 40.00     Pack years: 40.00     Types: Cigarettes     Start date: 3/8/1976     Last attempt to quit: 2018     Years since quittin.8    Smokeless tobacco: Never Used   Substance and Sexual Activity    Alcohol use: No     Alcohol/week: 0.0 oz    Drug use: No    Sexual activity: Never   Lifestyle    Physical activity:     Days per week: Not on file     Minutes per session: Not on file    Stress: Not on file   Relationships    Social connections:     Talks on phone: Not on file     Gets together: Not on file     Attends Anabaptism service: Not on file     Active member of club or organization: Not on file     Attends meetings of clubs or organizations: Not on file     Relationship status: Not on file    Intimate partner violence:     Fear of current or ex partner: Not on file     Emotionally abused: Not on file     Physically abused: Not on file     Forced sexual activity: Not on file   Other Topics Concern    Not on file   Social History Narrative    Not on file       Family History   Problem Relation Age of Onset    Diabetes Father     Heart Disease Father     Thyroid Cancer Sister     Thyroid Cancer Brother     Thyroid Cancer Sister     No Known Problems Daughter        Review Of Systems:   CONSTITUTIONAL:  negative  HEENT:  negative  RESPIRATORY:  negative  CARDIOVASCULAR:  negative  GASTROINTESTINAL:  positive for abdominal distention and abd pain  MUSCULOSKELETAL:  negative  NEUROLOGICAL:  Negative  SKIN:  Chronic wounds to R leg draining  BEHAVIOR/PSYCH:  negative  ROS as noted in HPI, 12 point ROS reviewed and otherwise negative.   Physical Exam:  Vitals:    19 1135 19 1140 19 1202 19 1514   BP: (!) 114/56 (!) 115/56 (!) 103/56 (!) 124/54   Pulse: 65 66 64 72   Resp: 20 20 14 13   Temp: 97.1 °F (36.2 °C) 97.2 °F (36.2 °C) 97.9 °F (36.6 °C)    TempSrc:  Bladder Oral    SpO2: 93% 94% 94% 94%   Weight:       Height:           CONSTITUTIONAL:  alert and mild distress  EYES:  pupils equal, round and reactive to light, sclera clear and conjunctiva normal  ENT:  normocepalic, without obvious abnormality, atraumatic, good dentition, dry mucosa  NECK:  supple, symmetrical, trachea midline, skin normal and no carotid bruits  LUNGS:  no increased work of breathing and clear to auscultation  CARDIOVASCULAR:  normal apical pulses and normal S1 and S2  ABDOMEN:  absent bowel sounds and tenderness noted diffusely  MUSCULOSKELETAL:  1-2+ BLE edema, + distal pulses  NEUROLOGIC:  Mental Status Exam:  Level of Alertness:   awake  Orientation:   person, place, time  Cranial Nerves:  cranial nerves II-XII are grossly intact  SKIN:  Chronic wounds to RLE w/sanguanous drainage and mild erythema    Labs:  Recent Results (from the past 72 hour(s))   POCT Glucose    Collection Time: 05/13/19  7:03 AM   Result Value Ref Range    POC Glucose 146 (H) 60 - 115 mg/dl    Performed on ACCU-CHEK    POCT Glucose    Collection Time: 05/13/19 10:30 AM   Result Value Ref Range    POC Glucose 180 (H) 60 - 115 mg/dl    Performed on ACCU-CHEK        Data:    No results found. Assessment/Plan:  1. S/p R colectomy  2. Hypercapnea  3. COPD w/o exac w/chronic hypoxia  4. HTN  5. HLD  6. DMII  7.  YARED       Prn analgesia, dvt ppx per attending  q6h glucose checks w/SSI  Resume home meds per [facility]  Monitor CO2 closely, consider prn Bipap  Neuro checks q4h  Wound care consult  Cbc, bmp, Mg in am      Electronically signed by Lorena Esquivel PA-C on 5/13/19 at 3:37 PM    Dr. Thomas Goodwin MD - supervising physician

## 2019-05-13 NOTE — PROGRESS NOTES
[facility] Respiratory Therapy Evaluation   Current Order:  Albuterol mdi q6 prn      Home Regimen: yes      Ordering Physician: pina william  Re-evaluation Date:  n/a     Diagnosis: post op  dysplatic colon     Patient Status: Stable / Unstable + Physician notified    The following MDI Criteria must be met in order to convert aerosol to MDI with spacer.  If unable to meet, MDI will be converted to aerosol:  []  Patient able to demonstrate the ability to use MDI effectively  []  Patient alert and cooperative  []  Patient able to take deep breath with 5-10 second hold  []  Medication(s) available in this delivery method   []  Peak flow greater than or equal to 200 ml/min            Current Order Substituted To  (same drug, same frequency)   Aerosol to MDI [] Albuterol Sulfate 0.083% unit dose by aerosol Albuterol Sulfate MDI 2 puffs by inhalation with spacer    [] Levalbuterol 1.25 mg unit dose by aerosol Levalbuterol MDI 2 puffs by inhalation with spacer    [] Levalbuterol 0.63 mg unit dose by aerosol Levalbuterol MDI 2 puffs by inhalation with spacer    [] Ipratropium Bromide 0.02% unit dose by aerosol Ipratropium Bromide MDI 2 puffs by inhalation with spacer    [] Duoneb (Ipratropium + Albuterol) unit dose by aerosol Ipratropium MDI + Albuterol MDI 2 puffs by inhalation w/spacer   MDI to Aerosol [] Albuterol Sulfate MDI Albuterol Sulfate 0.083% unit dose by aerosol    [] Levalbuterol MDI 2 puffs by inhalation Levalbuterol 1.25 mg unit dose by aerosol    [] Ipratropium Bromide MDI by inhalation Ipratropium Bromide 0.02% unit dose by aerosol    [] Combivent (Ipratropium + Albuterol) MDI by inhalation Duoneb (Ipratropium + Albuterol) unit dose by aerosol   Treatment Assessment [Frequency/Schedule]:  Change frequency to: __________albuterol mdi q6 prn________________________________________per Protocol, P&T, MEC      Points 0 1 2 3 4   Pulmonary Status  Non-Smoker  []   Smoking history   < 20 pack years  []   Smoking history  ?  20 pack years  []   Pulmonary Disorder  (acute or chronic)  [x]   Severe or Chronic w/ Exacerbation  []     Surgical Status No []   Surgeries     General [x]   Surgery Lower []   Abdominal Thoracic or []   Upper Abdominal Thoracic with  PulmonaryDisorder  []     Chest X-ray Clear/Not  Ordered     [x]  Chronic Changes  Results Pending  []  Infiltrates, atelectasis, pleural effusion, or edema  []  Infiltrates in more than one lobe []  Infiltrate + Atelectasis, &/or pleural effusion  []    Respiratory Pattern Regular,  RR = 12-20 [x]  Increased,  RR = 21-25 []  MORRIS, irregular,  or RR = 26-30 []  Decreased FEV1  or RR = 31-35 []  Severe SOB, use  of accessory muscles, or RR ? 35  []    Mental Status Alert, oriented,  Cooperative [x]  Confused but Follows commands []  Lethargic or unable to follow commands []  Obtunded  []  Comatose  []    Breath Sounds Clear to  auscultation  [x]  Decreased unilaterally or  in bases only []  Decreased  bilaterally  []  Crackles or intermittent wheezes []  Wheezes []    Cough Strong, Spontan., & nonproductive [x]  Strong,  spontaneous, &  productive []  Weak,  Nonproductive []  Weak, productive or  with wheezes []  No spontaneous  cough or may require suctioning []    Level of Activity Ambulatory []  Ambulatory w/ Assist  [x]  Non-ambulatory []  Paraplegic []  Quadriplegic []    Total    Score:_____5__     Triage Score:_5_______      Tri       Triage:     1. (>20) Freq: Q3    2. (16-20) Freq: Q4   3. (11-15) Freq: QID & Albuterol Q2 PRN    4. (6-10) Freq: TID & Albuterol Q2 PRN    5. (0-5) Freq Q4prn

## 2019-05-13 NOTE — OP NOTE
[address]                      [address] [facility], [address] [facility]                                OPERATIVE REPORT    PATIENT NAME: Valentino Leventhal                 :        1955  MED REC NO:   [id number]                            ROOM:       C266  ACCOUNT NO:   [de-identified]                           ADMIT DATE: 2019  PROVIDER:     Ricki Carey MD    DATE OF PROCEDURE:  2019    PREOPERATIVE DIAGNOSIS:  Dysplastic right colon polyp. POSTOPERATIVE DIAGNOSIS:  Dysplastic right colon polyp. PROCEDURE PERFORMED:  Laparoscopic-assisted right colectomy. SURGEON:  Ricki Carey MD.    ANESTHESIA:  General endotracheal anesthesia/TAP block. ESTIMATED BLOOD LOSS:  200 mL. SPECIMEN:  Right colon. COMPLICATIONS:  None. INDICATIONS:  A 24-year-old female who had a colonoscopy with multiple  polyps removed. She had a large cecal polyp, unable to be retrieved  endoscopically. I saw her in the office and discussed the risks and  benefits of laparoscopic-assisted right colectomy. Risks of the  procedure including infection, bleeding, anastomotic leak, reoperation,  along with other complications such as arrhythmia, heart attack, stroke  and potential perioperative death were all described. Despite these  risks, she wished to proceed. Consent obtained. OPERATIVE PROCEDURE:  She was taken to the operating room, placed in the  supine position. General endotracheal anesthesia was administered. Hyman catheter and  orogastric tube were placed. Time-out was taken for appropriate  verification. A supraumbilical 36-QB incision was made. An optical port was placed  and pneumoperitoneum was established. Right and left lower 5-mm ports  were placed under direct visualization. For the next hour, I did a lysis of adhesions from her previous hiatal  hernia repair as well as adhesions from the lower abdomen.   There were  taken down with a combination of sharp dissection along with  electrocautery. All the intestine was free from any injury. After I  took down all the adhesions, had afforded me the ability to identify the  right colon without problems. A separate 5-mm epigastric port was placed under direct visualization. The hepatic flexure was taken down using a combination of scissors and  electrocautery. I was able to identify the cecum and bring it up into  the surgical field and take down the line of Toldt with electrocautery  to afford the cecum to be up in the left upper quadrant. After approximately an hour of dissection, I elected to proceed with  extracorporealization of the right colon which was done without  problems. The distal ileum was transected using a 75-mm NINO stapler. The mid transverse colon was transected in a similar fashion. The Enseal device was used to transect the mesentery in a high fashion. The ileocolic vessels were doubly ligated with 0 Vicryl. The specimen  was taken off the field, and on the back table, the polyp was  identified. It was sent to Pathology in formalin for permanent section. Excellent hemostasis was achieved and assured at this time. A  side-to-side ileocolostomy was performed with a 75-mm NINO stapler,  followed by a 60-mm TA stapler to close the anastomosis. 3-0 silk  sutures were used at the anastomotic crotch for reinforcement. The  mesenteric defect was closed using a running 2-0 Vicryl suture. The small bowel was run from the anastomosis to the ligament of Treitz. No bowel injuries were seen. There was no twisting of the mesentery. The omentum was draped back over the intestine after excellent  hemostasis was assured. The lap, needle, and instrument counts were all  correct prior to closure. The fascia was closed with a combination of 0 Vicryl and 0 Prolene  suture. The subcutaneous tissues were irrigated with saline. The skin  was closed with staples.   The port site were closed with staples as  well. A combination of Band-Aids with sterile dressings were placed. Following closure, lap, needle, and instrument counts were all again  correct. The patient was extubated and taken to recovery for postop monitoring.       Rogerio Rush MD    D: 05/13/2019 10:22:47       T: 05/13/2019 10:26:17     [id number]  Job#: [id number]     Doc#: [id number]    CC:

## 2019-05-14 LAB
ANION GAP SERPL CALCULATED.3IONS-SCNC: 10 MEQ/L (ref 9–15)
BUN BLDV-MCNC: 9 MG/DL (ref 8–23)
CALCIUM SERPL-MCNC: 8.7 MG/DL (ref 8.5–9.9)
CHLORIDE BLD-SCNC: 102 MEQ/L (ref 95–107)
CO2: 30 MEQ/L (ref 20–31)
CREAT SERPL-MCNC: 0.62 MG/DL (ref 0.5–0.9)
GFR AFRICAN AMERICAN: >60
GFR NON-AFRICAN AMERICAN: >60
GLUCOSE BLD-MCNC: 133 MG/DL (ref 60–115)
GLUCOSE BLD-MCNC: 133 MG/DL (ref 60–115)
GLUCOSE BLD-MCNC: 135 MG/DL (ref 60–115)
GLUCOSE BLD-MCNC: 136 MG/DL (ref 60–115)
GLUCOSE BLD-MCNC: 138 MG/DL (ref 70–99)
GLUCOSE BLD-MCNC: 146 MG/DL (ref 60–115)
GLUCOSE BLD-MCNC: 147 MG/DL (ref 60–115)
HCT VFR BLD CALC: 31.4 % (ref 37–47)
HEMOGLOBIN: 10.1 G/DL (ref 12–16)
MCH RBC QN AUTO: 28.1 PG (ref 27–31.3)
MCHC RBC AUTO-ENTMCNC: 32.2 % (ref 33–37)
MCV RBC AUTO: 87.4 FL (ref 82–100)
PDW BLD-RTO: 18.4 % (ref 11.5–14.5)
PERFORMED ON: ABNORMAL
PLATELET # BLD: 172 K/UL (ref 130–400)
POTASSIUM SERPL-SCNC: 3.8 MEQ/L (ref 3.4–4.9)
RBC # BLD: 3.6 M/UL (ref 4.2–5.4)
SODIUM BLD-SCNC: 142 MEQ/L (ref 135–144)
WBC # BLD: 8.8 K/UL (ref 4.8–10.8)

## 2019-05-14 PROCEDURE — 80048 BASIC METABOLIC PNL TOTAL CA: CPT

## 2019-05-14 PROCEDURE — 94770 HC ETCO2 MONITOR DAILY: CPT

## 2019-05-14 PROCEDURE — 6370000000 HC RX 637 (ALT 250 FOR IP): Performed by: PHYSICIAN ASSISTANT

## 2019-05-14 PROCEDURE — 2580000003 HC RX 258: Performed by: COLON & RECTAL SURGERY

## 2019-05-14 PROCEDURE — 6370000000 HC RX 637 (ALT 250 FOR IP): Performed by: COLON & RECTAL SURGERY

## 2019-05-14 PROCEDURE — 94640 AIRWAY INHALATION TREATMENT: CPT

## 2019-05-14 PROCEDURE — 1210000000 HC MED SURG R&B

## 2019-05-14 PROCEDURE — 36415 COLL VENOUS BLD VENIPUNCTURE: CPT

## 2019-05-14 PROCEDURE — 85027 COMPLETE CBC AUTOMATED: CPT

## 2019-05-14 PROCEDURE — 99213 OFFICE O/P EST LOW 20 MIN: CPT

## 2019-05-14 PROCEDURE — 2700000000 HC OXYGEN THERAPY PER DAY

## 2019-05-14 PROCEDURE — 99024 POSTOP FOLLOW-UP VISIT: CPT | Performed by: COLON & RECTAL SURGERY

## 2019-05-14 PROCEDURE — 6360000002 HC RX W HCPCS: Performed by: COLON & RECTAL SURGERY

## 2019-05-14 RX ORDER — KETOROLAC TROMETHAMINE 30 MG/ML
30 INJECTION, SOLUTION INTRAMUSCULAR; INTRAVENOUS EVERY 6 HOURS PRN
Status: DISPENSED | OUTPATIENT
Start: 2019-05-14 | End: 2019-05-16

## 2019-05-14 RX ADMIN — Medication 10 ML: at 08:04

## 2019-05-14 RX ADMIN — MOMETASONE FUROATE AND FORMOTEROL FUMARATE DIHYDRATE 2 PUFF: 200; 5 AEROSOL RESPIRATORY (INHALATION) at 07:40

## 2019-05-14 RX ADMIN — LOSARTAN POTASSIUM 25 MG: 25 TABLET ORAL at 08:02

## 2019-05-14 RX ADMIN — LEVOTHYROXINE SODIUM 175 MCG: 175 TABLET ORAL at 05:42

## 2019-05-14 RX ADMIN — KETOROLAC TROMETHAMINE 30 MG: 30 INJECTION, SOLUTION INTRAMUSCULAR; INTRAVENOUS at 16:19

## 2019-05-14 RX ADMIN — ENOXAPARIN SODIUM 40 MG: 40 INJECTION SUBCUTANEOUS at 19:59

## 2019-05-14 RX ADMIN — MOMETASONE FUROATE AND FORMOTEROL FUMARATE DIHYDRATE 2 PUFF: 200; 5 AEROSOL RESPIRATORY (INHALATION) at 19:41

## 2019-05-14 RX ADMIN — DULOXETINE HYDROCHLORIDE 60 MG: 60 CAPSULE, DELAYED RELEASE ORAL at 08:02

## 2019-05-14 RX ADMIN — INSULIN LISPRO 1 UNITS: 100 INJECTION, SOLUTION INTRAVENOUS; SUBCUTANEOUS at 18:37

## 2019-05-14 RX ADMIN — ALBUTEROL SULFATE 2 PUFF: 90 AEROSOL, METERED RESPIRATORY (INHALATION) at 07:40

## 2019-05-14 RX ADMIN — ARIPIPRAZOLE 10 MG: 5 TABLET ORAL at 08:02

## 2019-05-14 RX ADMIN — ALBUTEROL SULFATE 2 PUFF: 90 AEROSOL, METERED RESPIRATORY (INHALATION) at 19:42

## 2019-05-14 RX ADMIN — SODIUM CHLORIDE, POTASSIUM CHLORIDE, SODIUM LACTATE AND CALCIUM CHLORIDE: 600; 310; 30; 20 INJECTION, SOLUTION INTRAVENOUS at 05:42

## 2019-05-14 RX ADMIN — ANASTROZOLE 1 MG: 1 TABLET, COATED ORAL at 11:53

## 2019-05-14 RX ADMIN — Medication 10 ML: at 11:55

## 2019-05-14 RX ADMIN — FUROSEMIDE 40 MG: 40 TABLET ORAL at 08:02

## 2019-05-14 ASSESSMENT — PAIN SCALES - GENERAL
PAINLEVEL_OUTOF10: 2
PAINLEVEL_OUTOF10: 4

## 2019-05-14 ASSESSMENT — PAIN DESCRIPTION - LOCATION: LOCATION: ABDOMEN

## 2019-05-14 ASSESSMENT — PAIN DESCRIPTION - PAIN TYPE: TYPE: ACUTE PAIN
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
## 2019-05-14 NOTE — PROGRESS NOTES
Hospitalist Progress Note      Date of Admission: 5/13/2019  Chief Complaint:    No chief complaint on file. Subjective:  No new complaints. No nausea, vomiting, chest pain, or headache      Medications:    Infusion Medications    lactated ringers 125 mL/hr at 05/14/19 0542    sodium chloride 50 mL/hr at 05/14/19 1154    HYDROmorphone      dextrose       Scheduled Medications    sodium chloride flush  10 mL Intravenous 2 times per day    anastrozole  1 mg Oral Daily    ARIPiprazole  10 mg Oral Daily    DULoxetine  60 mg Oral Daily    mometasone-formoterol  2 puff Inhalation BID    furosemide  40 mg Oral Daily    levothyroxine  175 mcg Oral Daily    losartan  25 mg Oral Daily    sodium chloride flush  10 mL Intravenous 2 times per day    enoxaparin  40 mg Subcutaneous Daily    insulin lispro  0-6 Units Subcutaneous Q6H     PRN Meds: ketorolac, sodium chloride flush, fentanNYL, HYDROmorphone, meperidine, albuterol sulfate HFA, sodium chloride flush, ondansetron, naloxone, glucose, dextrose, glucagon (rDNA), dextrose    Intake/Output Summary (Last 24 hours) at 5/14/2019 1604  Last data filed at 5/14/2019 0825  Gross per 24 hour   Intake 1527 ml   Output 1850 ml   Net -323 ml     Exam:  BP (!) 119/47   Pulse 76   Temp 98.1 °F (36.7 °C) (Oral)   Resp 22   Ht 5' 6.5\" (1.689 m)   Wt (!) 313 lb (142 kg)   SpO2 96%   BMI 49.76 kg/m²   Head: Normocephalic, atraumatic  Sclera clear  Neck supple, nontender  Lungs: clear    Labs:   Recent Labs     05/14/19  0646   WBC 8.8   HGB 10.1*   HCT 31.4*        Recent Labs     05/14/19  0646      K 3.8      CO2 30   BUN 9   CREATININE 0.62   CALCIUM 8.7     No results for input(s): INR in the last 72 hours. No results for input(s): Elsa Ka in the last 72 hours. Radiology:  No orders to display     Assessment/Plan:    Htn: stable. Monitoring BP    DM: monitor sugars, stable at time time.      Hypothyroid: continue home meds    35

## 2019-05-14 NOTE — PROGRESS NOTES
Pt awake and alert  Vital signs WNL  Dressing C/D/I   Pt states pain is well controlled so far with PCA  3 of 10 reported  Pt educated on splinting to cough  Incentive spirometer use taught and encouraged  Pt compliant

## 2019-05-14 NOTE — PROGRESS NOTES
Pt Name: Elmo Pierce Record Number: [id number]  Date of Birth 1955   Admit date 5/13/2019  5:39 AM  Today's Date: 5/14/2019     ASSESSMENT  1. Hospital day # 1  2  postop day #1 right colectomy    PLAN  1.  DC Hyman  2. Clear liquids  3. Continue PCA/Toradol for today  4. Change Unaboot per wound care  5. Decrease IV fluids    SUBJECTIVE  Chief complaint: Follow-up colectomy  Afebrile, vital signs are stable. She denies any nausea or vomiting, has not passed flatus or had a bowel movement. She is tolerating a DIET CLEAR LIQUID;. Her pain is well controlled on current medications. She has been ambulating in the room. has a past medical history of Acquired hypothyroidism, Allergic rhinitis, Anxiety, Asthma, Bipolar affect, depressed ([address] Bipolar disorder ([address] Breast cancer ([address] Cancer ([address] Chronic back pain, COPD (chronic obstructive pulmonary disease) ([address] Depression, Diabetes mellitus ([address] Emphysema of lung ([address] Essential hypertension, History of blood transfusion, Hyperlipidemia, Hypothyroidism, Obesity, On home O2, YARED (obstructive sleep apnea), Osteoarthritis, Restless legs syndrome, Sleep apnea, and Urinary incontinence.     CURRENT MEDS  Scheduled Meds:   sodium chloride flush  10 mL Intravenous 2 times per day    anastrozole  1 mg Oral Daily    ARIPiprazole  10 mg Oral Daily    DULoxetine  60 mg Oral Daily    mometasone-formoterol  2 puff Inhalation BID    furosemide  40 mg Oral Daily    levothyroxine  175 mcg Oral Daily    losartan  25 mg Oral Daily    sodium chloride flush  10 mL Intravenous 2 times per day    enoxaparin  40 mg Subcutaneous Daily    insulin lispro  0-6 Units Subcutaneous Q6H     Continuous Infusions:   lactated ringers 125 mL/hr at 05/14/19 0542    sodium chloride 100 mL/hr at 05/13/19 1442    HYDROmorphone      dextrose       PRN Meds:.ketorolac, sodium chloride flush, fentanNYL, HYDROmorphone, meperidine, albuterol sulfate HFA, sodium chloride flush, ondansetron, naloxone, glucose, dextrose, glucagon (rDNA), dextrose    OBJECTIVE  CURRENT VITALS:  height is 5' 6.5\" (1.689 m) and weight is 313 lb (142 kg) (abnormal). Her oral temperature is 98.1 °F (36.7 °C). Her blood pressure is 119/47 (abnormal) and her pulse is 76. Her respiration is 20 and oxygen saturation is 96%. Temperature Range (24h):Temp: 98.1 °F (36.7 °C) Temp  Av.2 °F (36.8 °C)  Min: 97.9 °F (36.6 °C)  Max: 98.7 °F (37.1 °C)  BP Range (89H): Systolic (70WHT), SSS:899 , Min:103 , TQK:415     Diastolic (05OLF), EZW:95, Min:35, Max:64    Pulse Range (24h): Pulse  Av.9  Min: 64  Max: 78  Respiration Range (24h): Resp  Avg: 15.4  Min: 13  Max: 21    GENERAL: alert, no distress  LUNGS: clear to ausculation, without wheezes, rales or rhonci  HEART: normal rate and regular rhythm  ABDOMEN: soft, non-tender, non-distended, dressings dry, bowel sounds present in all 4 quadrants and no guarding or peritoneal signs  EXTERMITY: no edema  In: 1527 [P.O.:60; I.V.:1467]  Out: 1850 [Urine:1850]  Date 19 - 19 235   Shift 9474-2169 8850-4089 5925-5369 24 Hour Total   INTAKE   P.O.(mL/kg/hr) 60(0.1)   60   I. V.(mL/kg) 912(6.4)   912(6.4)   Shift Total(mL/kg) 972(6.8)   972(6.8)   OUTPUT   Urine(mL/kg/hr) 1000(0.9) 600  1600   Emesis/NG output(mL/kg) 0(0)   0(0)   Other(mL/kg) 0(0)   0(0)   Stool(mL/kg) 0(0)   0(0)   Blood(mL/kg) 0(0)   0(0)   Shift Total(mL/kg) 1000(7) 600(4.2)  1600(11.3)   Weight (kg) 142 142 142 142       LABS  Recent Labs     19  0646   WBC 8.8   HGB 10.1*   HCT 31.4*         K 3.8      CO2 30   BUN 9   CREATININE 0.62   CALCIUM 8.7      No results for input(s): PTT, INR in the last 72 hours. Invalid input(s): PT  No results for input(s): AST, ALT, BILITOT, BILIDIR, AMYLASE, LIPASE, LDH, LACTA in the last 72 hours.     RADIOLOGY  Ct Abdomen Pelvis W Iv Contrast Additional Contrast? Radiologist Recommendation    Result Date: 5/6/2019  CT of the Abdomen and Pelvis with intravenous contrast medium History:  Colonic mass on colonoscopy. History of right mastectomy for invasive ductal carcinoma. Technical Factors: CT imaging of the abdomen and pelvis were obtained and formatted as 5 mm contiguous axial images from the domes of the diaphragm to the symphysis pubis. Sagittal and coronal reconstructions were also obtained. Oral contrast medium:  Barium sulfate, 450 mL. Intravenous contrast medium:  Isovue-300, 100 mL. Comparison:  None. Findings: Lungs:  Lung bases are clear. Liver:  Normal in size, shape, and attenuation. Bile Ducts:  Normal in caliber. Gallbladder:  Surgically absent. Pancreas:  Normal without masses, cysts, ductal dilatation or calcification. Spleen:  Normal in size without masses or calcifications. No splenules. Kidneys:  Normal in size and enhancement. No hydronephrosis, masses, or stones. Adrenals:  Normal. Small bowel:  Normal in caliber. Appendix:  Surgically absent. Colon:  Normal in caliber. Peritoneum:  No ascites, free air, or fluid collections. Vessels: Aorta normal in course and caliber. Portal vein, splenic vein, superior mesenteric vein are patent. Lymph nodes:  Retroperitoneal:  No enlarged retroperitoneal lymph nodes. Mesenteric:  No enlarged mesenteric lymph nodes. Pelvic: No enlarged pelvic lymph nodes. Ureters: Normal in course and caliber. No calcifications. Bladder: No wall thickening. Reproductive organs: No pelvic masses. Abdominal Wall:  No hernia identified. Bones:  No bone lesions. Diffuse disc space narrowing lumbar spine. Vacuum disc, L4-5, L5-S1. 2 mm anterolisthesis of L3 on L4. Small anterior osteophytes lumbar spine. No post operative changes. No colonic mass identified. Grade 1 L3 spondylolisthesis. Appendectomy. Cholecystectomy.  All CT scans at this facility use dose modulation, iterative reconstruction, and/or weight based dosing when appropriate to reduce radiation dose to as low as reasonably achievable.      Electronically signed by Mimi Sweet MD on 5/14/2019 at 11:41 AM

## 2019-05-14 NOTE — PROGRESS NOTES
BREAST BIOPSY Right 2018     SECTION      CHOLECYSTECTOMY      COLONOSCOPY N/A 2019    COLONOSCOPY DIAGNOSTIC performed by Jaden Sherwood MD at [address] [place] [address] Street Right 2019    Simple mastectomy with sentinel node biopsy    TUBAL LIGATION         FAMILY HISTORY    Family History   Problem Relation Age of Onset    Diabetes Father     Heart Disease Father     Thyroid Cancer Sister     Thyroid Cancer Brother     Thyroid Cancer Sister     No Known Problems Daughter        SOCIAL HISTORY    Social History     Tobacco Use    Smoking status: Former Smoker     Packs/day: 1.00     Years: 40.00     Pack years: 40.00     Types: Cigarettes     Start date: 3/8/1976     Last attempt to quit: 2018     Years since quittin.8    Smokeless tobacco: Never Used   Substance Use Topics    Alcohol use: No     Alcohol/week: 0.0 oz    Drug use: No       ALLERGIES    Allergies   Allergen Reactions    Latex Itching and Rash    Sulfa Antibiotics Hives    Bactrim [Sulfamethoxazole-Trimethoprim] Hives and Other (See Comments)     Metallic taste    Nicotine Hives     Hives from the patch       MEDICATIONS    No current facility-administered medications on file prior to encounter.       Current Outpatient Medications on File Prior to Encounter   Medication Sig Dispense Refill    furosemide (LASIX) 40 MG tablet TAKE 1 TABLET BY MOUTH EVERY DAY 90 tablet 1    albuterol sulfate HFA (VENTOLIN HFA) 108 (90 Base) MCG/ACT inhaler Inhale 2 puffs into the lungs every 6 hours as needed for Wheezing 1 Inhaler 5    oxybutynin (DITROPAN-XL) 10 MG extended release tablet Take 10 mg by mouth daily      simvastatin (ZOCOR) 40 MG tablet TAKE 1 TABLET BY MOUTH EVERY EVENING 90 tablet 1    losartan (COZAAR) 25 MG tablet Take 1 tablet by mouth daily 90 tablet 1    levothyroxine (SYNTHROID) 175 MCG tablet Take 1 tablet by mouth Daily 90 tablet 1    potassium chloride (KLOR-CON M) 10 MEQ extended release tablet Take 1 tablet by mouth daily 90 tablet 1    metFORMIN (GLUCOPHAGE XR) 500 MG extended release tablet Take 2 tablets by mouth daily (with breakfast) 180 tablet 1    ARIPiprazole (ABILIFY) 10 MG tablet TAKE 1 TABLET BY MOUTH EVERY DAY IN THE EVENING AS DIRECTED  3    vitamin D (CHOLECALCIFEROL) 1000 UNIT TABS tablet Take 1,000 Units by mouth daily      anastrozole (ARIMIDEX) 1 MG tablet Take 1 mg by mouth daily      ACCU-CHEK SMARTVIEW strip Test TID E11.8 300 each 3    ipratropium-albuterol (DUONEB) 0.5-2.5 (3) MG/3ML SOLN nebulizer solution 3 mLs      fluticasone-vilanterol (BREO ELLIPTA) 100-25 MCG/INH AEPB inhaler Inhale 1 puff into the lungs daily 28 each 5    SOFT TOUCH LANCETS MISC Use tid as directed dx: E11.9 300 each 3    DULoxetine (CYMBALTA) 60 MG extended release capsule Take 60 mg by mouth daily       sodium chloride 0.9 % irrigation Irrigate with as directed for 1 dose. 1000 mL 5    sodium chloride 0.9 % irrigation Irrigate with as directed for 1 dose.  1000 mL 5    Multiple Vitamin (MULTIVITAMIN) tablet Take 1 tablet by mouth daily      Multiple Vitamins-Minerals (ICAPS AREDS 2 PO) Take 1 tablet by mouth daily      hydrOXYzine (VISTARIL) 25 MG capsule Take 25 mg by mouth 3 times daily as needed          Objective    BP (!) 119/47   Pulse 76   Temp 98.1 °F (36.7 °C) (Oral)   Resp 20   Ht 5' 6.5\" (1.689 m)   Wt (!) 313 lb (142 kg)   SpO2 96%   BMI 49.76 kg/m²     LABS:  WBC:    Lab Results   Component Value Date    WBC 8.8 05/14/2019     H/H:    Lab Results   Component Value Date    HGB 10.1 05/14/2019    HCT 31.4 05/14/2019     PTT:  No results found for: APTT, PTT[APTT}  PT/INR:    Lab Results   Component Value Date    PROTIME 12.1 07/24/2018    INR 1.09 07/24/2018     HgBA1c:    Lab Results   Component Value Date    LABA1C 6.4 01/03/2019       Assessment   Guille Risk Score: Guille Scale Score: 19    Patient Active Problem List   Diagnosis    Type 2 diabetes mellitus with complication ([facility]    Acquired hypothyroidism    Essential hypertension    Mixed hyperlipidemia    COPD with chronic bronchitis (HCC)    Microalbuminuria    Moderate episode of recurrent major depressive disorder (HCC)    YARED (obstructive sleep apnea)    Restless leg syndrome    Vitamin B12 deficiency    Vitamin D deficiency    Left ventricular hypertrophy    Left ventricular diastolic dysfunction    Morbid obesity with BMI of 45.0-49.9, adult (HCC)    Breast carcinoma, female, right (HCC)    Iron deficiency anemia secondary to inadequate dietary iron intake    Breast cancer of lower-inner quadrant of right female breast (HCC)    Postoperative anemia    Malignant neoplasm of central portion of right breast in female, estrogen receptor positive (HCC)    Iron deficiency anemia due to chronic blood loss    Non-pressure chronic ulcer of other part of right lower leg with fat layer exposed ([facility]    Chronic venous insufficiency    Adenomatous polyp of ascending colon    Adenomatous polyp of sigmoid colon    Dysplastic colon polyp       Measurements:  Wound 02/05/19 Leg Lower;Right; Anterior #1  (Active)   Wound Image   4/18/2019  1:59 PM   Wound Venous 4/18/2019  1:59 PM   Dressing Status Dry; Intact; Old drainage 5/13/2019 12:52 PM   Dressing Changed Other (Comment) 5/13/2019  7:18 AM   Dressing/Treatment Other (comment) 5/13/2019  7:18 AM   Wound Cleansed Rinsed/Irrigated with saline 4/18/2019  1:59 PM   Wound Length (cm) 0.5 cm 4/18/2019  1:59 PM   Wound Width (cm) 0.3 cm 4/18/2019  1:59 PM   Wound Depth (cm) 0.1 cm 4/18/2019  1:59 PM   Wound Surface Area (cm^2) 0.15 cm^2 4/18/2019  1:59 PM   Change in Wound Size % (l*w) 99.82 4/18/2019  1:59 PM   Wound Volume (cm^3) 0.02 cm^3 4/18/2019  1:59 PM   Wound Healing % 100 4/18/2019  1:59 PM   Wound Assessment Edema; Red 5/13/2019  8:50 PM   Drainage Amount None 5/13/2019  8:50 PM   Drainage Description Serosanguinous 5/13/2019 12:52 PM Odor Mild 5/13/2019  7:18 AM   Tamara-wound Assessment Red 4/18/2019  1:59 PM   [place]%Wound Bed 100 4/18/2019  1:59 PM   Number of days: 97       Wound 04/18/19 Knee Left #2  (Active)   Wound Image   4/18/2019  1:59 PM   Wound Traumatic 5/13/2019  8:50 PM   Dressing Status Clean;Dry; Intact 5/13/2019 12:52 PM   Dressing/Treatment Antibacterial Ointment;Dry Dressing 4/18/2019  2:41 PM   Wound Cleansed Rinsed/Irrigated with saline 4/18/2019  1:59 PM   Wound Length (cm) 1.5 cm 4/18/2019  1:59 PM   Wound Width (cm) 1 cm 4/18/2019  1:59 PM   Wound Depth (cm) 0.1 cm 4/18/2019  1:59 PM   Wound Surface Area (cm^2) 1.5 cm^2 4/18/2019  1:59 PM   Wound Volume (cm^3) 0.15 cm^3 4/18/2019  1:59 PM   Drainage Amount None 5/13/2019  8:50 PM   Drainage Description Serosanguinous 4/18/2019  1:59 PM   Odor None 5/13/2019  8:50 PM   Margins Defined edges 4/18/2019  1:59 PM   Tamara-wound Assessment Dry 4/18/2019  1:59 PM   Non-staged Wound Description Full thickness 4/18/2019  1:59 PM   Red%Wound Bed 100 4/18/2019  1:59 PM   Number of days: 25       Wound 04/18/19 Leg Left; Lower #3 (Active)   Wound Image   4/18/2019  2:08 PM   Wound Traumatic 5/13/2019  8:50 PM   Dressing Status Dry 5/13/2019 12:52 PM   Dressing/Treatment Open to air 5/13/2019 12:52 PM   Wound Cleansed Rinsed/Irrigated with saline 4/18/2019  2:08 PM   Wound Length (cm) 1.2 cm 4/18/2019  2:08 PM   Wound Width (cm) 0.6 cm 4/18/2019  2:08 PM   Wound Depth (cm) 0.1 cm 4/18/2019  2:08 PM   Wound Surface Area (cm^2) 0.72 cm^2 4/18/2019  2:08 PM   Wound Volume (cm^3) 0.07 cm^3 4/18/2019  2:08 PM   Wound Assessment Dry;Red; Intact 5/13/2019  8:50 PM   Drainage Amount None 5/13/2019  8:50 PM   Drainage Description Serosanguinous 5/13/2019 12:52 PM   Odor None 5/13/2019  8:50 PM   Margins Defined edges 4/18/2019  2:08 PM   Tamara-wound Assessment Dry 4/18/2019  2:08 PM   Non-staged Wound Description Full thickness 4/18/2019  2:08 PM   Red%Wound Bed 100 4/18/2019  2:08 PM   Number of days: 25     Incision (Active)   Number of days:        Incision 05/13/19 Abdomen Right (Active)   Wound Assessment Clean;Dry; Intact 5/13/2019 12:52 PM   Drainage Amount None 5/13/2019  8:50 PM   Dressing/Treatment ABD 5/13/2019  8:50 PM   Dressing Status Clean;Dry; Intact 5/14/2019  8:10 AM   Number of days: 1       Plan   Plan of Care: Wound 02/05/19 Leg Lower;Right; Anterior #1 -Dressing/Treatment: Other (comment)  Incision 05/13/19 Abdomen Right-Dressing/Treatment: ABD  Wound 04/18/19 Leg Left; Lower #3-Dressing/Treatment: Open to air    Specialty Bed Required : N/A   [] Low Air Loss   [] Pressure Redistribution  [] Fluid Immersion  [] Bariatric  [] Other:     Current Diet: Diet NPO Effective Now Exceptions are: Sips with Meds  Dietician consult:  N/A    Discharge Plan:  Placement for patient upon discharge: home with support    Patient appropriate for Outpatient [address]: Yes    Referrals:  []   [] [address] [place] [address]  [] Supplies  [] Other    Patient/Caregiver Teaching:  Level of patient/caregiver understanding able to:   [] Indicates understanding       [] Needs reinforcement  [] Unsuccessful      [] Verbal Understanding  [] Demonstrated understanding       [] No evidence of learning  [] Refused teaching         [x] N/A       Electronically signed by JUANJO SalgadoN, RN, CWOCN on 5/14/2019 at 11:12 AM

## 2019-05-14 NOTE — FLOWSHEET NOTE
Patient is resting at present time; was up to side of bed for dangle for about 20 min. And also was up to [facility] to void. Tolerated well. Used the IS up 1500 ml. Abdominal dressing clean , dry and intacted. Dr Ailyn Plasencia visited and placed her on clear liquid diet. Hyman discontinued at 08:20 am and has already voided. Lungs clear and diminished. Has marcus. Lower leg edema with dry scabs with SCD maintained.

## 2019-05-15 LAB
GLUCOSE BLD-MCNC: 108 MG/DL (ref 60–115)
GLUCOSE BLD-MCNC: 123 MG/DL (ref 60–115)
GLUCOSE BLD-MCNC: 166 MG/DL (ref 60–115)
GLUCOSE BLD-MCNC: 190 MG/DL (ref 60–115)
PERFORMED ON: ABNORMAL
PERFORMED ON: NORMAL

## 2019-05-15 PROCEDURE — 94770 HC ETCO2 MONITOR DAILY: CPT

## 2019-05-15 PROCEDURE — 6370000000 HC RX 637 (ALT 250 FOR IP): Performed by: PHYSICIAN ASSISTANT

## 2019-05-15 PROCEDURE — 94640 AIRWAY INHALATION TREATMENT: CPT

## 2019-05-15 PROCEDURE — 2580000003 HC RX 258: Performed by: COLON & RECTAL SURGERY

## 2019-05-15 PROCEDURE — 1210000000 HC MED SURG R&B

## 2019-05-15 PROCEDURE — 6360000002 HC RX W HCPCS: Performed by: COLON & RECTAL SURGERY

## 2019-05-15 PROCEDURE — 2700000000 HC OXYGEN THERAPY PER DAY

## 2019-05-15 PROCEDURE — 99024 POSTOP FOLLOW-UP VISIT: CPT | Performed by: COLON & RECTAL SURGERY

## 2019-05-15 PROCEDURE — 6370000000 HC RX 637 (ALT 250 FOR IP): Performed by: COLON & RECTAL SURGERY

## 2019-05-15 RX ORDER — OXYCODONE HYDROCHLORIDE AND ACETAMINOPHEN 5; 325 MG/1; MG/1
1 TABLET ORAL EVERY 4 HOURS PRN
Status: DISCONTINUED | OUTPATIENT
Start: 2019-05-15 | End: 2019-05-20 | Stop reason: HOSPADM

## 2019-05-15 RX ADMIN — Medication 10 ML: at 22:00

## 2019-05-15 RX ADMIN — KETOROLAC TROMETHAMINE 30 MG: 30 INJECTION, SOLUTION INTRAMUSCULAR; INTRAVENOUS at 01:43

## 2019-05-15 RX ADMIN — ENOXAPARIN SODIUM 40 MG: 40 INJECTION SUBCUTANEOUS at 09:00

## 2019-05-15 RX ADMIN — MOMETASONE FUROATE AND FORMOTEROL FUMARATE DIHYDRATE 2 PUFF: 200; 5 AEROSOL RESPIRATORY (INHALATION) at 20:04

## 2019-05-15 RX ADMIN — KETOROLAC TROMETHAMINE 30 MG: 30 INJECTION, SOLUTION INTRAMUSCULAR; INTRAVENOUS at 18:24

## 2019-05-15 RX ADMIN — INSULIN LISPRO 1 UNITS: 100 INJECTION, SOLUTION INTRAVENOUS; SUBCUTANEOUS at 13:00

## 2019-05-15 RX ADMIN — ARIPIPRAZOLE 10 MG: 5 TABLET ORAL at 22:00

## 2019-05-15 RX ADMIN — FUROSEMIDE 40 MG: 40 TABLET ORAL at 09:00

## 2019-05-15 RX ADMIN — ANASTROZOLE 1 MG: 1 TABLET, COATED ORAL at 09:55

## 2019-05-15 RX ADMIN — MOMETASONE FUROATE AND FORMOTEROL FUMARATE DIHYDRATE 2 PUFF: 200; 5 AEROSOL RESPIRATORY (INHALATION) at 08:21

## 2019-05-15 RX ADMIN — SODIUM CHLORIDE: 9 INJECTION, SOLUTION INTRAVENOUS at 17:35

## 2019-05-15 RX ADMIN — ALBUTEROL SULFATE 2 PUFF: 90 AEROSOL, METERED RESPIRATORY (INHALATION) at 08:21

## 2019-05-15 RX ADMIN — LEVOTHYROXINE SODIUM 175 MCG: 175 TABLET ORAL at 06:24

## 2019-05-15 RX ADMIN — DULOXETINE HYDROCHLORIDE 60 MG: 60 CAPSULE, DELAYED RELEASE ORAL at 08:59

## 2019-05-15 ASSESSMENT — PAIN SCALES - GENERAL
PAINLEVEL_OUTOF10: 5
PAINLEVEL_OUTOF10: 4
PAINLEVEL_OUTOF10: 3
PAINLEVEL_OUTOF10: 6

## 2019-05-15 NOTE — PROGRESS NOTES
PCA pump dc'ed. Pt reports pain is well controlled. Her diet was advanced to a low fiber diet. Will monitor to see how pt tolerates. She is moving around better today. She gets up with 1 assist. She has been sitting up in chair for most of the afternoon.      Electronically signed by Brenda Bales RN on 5/15/2019 at 6:09 PM

## 2019-05-15 NOTE — PROGRESS NOTES
Assumed care at  , patient resting quietly in bed with eyes closed. Will continue to monitor call light with in reach.

## 2019-05-15 NOTE — FLOWSHEET NOTE
Patient was up in the chair for most of the day. At 19:00 PM  marcus lower leg dressing changed for small amt of dark red drainage from right lower leg; both leg cleansed with normal saline and unna boot applied , then ace wrapped. No silvercel was available in the hospital. Tolerated procedure well. Assisted back to bed. Voided in BSC. Resting in bed at present time. SCD applied. PCA pump with Dilaudid maintained as ordered.

## 2019-05-15 NOTE — PROGRESS NOTES
Pt is alert and oriented x4. Her pain is well controlled. She said she is not using PCA pump often and would like to have it discontinued. BS hypoactive. Not passing gas yet. But is tolerating clear liquid diet.  Denies n/v.     Electronically signed by Paul Avalos RN on 5/15/2019 at 11:00 AM

## 2019-05-15 NOTE — PROGRESS NOTES
Hospitalist Progress Note      Date of Admission: 5/13/2019  Chief Complaint:    No chief complaint on file. Subjective:  No new complaints. No nausea, vomiting, chest pain, or headache      Medications:    Infusion Medications    lactated ringers 125 mL/hr at 05/14/19 0542    sodium chloride 50 mL/hr at 05/14/19 1154    HYDROmorphone      dextrose       Scheduled Medications    sodium chloride flush  10 mL Intravenous 2 times per day    anastrozole  1 mg Oral Daily    ARIPiprazole  10 mg Oral Daily    DULoxetine  60 mg Oral Daily    mometasone-formoterol  2 puff Inhalation BID    furosemide  40 mg Oral Daily    levothyroxine  175 mcg Oral Daily    losartan  25 mg Oral Daily    sodium chloride flush  10 mL Intravenous 2 times per day    enoxaparin  40 mg Subcutaneous Daily    insulin lispro  0-6 Units Subcutaneous Q6H     PRN Meds: ketorolac, sodium chloride flush, fentanNYL, HYDROmorphone, meperidine, albuterol sulfate HFA, sodium chloride flush, ondansetron, naloxone, glucose, dextrose, glucagon (rDNA), dextrose    Intake/Output Summary (Last 24 hours) at 5/15/2019 1430  Last data filed at 5/15/2019 1264  Gross per 24 hour   Intake 2082 ml   Output 400 ml   Net 1682 ml     Exam:  BP (!) 100/43   Pulse 66   Temp 98.1 °F (36.7 °C) (Oral)   Resp 16   Ht 5' 6.5\" (1.689 m)   Wt (!) 313 lb (142 kg)   SpO2 99%   BMI 49.76 kg/m²   Head: Normocephalic, atraumatic  Sclera clear  Neck supple, nontender  Lungs: clear    Labs:   Recent Labs     05/14/19  0646   WBC 8.8   HGB 10.1*   HCT 31.4*        Recent Labs     05/14/19  0646      K 3.8      CO2 30   BUN 9   CREATININE 0.62   CALCIUM 8.7     No results for input(s): INR in the last 72 hours. No results for input(s): Jhon Relic in the last 72 hours. Radiology:  No orders to display     Assessment/Plan:    Htn: stable. Monitoring BP    DM: monitor sugars, stable at time time.      Hypothyroid: continue home meds    35 minutes total care time, >1/2 in unit/floor time and care coordination        Electronically signed by Jabier Ellis MD on 5/15/2019 at 2:30 PM

## 2019-05-16 ENCOUNTER — APPOINTMENT (OUTPATIENT)
Dept: GENERAL RADIOLOGY | Age: 64
DRG: 330 | End: 2019-05-16
Attending: COLON & RECTAL SURGERY
Payer: COMMERCIAL

## 2019-05-16 LAB
ANION GAP SERPL CALCULATED.3IONS-SCNC: 13 MEQ/L (ref 9–15)
BUN BLDV-MCNC: 13 MG/DL (ref 8–23)
CALCIUM SERPL-MCNC: 9.4 MG/DL (ref 8.5–9.9)
CHLORIDE BLD-SCNC: 92 MEQ/L (ref 95–107)
CO2: 31 MEQ/L (ref 20–31)
CREAT SERPL-MCNC: 0.67 MG/DL (ref 0.5–0.9)
GFR AFRICAN AMERICAN: >60
GFR NON-AFRICAN AMERICAN: >60
GLUCOSE BLD-MCNC: 127 MG/DL (ref 60–115)
GLUCOSE BLD-MCNC: 152 MG/DL (ref 60–115)
GLUCOSE BLD-MCNC: 174 MG/DL (ref 60–115)
GLUCOSE BLD-MCNC: 186 MG/DL (ref 70–99)
HCT VFR BLD CALC: 38.7 % (ref 37–47)
HEMOGLOBIN: 12.2 G/DL (ref 12–16)
MCH RBC QN AUTO: 28.1 PG (ref 27–31.3)
MCHC RBC AUTO-ENTMCNC: 31.5 % (ref 33–37)
MCV RBC AUTO: 89.3 FL (ref 82–100)
PDW BLD-RTO: 18.6 % (ref 11.5–14.5)
PERFORMED ON: ABNORMAL
PLATELET # BLD: 225 K/UL (ref 130–400)
POTASSIUM SERPL-SCNC: 3.9 MEQ/L (ref 3.4–4.9)
RBC # BLD: 4.33 M/UL (ref 4.2–5.4)
SODIUM BLD-SCNC: 136 MEQ/L (ref 135–144)
WBC # BLD: 11.2 K/UL (ref 4.8–10.8)

## 2019-05-16 PROCEDURE — 2580000003 HC RX 258: Performed by: COLON & RECTAL SURGERY

## 2019-05-16 PROCEDURE — 6360000002 HC RX W HCPCS: Performed by: COLON & RECTAL SURGERY

## 2019-05-16 PROCEDURE — 71045 X-RAY EXAM CHEST 1 VIEW: CPT

## 2019-05-16 PROCEDURE — 94640 AIRWAY INHALATION TREATMENT: CPT

## 2019-05-16 PROCEDURE — 0D9670Z DRAINAGE OF STOMACH WITH DRAINAGE DEVICE, VIA NATURAL OR ARTIFICIAL OPENING: ICD-10-PCS | Performed by: RADIOLOGY

## 2019-05-16 PROCEDURE — 1210000000 HC MED SURG R&B

## 2019-05-16 PROCEDURE — 36415 COLL VENOUS BLD VENIPUNCTURE: CPT

## 2019-05-16 PROCEDURE — 6370000000 HC RX 637 (ALT 250 FOR IP): Performed by: SURGERY

## 2019-05-16 PROCEDURE — 94760 N-INVAS EAR/PLS OXIMETRY 1: CPT

## 2019-05-16 PROCEDURE — 6360000002 HC RX W HCPCS: Performed by: SURGERY

## 2019-05-16 PROCEDURE — 2700000000 HC OXYGEN THERAPY PER DAY

## 2019-05-16 PROCEDURE — 85027 COMPLETE CBC AUTOMATED: CPT

## 2019-05-16 PROCEDURE — 99024 POSTOP FOLLOW-UP VISIT: CPT | Performed by: COLON & RECTAL SURGERY

## 2019-05-16 PROCEDURE — 80048 BASIC METABOLIC PNL TOTAL CA: CPT

## 2019-05-16 PROCEDURE — 6370000000 HC RX 637 (ALT 250 FOR IP): Performed by: COLON & RECTAL SURGERY

## 2019-05-16 RX ORDER — OXYMETAZOLINE HYDROCHLORIDE 0.05 G/100ML
2 SPRAY NASAL ONCE
Status: COMPLETED | OUTPATIENT
Start: 2019-05-16 | End: 2019-05-16

## 2019-05-16 RX ORDER — LIDOCAINE HYDROCHLORIDE 20 MG/ML
JELLY TOPICAL ONCE
Status: COMPLETED | OUTPATIENT
Start: 2019-05-16 | End: 2019-05-16

## 2019-05-16 RX ORDER — METOCLOPRAMIDE HYDROCHLORIDE 5 MG/ML
5 INJECTION INTRAMUSCULAR; INTRAVENOUS EVERY 8 HOURS
Status: DISCONTINUED | OUTPATIENT
Start: 2019-05-16 | End: 2019-05-20 | Stop reason: HOSPADM

## 2019-05-16 RX ADMIN — ALBUTEROL SULFATE 2 PUFF: 90 AEROSOL, METERED RESPIRATORY (INHALATION) at 08:16

## 2019-05-16 RX ADMIN — LIDOCAINE HYDROCHLORIDE: 20 JELLY TOPICAL at 20:57

## 2019-05-16 RX ADMIN — KETOROLAC TROMETHAMINE 30 MG: 30 INJECTION, SOLUTION INTRAMUSCULAR; INTRAVENOUS at 01:28

## 2019-05-16 RX ADMIN — SODIUM CHLORIDE: 9 INJECTION, SOLUTION INTRAVENOUS at 12:35

## 2019-05-16 RX ADMIN — KETOROLAC TROMETHAMINE 30 MG: 30 INJECTION, SOLUTION INTRAMUSCULAR; INTRAVENOUS at 11:12

## 2019-05-16 RX ADMIN — ONDANSETRON 4 MG: 2 INJECTION INTRAMUSCULAR; INTRAVENOUS at 08:44

## 2019-05-16 RX ADMIN — Medication 2 SPRAY: at 20:58

## 2019-05-16 RX ADMIN — MOMETASONE FUROATE AND FORMOTEROL FUMARATE DIHYDRATE 2 PUFF: 200; 5 AEROSOL RESPIRATORY (INHALATION) at 08:16

## 2019-05-16 RX ADMIN — FUROSEMIDE 40 MG: 40 TABLET ORAL at 12:35

## 2019-05-16 RX ADMIN — LOSARTAN POTASSIUM 25 MG: 25 TABLET ORAL at 12:34

## 2019-05-16 RX ADMIN — ONDANSETRON 4 MG: 2 INJECTION INTRAMUSCULAR; INTRAVENOUS at 16:18

## 2019-05-16 RX ADMIN — ENOXAPARIN SODIUM 40 MG: 40 INJECTION SUBCUTANEOUS at 11:13

## 2019-05-16 RX ADMIN — LEVOTHYROXINE SODIUM 175 MCG: 175 TABLET ORAL at 05:27

## 2019-05-16 RX ADMIN — METOCLOPRAMIDE 5 MG: 5 INJECTION, SOLUTION INTRAMUSCULAR; INTRAVENOUS at 21:25

## 2019-05-16 RX ADMIN — DULOXETINE HYDROCHLORIDE 60 MG: 60 CAPSULE, DELAYED RELEASE ORAL at 12:34

## 2019-05-16 ASSESSMENT — PAIN SCALES - GENERAL
PAINLEVEL_OUTOF10: 2
PAINLEVEL_OUTOF10: 6
PAINLEVEL_OUTOF10: 5
PAINLEVEL_OUTOF10: 1

## 2019-05-16 NOTE — PROGRESS NOTES
She has had emesis x 4 today. 3 large and 1 small. The last one about 800 cc all over the room floor. I will insert NGT and make NPO.

## 2019-05-16 NOTE — PROGRESS NOTES
Patient felt nauseous this AM, and did not want to take anything oral, including medications. Medicated for nausea per orders. Complaining of heartburn. Dr. Yina Keene notified.

## 2019-05-16 NOTE — CARE COORDINATION
NOTE TO DR Billie Reynoso REQUESTING [address] [facility]. PHONE CALL WILL NEED TO BE PLACED TO [facility] TO RESUME  [facility] @ [phone] SPEAK TO [facility]. [facility] WILL ALSO NEED TO BE NOTIFIED TO RESUME THE NURSING SERVICES. CALL [phone] AND FAX AVS [phone].   ANTICIPATED DISCHARGE Friday 5/17

## 2019-05-16 NOTE — PROGRESS NOTES
Pt Name: Rhina Ron Record Number: [id number]  Date of Birth 1955   Admit date 5/13/2019  5:39 AM  Today's Date: 5/16/2019     ASSESSMENT  1. Hospital day # 3  2  postop day #3 laparoscopic right colectomy  3. Postop ileus    PLAN  1. Sips of liquids only for now  2. Blood work in a.m.  3.  Out of bed to increase activity and mobilization      SUBJECTIVE  Chief complaint: Follow-up colectomy  Afebrile, vital signs are stable. Heartburn and nausea this morning, bowels have started to work with loose stool. She is tolerating a DIET LOW FIBER;. Her pain is well controlled on current medications. She is up out of bed into a chair      has a past medical history of Acquired hypothyroidism, Allergic rhinitis, Anxiety, Asthma, Bipolar affect, depressed ([address] Bipolar disorder ([address] Breast cancer ([address] Cancer ([address] Chronic back pain, COPD (chronic obstructive pulmonary disease) ([address] Depression, Diabetes mellitus ([address] Emphysema of lung ([address] Essential hypertension, History of blood transfusion, Hyperlipidemia, Hypothyroidism, Obesity, On home O2, YARED (obstructive sleep apnea), Osteoarthritis, Restless legs syndrome, Sleep apnea, and Urinary incontinence.     CURRENT MEDS  Scheduled Meds:   insulin lispro  0-6 Units Subcutaneous 4x Daily AC & HS    sodium chloride flush  10 mL Intravenous 2 times per day    anastrozole  1 mg Oral Daily    ARIPiprazole  10 mg Oral Daily    DULoxetine  60 mg Oral Daily    mometasone-formoterol  2 puff Inhalation BID    furosemide  40 mg Oral Daily    levothyroxine  175 mcg Oral Daily    losartan  25 mg Oral Daily    sodium chloride flush  10 mL Intravenous 2 times per day    enoxaparin  40 mg Subcutaneous Daily     Continuous Infusions:   lactated ringers 125 mL/hr at 05/14/19 0542    sodium chloride 50 mL/hr at 05/15/19 1735    dextrose       PRN Meds:.oxyCODONE-acetaminophen, HYDROmorphone, ketorolac, sodium chloride flush, fentanNYL, HYDROmorphone, meperidine, albuterol sulfate HFA, sodium chloride flush, ondansetron, glucose, dextrose, glucagon (rDNA), dextrose    OBJECTIVE  CURRENT VITALS:  height is 5' 6.5\" (1.689 m) and weight is 313 lb (142 kg) (abnormal). Her oral temperature is 98.4 °F (36.9 °C). Her blood pressure is 152/70 (abnormal) and her pulse is 77. Her respiration is 20 and oxygen saturation is 96%. Temperature Range (24h):Temp: 98.4 °F (36.9 °C) Temp  Av.4 °F (36.9 °C)  Min: 98.4 °F (36.9 °C)  Max: 98.4 °F (36.9 °C)  BP Range (60G): Systolic (73DPB), XGQ:193 , Min:118 , FYP:052     Diastolic (43MQR), NRD:53, Min:45, Max:70    Pulse Range (24h): Pulse  Av.5  Min: 70  Max: 77  Respiration Range (24h): Resp  Av.6  Min: 16  Max: 22    GENERAL: alert, no distress  LUNGS: clear to ausculation, without wheezes, rales or rhonci  HEART: normal rate and regular rhythm  ABDOMEN: soft, non-tender, mild distention, bowel sounds present in all 4 quadrants and no guarding or peritoneal signs  EXTERMITY: no cyanosis, clubbing or edema  In: 1992.1 [P.O.:840; I.V.:1152.1]  Out: 500 [Urine:500]  Date 19 - 19   Shift 9299-6554 6791-3391 7625-0916 24 Hour Total   INTAKE   P.O.(mL/kg/hr) 240(0.2)   240   I. V.(mL/kg) 594. 1(4.2)   594. 1(4.2)   Shift Total(mL/kg) 834. 1(5.9)   834. 1(5.9)   OUTPUT   Urine(mL/kg/hr) 100(0.1)   100   Shift Total(mL/kg) 100(0.7)   100(0.7)   Weight (kg) 142 142 142 142       LABS  Recent Labs     19  0646   WBC 8.8   HGB 10.1*   HCT 31.4*         K 3.8      CO2 30   BUN 9   CREATININE 0.62   CALCIUM 8.7      No results for input(s): PTT, INR in the last 72 hours. Invalid input(s): PT  No results for input(s): AST, ALT, BILITOT, BILIDIR, AMYLASE, LIPASE, LDH, LACTA in the last 72 hours.     RADIOLOGY  Ct Abdomen Pelvis W Iv Contrast Additional Contrast? Radiologist Recommendation    Result Date: 2019  CT of the Abdomen and Pelvis with intravenous contrast medium History:  Colonic mass on colonoscopy. History of right mastectomy for invasive ductal carcinoma. Technical Factors: CT imaging of the abdomen and pelvis were obtained and formatted as 5 mm contiguous axial images from the domes of the diaphragm to the symphysis pubis. Sagittal and coronal reconstructions were also obtained. Oral contrast medium:  Barium sulfate, 450 mL. Intravenous contrast medium:  Isovue-300, 100 mL. Comparison:  None. Findings: Lungs:  Lung bases are clear. Liver:  Normal in size, shape, and attenuation. Bile Ducts:  Normal in caliber. Gallbladder:  Surgically absent. Pancreas:  Normal without masses, cysts, ductal dilatation or calcification. Spleen:  Normal in size without masses or calcifications. No splenules. Kidneys:  Normal in size and enhancement. No hydronephrosis, masses, or stones. Adrenals:  Normal. Small bowel:  Normal in caliber. Appendix:  Surgically absent. Colon:  Normal in caliber. Peritoneum:  No ascites, free air, or fluid collections. Vessels: Aorta normal in course and caliber. Portal vein, splenic vein, superior mesenteric vein are patent. Lymph nodes:  Retroperitoneal:  No enlarged retroperitoneal lymph nodes. Mesenteric:  No enlarged mesenteric lymph nodes. Pelvic: No enlarged pelvic lymph nodes. Ureters: Normal in course and caliber. No calcifications. Bladder: No wall thickening. Reproductive organs: No pelvic masses. Abdominal Wall:  No hernia identified. Bones:  No bone lesions. Diffuse disc space narrowing lumbar spine. Vacuum disc, L4-5, L5-S1. 2 mm anterolisthesis of L3 on L4. Small anterior osteophytes lumbar spine. No post operative changes. No colonic mass identified. Grade 1 L3 spondylolisthesis. Appendectomy. Cholecystectomy. All CT scans at this facility use dose modulation, iterative reconstruction, and/or weight based dosing when appropriate to reduce radiation dose to as low as reasonably achievable.      Electronically signed by Pily Villagomez

## 2019-05-16 NOTE — PROGRESS NOTES
Patient of 700 cc of brown emesis. Dr. Kylie Dietz notified. Diet currently down to clear liquids. Patient currently sitting up in chair.      Electronically signed by Amanda West RN on 5/16/2019 at 10:53 AM

## 2019-05-16 NOTE — PROGRESS NOTES
Hospitalist Progress Note      Date of Admission: 5/13/2019  Chief Complaint:    No chief complaint on file. Subjective:  No new complaints. No nausea, vomiting, chest pain, or headache      Medications:    Infusion Medications    lactated ringers 125 mL/hr at 05/14/19 0542    sodium chloride 75 mL/hr at 05/16/19 1235    dextrose       Scheduled Medications    insulin lispro  0-6 Units Subcutaneous 4x Daily AC & HS    sodium chloride flush  10 mL Intravenous 2 times per day    anastrozole  1 mg Oral Daily    ARIPiprazole  10 mg Oral Daily    DULoxetine  60 mg Oral Daily    mometasone-formoterol  2 puff Inhalation BID    furosemide  40 mg Oral Daily    levothyroxine  175 mcg Oral Daily    losartan  25 mg Oral Daily    sodium chloride flush  10 mL Intravenous 2 times per day    enoxaparin  40 mg Subcutaneous Daily     PRN Meds: oxyCODONE-acetaminophen, HYDROmorphone, sodium chloride flush, fentanNYL, HYDROmorphone, meperidine, albuterol sulfate HFA, sodium chloride flush, ondansetron, glucose, dextrose, glucagon (rDNA), dextrose    Intake/Output Summary (Last 24 hours) at 5/16/2019 1530  Last data filed at 5/16/2019 1022  Gross per 24 hour   Intake 1992.1 ml   Output 1300 ml   Net 692.1 ml     Exam:  BP (!) 152/70   Pulse 77   Temp 98.4 °F (36.9 °C) (Oral)   Resp 20   Ht 5' 6.5\" (1.689 m)   Wt (!) 313 lb (142 kg)   SpO2 96%   BMI 49.76 kg/m²   Head: Normocephalic, atraumatic  Sclera clear  Neck supple, nontender  Lungs: clear    Labs:   Recent Labs     05/14/19  0646 05/16/19  1106   WBC 8.8 11.2*   HGB 10.1* 12.2   HCT 31.4* 38.7    225     Recent Labs     05/14/19  0646 05/16/19  1106    136   K 3.8 3.9    92*   CO2 30 31   BUN 9 13   CREATININE 0.62 0.67   CALCIUM 8.7 9.4     No results for input(s): INR in the last 72 hours. No results for input(s): Floreen Pill in the last 72 hours. Radiology:  No orders to display     Assessment/Plan:    Htn: stable. Monitoring BP    DM: monitor sugars, stable at time time.      Hypothyroid: continue home meds    35 minutes total care time, >1/2 in unit/floor time and care coordination        Electronically signed by Shagufta Velasquez MD on 5/16/2019 at 3:30 PM

## 2019-05-17 LAB
GLUCOSE BLD-MCNC: 112 MG/DL (ref 60–115)
GLUCOSE BLD-MCNC: 134 MG/DL (ref 60–115)
GLUCOSE BLD-MCNC: 142 MG/DL (ref 60–115)
GLUCOSE BLD-MCNC: 151 MG/DL (ref 60–115)
PERFORMED ON: ABNORMAL
PERFORMED ON: NORMAL

## 2019-05-17 PROCEDURE — 6370000000 HC RX 637 (ALT 250 FOR IP): Performed by: COLON & RECTAL SURGERY

## 2019-05-17 PROCEDURE — 6360000002 HC RX W HCPCS: Performed by: COLON & RECTAL SURGERY

## 2019-05-17 PROCEDURE — 2580000003 HC RX 258: Performed by: COLON & RECTAL SURGERY

## 2019-05-17 PROCEDURE — 6360000002 HC RX W HCPCS: Performed by: SURGERY

## 2019-05-17 PROCEDURE — 1210000000 HC MED SURG R&B

## 2019-05-17 PROCEDURE — 2700000000 HC OXYGEN THERAPY PER DAY

## 2019-05-17 RX ADMIN — OXYCODONE HYDROCHLORIDE AND ACETAMINOPHEN 1 TABLET: 5; 325 TABLET ORAL at 20:08

## 2019-05-17 RX ADMIN — METOCLOPRAMIDE 5 MG: 5 INJECTION, SOLUTION INTRAMUSCULAR; INTRAVENOUS at 10:35

## 2019-05-17 RX ADMIN — ANASTROZOLE 1 MG: 1 TABLET, COATED ORAL at 09:13

## 2019-05-17 RX ADMIN — SODIUM CHLORIDE: 9 INJECTION, SOLUTION INTRAVENOUS at 21:36

## 2019-05-17 RX ADMIN — LOSARTAN POTASSIUM 25 MG: 25 TABLET ORAL at 08:16

## 2019-05-17 RX ADMIN — DULOXETINE HYDROCHLORIDE 60 MG: 60 CAPSULE, DELAYED RELEASE ORAL at 08:16

## 2019-05-17 RX ADMIN — LEVOTHYROXINE SODIUM 175 MCG: 175 TABLET ORAL at 05:27

## 2019-05-17 RX ADMIN — ENOXAPARIN SODIUM 40 MG: 40 INJECTION SUBCUTANEOUS at 08:17

## 2019-05-17 RX ADMIN — METOCLOPRAMIDE 5 MG: 5 INJECTION, SOLUTION INTRAMUSCULAR; INTRAVENOUS at 03:22

## 2019-05-17 RX ADMIN — METOCLOPRAMIDE 5 MG: 5 INJECTION, SOLUTION INTRAMUSCULAR; INTRAVENOUS at 21:28

## 2019-05-17 RX ADMIN — Medication 10 ML: at 08:18

## 2019-05-17 RX ADMIN — FUROSEMIDE 40 MG: 40 TABLET ORAL at 08:17

## 2019-05-17 RX ADMIN — SODIUM CHLORIDE: 9 INJECTION, SOLUTION INTRAVENOUS at 05:27

## 2019-05-17 RX ADMIN — Medication 10 ML: at 08:17

## 2019-05-17 RX ADMIN — ARIPIPRAZOLE 10 MG: 5 TABLET ORAL at 21:27

## 2019-05-17 ASSESSMENT — PAIN SCALES - GENERAL
PAINLEVEL_OUTOF10: 1
PAINLEVEL_OUTOF10: 0
PAINLEVEL_OUTOF10: 2
PAINLEVEL_OUTOF10: 6

## 2019-05-17 NOTE — PROGRESS NOTES
19 0527    dextrose       PRN Meds:.oxyCODONE-acetaminophen, HYDROmorphone, sodium chloride flush, fentanNYL, HYDROmorphone, meperidine, albuterol sulfate HFA, sodium chloride flush, ondansetron, glucose, dextrose, glucagon (rDNA), dextrose    OBJECTIVE  CURRENT VITALS:  height is 5' 6.5\" (1.689 m) and weight is 313 lb (142 kg) (abnormal). Her temperature is 97.9 °F (36.6 °C). Her blood pressure is 120/59 (abnormal) and her pulse is 80. Her respiration is 18 and oxygen saturation is 93%. Temperature Range (24h):Temp: 97.9 °F (36.6 °C) Temp  Av.7 °F (37.1 °C)  Min: 97.9 °F (36.6 °C)  Max: 99.3 °F (37.4 °C)  BP Range (81L): Systolic (04SKH), RMW:184 , Min:120 , AQK:738     Diastolic (70ACX), AMU:74, Min:59, Max:68    Pulse Range (24h): Pulse  Av.7  Min: 80  Max: 93  Respiration Range (24h): Resp  Av  Min: 18  Max: 18    GENERAL: alert, no distress  LUNGS: clear to ausculation, without wheezes, rales or rhonci  HEART: normal rate and regular rhythm  ABDOMEN: soft, non-tender, non-distended, dressings dry, bowel sounds present in all 4 quadrants and no guarding or peritoneal signs  EXTERMITY: no cyanosis, clubbing or edema  In: 1720 [P.O.:25; I.V.:1575; NG/GT:120]  Out: 4150 [Urine:850]  Date 19 0000 - 19   Shift [id number] 0747-2183 5746-5893 24 Hour Total   INTAKE   P.O.(mL/kg/hr) 0(0) 25  25   I. V.(mL/kg) 812(5.7)   812(5.7)   NG/GT(mL/kg) 120(0.8)   120(0.8)   Shift Total(mL/kg) 932(6.6) 25(0.2)  957(6.7)   OUTPUT   Urine(mL/kg/hr) 300(0.3)   300   Emesis/NG output(mL/kg) 1612(51.7)   6355(57.4)   Shift Total(mL/kg) 6862(49)   9248(81)   Weight (kg) 142 142 142 142       LABS  Recent Labs     19  1106   WBC 11.2*   HGB 12.2   HCT 38.7         K 3.9   CL 92*   CO2 31   BUN 13   CREATININE 0.67   CALCIUM 9.4      No results for input(s): PTT, INR in the last 72 hours.     Invalid input(s): PT  No results for input(s): AST, ALT, BILITOT, BILIDIR, AMYLASE, LIPASE, LDH, LACTA in the last 72 hours. RADIOLOGY  Ct Abdomen Pelvis W Iv Contrast Additional Contrast? Radiologist Recommendation    Result Date: 5/6/2019  CT of the Abdomen and Pelvis with intravenous contrast medium History:  Colonic mass on colonoscopy. History of right mastectomy for invasive ductal carcinoma. Technical Factors: CT imaging of the abdomen and pelvis were obtained and formatted as 5 mm contiguous axial images from the domes of the diaphragm to the symphysis pubis. Sagittal and coronal reconstructions were also obtained. Oral contrast medium:  Barium sulfate, 450 mL. Intravenous contrast medium:  Isovue-300, 100 mL. Comparison:  None. Findings: Lungs:  Lung bases are clear. Liver:  Normal in size, shape, and attenuation. Bile Ducts:  Normal in caliber. Gallbladder:  Surgically absent. Pancreas:  Normal without masses, cysts, ductal dilatation or calcification. Spleen:  Normal in size without masses or calcifications. No splenules. Kidneys:  Normal in size and enhancement. No hydronephrosis, masses, or stones. Adrenals:  Normal. Small bowel:  Normal in caliber. Appendix:  Surgically absent. Colon:  Normal in caliber. Peritoneum:  No ascites, free air, or fluid collections. Vessels: Aorta normal in course and caliber. Portal vein, splenic vein, superior mesenteric vein are patent. Lymph nodes:  Retroperitoneal:  No enlarged retroperitoneal lymph nodes. Mesenteric:  No enlarged mesenteric lymph nodes. Pelvic: No enlarged pelvic lymph nodes. Ureters: Normal in course and caliber. No calcifications. Bladder: No wall thickening. Reproductive organs: No pelvic masses. Abdominal Wall:  No hernia identified. Bones:  No bone lesions. Diffuse disc space narrowing lumbar spine. Vacuum disc, L4-5, L5-S1. 2 mm anterolisthesis of L3 on L4. Small anterior osteophytes lumbar spine. No post operative changes. No colonic mass identified. Grade 1 L3 spondylolisthesis. Appendectomy. Cholecystectomy.  All CT scans at this facility use dose modulation, iterative reconstruction, and/or weight based dosing when appropriate to reduce radiation dose to as low as reasonably achievable.      Electronically signed by Frederick Rodriguez MD on 5/17/2019 at 9:51 AM

## 2019-05-17 NOTE — PROGRESS NOTES
Hospitalist Progress Note      Date of Admission: 5/13/2019  Chief Complaint:    No chief complaint on file. Subjective:  No new complaints. No nausea, vomiting, chest pain, or headache      Medications:    Infusion Medications    lactated ringers 125 mL/hr at 05/14/19 0542    sodium chloride 75 mL/hr at 05/17/19 2484    dextrose       Scheduled Medications    metoclopramide  5 mg Intravenous Q8H    insulin lispro  0-6 Units Subcutaneous Q6H    sodium chloride flush  10 mL Intravenous 2 times per day    anastrozole  1 mg Oral Daily    ARIPiprazole  10 mg Oral Daily    DULoxetine  60 mg Oral Daily    mometasone-formoterol  2 puff Inhalation BID    furosemide  40 mg Oral Daily    levothyroxine  175 mcg Oral Daily    losartan  25 mg Oral Daily    sodium chloride flush  10 mL Intravenous 2 times per day    enoxaparin  40 mg Subcutaneous Daily     PRN Meds: oxyCODONE-acetaminophen, HYDROmorphone, sodium chloride flush, fentanNYL, HYDROmorphone, meperidine, albuterol sulfate HFA, sodium chloride flush, ondansetron, glucose, dextrose, glucagon (rDNA), dextrose    Intake/Output Summary (Last 24 hours) at 5/17/2019 1956  Last data filed at 5/17/2019 1908  Gross per 24 hour   Intake 1912 ml   Output 4450 ml   Net -2538 ml     Exam:  BP (!) 120/59   Pulse 77   Temp 97.9 °F (36.6 °C)   Resp 18   Ht 5' 6.5\" (1.689 m)   Wt (!) 313 lb (142 kg)   SpO2 90%   BMI 49.76 kg/m²   Head: Normocephalic, atraumatic  Sclera clear  Neck supple, nontender  Lungs: clear    Labs:   Recent Labs     05/16/19  1106   WBC 11.2*   HGB 12.2   HCT 38.7        Recent Labs     05/16/19  1106      K 3.9   CL 92*   CO2 31   BUN 13   CREATININE 0.67   CALCIUM 9.4     No results for input(s): INR in the last 72 hours. No results for input(s): Pam Hanson in the last 72 hours. Radiology:  XR Chest Abdomen Ng Placement   Final Result   NG TUBE IN SATISFACTORY POSITION WITH DISTAL TIP OF TUBE IN REGION OF STOMACH.

## 2019-05-17 NOTE — PROGRESS NOTES
Pt's vitals are stable. 1/10 pain, not requiring any pain meds. NG hooked up to low int suction. She has no nausea/vomiting. She is able to tolerate ice chips and sips with meds. She has had two large watery BMs today. Bowel sounds present, abd slightly distended although soft. Incision is clean, dry, and intact. Open to air now. She has been up to the chair and now ambulating in her room. Call light in reach.

## 2019-05-17 NOTE — CARE COORDINATION
See note of 5/16. Plan to [facility].   Electronically signed by Adrian Reno RN on 5/17/2019 at 10:30 AM

## 2019-05-17 NOTE — PROGRESS NOTES
#16 Fr NG tube place L nares, after having dosed with Afrin and Lidocaine. Immediate return of 1000cc green fluid. Canister changed and with 5 minutes, another 1000cc return. NG at intermediate wall suction now with darker return as well as dark green/brown particlate matter. \"My stomach feels so much better\"  Tube secured to gown with safety pin and tape. Patient tolerated procedure well. Order in  for xray to verify tube placement.

## 2019-05-18 LAB
ANION GAP SERPL CALCULATED.3IONS-SCNC: 13 MEQ/L (ref 9–15)
BUN BLDV-MCNC: 20 MG/DL (ref 8–23)
CALCIUM SERPL-MCNC: 8.7 MG/DL (ref 8.5–9.9)
CHLORIDE BLD-SCNC: 97 MEQ/L (ref 95–107)
CO2: 31 MEQ/L (ref 20–31)
CREAT SERPL-MCNC: 0.74 MG/DL (ref 0.5–0.9)
GFR AFRICAN AMERICAN: >60
GFR NON-AFRICAN AMERICAN: >60
GLUCOSE BLD-MCNC: 107 MG/DL (ref 60–115)
GLUCOSE BLD-MCNC: 115 MG/DL (ref 60–115)
GLUCOSE BLD-MCNC: 134 MG/DL (ref 60–115)
GLUCOSE BLD-MCNC: 98 MG/DL (ref 60–115)
GLUCOSE BLD-MCNC: 99 MG/DL (ref 60–115)
GLUCOSE BLD-MCNC: 99 MG/DL (ref 70–99)
PERFORMED ON: ABNORMAL
PERFORMED ON: NORMAL
POTASSIUM SERPL-SCNC: 3.5 MEQ/L (ref 3.4–4.9)
REASON FOR REJECTION: NORMAL
REJECTED TEST: NORMAL
SODIUM BLD-SCNC: 141 MEQ/L (ref 135–144)

## 2019-05-18 PROCEDURE — 94640 AIRWAY INHALATION TREATMENT: CPT

## 2019-05-18 PROCEDURE — 6370000000 HC RX 637 (ALT 250 FOR IP): Performed by: COLON & RECTAL SURGERY

## 2019-05-18 PROCEDURE — 36415 COLL VENOUS BLD VENIPUNCTURE: CPT

## 2019-05-18 PROCEDURE — 6360000002 HC RX W HCPCS: Performed by: COLON & RECTAL SURGERY

## 2019-05-18 PROCEDURE — 94761 N-INVAS EAR/PLS OXIMETRY MLT: CPT

## 2019-05-18 PROCEDURE — 1210000000 HC MED SURG R&B

## 2019-05-18 PROCEDURE — 80048 BASIC METABOLIC PNL TOTAL CA: CPT

## 2019-05-18 PROCEDURE — 2580000003 HC RX 258: Performed by: COLON & RECTAL SURGERY

## 2019-05-18 PROCEDURE — 6360000002 HC RX W HCPCS: Performed by: SURGERY

## 2019-05-18 PROCEDURE — 2700000000 HC OXYGEN THERAPY PER DAY

## 2019-05-18 PROCEDURE — 99024 POSTOP FOLLOW-UP VISIT: CPT | Performed by: COLON & RECTAL SURGERY

## 2019-05-18 RX ADMIN — METOCLOPRAMIDE 5 MG: 5 INJECTION, SOLUTION INTRAMUSCULAR; INTRAVENOUS at 12:08

## 2019-05-18 RX ADMIN — FUROSEMIDE 40 MG: 40 TABLET ORAL at 09:00

## 2019-05-18 RX ADMIN — ENOXAPARIN SODIUM 40 MG: 40 INJECTION SUBCUTANEOUS at 09:01

## 2019-05-18 RX ADMIN — ANASTROZOLE 1 MG: 1 TABLET, COATED ORAL at 09:04

## 2019-05-18 RX ADMIN — METOCLOPRAMIDE 5 MG: 5 INJECTION, SOLUTION INTRAMUSCULAR; INTRAVENOUS at 21:27

## 2019-05-18 RX ADMIN — METOCLOPRAMIDE 5 MG: 5 INJECTION, SOLUTION INTRAMUSCULAR; INTRAVENOUS at 03:25

## 2019-05-18 RX ADMIN — DULOXETINE HYDROCHLORIDE 60 MG: 60 CAPSULE, DELAYED RELEASE ORAL at 09:00

## 2019-05-18 RX ADMIN — LOSARTAN POTASSIUM 25 MG: 25 TABLET ORAL at 09:00

## 2019-05-18 RX ADMIN — MOMETASONE FUROATE AND FORMOTEROL FUMARATE DIHYDRATE 2 PUFF: 200; 5 AEROSOL RESPIRATORY (INHALATION) at 09:21

## 2019-05-18 RX ADMIN — ARIPIPRAZOLE 10 MG: 5 TABLET ORAL at 21:26

## 2019-05-18 RX ADMIN — MOMETASONE FUROATE AND FORMOTEROL FUMARATE DIHYDRATE 2 PUFF: 200; 5 AEROSOL RESPIRATORY (INHALATION) at 19:37

## 2019-05-18 RX ADMIN — SODIUM CHLORIDE: 9 INJECTION, SOLUTION INTRAVENOUS at 11:58

## 2019-05-18 RX ADMIN — LEVOTHYROXINE SODIUM 175 MCG: 175 TABLET ORAL at 06:25

## 2019-05-18 ASSESSMENT — PAIN SCALES - GENERAL
PAINLEVEL_OUTOF10: 0
PAINLEVEL_OUTOF10: 0
PAINLEVEL_OUTOF10: 2

## 2019-05-18 NOTE — PROGRESS NOTES
Pt Name: Gorge Lerma Record Number: [id number]  Date of Birth 1955   Admit date 5/13/2019  5:39 AM  Today's Date: 5/18/2019     ASSESSMENT  1. Hospital day # 5  2  postop day #5 laparoscopic right colectomy  3. Postoperative ileus, resolved    PLAN  1.  DC NG tube  2. Clear liquids      SUBJECTIVE  Chief complaint: Follow-up colectomy  Afebrile, vital signs are stable. She denies any nausea or vomiting, bowels working with flatus. She is tolerating a Diet NPO Effective Now Exceptions are: Sips with Meds, Ice Chips. Her pain is well controlled on current medications. She has been ambulating in the room. has a past medical history of Acquired hypothyroidism, Allergic rhinitis, Anxiety, Asthma, Bipolar affect, depressed ([address] Bipolar disorder ([facility] [address] Breast cancer ([facility] [address] Cancer ([facility] [address] Chronic back pain, COPD (chronic obstructive pulmonary disease) ([facility] [address] Depression, Diabetes mellitus ([facility] [address] Emphysema of lung ([facility] [address] Essential hypertension, History of blood transfusion, Hyperlipidemia, Hypothyroidism, Obesity, On home O2, YARED (obstructive sleep apnea), Osteoarthritis, Restless legs syndrome, Sleep apnea, and Urinary incontinence.     CURRENT MEDS  Scheduled Meds:   metoclopramide  5 mg Intravenous Q8H    insulin lispro  0-6 Units Subcutaneous Q6H    sodium chloride flush  10 mL Intravenous 2 times per day    anastrozole  1 mg Oral Daily    ARIPiprazole  10 mg Oral Daily    DULoxetine  60 mg Oral Daily    mometasone-formoterol  2 puff Inhalation BID    furosemide  40 mg Oral Daily    levothyroxine  175 mcg Oral Daily    losartan  25 mg Oral Daily    sodium chloride flush  10 mL Intravenous 2 times per day    enoxaparin  40 mg Subcutaneous Daily     Continuous Infusions:   lactated ringers 125 mL/hr at 05/14/19 0542    sodium chloride 75 mL/hr at 05/17/19 2136    dextrose       PRN Meds:.oxyCODONE-acetaminophen, HYDROmorphone, sodium chloride flush, fentanNYL, HYDROmorphone, meperidine, albuterol sulfate HFA, sodium chloride flush, ondansetron, glucose, dextrose, glucagon (rDNA), dextrose    OBJECTIVE  CURRENT VITALS:  height is 5' 6.5\" (1.689 m) and weight is 313 lb (142 kg) (abnormal). Her oral temperature is 98.4 °F (36.9 °C). Her blood pressure is 126/65 and her pulse is 67. Her respiration is 16 and oxygen saturation is 98%. Temperature Range (24h):Temp: 98.4 °F (36.9 °C) Temp  Av.4 °F (36.9 °C)  Min: 98.4 °F (36.9 °C)  Max: 98.4 °F (36.9 °C)  BP Range (41Q): Systolic (75PLL), WVT:242 , Min:101 , DRF:846     Diastolic (61LJJ), VYV:59, Min:65, Max:74    Pulse Range (24h): Pulse  Av.3  Min: 67  Max: 77  Respiration Range (24h): Resp  Av  Min: 16  Max: 20    GENERAL: alert, no distress  LUNGS: clear to ausculation, without wheezes, rales or rhonci  HEART: normal rate and regular rhythm  ABDOMEN: soft, non-tender, non-distended, NG tube minimal, bowel sounds present in all 4 quadrants and no guarding or peritoneal signs  EXTERMITY: no cyanosis, clubbing or edema  In: 1779 [P.O.:25; I.V.:1694; NG/GT:60]  Out: 1700 [Urine:850]  Date 19 - 19   Shift [id number] 0896-7086 1948-4597 24 Hour Total   INTAKE   I.V.(mL/kg) 849(6)   849(6)   Shift Total(mL/kg) 849(6)   849(6)   OUTPUT   Urine(mL/kg/hr) 550(0.5)   550   Emesis/NG output(mL/kg) 200(1.4)   200(1.4)   Shift Total(mL/kg) 750(5.3)   750(5.3)   Weight (kg) 142 142 142 142       LABS  Recent Labs     19  1106 19  0722   WBC 11.2*  --    HGB 12.2  --    HCT 38.7  --      --     141   K 3.9 3.5   CL 92* 97   CO2 31 31   BUN 13 20   CREATININE 0.67 0.74   CALCIUM 9.4 8.7      No results for input(s): PTT, INR in the last 72 hours. Invalid input(s): PT  No results for input(s): AST, ALT, BILITOT, BILIDIR, AMYLASE, LIPASE, LDH, LACTA in the last 72 hours.     RADIOLOGY  Ct Abdomen Pelvis W Iv Contrast Additional Contrast? Radiologist Recommendation    Result Date: 2019  CT of the Abdomen and Pelvis with intravenous contrast medium History:  Colonic mass on colonoscopy. History of right mastectomy for invasive ductal carcinoma. Technical Factors: CT imaging of the abdomen and pelvis were obtained and formatted as 5 mm contiguous axial images from the domes of the diaphragm to the symphysis pubis. Sagittal and coronal reconstructions were also obtained. Oral contrast medium:  Barium sulfate, 450 mL. Intravenous contrast medium:  Isovue-300, 100 mL. Comparison:  None. Findings: Lungs:  Lung bases are clear. Liver:  Normal in size, shape, and attenuation. Bile Ducts:  Normal in caliber. Gallbladder:  Surgically absent. Pancreas:  Normal without masses, cysts, ductal dilatation or calcification. Spleen:  Normal in size without masses or calcifications. No splenules. Kidneys:  Normal in size and enhancement. No hydronephrosis, masses, or stones. Adrenals:  Normal. Small bowel:  Normal in caliber. Appendix:  Surgically absent. Colon:  Normal in caliber. Peritoneum:  No ascites, free air, or fluid collections. Vessels: Aorta normal in course and caliber. Portal vein, splenic vein, superior mesenteric vein are patent. Lymph nodes:  Retroperitoneal:  No enlarged retroperitoneal lymph nodes. Mesenteric:  No enlarged mesenteric lymph nodes. Pelvic: No enlarged pelvic lymph nodes. Ureters: Normal in course and caliber. No calcifications. Bladder: No wall thickening. Reproductive organs: No pelvic masses. Abdominal Wall:  No hernia identified. Bones:  No bone lesions. Diffuse disc space narrowing lumbar spine. Vacuum disc, L4-5, L5-S1. 2 mm anterolisthesis of L3 on L4. Small anterior osteophytes lumbar spine. No post operative changes. No colonic mass identified. Grade 1 L3 spondylolisthesis. Appendectomy. Cholecystectomy.  All CT scans at this facility use dose modulation, iterative reconstruction, and/or weight based dosing when appropriate to reduce radiation dose to as low as reasonably achievable. Xr Chest Abdomen Ng Placement    Result Date: 5/17/2019  EXAMINATION: PORTABLE CHEST X-RAY FROM 5/16/2019 AT 21:08 HOURS CLINICAL HISTORY: CHECK PLACEMENT OF ENTERIC TUBE COMPARISONS: 2/6/2019 FINDINGS: An NG tube appears in satisfactory position with the distal tip of the tube in the region of the stomach. Heart and mediastinum appear unremarkable. There is a shallow inspiration and subsegmental atelectasis in the mid left lung field. No evidence of a pneumothorax and no pleural effusions visible. There is degenerative bone spurring in the spine. NG TUBE IN SATISFACTORY POSITION WITH DISTAL TIP OF TUBE IN REGION OF STOMACH.     Electronically signed by Oswaldo Rucker MD on 5/18/2019 at 10:18 AM

## 2019-05-18 NOTE — PROGRESS NOTES
Hospitalist Progress Note      Date of Admission: 5/13/2019  Chief Complaint:    No chief complaint on file. Subjective:  No new complaints. No nausea, vomiting, chest pain, or headache      Medications:    Infusion Medications    lactated ringers 125 mL/hr at 05/14/19 0542    sodium chloride 75 mL/hr at 05/18/19 1158    dextrose       Scheduled Medications    metoclopramide  5 mg Intravenous Q8H    insulin lispro  0-6 Units Subcutaneous Q6H    sodium chloride flush  10 mL Intravenous 2 times per day    anastrozole  1 mg Oral Daily    ARIPiprazole  10 mg Oral Daily    DULoxetine  60 mg Oral Daily    mometasone-formoterol  2 puff Inhalation BID    furosemide  40 mg Oral Daily    levothyroxine  175 mcg Oral Daily    losartan  25 mg Oral Daily    sodium chloride flush  10 mL Intravenous 2 times per day    enoxaparin  40 mg Subcutaneous Daily     PRN Meds: oxyCODONE-acetaminophen, HYDROmorphone, sodium chloride flush, fentanNYL, HYDROmorphone, meperidine, albuterol sulfate HFA, sodium chloride flush, ondansetron, glucose, dextrose, glucagon (rDNA), dextrose    Intake/Output Summary (Last 24 hours) at 5/18/2019 1539  Last data filed at 5/18/2019 1423  Gross per 24 hour   Intake 2279 ml   Output 2000 ml   Net 279 ml     Exam:  /65   Pulse 67   Temp 98.4 °F (36.9 °C) (Oral)   Resp 16   Ht 5' 6.5\" (1.689 m)   Wt (!) 313 lb (142 kg)   SpO2 99%   BMI 49.76 kg/m²   Head: Normocephalic, atraumatic  Sclera clear  Neck supple, nontender  Lungs: clear    Labs:   Recent Labs     05/16/19  1106   WBC 11.2*   HGB 12.2   HCT 38.7        Recent Labs     05/16/19  1106 05/18/19  0722    141   K 3.9 3.5   CL 92* 97   CO2 31 31   BUN 13 20   CREATININE 0.67 0.74   CALCIUM 9.4 8.7     No results for input(s): INR in the last 72 hours. No results for input(s): Raúl Mylar in the last 72 hours.   Radiology:  XR Chest Abdomen Ng Placement   Final Result   NG TUBE IN SATISFACTORY POSITION WITH DISTAL TIP OF TUBE IN REGION OF STOMACH. Assessment/Plan:    Htn: stable. Monitoring BP    DM: monitor sugars, stable at this time.      Hypothyroid: continue home meds    25 minutes total care time, >1/2 in unit/floor time and care coordination    Electronically signed by Ayesha Szymanski MD on 5/18/2019 at 3:39 PM

## 2019-05-18 NOTE — PROGRESS NOTES
Nutrition Assessment    Type and Reason for Visit: Initial(NPO/CL x 5 days)    Nutrition Recommendations: Continue current diet, Start ONS    Continue Clear liquids. Advance to Carb Controlled 4 diet as tolerated when OK with GI  Start clear liquid ONS TID until diet advanced and po intake > 50%. unable to advance diet in next 24 hours, recommend start PN    Nutrition Assessment: Pt adequately nourished on admission evidenced by stable weight. At risk for nutrition compromise due to NPO/Clear Liquids x 5 days. Will start a clear liquid nutrition supplement and monitor nutrition progression    Malnutrition Assessment:  · Malnutrition Status: At risk for malnutrition  · Context: Acute illness or injury  · Findings of the 6 clinical characteristics of malnutrition (Minimum of 2 out of 6 clinical characteristics is required to make the diagnosis of moderate or severe Protein Calorie Malnutrition based on AND/ASPEN Guidelines):  1. Energy Intake-Greater than 75% of estimated energy requirement, Greater than or equal to 3 months    2. Weight Loss-No significant weight loss,    3. Fat Loss-No significant subcutaneous fat loss,    4. Muscle Loss-No significant muscle mass loss,    5. Fluid Accumulation-Moderate to severe fluid accumulation, Extremities  6.  Strength-Not measured    Nutrition Risk Level: High    Nutrient Needs:  · Estimated Daily Total Kcal: 9572-0873 (kg x 11-13)  · Estimated Daily Protein (g): 58-70 (kg IBW x 1.0-1.2)  · Estimated Daily Total Fluid (ml/day): ~1800 ml(1 ml/kcal)    Nutrition Diagnosis:   · Problem: Inadequate oral intake  · Etiology: related to Insufficient energy/nutrient consumption     Signs and symptoms:  as evidenced by (NPO/Clear Liquids x 5 days)    Objective Information:  · Nutrition-Focused Physical Findings: NGT removed. I/O: 1829/2400 (200 NG). 1+ BLE edema. Resolved post-op ileus per MDLoephraim BM (5/17), Peripheral line. IVF NS @ 75 ml/hr. Meds: lasix, reglan.  PM includes: DM,COPD,breast cancer, biPolar, HTN, AL. · Wound Type: Surgical Wound  · Current Nutrition Therapies:  · Oral Diet Orders: Clear Liquid   · Oral Diet intake: %  · Oral Nutrition Supplement (ONS) Orders: None  · Anthropometric Measures:  · Ht: 5' 6.5\" (168.9 cm)   · Admission Body Wt: 313 lb (142 kg)  · Usual Body Wt: (> 300)  · % Weight Change:  ,  no weight loss  · Ideal Body Wt: 130 lb (59 kg), % Ideal Body > 100%  · BMI Classification: BMI > or equal to 40.0 Obese Class III    Nutrition Interventions:   Continue current diet, Start ONS(Advance to Carb Controlled 4 diet as tolerated. Start clear liquid ONS TID until diet advanced and po intake > 50%.  If unable to advance diet in next 24 hours, recommend start PN)  Continued Inpatient Monitoring, Education Not Indicated    Nutrition Evaluation:   · Evaluation: Goals set   · Goals: Ability to advance po diet with tolerance, intake > 75% of meals and supplements    · Monitoring: Monitor Bowel Function, Nutrition Progression, Meal Intake, Supplement Intake, Weight, Diet Tolerance      Electronically signed by Brennon Bahena RD, LD on 5/18/19 at 4:35 PM

## 2019-05-18 NOTE — PLAN OF CARE
Nutrition Problem: Inadequate oral intake  Intervention: Food and/or Nutrient Delivery: Continue current diet, Start ONS(Advance to Carb Controlled 4 diet as tolerated. Start clear liquid ONS TID until diet advanced and po intake > 50%.  If unable to advance diet in next 24 hours, recommend start PN)  Nutritional Goals: Ability to advance po diet with tolerance, intake > 75% of meals and supplements

## 2019-05-18 NOTE — PROGRESS NOTES
Patient was OOB to bathroom with walker and standby assist.  Gait steady. Voiding qs. NG draining small amounts. Denies nausea, reports hunger. ML incision LOGAN with staples, well approximated, no drainage. Lap sites also LOGAN with staples. Reports earlier dose of Percocet effective for  Pain control.

## 2019-05-19 LAB
GLUCOSE BLD-MCNC: 100 MG/DL (ref 60–115)
GLUCOSE BLD-MCNC: 116 MG/DL (ref 60–115)
GLUCOSE BLD-MCNC: 119 MG/DL (ref 60–115)
GLUCOSE BLD-MCNC: 120 MG/DL (ref 60–115)
PERFORMED ON: ABNORMAL
PERFORMED ON: NORMAL

## 2019-05-19 PROCEDURE — 1210000000 HC MED SURG R&B

## 2019-05-19 PROCEDURE — 6360000002 HC RX W HCPCS: Performed by: COLON & RECTAL SURGERY

## 2019-05-19 PROCEDURE — 6370000000 HC RX 637 (ALT 250 FOR IP): Performed by: INTERNAL MEDICINE

## 2019-05-19 PROCEDURE — 99024 POSTOP FOLLOW-UP VISIT: CPT | Performed by: COLON & RECTAL SURGERY

## 2019-05-19 PROCEDURE — 94640 AIRWAY INHALATION TREATMENT: CPT

## 2019-05-19 PROCEDURE — 6370000000 HC RX 637 (ALT 250 FOR IP): Performed by: COLON & RECTAL SURGERY

## 2019-05-19 PROCEDURE — 94760 N-INVAS EAR/PLS OXIMETRY 1: CPT

## 2019-05-19 PROCEDURE — 2580000003 HC RX 258: Performed by: COLON & RECTAL SURGERY

## 2019-05-19 PROCEDURE — 6360000002 HC RX W HCPCS: Performed by: SURGERY

## 2019-05-19 RX ORDER — HYDROXYZINE HYDROCHLORIDE 10 MG/1
10 TABLET, FILM COATED ORAL ONCE
Status: COMPLETED | OUTPATIENT
Start: 2019-05-19 | End: 2019-05-19

## 2019-05-19 RX ORDER — LANOLIN ALCOHOL/MO/W.PET/CERES
3 CREAM (GRAM) TOPICAL NIGHTLY PRN
Status: DISCONTINUED | OUTPATIENT
Start: 2019-05-19 | End: 2019-05-20 | Stop reason: HOSPADM

## 2019-05-19 RX ADMIN — MELATONIN 3 MG: at 20:39

## 2019-05-19 RX ADMIN — FUROSEMIDE 40 MG: 40 TABLET ORAL at 09:07

## 2019-05-19 RX ADMIN — HYDROXYZINE HYDROCHLORIDE 10 MG: 10 TABLET, FILM COATED ORAL at 01:29

## 2019-05-19 RX ADMIN — LEVOTHYROXINE SODIUM 175 MCG: 175 TABLET ORAL at 09:21

## 2019-05-19 RX ADMIN — ALBUTEROL SULFATE 2 PUFF: 90 AEROSOL, METERED RESPIRATORY (INHALATION) at 19:10

## 2019-05-19 RX ADMIN — Medication 10 ML: at 20:39

## 2019-05-19 RX ADMIN — DULOXETINE HYDROCHLORIDE 60 MG: 60 CAPSULE, DELAYED RELEASE ORAL at 09:08

## 2019-05-19 RX ADMIN — ENOXAPARIN SODIUM 40 MG: 40 INJECTION SUBCUTANEOUS at 09:08

## 2019-05-19 RX ADMIN — ARIPIPRAZOLE 10 MG: 5 TABLET ORAL at 20:39

## 2019-05-19 RX ADMIN — MOMETASONE FUROATE AND FORMOTEROL FUMARATE DIHYDRATE 2 PUFF: 200; 5 AEROSOL RESPIRATORY (INHALATION) at 19:14

## 2019-05-19 RX ADMIN — METOCLOPRAMIDE 5 MG: 5 INJECTION, SOLUTION INTRAMUSCULAR; INTRAVENOUS at 22:45

## 2019-05-19 RX ADMIN — SODIUM CHLORIDE: 9 INJECTION, SOLUTION INTRAVENOUS at 01:37

## 2019-05-19 RX ADMIN — LOSARTAN POTASSIUM 25 MG: 25 TABLET ORAL at 09:08

## 2019-05-19 RX ADMIN — METOCLOPRAMIDE 5 MG: 5 INJECTION, SOLUTION INTRAMUSCULAR; INTRAVENOUS at 04:29

## 2019-05-19 RX ADMIN — ALBUTEROL SULFATE 2 PUFF: 90 AEROSOL, METERED RESPIRATORY (INHALATION) at 08:07

## 2019-05-19 RX ADMIN — MOMETASONE FUROATE AND FORMOTEROL FUMARATE DIHYDRATE 2 PUFF: 200; 5 AEROSOL RESPIRATORY (INHALATION) at 08:07

## 2019-05-19 RX ADMIN — ANASTROZOLE 1 MG: 1 TABLET, COATED ORAL at 09:08

## 2019-05-19 RX ADMIN — METOCLOPRAMIDE 5 MG: 5 INJECTION, SOLUTION INTRAMUSCULAR; INTRAVENOUS at 15:51

## 2019-05-19 ASSESSMENT — PAIN SCALES - GENERAL
PAINLEVEL_OUTOF10: 0
PAINLEVEL_OUTOF10: 0

## 2019-05-19 NOTE — PROGRESS NOTES
Pt is requesting something for pain this evening. Perfect serve sent to dr Uzma Ray. Awaiting return call or orders.   Electronically signed by Agata Ellis RN on 5/19/2019 at 7:55 PM

## 2019-05-19 NOTE — PROGRESS NOTES
Hospitalist Progress Note      Date of Admission: 5/13/2019  Chief Complaint:    No chief complaint on file. Subjective:  No new complaints. No nausea, vomiting, chest pain, or headache      Medications:    Infusion Medications    lactated ringers 125 mL/hr at 05/14/19 0542    dextrose       Scheduled Medications    metoclopramide  5 mg Intravenous Q8H    insulin lispro  0-6 Units Subcutaneous Q6H    sodium chloride flush  10 mL Intravenous 2 times per day    anastrozole  1 mg Oral Daily    ARIPiprazole  10 mg Oral Daily    DULoxetine  60 mg Oral Daily    mometasone-formoterol  2 puff Inhalation BID    furosemide  40 mg Oral Daily    levothyroxine  175 mcg Oral Daily    losartan  25 mg Oral Daily    sodium chloride flush  10 mL Intravenous 2 times per day    enoxaparin  40 mg Subcutaneous Daily     PRN Meds: oxyCODONE-acetaminophen, sodium chloride flush, fentanNYL, albuterol sulfate HFA, sodium chloride flush, ondansetron, glucose, dextrose, glucagon (rDNA), dextrose    Intake/Output Summary (Last 24 hours) at 5/19/2019 1526  Last data filed at 5/19/2019 1509  Gross per 24 hour   Intake 1200 ml   Output 475 ml   Net 725 ml     Exam:  BP (!) 123/49   Pulse 70   Temp 98.1 °F (36.7 °C) (Oral)   Resp 16   Ht 5' 6.5\" (1.689 m)   Wt (!) 313 lb (142 kg)   SpO2 95%   BMI 49.76 kg/m²   Head: Normocephalic, atraumatic  Sclera clear  Neck supple, nontender  Lungs: clear    Labs:   No results for input(s): WBC, HGB, HCT, PLT in the last 72 hours. Recent Labs     05/18/19  0722      K 3.5   CL 97   CO2 31   BUN 20   CREATININE 0.74   CALCIUM 8.7     No results for input(s): INR in the last 72 hours. No results for input(s): Odin Milch in the last 72 hours. Radiology:  XR Chest Abdomen Ng Placement   Final Result   NG TUBE IN SATISFACTORY POSITION WITH DISTAL TIP OF TUBE IN REGION OF STOMACH. Assessment/Plan:    Htn: stable.   Monitoring BP    DM: monitor sugars, stable at this

## 2019-05-19 NOTE — PROGRESS NOTES
Pt Name: Joyce Kim Record Number: [id number]  Date of Birth 1955   Admit date 5/13/2019  5:39 AM  Today's Date: 5/19/2019     ASSESSMENT  1. Hospital day # 6  2  postop day #5 laparoscopic right colectomy  3. Ileus resolved    PLAN  1. Low fiber diet  2. Hep-Lock IV  3. Home in a.m. if okay      SUBJECTIVE  Chief complaint: Follow-up colectomy  Afebrile, vital signs are stable. She denies any nausea or vomiting, bowels are working. She is tolerating a DIET CLEAR LIQUID;  Dietary Nutrition Supplements: Clear Liquid Oral Supplement. Her pain is well controlled on current medications. She has been ambulating in the halls. has a past medical history of Acquired hypothyroidism, Allergic rhinitis, Anxiety, Asthma, Bipolar affect, depressed ([address] Bipolar disorder ([address] Breast cancer ([facility] [address] Cancer ([address] Chronic back pain, COPD (chronic obstructive pulmonary disease) ([facility] [address] Depression, Diabetes mellitus ([address] Emphysema of lung ([address] Essential hypertension, History of blood transfusion, Hyperlipidemia, Hypothyroidism, Obesity, On home O2, YARED (obstructive sleep apnea), Osteoarthritis, Restless legs syndrome, Sleep apnea, and Urinary incontinence.     CURRENT MEDS  Scheduled Meds:   metoclopramide  5 mg Intravenous Q8H    insulin lispro  0-6 Units Subcutaneous Q6H    sodium chloride flush  10 mL Intravenous 2 times per day    anastrozole  1 mg Oral Daily    ARIPiprazole  10 mg Oral Daily    DULoxetine  60 mg Oral Daily    mometasone-formoterol  2 puff Inhalation BID    furosemide  40 mg Oral Daily    levothyroxine  175 mcg Oral Daily    losartan  25 mg Oral Daily    sodium chloride flush  10 mL Intravenous 2 times per day    enoxaparin  40 mg Subcutaneous Daily     Continuous Infusions:   lactated ringers 125 mL/hr at 05/14/19 0542    sodium chloride 75 mL/hr at 05/19/19 0137    dextrose       PRN Meds:.oxyCODONE-acetaminophen, HYDROmorphone, sodium chloride flush, fentanNYL, HYDROmorphone, meperidine, albuterol sulfate HFA, sodium chloride flush, ondansetron, glucose, dextrose, glucagon (rDNA), dextrose    OBJECTIVE  CURRENT VITALS:  height is 5' 6.5\" (1.689 m) and weight is 313 lb (142 kg) (abnormal). Her oral temperature is 98.1 °F (36.7 °C). Her blood pressure is 123/49 (abnormal) and her pulse is 70. Her respiration is 16 and oxygen saturation is 95%. Temperature Range (24h):Temp: 98.1 °F (36.7 °C) Temp  Av.5 °F (36.9 °C)  Min: 98.1 °F (36.7 °C)  Max: 98.8 °F (37.1 °C)  BP Range (03Y): Systolic (36ZQB), RNU:216 , Min:120 , PPK:614     Diastolic (15LFE), BAY:60, Min:49, Max:50    Pulse Range (24h): Pulse  Av  Min: 66  Max: 70  Respiration Range (24h): Resp  Av  Min: 16  Max: 16    GENERAL: alert, no distress  LUNGS: clear to ausculation, without wheezes, rales or rhonci  HEART: normal rate and regular rhythm  ABDOMEN: soft, non-tender, non-distended, bowel sounds present in all 4 quadrants and no guarding or peritoneal signs  EXTERMITY: no cyanosis, clubbing or edema  In: 1200 [P.O.:300; I.V.:900]  Out: 175 [Urine:175]  Date 19 - 19 235   Shift 6588-4484 5062-8812 5972-4279 24 Hour Total   INTAKE   P.O.(mL/kg/hr) 300(0.3)   300   I. V.(mL/kg) 900(6.3)   900(6.3)   Shift Total(mL/kg) 1200(8.5)   1200(8.5)   OUTPUT   Urine(mL/kg/hr)  175  175   Shift Total(mL/kg)  175(1.2)  175(1.2)   Weight (kg) 142 142 142 142       LABS  Recent Labs     19  1106 19  0722   WBC 11.2*  --    HGB 12.2  --    HCT 38.7  --      --     141   K 3.9 3.5   CL 92* 97   CO2 31 31   BUN 13 20   CREATININE 0.67 0.74   CALCIUM 9.4 8.7      No results for input(s): PTT, INR in the last 72 hours. Invalid input(s): PT  No results for input(s): AST, ALT, BILITOT, BILIDIR, AMYLASE, LIPASE, LDH, LACTA in the last 72 hours.     RADIOLOGY  Ct Abdomen Pelvis W Iv Contrast Additional Contrast? Radiologist Recommendation    Result Date: 2019  CT of the Abdomen Xr Chest Abdomen Ng Placement    Result Date: 5/17/2019  EXAMINATION: PORTABLE CHEST X-RAY FROM 5/16/2019 AT 21:08 HOURS CLINICAL HISTORY: CHECK PLACEMENT OF ENTERIC TUBE COMPARISONS: 2/6/2019 FINDINGS: An NG tube appears in satisfactory position with the distal tip of the tube in the region of the stomach. Heart and mediastinum appear unremarkable. There is a shallow inspiration and subsegmental atelectasis in the mid left lung field. No evidence of a pneumothorax and no pleural effusions visible. There is degenerative bone spurring in the spine. NG TUBE IN SATISFACTORY POSITION WITH DISTAL TIP OF TUBE IN REGION OF STOMACH.     Electronically signed by Amanda Wang MD on 5/19/2019 at 10:23 AM

## 2019-05-20 VITALS
TEMPERATURE: 99 F | WEIGHT: 293 LBS | HEART RATE: 65 BPM | DIASTOLIC BLOOD PRESSURE: 50 MMHG | SYSTOLIC BLOOD PRESSURE: 117 MMHG | BODY MASS INDEX: 45.99 KG/M2 | OXYGEN SATURATION: 96 % | RESPIRATION RATE: 18 BRPM | HEIGHT: 67 IN

## 2019-05-20 LAB
GLUCOSE BLD-MCNC: 115 MG/DL (ref 60–115)
GLUCOSE BLD-MCNC: 126 MG/DL (ref 60–115)
PERFORMED ON: ABNORMAL
PERFORMED ON: NORMAL

## 2019-05-20 PROCEDURE — 94640 AIRWAY INHALATION TREATMENT: CPT

## 2019-05-20 PROCEDURE — 6370000000 HC RX 637 (ALT 250 FOR IP): Performed by: COLON & RECTAL SURGERY

## 2019-05-20 PROCEDURE — 6360000002 HC RX W HCPCS: Performed by: COLON & RECTAL SURGERY

## 2019-05-20 PROCEDURE — 99024 POSTOP FOLLOW-UP VISIT: CPT | Performed by: COLON & RECTAL SURGERY

## 2019-05-20 PROCEDURE — 6360000002 HC RX W HCPCS: Performed by: SURGERY

## 2019-05-20 RX ORDER — OXYCODONE HYDROCHLORIDE AND ACETAMINOPHEN 5; 325 MG/1; MG/1
1 TABLET ORAL EVERY 6 HOURS PRN
Qty: 20 TABLET | Refills: 0 | Status: SHIPPED | OUTPATIENT
Start: 2019-05-20 | End: 2019-05-23

## 2019-05-20 RX ADMIN — MOMETASONE FUROATE AND FORMOTEROL FUMARATE DIHYDRATE 2 PUFF: 200; 5 AEROSOL RESPIRATORY (INHALATION) at 08:10

## 2019-05-20 RX ADMIN — FUROSEMIDE 40 MG: 40 TABLET ORAL at 08:36

## 2019-05-20 RX ADMIN — ALBUTEROL SULFATE 2 PUFF: 90 AEROSOL, METERED RESPIRATORY (INHALATION) at 08:10

## 2019-05-20 RX ADMIN — ANASTROZOLE 1 MG: 1 TABLET, COATED ORAL at 08:41

## 2019-05-20 RX ADMIN — LOSARTAN POTASSIUM 25 MG: 25 TABLET ORAL at 08:36

## 2019-05-20 RX ADMIN — LEVOTHYROXINE SODIUM 175 MCG: 175 TABLET ORAL at 05:32

## 2019-05-20 RX ADMIN — METOCLOPRAMIDE 5 MG: 5 INJECTION, SOLUTION INTRAMUSCULAR; INTRAVENOUS at 08:36

## 2019-05-20 RX ADMIN — DULOXETINE HYDROCHLORIDE 60 MG: 60 CAPSULE, DELAYED RELEASE ORAL at 08:36

## 2019-05-20 RX ADMIN — ENOXAPARIN SODIUM 40 MG: 40 INJECTION SUBCUTANEOUS at 08:36

## 2019-05-20 ASSESSMENT — PAIN SCALES - GENERAL: PAINLEVEL_OUTOF10: 0

## 2019-05-20 NOTE — PROGRESS NOTES
Pt Name: Ann Cason Record Number: [id number]  Date of Birth 1955   Admit date 5/13/2019  5:39 AM  Today's Date: 5/20/2019     ASSESSMENT  1. Hospital day # 7  2  postop day #6 laparoscopic right colectomy    PLAN  1.  DC home      SUBJECTIVE  Chief complaint: Follow-up colectomy  Afebrile, vital signs are stable. She denies any nausea or vomiting, bowels are working. She is tolerating a DIET LOW FIBER;. Her pain is well controlled on current medications. She has been ambulating in the halls. has a past medical history of Acquired hypothyroidism, Allergic rhinitis, Anxiety, Asthma, Bipolar affect, depressed ([address] Bipolar disorder ([address] Breast cancer ([facility] [address] Cancer ([facility] [address] Chronic back pain, COPD (chronic obstructive pulmonary disease) ([facility] [address] Depression, Diabetes mellitus ([facility] [address] Emphysema of lung ([facility] [address] Essential hypertension, History of blood transfusion, Hyperlipidemia, Hypothyroidism, Obesity, On home O2, YARED (obstructive sleep apnea), Osteoarthritis, Restless legs syndrome, Sleep apnea, and Urinary incontinence.     CURRENT MEDS  Scheduled Meds:   insulin lispro  0-6 Units Subcutaneous 4x Daily AC & HS    metoclopramide  5 mg Intravenous Q8H    sodium chloride flush  10 mL Intravenous 2 times per day    anastrozole  1 mg Oral Daily    ARIPiprazole  10 mg Oral Daily    DULoxetine  60 mg Oral Daily    mometasone-formoterol  2 puff Inhalation BID    furosemide  40 mg Oral Daily    levothyroxine  175 mcg Oral Daily    losartan  25 mg Oral Daily    sodium chloride flush  10 mL Intravenous 2 times per day    enoxaparin  40 mg Subcutaneous Daily     Continuous Infusions:   lactated ringers 125 mL/hr at 05/14/19 0542    dextrose       PRN Meds:.melatonin, oxyCODONE-acetaminophen, sodium chloride flush, fentanNYL, albuterol sulfate HFA, sodium chloride flush, ondansetron, glucose, dextrose, glucagon (rDNA), dextrose    OBJECTIVE  CURRENT VITALS:  height is 5' 6.5\" (1.689 m) and weight is 321 lb 11.2 oz (145.9 kg) (abnormal). Her oral temperature is 99 °F (37.2 °C). Her blood pressure is 128/54 (abnormal) and her pulse is 71. Her respiration is 16 and oxygen saturation is 97%. Temperature Range (24h):Temp: 99 °F (37.2 °C) Temp  Av °F (37.2 °C)  Min: 99 °F (37.2 °C)  Max: 99 °F (37.2 °C)  BP Range (81F): Systolic (83KWU), HIZ:192 , Min:128 , FND:681     Diastolic (33XLO), ZFH:76, Min:54, Max:54    Pulse Range (24h): Pulse  Av  Min: 71  Max: 71  Respiration Range (24h): Resp  Av.7  Min: 16  Max: 20    GENERAL: alert, no distress  LUNGS: clear to ausculation, without wheezes, rales or rhonci  HEART: normal rate and regular rhythm  ABDOMEN: soft, non-tender, non-distended, incision clean, bowel sounds present in all 4 quadrants and no guarding or peritoneal signs  EXTERMITY: no cyanosis, clubbing or edema  In: 1250 [P.O.:980; I.V.:270]  Out: 950 [Urine:950]  Date 19 0000 - 19 2359   Shift 4424-4680 0122-4911 5689-9453 24 Hour Total   INTAKE   P.O.(mL/kg/hr) 480(0.4)   480   I. V.(mL/kg) 10(0.1)   10(0.1)   Shift Total(mL/kg) 490(3.4)   490(3.4)   OUTPUT   Urine(mL/kg/hr) 250(0.2)   250   Shift Total(mL/kg) 250(1.7)   250(1.7)   Weight (kg) 145.9 145.9 145.9 145.9       LABS  Recent Labs     19  0722      K 3.5   CL 97   CO2 31   BUN 20   CREATININE 0.74   CALCIUM 8.7      No results for input(s): PTT, INR in the last 72 hours. Invalid input(s): PT  No results for input(s): AST, ALT, BILITOT, BILIDIR, AMYLASE, LIPASE, LDH, LACTA in the last 72 hours. RADIOLOGY  Ct Abdomen Pelvis W Iv Contrast Additional Contrast? Radiologist Recommendation    Result Date: 2019  CT of the Abdomen and Pelvis with intravenous contrast medium History:  Colonic mass on colonoscopy. History of right mastectomy for invasive ductal carcinoma.  Technical Factors: CT imaging of the abdomen and pelvis were obtained and formatted as 5 mm contiguous axial images from the domes of the diaphragm to the symphysis pubis. Sagittal and coronal reconstructions were also obtained. Oral contrast medium:  Barium sulfate, 450 mL. Intravenous contrast medium:  Isovue-300, 100 mL. Comparison:  None. Findings: Lungs:  Lung bases are clear. Liver:  Normal in size, shape, and attenuation. Bile Ducts:  Normal in caliber. Gallbladder:  Surgically absent. Pancreas:  Normal without masses, cysts, ductal dilatation or calcification. Spleen:  Normal in size without masses or calcifications. No splenules. Kidneys:  Normal in size and enhancement. No hydronephrosis, masses, or stones. Adrenals:  Normal. Small bowel:  Normal in caliber. Appendix:  Surgically absent. Colon:  Normal in caliber. Peritoneum:  No ascites, free air, or fluid collections. Vessels: Aorta normal in course and caliber. Portal vein, splenic vein, superior mesenteric vein are patent. Lymph nodes:  Retroperitoneal:  No enlarged retroperitoneal lymph nodes. Mesenteric:  No enlarged mesenteric lymph nodes. Pelvic: No enlarged pelvic lymph nodes. Ureters: Normal in course and caliber. No calcifications. Bladder: No wall thickening. Reproductive organs: No pelvic masses. Abdominal Wall:  No hernia identified. Bones:  No bone lesions. Diffuse disc space narrowing lumbar spine. Vacuum disc, L4-5, L5-S1. 2 mm anterolisthesis of L3 on L4. Small anterior osteophytes lumbar spine. No post operative changes. No colonic mass identified. Grade 1 L3 spondylolisthesis. Appendectomy. Cholecystectomy. All CT scans at this facility use dose modulation, iterative reconstruction, and/or weight based dosing when appropriate to reduce radiation dose to as low as reasonably achievable.      Xr Chest Abdomen Ng Placement    Result Date: 5/17/2019  EXAMINATION: PORTABLE CHEST X-RAY FROM 5/16/2019 AT 21:08 HOURS CLINICAL HISTORY: CHECK PLACEMENT OF ENTERIC TUBE COMPARISONS: 2/6/2019 FINDINGS: An NG tube appears in satisfactory position with the distal tip of the tube in the region of the stomach. Heart and mediastinum appear unremarkable. There is a shallow inspiration and subsegmental atelectasis in the mid left lung field. No evidence of a pneumothorax and no pleural effusions visible. There is degenerative bone spurring in the spine. NG TUBE IN SATISFACTORY POSITION WITH DISTAL TIP OF TUBE IN REGION OF STOMACH.     Electronically signed by Svetlana Salmeron MD on 5/20/2019 at 9:07 AM

## 2019-05-20 NOTE — CARE COORDINATION
Met with pt who still plans to return to home at [place] and resume skilled [facility] through [address] through [facility]. Pt states she has already [address], but needs [facility] to notify [facility]. Pt also confirmed that she has home 02 with portability. Reviewed IMM.

## 2019-05-20 NOTE — PROGRESS NOTES
Pt is alert and oriented x4. Midlines incision with staples is open to air. Pt had 2 loose bm's so far this shift. Denies n/v and pain. Planning to discharge home today.      Electronically signed by Patrick Horvath RN on 5/20/2019 at 10:37 AM

## 2019-05-20 NOTE — PROGRESS NOTES
Pt resting in bed. Denies pain this shift. Midline abd staples as well as 3 lap sites open to air and no drainage noted. Pt having active bowel sounds. Dressing d/i to ble from chronic wounds.   Electronically signed by Dalia Min RN on 5/19/2019 at 10:15 PM

## 2019-05-20 NOTE — DISCHARGE SUMMARY
Physician Discharge Summary     Patient ID:  Abby Tomas  [id number]  89 y.o.  1955    Admit date: 5/13/2019    Discharge date and time: 5/20/2019    Admitting Physician: Francisco Leyden, MD     Discharge Physician: Same    Admission Diagnoses: Dysplastic colon polyp [K63.5]    Discharge Diagnoses: Same    Admission Condition: good    Discharged Condition: good    Indication for Admission: Dysplastic colon polyp in ascending colon    Hospital Course: She was brought to the operating room on 5/13/2019 for a laparoscopic-assisted right colectomy. The details of which can be found in the operative report. She was taken to the floor postoperatively pain control was excellent with a tap block as well as PCA as needed. She was kept on DVT prophylaxis throughout her hospital course. She was started on clear liquids but on postoperative day #3 had nausea and vomiting consistent with a ileus. She had a nasogastric tube placed for 36 hours. It was removed after her bowel function resumed. She was started on clear liquids and advanced to a low fiber diet prior to discharge. She had consultation to the hospitalist service for assistance with a number of her comorbidities. On postop day #7 she was doing well. She was eating without problems. She denied any abdominal pain. Histology was pending. Discharge instructions were given at the bedside regarding activity, medications, follow-up, and wound care. She will return back to see me in the office Friday for postoperative check.     Consults: Hospitalist    Significant Diagnostic Studies: labs: CBC, BMP    Treatments: IV hydration and surgery: Laparoscopic-assisted right colectomy    Discharge Exam:  See exam dated 5/20/2019    Disposition: home    In process/preliminary results:  Outstanding Order Results     Date and Time Order Name Status Description    5/13/2019 1048 Peripheral Block In process           Patient Instructions:   Current Discharge Medication List      START taking these medications    Details   oxyCODONE-acetaminophen (PERCOCET) 5-325 MG per tablet Take 1 tablet by mouth every 6 hours as needed for Pain for up to 3 days.   Qty: 20 tablet, Refills: 0    Comments: Reduce doses taken as pain becomes manageable  Associated Diagnoses: Dysplastic colon polyp         CONTINUE these medications which have NOT CHANGED    Details   GAVILYTE-N WITH FLAVOR PACK 420 g solution TAKE 4,000 ML BY MOUTH AS DIRECTED  Refills: 0      VORTIoxetine (TRINTELLIX) 10 MG TABS tablet Take 10 mg by mouth      furosemide (LASIX) 40 MG tablet TAKE 1 TABLET BY MOUTH EVERY DAY  Qty: 90 tablet, Refills: 1    Associated Diagnoses: Bilateral leg edema      albuterol sulfate HFA (VENTOLIN HFA) 108 (90 Base) MCG/ACT inhaler Inhale 2 puffs into the lungs every 6 hours as needed for Wheezing  Qty: 1 Inhaler, Refills: 5    Associated Diagnoses: COPD exacerbation (HCC); COPD with chronic bronchitis (HCC)      oxybutynin (DITROPAN-XL) 10 MG extended release tablet Take 10 mg by mouth daily      simvastatin (ZOCOR) 40 MG tablet TAKE 1 TABLET BY MOUTH EVERY EVENING  Qty: 90 tablet, Refills: 1    Associated Diagnoses: Mixed hyperlipidemia      losartan (COZAAR) 25 MG tablet Take 1 tablet by mouth daily  Qty: 90 tablet, Refills: 1      levothyroxine (SYNTHROID) 175 MCG tablet Take 1 tablet by mouth Daily  Qty: 90 tablet, Refills: 1    Associated Diagnoses: Acquired hypothyroidism      potassium chloride (KLOR-CON M) 10 MEQ extended release tablet Take 1 tablet by mouth daily  Qty: 90 tablet, Refills: 1    Associated Diagnoses: Bilateral leg edema      metFORMIN (GLUCOPHAGE XR) 500 MG extended release tablet Take 2 tablets by mouth daily (with breakfast)  Qty: 180 tablet, Refills: 1      ARIPiprazole (ABILIFY) 10 MG tablet TAKE 1 TABLET BY MOUTH EVERY DAY IN THE EVENING AS DIRECTED  Refills: 3      vitamin D (CHOLECALCIFEROL) 1000 UNIT TABS tablet Take 1,000 Units by mouth daily

## 2019-05-20 NOTE — PROGRESS NOTES
Assumed care of patient at 2300. No change in condition. Patient is alert and oriented X 3, no complaints of pain at this time. Bowel sounds present X 4, Incision intact, no drainage noted, and open to air.

## 2019-05-22 ENCOUNTER — OFFICE VISIT (OUTPATIENT)
Dept: GASTROENTEROLOGY | Age: 64
End: 2019-05-22
Payer: COMMERCIAL

## 2019-05-22 VITALS
OXYGEN SATURATION: 95 % | TEMPERATURE: 98 F | RESPIRATION RATE: 12 BRPM | BODY MASS INDEX: 45.99 KG/M2 | HEIGHT: 67 IN | WEIGHT: 293 LBS | HEART RATE: 68 BPM

## 2019-05-22 DIAGNOSIS — R10.9 ABDOMINAL PAIN WITH VOMITING: Primary | ICD-10-CM

## 2019-05-22 DIAGNOSIS — R11.10 ABDOMINAL PAIN WITH VOMITING: Primary | ICD-10-CM

## 2019-05-22 PROCEDURE — 3017F COLORECTAL CA SCREEN DOC REV: CPT | Performed by: SPECIALIST

## 2019-05-22 PROCEDURE — 1111F DSCHRG MED/CURRENT MED MERGE: CPT | Performed by: SPECIALIST

## 2019-05-22 PROCEDURE — 1036F TOBACCO NON-USER: CPT | Performed by: SPECIALIST

## 2019-05-22 PROCEDURE — 99213 OFFICE O/P EST LOW 20 MIN: CPT | Performed by: SPECIALIST

## 2019-05-22 PROCEDURE — G8427 DOCREV CUR MEDS BY ELIG CLIN: HCPCS | Performed by: SPECIALIST

## 2019-05-22 PROCEDURE — G8417 CALC BMI ABV UP PARAM F/U: HCPCS | Performed by: SPECIALIST

## 2019-05-22 ASSESSMENT — ENCOUNTER SYMPTOMS
EYES NEGATIVE: 1
RESPIRATORY NEGATIVE: 1
NAUSEA: 0
ABDOMINAL PAIN: 0
BLOOD IN STOOL: 0
RECTAL PAIN: 0
CONSTIPATION: 0
DIARRHEA: 1
VOMITING: 1
ABDOMINAL DISTENTION: 0
ANAL BLEEDING: 0

## 2019-05-22 NOTE — PROGRESS NOTES
[facility] Follow up Visit    Donis Slider  [id number]  Chief Complaint   Patient presents with    Follow-up       HPI and A/P at last visit summarized below:  Patient is here because of emesis. She had a bowel resection for colonic neoplasm. No history of any hematemesis. No history of any heartburn. She had bilious vomiting while in the hospital.  Has watery diarrhea.,  Had seen him ingested food particle in the vomitus. No history of any significant bloating after meals. Review of Systems   Constitutional: Negative. HENT: Negative. Eyes: Negative. Respiratory: Negative. Cardiovascular: Negative. Gastrointestinal: Positive for diarrhea and vomiting. Negative for abdominal distention, abdominal pain, anal bleeding, blood in stool, constipation, nausea and rectal pain. Endocrine: Negative. Genitourinary: Negative. Musculoskeletal: Negative. Skin: Negative. Allergic/Immunologic: Positive for food allergies. Neurological: Negative. Hematological: Negative. Psychiatric/Behavioral: Negative. Past medical history, past surgical history, medication list, social and familyhistory reviewed    Pulse 68, temperature 98 °F (36.7 °C), resp. rate 12, height 5' 6.5\" (1.689 m), weight (!) 312 lb (141.5 kg), SpO2 95 %, not currently breastfeeding.     Physical Exam    Laboratory, Pathology, Radiology reviewed in detail with relevantimportant investigations summarized below:    Recent Labs     05/16/19  1106 05/14/19  0646 04/30/19  1628   WBC 11.2* 8.8 9.7   HGB 12.2 10.1* 12.9   HCT 38.7 31.4* 38.2   MCV 89.3 87.4 87.7    172 220     Lab Results   Component Value Date    ALT 11 04/30/2019    AST 11 04/30/2019    ALKPHOS 91 04/30/2019    BILITOT <0.2 04/30/2019     Ct Abdomen Pelvis W Iv Contrast Additional Contrast? Radiologist Recommendation    Result Date: 5/6/2019  CT of the Abdomen and Pelvis with intravenous contrast medium History:  Colonic mass on colonoscopy. History of right mastectomy for invasive ductal carcinoma. Technical Factors: CT imaging of the abdomen and pelvis were obtained and formatted as 5 mm contiguous axial images from the domes of the diaphragm to the symphysis pubis. Sagittal and coronal reconstructions were also obtained. Oral contrast medium:  Barium sulfate, 450 mL. Intravenous contrast medium:  Isovue-300, 100 mL. Comparison:  None. Findings: Lungs:  Lung bases are clear. Liver:  Normal in size, shape, and attenuation. Bile Ducts:  Normal in caliber. Gallbladder:  Surgically absent. Pancreas:  Normal without masses, cysts, ductal dilatation or calcification. Spleen:  Normal in size without masses or calcifications. No splenules. Kidneys:  Normal in size and enhancement. No hydronephrosis, masses, or stones. Adrenals:  Normal. Small bowel:  Normal in caliber. Appendix:  Surgically absent. Colon:  Normal in caliber. Peritoneum:  No ascites, free air, or fluid collections. Vessels: Aorta normal in course and caliber. Portal vein, splenic vein, superior mesenteric vein are patent. Lymph nodes:  Retroperitoneal:  No enlarged retroperitoneal lymph nodes. Mesenteric:  No enlarged mesenteric lymph nodes. Pelvic: No enlarged pelvic lymph nodes. Ureters: Normal in course and caliber. No calcifications. Bladder: No wall thickening. Reproductive organs: No pelvic masses. Abdominal Wall:  No hernia identified. Bones:  No bone lesions. Diffuse disc space narrowing lumbar spine. Vacuum disc, L4-5, L5-S1. 2 mm anterolisthesis of L3 on L4. Small anterior osteophytes lumbar spine. No post operative changes. No colonic mass identified. Grade 1 L3 spondylolisthesis. Appendectomy. Cholecystectomy. All CT scans at this facility use dose modulation, iterative reconstruction, and/or weight based dosing when appropriate to reduce radiation dose to as low as reasonably achievable.      Xr Chest Abdomen Ng Placement    Result Date:

## 2019-05-24 ENCOUNTER — OFFICE VISIT (OUTPATIENT)
Dept: SURGERY | Age: 64
End: 2019-05-24

## 2019-05-24 VITALS
HEART RATE: 85 BPM | TEMPERATURE: 99.2 F | HEIGHT: 67 IN | WEIGHT: 293 LBS | OXYGEN SATURATION: 95 % | BODY MASS INDEX: 45.99 KG/M2

## 2019-05-24 DIAGNOSIS — D12.2 ADENOMATOUS POLYP OF ASCENDING COLON: Primary | ICD-10-CM

## 2019-05-24 PROCEDURE — 99024 POSTOP FOLLOW-UP VISIT: CPT | Performed by: COLON & RECTAL SURGERY

## 2019-05-24 NOTE — PROGRESS NOTES
Subjective:      Patient ID: Bertin Mccall is a 61 y.o. female who presents for:  Chief Complaint   Patient presents with    Post-Op Check       This is a 51-year-old female who is 10 days postop from a laparoscopic-assisted right colectomy for a large benign adenomatous polyp. She's been doing well but having some diarrhea. She is also been having trouble returning back to normal diet but is tolerating a full liquid diet currently. She takes no pain medication. She denies any fever. Past Medical History:   Diagnosis Date    Acquired hypothyroidism 3/15/2016    Allergic rhinitis     pollen, dust ragweed, hay and straw     Anxiety     Asthma     Bipolar affect, depressed ([address]     Bipolar disorder ([address]     Breast cancer ([address]     Invasive ductal cancer right breast    Cancer ([address] 1980    thyroid cancer     Chronic back pain     COPD (chronic obstructive pulmonary disease) ([address]     Depression     Diabetes mellitus ([address]     Emphysema of lung ([address]     Essential hypertension 3/15/2016    History of blood transfusion     2019    Hyperlipidemia     Hypothyroidism     Obesity     On home O2     3L NC at night    YARED (obstructive sleep apnea)     3 L NC    Osteoarthritis     Restless legs syndrome     Sleep apnea     Urinary incontinence      Past Surgical History:   Procedure Laterality Date    APPENDECTOMY      BREAST BIOPSY Right 2018     SECTION      CHOLECYSTECTOMY      COLONOSCOPY N/A 2019    COLONOSCOPY DIAGNOSTIC performed by Willow Lane MD at [address] Right 2019    LAPAROSCOPIC RIGHT COLECTOMY, LYSIS OF ADHESIONS. performed by Damion Haro MD at [address] [facility] Right 2019    Simple mastectomy with sentinel node biopsy    TUBAL LIGATION       Social History     Socioeconomic History    Marital status:       Spouse name: Not on file    Number of children: Not on file    Years of education: Not on file    Highest education level: Not on file   Occupational History    Not on file   Social Needs    Financial resource strain: Not on file    Food insecurity:     Worry: Not on file     Inability: Not on file    Transportation needs:     Medical: Not on file     Non-medical: Not on file   Tobacco Use    Smoking status: Former Smoker     Packs/day: 1.00     Years: 40.00     Pack years: 40.00     Types: Cigarettes     Start date: 3/8/1976     Last attempt to quit: 2018     Years since quittin.9    Smokeless tobacco: Never Used   Substance and Sexual Activity    Alcohol use: No     Alcohol/week: 0.0 oz    Drug use: No    Sexual activity: Never   Lifestyle    Physical activity:     Days per week: Not on file     Minutes per session: Not on file    Stress: Not on file   Relationships    Social connections:     Talks on phone: Not on file     Gets together: Not on file     Attends Yazidism service: Not on file     Active member of club or organization: Not on file     Attends meetings of clubs or organizations: Not on file     Relationship status: Not on file    Intimate partner violence:     Fear of current or ex partner: Not on file     Emotionally abused: Not on file     Physically abused: Not on file     Forced sexual activity: Not on file   Other Topics Concern    Not on file   Social History Narrative    Not on file     Family History   Problem Relation Age of Onset    Diabetes Father     Heart Disease Father     Thyroid Cancer Sister     Thyroid Cancer Brother     Thyroid Cancer Sister     No Known Problems Daughter      Allergies:  Latex; Sulfa antibiotics; Bactrim [sulfamethoxazole-trimethoprim]; and Nicotine    Review of Systems    Objective:    Pulse 85   Temp 99.2 °F (37.3 °C) (Temporal)   Ht 5' 6.5\" (1.689 m)   Wt (!) 310 lb (140.6 kg)   SpO2 95%   BMI 49.29 kg/m²     Physical Exam  On exam her incisions are healing well.   All the laparoscopic

## 2019-05-30 ENCOUNTER — HOSPITAL ENCOUNTER (OUTPATIENT)
Dept: WOUND CARE | Age: 64
Discharge: HOME | End: 2019-05-30
Payer: COMMERCIAL

## 2019-05-30 VITALS
TEMPERATURE: 98.3 F | SYSTOLIC BLOOD PRESSURE: 121 MMHG | HEIGHT: 67 IN | WEIGHT: 293 LBS | RESPIRATION RATE: 16 BRPM | BODY MASS INDEX: 45.99 KG/M2 | HEART RATE: 76 BPM | DIASTOLIC BLOOD PRESSURE: 56 MMHG

## 2019-05-30 PROCEDURE — 11042 DBRDMT SUBQ TIS 1ST 20SQCM/<: CPT

## 2019-05-30 RX ORDER — OXYCODONE HYDROCHLORIDE AND ACETAMINOPHEN 5; 325 MG/1; MG/1
1 TABLET ORAL EVERY 6 HOURS PRN
COMMUNITY
End: 2019-06-20

## 2019-05-30 ASSESSMENT — PAIN SCALES - GENERAL: PAINLEVEL_OUTOF10: 0

## 2019-05-30 NOTE — CODE DOCUMENTATION
[id number] Physician Billing Sheet. Cat Sinha  AGE: 61 y.o.    GENDER: female  : 1955  TODAY'S DATE:  2019    ICD-10 2000 [facility] Problems    Diagnosis Date Noted    Non-pressure chronic ulcer of other part of right lower leg with fat layer exposed ([facility] [L97.812] 2019    Chronic venous insufficiency [I87.2] 2019    Morbid obesity with BMI of 45.0-49.9, adult ([facility] [E66.01, Z68.42] 2018    Type 2 diabetes mellitus with complication ([facility] [D40.5] 03/15/2016       PHYSICIAN PROCEDURES    CPT CODE  02302      Electronically signed by Hui Tobias DPM on 2019 at 2:11 PM

## 2019-05-30 NOTE — PROGRESS NOTES
Anna Watkins 37                                                   Progress Note and Procedure Note      [address] RECORD NUMBER:  [id number]  AGE: 61 y.o. GENDER: female  : 1955  EPISODE DATE:  2019    Subjective:     Chief Complaint   Patient presents with    Wound Check     left knee and leg         HISTORY of PRESENT ILLNESS HPI    Alan Carpenter is a 61 y.o. female who presents today for wound/ulcer evaluation. History of Wound Context: Presents with RLE wound (LLE wounds have since healed). Recently hospitalized for dysplastic colon polyp. While in house, she states her dressings were not done properly and she thinks her RLE wound has gotten bigger. Since being home (discharge ) she states [facility] has been applying unna boots as instructed. Denies any constitutional symptoms.   Wound/Ulcer Pain Timing/Severity: intermittent  Quality of pain: dull  Severity:  2 / 10   Modifying Factors: Pain worsens with walking  Associated Signs/Symptoms: edema and erythema     Ulcer Identification:  Ulcer Type: venous and diabetic  Contributing Factors: edema, venous stasis and diabetes     Wound: N/A          PAST MEDICAL HISTORY        Diagnosis Date    Acquired hypothyroidism 3/15/2016    Allergic rhinitis     pollen, dust ragweed, hay and straw     Anxiety     Asthma     Bipolar affect, depressed (HCC)     Bipolar disorder ([address]     Breast cancer ([address]     Invasive ductal cancer right breast    Cancer ([address]     thyroid cancer     Chronic back pain     COPD (chronic obstructive pulmonary disease) ([address]     Depression     Diabetes mellitus ([address]     Emphysema of lung ([address]     Essential hypertension 3/15/2016    History of blood transfusion     2019    Hyperlipidemia     Hypothyroidism     Obesity     On home O2     3L NC at night    YARED (obstructive sleep apnea)     3 L NC    Osteoarthritis     Restless legs syndrome     Sleep apnea     Urinary incontinence        PAST SURGICAL HISTORY    Past Surgical History:   Procedure Laterality Date    APPENDECTOMY      BREAST BIOPSY Right 2018     SECTION      CHOLECYSTECTOMY      COLONOSCOPY N/A 2019    COLONOSCOPY DIAGNOSTIC performed by Jaden Sherwood MD at [address] Right 2019    LAPAROSCOPIC RIGHT COLECTOMY, LYSIS OF ADHESIONS. performed by Barbara Jasmine MD at [address] Right 2019    Simple mastectomy with sentinel node biopsy    TUBAL LIGATION         FAMILY HISTORY    Family History   Problem Relation Age of Onset    Diabetes Father     Heart Disease Father     Thyroid Cancer Sister     Thyroid Cancer Brother     Thyroid Cancer Sister     No Known Problems Daughter        SOCIAL HISTORY    Social History     Tobacco Use    Smoking status: Former Smoker     Packs/day: 1.00     Years: 40.00     Pack years: 40.00     Types: Cigarettes     Start date: 3/8/1976     Last attempt to quit: 2018     Years since quittin.9    Smokeless tobacco: Never Used   Substance Use Topics    Alcohol use: No     Alcohol/week: 0.0 oz    Drug use: No       ALLERGIES    Allergies   Allergen Reactions    Latex Itching and Rash    Sulfa Antibiotics Hives    Bactrim [Sulfamethoxazole-Trimethoprim] Hives and Other (See Comments)     Metallic taste    Nicotine Hives     Hives from the patch       MEDICATIONS    Current Outpatient Medications on File Prior to Encounter   Medication Sig Dispense Refill    oxyCODONE-acetaminophen (PERCOCET) 5-325 MG per tablet Take 1 tablet by mouth every 6 hours as needed for Pain.  GAVILYTE-N WITH FLAVOR PACK 420 g solution TAKE 4,000 ML BY MOUTH AS DIRECTED  0    VORTIoxetine (TRINTELLIX) 10 MG TABS tablet Take 10 mg by mouth      furosemide (LASIX) 40 MG tablet TAKE 1 TABLET BY MOUTH EVERY DAY 90 tablet 1    sodium chloride 0.9 % irrigation Irrigate with as directed for 1 dose.  1000 mL 5  sodium chloride 0.9 % irrigation Irrigate with as directed for 1 dose. 1000 mL 5    albuterol sulfate HFA (VENTOLIN HFA) 108 (90 Base) MCG/ACT inhaler Inhale 2 puffs into the lungs every 6 hours as needed for Wheezing 1 Inhaler 5    oxybutynin (DITROPAN-XL) 10 MG extended release tablet Take 10 mg by mouth daily      simvastatin (ZOCOR) 40 MG tablet TAKE 1 TABLET BY MOUTH EVERY EVENING 90 tablet 1    losartan (COZAAR) 25 MG tablet Take 1 tablet by mouth daily 90 tablet 1    levothyroxine (SYNTHROID) 175 MCG tablet Take 1 tablet by mouth Daily 90 tablet 1    potassium chloride (KLOR-CON M) 10 MEQ extended release tablet Take 1 tablet by mouth daily 90 tablet 1    metFORMIN (GLUCOPHAGE XR) 500 MG extended release tablet Take 2 tablets by mouth daily (with breakfast) 180 tablet 1    ARIPiprazole (ABILIFY) 10 MG tablet TAKE 1 TABLET BY MOUTH EVERY DAY IN THE EVENING AS DIRECTED  3    Multiple Vitamin (MULTIVITAMIN) tablet Take 1 tablet by mouth daily      Multiple Vitamins-Minerals (ICAPS AREDS 2 PO) Take 1 tablet by mouth daily      vitamin D (CHOLECALCIFEROL) 1000 UNIT TABS tablet Take 1,000 Units by mouth daily      anastrozole (ARIMIDEX) 1 MG tablet Take 1 mg by mouth daily      ACCU-CHEK SMARTVIEW strip Test TID E11.8 300 each 3    ipratropium-albuterol (DUONEB) 0.5-2.5 (3) MG/3ML SOLN nebulizer solution 3 mLs      fluticasone-vilanterol (BREO ELLIPTA) 100-25 MCG/INH AEPB inhaler Inhale 1 puff into the lungs daily 28 each 5    SOFT TOUCH LANCETS MISC Use tid as directed dx: E11.9 300 each 3    hydrOXYzine (VISTARIL) 25 MG capsule Take 25 mg by mouth 3 times daily as needed       DULoxetine (CYMBALTA) 60 MG extended release capsule Take 60 mg by mouth daily        No current facility-administered medications on file prior to encounter. REVIEW OF SYSTEMS    Pertinent items are noted in HPI.     Objective:      BP (!) 121/56   Pulse 76   Temp 98.3 °F (36.8 °C) (Temporal)   Resp 16   Ht 5' 6.5\" (1.689 m)   Wt (!) 310 lb (140.6 kg)   BMI 49.29 kg/m²     Wt Readings from Last 3 Encounters:   05/30/19 (!) 310 lb (140.6 kg)   05/24/19 (!) 310 lb (140.6 kg)   05/22/19 (!) 312 lb (141.5 kg)       PHYSICAL EXAM    Constitutional:   Well nourished and well developed. Appears neat and clean. Patient is alert, oriented x3, and in no apparent distress. Respiratory:  Respiratory effort is easy and symmetric bilaterally. Rate is normal at rest and on room air. Vascular:  Pedal Pulses is palpable and audible with doppler. Capillary refill is <3 sec to digits bilateral.  Extremities negative for pitting edema. Neurological:  Cranial nerves grossly intact. Deep tendon reflexes of the lower extremities are intact and symmetrical bilaterally. Sensation normal to touch and vibration. Sensation intact to 10 gram monofilament in extremities. Musculoskeletal:  Gait and station stable. Muscle strength +5/5 to all extrinsic muscles to the foot bilateral.  Full range of motion of ankle, subtalar, and midtarsal joints noted without crepitation. No cyanosis, clubbing or edema noted. Dermatological: LLE wounds have since healed. R anterior leg wound has a mixed fibrotic and granular wound bed with overlying biofilm. Wound measurements noted in wound assessment. Otherwise, skin is warm, dry, and well-hydrated with normal turgor, texture, and pigmentation. Psychiatric:  Judgement and insight intact. Short and long term memory intact. No evidence of depression, anxiety, or agitation. Patient is calm, cooperative, and communicative. Appropriate interactions and affect.         Assessment:      Patient Active Problem List   Diagnosis Code    Type 2 diabetes mellitus with complication ([facility]) R80.1    Acquired hypothyroidism E03.9    Essential hypertension I10    Mixed hyperlipidemia E78.2    COPD with chronic bronchitis ([facility] J44.9    Microalbuminuria R80.9    Moderate episode of recurrent major depressive disorder ([facility] F33.1    YARED (obstructive sleep apnea) G47.33    Restless leg syndrome G25.81    Vitamin B12 deficiency E53.8    Vitamin D deficiency E55.9    Left ventricular hypertrophy I51.7    Left ventricular diastolic dysfunction X31.1    Morbid obesity with BMI of 45.0-49.9, adult (HCC) E66.01, Z68.42    Breast carcinoma, female, right (HCC) C50.911    Iron deficiency anemia secondary to inadequate dietary iron intake D50.8    Breast cancer of lower-inner quadrant of right female breast (HCC) C50.311    Postoperative anemia D64.9    Malignant neoplasm of central portion of right breast in female, estrogen receptor positive (HCC) C50.111, Z17.0    Iron deficiency anemia due to chronic blood loss D50.0    Non-pressure chronic ulcer of other part of right lower leg with fat layer exposed ([facility] L97.812    Chronic venous insufficiency I87.2    Adenomatous polyp of ascending colon D12.2    Adenomatous polyp of sigmoid colon D12.5    Dysplastic colon polyp K63.5        Procedure Note  Indications:  Based on my examination of this patient's wound(s)/ulcer(s) today, debridement is required to promote healing and evaluate the wound base. Performed by: Ashley Haynes DPM    Consent obtained:  Yes    Time out taken:  Yes    Pain Control: Anesthetic  Anesthetic: None       Debridement:Excisional Debridement    Using curette the wound(s)/ulcer(s) was/were sharply debrided down through and including the removal of epidermis, dermis and subcutaneous tissue. Devitalized Tissue Debrided:  fibrin, biofilm and slough    Pre Debridement Measurements:  Are located in the [place]  Documentation Flow Sheet    Wound/Ulcer #: 1    Post Debridement Measurements:  Wound/Ulcer Descriptions are Pre Debridement except measurements:    Wound 02/05/19 Leg Lower;Right; Anterior #1  (Active)   Wound Image   5/30/2019  1:04 PM   Wound Venous 5/30/2019  1:04 PM   Dressing Status Clean;Dry; Intact 1:04 PM   Drainage Description Serosanguinous 4/18/2019  1:59 PM   Odor None 5/18/2019 10:55 AM   Margins Defined edges 4/18/2019  1:59 PM   Tamara-wound Assessment Dry 4/18/2019  1:59 PM   Non-staged Wound Description Full thickness 4/18/2019  1:59 PM   Red%Wound Bed 100 4/18/2019  1:59 PM   Number of days: 41       Wound 04/18/19 Leg Left; Lower #3 (Active)   Wound Image   5/30/2019  1:04 PM   Wound Traumatic 5/30/2019  1:04 PM   Dressing Status Dry 5/17/2019  9:04 PM   Dressing Changed Changed/New 5/17/2019  6:45 PM   Dressing/Treatment Silver Dressing 5/17/2019  6:45 PM   Wound Cleansed Rinsed/Irrigated with saline 5/17/2019  6:45 PM   Wound Length (cm) 0 cm 5/30/2019  1:04 PM   Wound Width (cm) 0 cm 5/30/2019  1:04 PM   Wound Depth (cm) 0 cm 5/30/2019  1:04 PM   Wound Surface Area (cm^2) 0 cm^2 5/30/2019  1:04 PM   Change in Wound Size % (l*w) 100 5/30/2019  1:04 PM   Wound Volume (cm^3) 0 cm^3 5/30/2019  1:04 PM   Wound Healing % 100 5/30/2019  1:04 PM   Wound Assessment Dry;Red; Intact 5/13/2019  8:50 PM   Drainage Amount None 5/30/2019  1:04 PM   Drainage Description Serosanguinous 5/17/2019  6:45 PM   Odor Mild 5/17/2019  6:45 PM   Margins Defined edges 4/18/2019  2:08 PM   Tamara-wound Assessment Dry 4/18/2019  2:08 PM   Non-staged Wound Description Full thickness 4/18/2019  2:08 PM   Red%Wound Bed 100 4/18/2019  2:08 PM   Number of days: 41       Incision (Active)   Number of days:        Incision 05/13/19 Abdomen Right (Active)   Wound Assessment Clean;Dry; Intact 5/18/2019 10:55 AM   Tamara-wound Assessment Intact; Clean;Dry 5/18/2019 10:55 AM   Closure Staples 5/18/2019 10:55 AM   Drainage Amount None 5/18/2019 10:55 AM   Drainage Description Yellow 5/17/2019  9:23 AM   Odor None 5/18/2019 10:55 AM   Dressing/Treatment Open to air 5/18/2019 10:55 AM   Dressing Changed Changed/New 5/15/2019 10:03 PM   Dressing Status Dry;Clean; Intact; Changed 5/15/2019 10:03 PM   Number of days: 17       Percent of Wound/Ulcer Debrided: 100%    Total Surface Area Debrided:  14.4 sq cm     Diabetic/Pressure/Non Pressure Ulcers:  Ulcer: Non-Pressure ulcer, fat layer exposed      Bleeding:  Minimal    Hemostasis Achieved:  by pressure    Procedural Pain:  2  / 10     Post Procedural Pain:  2 / 10     Response to treatment:  Well tolerated by patient. Plan:     Add promogran to dressing changes in addition to silvercel and unna boots  Follow up 3 weeks    Treatment Note please see attached Discharge Instructions    In my professional opinion this patient would benefit from HBO Therapy: Yes    Written patient dismissal instructions given to patient and signed by patient or POA. Discharge Instructions         Discharge Instructions        Visit Discharge/Physician Orders:     Home Care: [facility] (Home Care to order dressings as needed)     Wound Location:  Right Legs     Dressing orders:  1.Cleanse wound(s) with normal saline.  Sal;ine moist promogran then  2. Apply dry SILVERCEL OR CALCIUM ALGINATE WITH Ag or eqivalent to wound bed  3. Cover with 4x4's, calamine unna boots and coban wrap or ace wraps  4.  Change  Dressings Thursday and Monday or twice weekly      Compression: Apply Spandagrip to left(10-20) lower leg, apply first thing in the morning, remove at bedtime, elevate legs as much as possible during the day.     Other Instructions:        Keep all dressings clean & dry.  Keep pressure off the wound(s) at all times. Do not shower, take baths or get wound wet, unless otherwise instructed by your Wound Care doctor.     Follow up visit   3 weeks  June 20, 2019       For Diabetic patients keep blood sugars below 150 for optimal wound healing.     If you experience any of the following, please call the Wound Care Service during business hours: [phone]         *Increase in pain   *Temperature over 101   *Increase in drainage from your wound or a foul odor   *Uncontrolled swelling   *Need for compression bandage changes due to slippage, breakthrough drainage     If you need medical attention outside of business hours, please contact your Primary Care Doctor or go to the nearest emergency room. Keep next scheduled appointment. Dois Jim give 24 hour notice if unable to keep appointment. [phone]     PLEASE NOTE: IF YOU ARE UNABLE TO OBTAIN WOUND SUPPLIES, CONTINUE TO USE THE SUPPLIES YOU HAVE AVAILABLE UNTIL YOU ARE ABLE TO REACH US.  IT IS MOST IMPORTANT TO KEEP THE WOUND COVERED AT ALL TIMES    Electronically signed by Dahlia Johnson DPM on 5/30/2019 at 2:01 PM            Electronically signed by Dahlia Johnson DPM on 5/30/2019 at 2:09 PM

## 2019-06-06 ENCOUNTER — OFFICE VISIT (OUTPATIENT)
Dept: SURGERY | Age: 64
End: 2019-06-06

## 2019-06-06 VITALS
HEART RATE: 78 BPM | HEIGHT: 67 IN | OXYGEN SATURATION: 93 % | TEMPERATURE: 97.6 F | BODY MASS INDEX: 45.99 KG/M2 | WEIGHT: 293 LBS

## 2019-06-06 DIAGNOSIS — D12.2 ADENOMATOUS POLYP OF ASCENDING COLON: Primary | ICD-10-CM

## 2019-06-06 PROCEDURE — 99024 POSTOP FOLLOW-UP VISIT: CPT | Performed by: COLON & RECTAL SURGERY

## 2019-06-06 NOTE — PROGRESS NOTES
Subjective:      Patient ID: Vanessa Deluna is a 61 y.o. female who presents for:  Chief Complaint   Patient presents with    Post-Op Check       She returns to the office about 3-4 weeks after a laparoscopic-assisted right colectomy for a benign colon polyp. She is doing well. Her bowel movements are formed. She denies fever. She has occasional episodes of nausea. She denies any pain. Past Medical History:   Diagnosis Date    Acquired hypothyroidism 3/15/2016    Allergic rhinitis     pollen, dust ragweed, hay and straw     Anxiety     Asthma     Bipolar affect, depressed ([address]     Bipolar disorder ([address]     Breast cancer ([address]     Invasive ductal cancer right breast    Cancer ([address] 1980    thyroid cancer     Chronic back pain     COPD (chronic obstructive pulmonary disease) ([address]     Depression     Diabetes mellitus ([address]     Emphysema of lung ([address]     Essential hypertension 3/15/2016    History of blood transfusion     2019    Hyperlipidemia     Hypothyroidism     Obesity     On home O2     3L NC at night    YARED (obstructive sleep apnea)     3 L NC    Osteoarthritis     Restless legs syndrome     Sleep apnea     Urinary incontinence      Past Surgical History:   Procedure Laterality Date    APPENDECTOMY      BREAST BIOPSY Right 2018     SECTION      CHOLECYSTECTOMY      COLONOSCOPY N/A 2019    COLONOSCOPY DIAGNOSTIC performed by Saba Nolan MD at [address] Right 2019    LAPAROSCOPIC RIGHT COLECTOMY, LYSIS OF ADHESIONS. performed by Ashia Triana MD at [address] [facility] Right 2019    Simple mastectomy with sentinel node biopsy    TUBAL LIGATION       Social History     Socioeconomic History    Marital status:       Spouse name: Not on file    Number of children: Not on file    Years of education: Not on file    Highest education level: Not on file   Occupational History    Not on file   Social Needs    Financial resource strain: Not on file    Food insecurity:     Worry: Not on file     Inability: Not on file    Transportation needs:     Medical: Not on file     Non-medical: Not on file   Tobacco Use    Smoking status: Former Smoker     Packs/day: 1.00     Years: 40.00     Pack years: 40.00     Types: Cigarettes     Start date: 3/8/1976     Last attempt to quit: 2018     Years since quittin.9    Smokeless tobacco: Never Used   Substance and Sexual Activity    Alcohol use: No     Alcohol/week: 0.0 oz    Drug use: No    Sexual activity: Never   Lifestyle    Physical activity:     Days per week: Not on file     Minutes per session: Not on file    Stress: Not on file   Relationships    Social connections:     Talks on phone: Not on file     Gets together: Not on file     Attends [employer] service: Not on file     Active member of club or organization: Not on file     Attends meetings of clubs or organizations: Not on file     Relationship status: Not on file    Intimate partner violence:     Fear of current or ex partner: Not on file     Emotionally abused: Not on file     Physically abused: Not on file     Forced sexual activity: Not on file   Other Topics Concern    Not on file   Social History Narrative    Not on file     Family History   Problem Relation Age of Onset    Diabetes Father     Heart Disease Father     Thyroid Cancer Sister     Thyroid Cancer Brother     Thyroid Cancer Sister     No Known Problems Daughter      Allergies:  Latex; Sulfa antibiotics; Bactrim [sulfamethoxazole-trimethoprim]; and Nicotine    Review of Systems    Objective:    Pulse 78   Temp 97.6 °F (36.4 °C) (Temporal)   Ht 5' 6.5\" (1.689 m)   Wt (!) 306 lb (138.8 kg)   SpO2 93%   BMI 48.65 kg/m²     Physical Exam  On exam her abdomen soft nontender and nondistended. Her incision has healed. A few remaining staples were removed. No signs of hernia or evidence of infection. Assessment/Plan:          Diagnosis Orders   1. Adenomatous polyp of ascending colon          She will return back to see me in the office as needed. She is scheduled for a potential EGD in the near future and she can follow up with her gastroenterologist if she wishes to proceed. Please note this report has beenpartially produced using speech recognition software and may cause contain errors related to that system including grammar, punctuation and spelling as well as words and phrases that may seem inappropriate.  If there arequestions or concerns please feel free to contact me to clarify

## 2019-06-20 ENCOUNTER — HOSPITAL ENCOUNTER (OUTPATIENT)
Dept: WOUND CARE | Age: 64
Discharge: HOME | End: 2019-06-20
Payer: COMMERCIAL

## 2019-06-20 VITALS
SYSTOLIC BLOOD PRESSURE: 118 MMHG | DIASTOLIC BLOOD PRESSURE: 56 MMHG | TEMPERATURE: 98.1 F | HEART RATE: 72 BPM | RESPIRATION RATE: 18 BRPM

## 2019-06-20 PROCEDURE — 11042 DBRDMT SUBQ TIS 1ST 20SQCM/<: CPT

## 2019-06-20 NOTE — PROGRESS NOTES
Anna Watkins 37                                                   Progress Note and Procedure Note      [address] Gonzalez RECORD NUMBER:  [id number]  AGE: 61 y.o. GENDER: female  : 1955  EPISODE DATE:  2019    Subjective:     Chief Complaint   Patient presents with    Wound Check     right leg         HISTORY of PRESENT ILLNESS HPI     Juwan Cardenas is a 61 y.o. female who presents today for wound/ulcer evaluation. Patient is seen for CVI ulcerations to the right anterior leg. She has been applying unna boots with pomogran and silvercel for absorptive capacity since her last visit. She has them changed twice weekly. They have progressed well.    Severity:  0 / 10   Modifying Factors: None  Associated Signs/Symptoms: edema and drainage    Ulcer Identification:  Ulcer Type: venous and diabetic  Contributing Factors: edema, venous stasis and diabetes    Wound: N/A        PAST MEDICAL HISTORY        Diagnosis Date    Acquired hypothyroidism 3/15/2016    Allergic rhinitis     pollen, dust ragweed, hay and straw     Anxiety     Asthma     Bipolar affect, depressed ([address]     Bipolar disorder ([address]     Breast cancer ([address]     Invasive ductal cancer right breast    Cancer ([address] 1980    thyroid cancer     Chronic back pain     COPD (chronic obstructive pulmonary disease) ([address]     Depression     Diabetes mellitus ([address]     Emphysema of lung ([address]     Essential hypertension 3/15/2016    History of blood transfusion     2019    Hyperlipidemia     Hypothyroidism     Obesity     On home O2     3L NC at night    YARED (obstructive sleep apnea)     3 L NC    Osteoarthritis     Restless legs syndrome     Sleep apnea     Urinary incontinence        PAST SURGICAL HISTORY    Past Surgical History:   Procedure Laterality Date    APPENDECTOMY      BREAST BIOPSY Right 2018     SECTION      CHOLECYSTECTOMY      COLONOSCOPY N/A 2019    COLONOSCOPY DIAGNOSTIC performed by Seema Pickens MD at [address] Right 2019    LAPAROSCOPIC RIGHT COLECTOMY, LYSIS OF ADHESIONS. performed by Samir Reed MD at [address] [place] Right 2019    Simple mastectomy with sentinel node biopsy    TUBAL LIGATION         FAMILY HISTORY    Family History   Problem Relation Age of Onset    Diabetes Father     Heart Disease Father     Thyroid Cancer Sister     Thyroid Cancer Brother     Thyroid Cancer Sister     No Known Problems Daughter        SOCIAL HISTORY    Social History     Tobacco Use    Smoking status: Former Smoker     Packs/day: 1.00     Years: 40.00     Pack years: 40.00     Types: Cigarettes     Start date: 3/8/1976     Last attempt to quit: 2018     Years since quittin.9    Smokeless tobacco: Never Used   Substance Use Topics    Alcohol use: No     Alcohol/week: 0.0 oz    Drug use: No       ALLERGIES    Allergies   Allergen Reactions    Latex Itching and Rash    Sulfa Antibiotics Hives    Bactrim [Sulfamethoxazole-Trimethoprim] Hives and Other (See Comments)     Metallic taste    Nicotine Hives     Hives from the patch       MEDICATIONS    Current Outpatient Medications on File Prior to Encounter   Medication Sig Dispense Refill    GAVILYTE-N WITH FLAVOR PACK 420 g solution TAKE 4,000 ML BY MOUTH AS DIRECTED  0    VORTIoxetine (TRINTELLIX) 10 MG TABS tablet Take 10 mg by mouth      furosemide (LASIX) 40 MG tablet TAKE 1 TABLET BY MOUTH EVERY DAY 90 tablet 1    sodium chloride 0.9 % irrigation Irrigate with as directed for 1 dose. 1000 mL 5    sodium chloride 0.9 % irrigation Irrigate with as directed for 1 dose.  1000 mL 5    albuterol sulfate HFA (VENTOLIN HFA) 108 (90 Base) MCG/ACT inhaler Inhale 2 puffs into the lungs every 6 hours as needed for Wheezing 1 Inhaler 5    oxybutynin (DITROPAN-XL) 10 MG extended release tablet Take 10 mg by mouth daily      simvastatin (ZOCOR) 40 MG tablet TAKE 1 TABLET BY MOUTH EVERY EVENING 90 tablet 1    losartan (COZAAR) 25 MG tablet Take 1 tablet by mouth daily 90 tablet 1    levothyroxine (SYNTHROID) 175 MCG tablet Take 1 tablet by mouth Daily 90 tablet 1    potassium chloride (KLOR-CON M) 10 MEQ extended release tablet Take 1 tablet by mouth daily 90 tablet 1    metFORMIN (GLUCOPHAGE XR) 500 MG extended release tablet Take 2 tablets by mouth daily (with breakfast) 180 tablet 1    ARIPiprazole (ABILIFY) 10 MG tablet TAKE 1 TABLET BY MOUTH EVERY DAY IN THE EVENING AS DIRECTED  3    Multiple Vitamin (MULTIVITAMIN) tablet Take 1 tablet by mouth daily      Multiple Vitamins-Minerals (ICAPS AREDS 2 PO) Take 1 tablet by mouth daily      vitamin D (CHOLECALCIFEROL) 1000 UNIT TABS tablet Take 1,000 Units by mouth daily      anastrozole (ARIMIDEX) 1 MG tablet Take 1 mg by mouth daily      ACCU-CHEK SMARTVIEW strip Test TID E11.8 300 each 3    ipratropium-albuterol (DUONEB) 0.5-2.5 (3) MG/3ML SOLN nebulizer solution 3 mLs      fluticasone-vilanterol (BREO ELLIPTA) 100-25 MCG/INH AEPB inhaler Inhale 1 puff into the lungs daily 28 each 5    SOFT TOUCH LANCETS MISC Use tid as directed dx: E11.9 300 each 3    hydrOXYzine (VISTARIL) 25 MG capsule Take 25 mg by mouth 3 times daily as needed       DULoxetine (CYMBALTA) 60 MG extended release capsule Take 60 mg by mouth daily        No current facility-administered medications on file prior to encounter. REVIEW OF SYSTEMS    Pertinent items are noted in HPI. Objective:      BP (!) 118/56   Pulse 72   Temp 98.1 °F (36.7 °C) (Temporal)   Resp 18     Wt Readings from Last 3 Encounters:   06/06/19 (!) 306 lb (138.8 kg)   05/30/19 (!) 310 lb (140.6 kg)   05/24/19 (!) 310 lb (140.6 kg)       PHYSICAL EXAM    Constitutional:   Well nourished and well developed. Appears neat and clean. Patient is alert, oriented x3, and in no apparent distress.      Respiratory:  Respiratory effort is easy and symmetric Postoperative anemia D64.9    Malignant neoplasm of central portion of right breast in female, estrogen receptor positive (HCC) C50.111, Z17.0    Iron deficiency anemia due to chronic blood loss D50.0    Non-pressure chronic ulcer of other part of right lower leg with fat layer exposed ([facility] L97.812    Chronic venous insufficiency I87.2    Adenomatous polyp of ascending colon D12.2    Adenomatous polyp of sigmoid colon D12.5    Dysplastic colon polyp K63.5        Procedure Note  Indications:  Based on my examination of this patient's wound(s)/ulcer(s) today, debridement is required to promote healing and evaluate the wound base. Performed by: Doni Mitchell DPM    Consent obtained:  Yes    Time out taken:  Yes    Pain Control:         Debridement:Excisional Debridement    Using tissue nippers the wound(s)/ulcer(s) was/were sharply debrided down through and including the removal of epidermis, dermis and subcutaneous tissue. Devitalized Tissue Debrided:  slough and callus    Pre Debridement Measurements:  Are located in the [facility]  Documentation Flow Sheet    Wound/Ulcer #: 1    Post Debridement Measurements:  Wound/Ulcer Descriptions are Pre Debridement except measurements:    Wound 02/05/19 Leg Lower;Right; Anterior #1  (Active)   Wound Image   6/20/2019  1:02 PM   Wound Venous 6/20/2019  1:02 PM   Dressing/Treatment Collagen;Alginate with Ag;Dry Dressing 5/30/2019  2:18 PM   Wound Cleansed Rinsed/Irrigated with saline 6/20/2019  1:02 PM   Wound Length (cm) 1.5 cm 6/20/2019  1:02 PM   Wound Width (cm) 0.4 cm 6/20/2019  1:02 PM   Wound Depth (cm) 0.1 cm 6/20/2019  1:02 PM   Wound Surface Area (cm^2) 0.6 cm^2 6/20/2019  1:02 PM   Change in Wound Size % (l*w) 99.26 6/20/2019  1:02 PM   Wound Volume (cm^3) 0.06 cm^3 6/20/2019  1:02 PM   Wound Healing % 99 6/20/2019  1:02 PM   Post-Procedure Length (cm) 1.5 cm 6/20/2019  1:21 PM   Post-Procedure Width (cm) 0.4 cm 6/20/2019  1:21 PM   Post-Procedure Depth (cm) 0.1 cm 6/20/2019  1:21 PM   Post-Procedure Surface Area (cm^2) 0.6 cm^2 6/20/2019  1:21 PM   Post-Procedure Volume (cm^3) 0.06 cm^3 6/20/2019  1:21 PM   Drainage Amount Small 6/20/2019  1:02 PM   Drainage Description Serosanguinous 6/20/2019  1:02 PM   Odor None 6/20/2019  1:02 PM   Margins Defined edges 6/20/2019  1:02 PM   Tamara-wound Assessment Dry; Intact 6/20/2019  1:02 PM   Non-staged Wound Description Full thickness 6/20/2019  1:02 PM   [place]%Wound Bed 100 4/18/2019  1:59 PM   Red%Wound Bed 10 6/20/2019  1:02 PM   Yellow%Wound Bed 90 6/20/2019  1:02 PM   Number of days: 134       Incision (Active)   Number of days:        Incision 05/13/19 Abdomen Right (Active)   Number of days: 38       Percent of Wound/Ulcer Debrided: 100%    Total Surface Area Debrided:  0.6 sq cm     Diabetic/Pressure/Non Pressure Ulcers:  Ulcer: Non-Pressure ulcer, fat layer exposed      Bleeding:  None    Hemostasis Achieved:  not needed    Procedural Pain:  0  / 10     Post Procedural Pain:  0 / 10     Response to treatment:  Well tolerated by patient. Plan:     Patient progressing well continue with Unna boot with twice weekly changes with pomogran and silvercel overtop. Continue with compression on the left leg  RTC in 3 weeks      Treatment Note please see attached Discharge Instructions    In my professional opinion this patient would benefit from HBO Therapy: No    Written patient dismissal instructions given to patient and signed by patient or POA. Discharge Instructions           Discharge Instructions        Visit Discharge/Physician Orders:     Home Care: [facility] (Home Care to order dressings as needed)     Wound Location:  Right Legs     Dressing orders:  1.Cleanse wound(s) with normal saline.  saline moist PROMOGRAN then  2. Apply dry SILVERCEL OR CALCIUM ALGINATE WITH Ag or eqivalent to wound bed  3. Cover with 4x4's, calamine unna boots and coban wrap or ace wraps  4.  Change  Dressings Thursday and Monday or twice weekly      Compression: Apply Spandagrip to left(10-20) lower leg, apply first thing in the morning, remove at bedtime, elevate legs as much as possible during the day.     Other Instructions:        Keep all dressings clean & dry.  Keep pressure off the wound(s) at all times. Do not shower, take baths or get wound wet, unless otherwise instructed by your Wound Care doctor.     Follow up visit   2 weeks     For Diabetic patients keep blood sugars below 150 for optimal wound healing.     If you experience any of the following, please call the Wound Care Service during business hours: [phone]         *Increase in pain   *Temperature over 101   *Increase in drainage from your wound or a foul odor   *Uncontrolled swelling   *Need for compression bandage changes due to slippage, breakthrough drainage     If you need medical attention outside of business hours, please contact your Primary Care Doctor or go to the nearest emergency room. Keep next scheduled appointment. [place] give 24 hour notice if unable to keep appointment. [phone]     PLEASE NOTE: IF YOU ARE UNABLE TO OBTAIN WOUND SUPPLIES, CONTINUE TO USE THE SUPPLIES YOU HAVE AVAILABLE UNTIL YOU ARE ABLE TO REACH US.  IT IS MOST IMPORTANT TO KEEP THE WOUND COVERED AT ALL TIMES     Electronically signed by Dahlia Johnson DPM on 6/20/2019 at 1:26 PM              Electronically signed by Dahlia Johnson DPM on 6/20/2019 at 1:27 PM

## 2019-06-20 NOTE — CODE DOCUMENTATION
[address] Physician Billing Sheet. Kevan Fernandez  AGE: 61 y.o.    GENDER: female  : 1955  TODAY'S DATE:  2019    ICD-10 [address] [facility] [address] Problems    Diagnosis Date Noted    Non-pressure chronic ulcer of other part of right lower leg with fat layer exposed [facility] [L97.812] 2019    Chronic venous insufficiency [I87.2] 2019    Morbid obesity with BMI of 45.0-49.9, adult ([facility] [E66.01, Z68.42] 2018    Type 2 diabetes mellitus with complication ([facility] [I77.2] 03/15/2016       PHYSICIAN PROCEDURES    CPT CODE  37501      Electronically signed by Adan Linares DPM on 2019 at 1:32 PM

## 2019-06-27 ENCOUNTER — HOSPITAL ENCOUNTER (OUTPATIENT)
Dept: WOMENS IMAGING | Age: 64
Discharge: HOME | End: 2019-06-29
Payer: COMMERCIAL

## 2019-06-27 DIAGNOSIS — Z12.31 ENCOUNTER FOR SCREENING MAMMOGRAM FOR MALIGNANT NEOPLASM OF BREAST: ICD-10-CM

## 2019-06-27 DIAGNOSIS — C50.111 MALIGNANT NEOPLASM OF CENTRAL PORTION OF RIGHT BREAST IN FEMALE, ESTROGEN RECEPTOR POSITIVE (HCC): ICD-10-CM

## 2019-06-27 DIAGNOSIS — D50.0 IRON DEFICIENCY ANEMIA DUE TO CHRONIC BLOOD LOSS: ICD-10-CM

## 2019-06-27 DIAGNOSIS — Z17.0 MALIGNANT NEOPLASM OF CENTRAL PORTION OF RIGHT BREAST IN FEMALE, ESTROGEN RECEPTOR POSITIVE (HCC): ICD-10-CM

## 2019-06-27 PROCEDURE — 77067 SCR MAMMO BI INCL CAD: CPT

## 2019-07-11 ENCOUNTER — HOSPITAL ENCOUNTER (OUTPATIENT)
Dept: WOUND CARE | Age: 64
Discharge: HOME | End: 2019-07-11
Payer: COMMERCIAL

## 2019-07-11 VITALS
SYSTOLIC BLOOD PRESSURE: 121 MMHG | DIASTOLIC BLOOD PRESSURE: 68 MMHG | RESPIRATION RATE: 18 BRPM | TEMPERATURE: 96.7 F | HEART RATE: 65 BPM

## 2019-07-11 PROCEDURE — 87147 CULTURE TYPE IMMUNOLOGIC: CPT

## 2019-07-11 PROCEDURE — 87186 SC STD MICRODIL/AGAR DIL: CPT

## 2019-07-11 PROCEDURE — 87205 SMEAR GRAM STAIN: CPT

## 2019-07-11 PROCEDURE — 87077 CULTURE AEROBIC IDENTIFY: CPT

## 2019-07-11 PROCEDURE — 87070 CULTURE OTHR SPECIMN AEROBIC: CPT

## 2019-07-11 PROCEDURE — 29580 STRAPPING UNNA BOOT: CPT

## 2019-07-11 RX ORDER — CEPHALEXIN 500 MG/1
500 CAPSULE ORAL 3 TIMES DAILY
Qty: 30 CAPSULE | Refills: 0 | Status: SHIPPED | OUTPATIENT
Start: 2019-07-11 | End: 2019-08-01

## 2019-07-11 NOTE — CODE DOCUMENTATION
[address] Physician Billing Sheet. Júnior Cooper  AGE: 61 y.o.    GENDER: female  : 1955  TODAY'S DATE:  2019    ICD-10 [address] [facility] [address] Problems    Diagnosis Date Noted    Non-pressure chronic ulcer of other part of right lower leg with fat layer exposed [facility] [L97.812] 2019    Chronic venous insufficiency [I87.2] 2019    Morbid obesity with BMI of 45.0-49.9, adult ([facility] [E66.01, Z68.42] 2018    Type 2 diabetes mellitus with complication ([facility] [K11.7] 03/15/2016       PHYSICIAN PROCEDURES    CPT CODE  72606      Electronically signed by Michelle Doyle DPM on 2019 at 2:12 PM

## 2019-07-11 NOTE — DISCHARGE INSTR - COC
2019  1:02 PM   Wound Venous 2019  1:02 PM   Wound Cleansed Rinsed/Irrigated with saline 2019  1:21 PM   Wound Length (cm) 1.5 cm 2019  1:02 PM   Wound Width (cm) 0.4 cm 2019  1:02 PM   Wound Depth (cm) 0.1 cm 2019  1:02 PM   Wound Surface Area (cm^2) 0.6 cm^2 2019  1:02 PM   Change in Wound Size % (l*w) 99.26 2019  1:02 PM   Wound Volume (cm^3) 0.06 cm^3 2019  1:02 PM   Wound Healing % 99 2019  1:02 PM   Post-Procedure Length (cm) 1.5 cm 2019  1:21 PM   Post-Procedure Width (cm) 0.4 cm 2019  1:21 PM   Post-Procedure Depth (cm) 0.1 cm 2019  1:21 PM   Post-Procedure Surface Area (cm^2) 0.6 cm^2 2019  1:21 PM   Post-Procedure Volume (cm^3) 0.06 cm^3 2019  1:21 PM   Drainage Amount Small 2019  1:02 PM   Drainage Description Serosanguinous 2019  1:02 PM   Odor None 2019  1:02 PM   Margins Defined edges 2019  1:02 PM   Tamara-wound Assessment Dry; Intact 2019  1:02 PM   Non-staged Wound Description Full thickness 2019  1:02 PM   Red%Wound Bed 10 2019  1:02 PM   Yellow%Wound Bed 90 2019  1:02 PM   Number of days: 155        Elimination:  Continence:   · Bowel: {YES / H}  · Bladder: {YES / DK:10374}  Urinary Catheter: {Urinary Catheter:698969783}   Colostomy/Ileostomy/Ileal Conduit: {YES / VZ:04209}       Date of Last BM: ***  No intake or output data in the 24 hours ending 19 1248  No intake/output data recorded.     Safety Concerns:     [address] [facility] Safety Concerns:[id number]}    Impairments/Disabilities:      [address] Otton[address] JEREMY Impairments/Disabilities:596648645}    Nutrition Therapy:  Current Nutrition Therapy:   [address] [facility] Diet List:635474630}    Routes of Feeding: {CHP DME Other Feedings:287291947}  Liquids: {Slp liquid thickness:91736}  Daily Fluid Restriction: {CHP DME Yes amt example:901688354}  Last Modified Barium Swallow with Video (Video Swallowing Test): {Done Not Done KNWI:459470770}    Treatments at the

## 2019-07-11 NOTE — PROGRESS NOTES
normal at rest and on room air. Vascular:  Pedal Pulses is palpable and audible with doppler. Capillary refill is <3 sec to digits bilateral.  Extremities positive for pitting edema. Neurological:  Cranial nerves grossly intact. Deep tendon reflexes of the lower extremities are intact and symmetrical bilaterally. Sensation normal to touch and vibration. Sensation intact to 10 gram monofilament in extremities. Musculoskeletal:  Gait and station stable. Muscle strength +5/5 to all extrinsic muscles to the foot bilateral.  Full range of motion of ankle, subtalar, and midtarsal joints noted without crepitation. No cyanosis, clubbing or edema noted. Dermatological:  Wound description noted in wound assessment. Ulcers have granular base with moderate serous drainage. Local erythema noted with increased warmth. Psychiatric:  Judgement and insight intact. Short and long term memory intact. No evidence of depression, anxiety, or agitation. Patient is calm, cooperative, and communicative. Appropriate interactions and affect. Wound 02/05/19 Leg Lower;Right; Anterior #1  (Active)   Wound Image   7/11/2019  1:36 PM   Wound Venous 7/11/2019  1:36 PM   Dressing/Treatment Collagen;Alginate with Ag;Dry Dressing 5/30/2019  2:18 PM   Wound Cleansed Rinsed/Irrigated with saline 7/11/2019  1:36 PM   Wound Length (cm) 3.7 cm 7/11/2019  1:36 PM   Wound Width (cm) 2 cm 7/11/2019  1:36 PM   Wound Depth (cm) 0.1 cm 7/11/2019  1:36 PM   Wound Surface Area (cm^2) 7.4 cm^2 7/11/2019  1:36 PM   Change in Wound Size % (l*w) 90.89 7/11/2019  1:36 PM   Wound Volume (cm^3) 0.74 cm^3 7/11/2019  1:36 PM   Wound Healing % 91 7/11/2019  1:36 PM   Post-Procedure Length (cm) 1.5 cm 6/20/2019  1:21 PM   Post-Procedure Width (cm) 0.4 cm 6/20/2019  1:21 PM   Post-Procedure Depth (cm) 0.1 cm 6/20/2019  1:21 PM   Post-Procedure Surface Area (cm^2) 0.6 cm^2 6/20/2019  1:21 PM   Post-Procedure Volume (cm^3) 0.06 cm^3 6/20/2019  1:21 PM   Drainage Amount Small 7/11/2019  1:36 PM   Drainage Description Sanguinous 7/11/2019  1:36 PM   Odor None 7/11/2019  1:36 PM   Margins Defined edges 7/11/2019  1:36 PM   Tamara-wound Assessment Dry; Intact 7/11/2019  1:36 PM   Non-staged Wound Description Full thickness 7/11/2019  1:36 PM   [place]%Wound Bed 100 4/18/2019  1:59 PM   Red%Wound Bed 100 7/11/2019  1:36 PM   Yellow%Wound Bed 90 6/20/2019  1:02 PM   Number of days: 155       Wound 07/11/19 Leg Right; Anterior;Proximal #2 (Active)   Wound Image   7/11/2019  1:36 PM   Wound Traumatic 7/11/2019  1:36 PM   Wound Cleansed Rinsed/Irrigated with saline 7/11/2019  1:36 PM   Wound Length (cm) 14.5 cm 7/11/2019  1:36 PM   Wound Width (cm) 4.5 cm 7/11/2019  1:36 PM   Wound Depth (cm) 0.1 cm 7/11/2019  1:36 PM   Wound Surface Area (cm^2) 65.25 cm^2 7/11/2019  1:36 PM   Wound Volume (cm^3) 6.52 cm^3 7/11/2019  1:36 PM   Drainage Amount Scant 7/11/2019  1:36 PM   Drainage Description Sanguinous 7/11/2019  1:36 PM   Odor None 7/11/2019  1:36 PM   Margins Undefined edges 7/11/2019  1:36 PM   Tamara-wound Assessment Dry;Fragile; Intact 7/11/2019  1:36 PM   Non-staged Wound Description Full thickness 7/11/2019  1:36 PM   Red%Wound Bed 100 7/11/2019  1:36 PM   Number of days: 0       Incision (Active)   Number of days:        Incision 05/13/19 Abdomen Right (Active)   Number of days: 59       Assessment:      Patient Active Problem List   Diagnosis Code    Type 2 diabetes mellitus with complication (HCC) D95.1    Acquired hypothyroidism E03.9    Essential hypertension I10    Mixed hyperlipidemia E78.2    COPD with chronic bronchitis (HCC) J44.9    Microalbuminuria R80.9    Moderate episode of recurrent major depressive disorder ([facility] F33.1    YARED (obstructive sleep apnea) G47.33    Restless leg syndrome G25.81    Vitamin B12 deficiency E53.8    Vitamin D deficiency E55.9    Left ventricular hypertrophy I51.7    Left ventricular diastolic dysfunction B16.4   

## 2019-07-13 LAB
GRAM STAIN RESULT: ABNORMAL
ORGANISM: ABNORMAL
WOUND/ABSCESS: ABNORMAL
WOUND/ABSCESS: ABNORMAL

## 2019-08-01 ENCOUNTER — HOSPITAL ENCOUNTER (OUTPATIENT)
Dept: WOUND CARE | Age: 64
Discharge: HOME | End: 2019-08-01
Payer: COMMERCIAL

## 2019-08-01 VITALS
HEIGHT: 67 IN | HEART RATE: 68 BPM | TEMPERATURE: 97.6 F | RESPIRATION RATE: 16 BRPM | SYSTOLIC BLOOD PRESSURE: 117 MMHG | DIASTOLIC BLOOD PRESSURE: 56 MMHG | BODY MASS INDEX: 45.99 KG/M2 | WEIGHT: 293 LBS

## 2019-08-01 PROCEDURE — 99212 OFFICE O/P EST SF 10 MIN: CPT

## 2019-08-01 ASSESSMENT — PAIN SCALES - GENERAL: PAINLEVEL_OUTOF10: 0

## 2019-08-01 NOTE — CODE DOCUMENTATION
[address] Physician Billing Sheet. Pat Rafa  AGE: 61 y.o.    GENDER: female  : 1955  TODAY'S DATE:  2019    ICD-10 [address] [facility] [address] Problems    Diagnosis Date Noted    Non-pressure chronic ulcer of other part of right lower leg with fat layer exposed ([facility] [L97.812] 2019    Chronic venous insufficiency [I87.2] 2019    Type 2 diabetes mellitus with complication ([facility] [D78.5] 03/15/2016       PHYSICIAN PROCEDURES    CPT CODE  29777      Electronically signed by Kae Young DPM on 2019 at 1:27 PM

## 2019-08-01 NOTE — DISCHARGE INSTR - COC
{Esignature:[id number]}    CASE MANAGEMENT/SOCIAL WORK SECTION    Inpatient Status Date: ***    Readmission Risk Assessment Score:  Readmission Risk              Risk of Unplanned Readmission:        0           Discharging to Facility/ Agency   · Name:   · Address:  · Phone:  · Fax:    Dialysis Facility (if applicable)   · Name:  · Address:  · Dialysis Schedule:  · Phone:  · Fax:    / signature: {Esignature:570353925}    PHYSICIAN SECTION    Prognosis: {Prognosis:2408946459}    Condition at Discharge: [address] [place] [address] Patient Condition:942923263}    Rehab Potential (if transferring to Rehab): {Prognosis:7894348955}    Recommended Labs or Other Treatments After Discharge: ***    Physician Certification: I certify the above information and transfer of Valentina Wahl  is necessary for the continuing treatment of the diagnosis listed and that she requires {Admit to Appropriate Level of Care:58808} for {GREATER/LESS:070793236} 30 days.      Update Admission H&P: {CHP DME Changes in KABVY:363529349}    PHYSICIAN SIGNATURE:  {Esignature:105437005}

## 2019-08-06 DIAGNOSIS — E03.9 ACQUIRED HYPOTHYROIDISM: ICD-10-CM

## 2019-08-06 DIAGNOSIS — I10 ESSENTIAL HYPERTENSION: ICD-10-CM

## 2019-08-06 DIAGNOSIS — E78.2 MIXED HYPERLIPIDEMIA: ICD-10-CM

## 2019-08-06 DIAGNOSIS — E11.8 TYPE 2 DIABETES MELLITUS WITH COMPLICATION, WITHOUT LONG-TERM CURRENT USE OF INSULIN (HCC): ICD-10-CM

## 2019-08-06 LAB
ALBUMIN SERPL-MCNC: 3.7 G/DL (ref 3.5–4.6)
ALP BLD-CCNC: 76 U/L (ref 40–130)
ALT SERPL-CCNC: 7 U/L (ref 0–33)
ANION GAP SERPL CALCULATED.3IONS-SCNC: 15 MEQ/L (ref 9–15)
AST SERPL-CCNC: 14 U/L (ref 0–35)
BILIRUB SERPL-MCNC: <0.2 MG/DL (ref 0.2–0.7)
BUN BLDV-MCNC: 22 MG/DL (ref 8–23)
CALCIUM SERPL-MCNC: 9.6 MG/DL (ref 8.5–9.9)
CHLORIDE BLD-SCNC: 100 MEQ/L (ref 95–107)
CHOLESTEROL, TOTAL: 164 MG/DL (ref 0–199)
CO2: 27 MEQ/L (ref 20–31)
CREAT SERPL-MCNC: 1.04 MG/DL (ref 0.5–0.9)
GFR AFRICAN AMERICAN: >60
GFR NON-AFRICAN AMERICAN: 53.4
GLOBULIN: 4.7 G/DL (ref 2.3–3.5)
GLUCOSE BLD-MCNC: 92 MG/DL (ref 70–99)
HBA1C MFR BLD: 6 % (ref 4.8–5.9)
HDLC SERPL-MCNC: 28 MG/DL (ref 40–59)
LDL CHOLESTEROL CALCULATED: 108 MG/DL (ref 0–129)
POTASSIUM SERPL-SCNC: 4.2 MEQ/L (ref 3.4–4.9)
SODIUM BLD-SCNC: 142 MEQ/L (ref 135–144)
T4 FREE: 1.27 NG/DL (ref 0.84–1.68)
TOTAL PROTEIN: 8.4 G/DL (ref 6.3–8)
TRIGL SERPL-MCNC: 138 MG/DL (ref 0–150)
TSH SERPL DL<=0.05 MIU/L-ACNC: 2.09 UIU/ML (ref 0.44–3.86)

## 2019-10-17 ENCOUNTER — HOSPITAL ENCOUNTER (OUTPATIENT)
Dept: WOUND CARE | Age: 64
Discharge: HOME | End: 2019-10-17
Payer: COMMERCIAL

## 2019-10-17 VITALS
HEART RATE: 81 BPM | RESPIRATION RATE: 18 BRPM | TEMPERATURE: 97.2 F | SYSTOLIC BLOOD PRESSURE: 115 MMHG | DIASTOLIC BLOOD PRESSURE: 69 MMHG

## 2019-10-17 DIAGNOSIS — L97.812 NON-PRESSURE CHRONIC ULCER OF OTHER PART OF RIGHT LOWER LEG WITH FAT LAYER EXPOSED (HCC): Primary | ICD-10-CM

## 2019-10-17 DIAGNOSIS — L03.115 CELLULITIS OF RIGHT LEG: ICD-10-CM

## 2019-10-17 PROCEDURE — 29580 STRAPPING UNNA BOOT: CPT

## 2019-10-17 PROCEDURE — 99212 OFFICE O/P EST SF 10 MIN: CPT

## 2019-10-17 RX ORDER — CEPHALEXIN 500 MG/1
500 CAPSULE ORAL 3 TIMES DAILY
Qty: 30 CAPSULE | Refills: 0 | Status: SHIPPED | OUTPATIENT
Start: 2019-10-17 | End: 2020-01-23 | Stop reason: ALTCHOICE

## 2019-10-31 ENCOUNTER — HOSPITAL ENCOUNTER (OUTPATIENT)
Dept: WOUND CARE | Age: 64
Discharge: HOME | End: 2019-10-31
Payer: COMMERCIAL

## 2019-10-31 VITALS
HEIGHT: 67 IN | BODY MASS INDEX: 45.99 KG/M2 | DIASTOLIC BLOOD PRESSURE: 65 MMHG | TEMPERATURE: 97.5 F | SYSTOLIC BLOOD PRESSURE: 125 MMHG | WEIGHT: 293 LBS | RESPIRATION RATE: 18 BRPM | HEART RATE: 80 BPM

## 2019-10-31 PROCEDURE — 29580 STRAPPING UNNA BOOT: CPT

## 2019-10-31 ASSESSMENT — PAIN SCALES - GENERAL: PAINLEVEL_OUTOF10: 0

## 2019-11-21 ENCOUNTER — HOSPITAL ENCOUNTER (OUTPATIENT)
Dept: WOUND CARE | Age: 64
Discharge: HOME | End: 2019-11-21
Payer: COMMERCIAL

## 2019-11-21 VITALS
WEIGHT: 293 LBS | DIASTOLIC BLOOD PRESSURE: 56 MMHG | RESPIRATION RATE: 16 BRPM | BODY MASS INDEX: 45.99 KG/M2 | TEMPERATURE: 98.4 F | HEART RATE: 70 BPM | HEIGHT: 67 IN | SYSTOLIC BLOOD PRESSURE: 112 MMHG

## 2019-11-21 PROCEDURE — 99212 OFFICE O/P EST SF 10 MIN: CPT

## 2019-11-21 ASSESSMENT — PAIN SCALES - GENERAL: PAINLEVEL_OUTOF10: 0

## 2019-12-13 ENCOUNTER — HOSPITAL ENCOUNTER (OUTPATIENT)
Dept: WOUND CARE | Age: 64
Discharge: HOME | End: 2019-12-13
Payer: COMMERCIAL

## 2019-12-13 VITALS
HEIGHT: 66 IN | WEIGHT: 293 LBS | RESPIRATION RATE: 16 BRPM | SYSTOLIC BLOOD PRESSURE: 113 MMHG | HEART RATE: 76 BPM | BODY MASS INDEX: 47.09 KG/M2 | DIASTOLIC BLOOD PRESSURE: 65 MMHG | TEMPERATURE: 99.1 F

## 2019-12-13 DIAGNOSIS — I87.2 CHRONIC VENOUS INSUFFICIENCY: ICD-10-CM

## 2019-12-13 DIAGNOSIS — L97.812 NON-PRESSURE CHRONIC ULCER OF OTHER PART OF RIGHT LOWER LEG WITH FAT LAYER EXPOSED (HCC): ICD-10-CM

## 2019-12-13 DIAGNOSIS — E11.8 TYPE 2 DIABETES MELLITUS WITH COMPLICATION (HCC): Primary | ICD-10-CM

## 2019-12-13 PROCEDURE — 99213 OFFICE O/P EST LOW 20 MIN: CPT

## 2019-12-13 PROCEDURE — 11042 DBRDMT SUBQ TIS 1ST 20SQCM/<: CPT

## 2019-12-13 PROCEDURE — 11045 DBRDMT SUBQ TISS EACH ADDL: CPT

## 2019-12-19 NOTE — PLAN OF CARE
met with patient to discuss and sign Patient Choice Form. Patient was provided with copy of document.       12/19/19 7928   Post-Acute Status   Post-Acute Authorization Placement   Post-Acute Placement Status Set-up Complete      Problem: Pain:  Goal: Pain level will decrease  Description  Pain level will decrease  5/17/2019 1003 by Marian Ojeda RN  Outcome: Ongoing  5/17/2019 0022 by Lane Packer RN  Outcome: Ongoing  Goal: Control of acute pain  Description  Control of acute pain  5/17/2019 0022 by Lane Packer RN  Outcome: Ongoing  Goal: Control of chronic pain  Description  Control of chronic pain  5/17/2019 0022 by Lane Packer RN  Outcome: Ongoing     Problem: Falls - Risk of:  Goal: Will remain free from falls  Description  Will remain free from falls  5/17/2019 1003 by Marian Ojeda RN  Outcome: Ongoing  5/17/2019 0022 by Lane Packer RN  Outcome: Ongoing  Goal: Absence of physical injury  Description  Absence of physical injury  5/17/2019 0022 by Lane Packer RN  Outcome: Ongoing     Problem: Risk for Impaired Skin Integrity  Goal: Tissue integrity - skin and mucous membranes  Description  Structural intactness and normal physiological function of skin and  mucous membranes.   5/17/2019 1003 by Marian Ojeda RN  Outcome: Ongoing  5/17/2019 0022 by Lane Packer RN  Outcome: Ongoing

## 2019-12-27 ENCOUNTER — HOSPITAL ENCOUNTER (OUTPATIENT)
Dept: WOUND CARE | Age: 64
Discharge: HOME | End: 2019-12-27
Payer: COMMERCIAL

## 2019-12-27 VITALS — HEIGHT: 66 IN | BODY MASS INDEX: 47.09 KG/M2 | TEMPERATURE: 99.1 F | WEIGHT: 293 LBS | RESPIRATION RATE: 16 BRPM

## 2019-12-27 PROCEDURE — 99212 OFFICE O/P EST SF 10 MIN: CPT

## 2019-12-27 PROCEDURE — 99203 OFFICE O/P NEW LOW 30 MIN: CPT | Performed by: SURGERY

## 2019-12-27 ASSESSMENT — PAIN SCALES - GENERAL: PAINLEVEL_OUTOF10: 0

## 2020-01-16 ENCOUNTER — HOSPITAL ENCOUNTER (OUTPATIENT)
Dept: WOUND CARE | Age: 65
Discharge: HOME | End: 2020-01-16
Payer: COMMERCIAL

## 2020-01-16 PROCEDURE — 99203 OFFICE O/P NEW LOW 30 MIN: CPT | Performed by: NURSE PRACTITIONER

## 2020-01-16 PROCEDURE — 99213 OFFICE O/P EST LOW 20 MIN: CPT

## 2020-01-16 PROCEDURE — 29580 STRAPPING UNNA BOOT: CPT

## 2020-01-16 NOTE — DISCHARGE INSTR - COC
 Restless leg syndrome G25.81    Vitamin B12 deficiency E53.8    Vitamin D deficiency E55.9    Left ventricular hypertrophy I51.7    Left ventricular diastolic dysfunction F18.7    Morbid obesity with BMI of 45.0-49.9, adult ([facility]) E66.01, Z68.42    Breast carcinoma, female, right ([facility] C50.911    Iron deficiency anemia secondary to inadequate dietary iron intake D50.8    Breast cancer of lower-inner quadrant of right female breast (HCC) C50.311    Postoperative anemia D64.9    Malignant neoplasm of central portion of right breast in female, estrogen receptor positive (HCC) C50.111, Z17.0    Iron deficiency anemia due to chronic blood loss D50.0    Non-pressure chronic ulcer of other part of right lower leg with fat layer exposed ([facility] L97.812    Chronic venous insufficiency I87.2    Adenomatous polyp of ascending colon D12.2    Adenomatous polyp of sigmoid colon D12.5    Dysplastic colon polyp K63.5    Cellulitis of right leg L03.115       Isolation/Infection:   Isolation          No Isolation        Patient Infection Status     Infection Onset Added Last Indicated Last Indicated By Review Planned Expiration Resolved Resolved By    [facility] 19 Wound Culture        Right leg Wound positive for MRSA           Nurse Assessment:  Last Vital Signs: There were no vitals taken for this visit.     Last documented pain score (0-10 scale):    Last Weight:   Wt Readings from Last 1 Encounters:   19 (!) 310 lb (140.6 kg)     Mental Status:  {IP PT MENTAL STATUS:15012}    IV Access:  { JEREMY IV ACCESS:572054548}    Nursing Mobility/ADLs:  Walking   {CHP DME FAQ}  Transfer  {CHP DME ZNXD:588733564}  Bathing  {CHP DME IOWT:123773834}  Dressing  {CHP DME TBOO:369476454}  Toileting  {CHP DME HBSR:708460009}  Feeding  {CHP DME ZAXK:759792927}  Med Admin  {P DME UXER:287085702}  Med Delivery   { JEREMY MED Delivery:985760899}    Wound Care Documentation and Therapy: {Prognosis:0296940728}    Recommended Labs or Other Treatments After Discharge: ***    Physician Certification: I certify the above information and transfer of Guillermo Adame  is necessary for the continuing treatment of the diagnosis listed and that she requires {Admit to Appropriate Level of Care:76424} for {GREATER/LESS:440071534} 30 days.      Update Admission H&P: {CHP DME Changes in FKVGT:375295386}    PHYSICIAN SIGNATURE:  {Esignature:242980600}

## 2020-01-16 NOTE — PROGRESS NOTES
[facility]    HEMICOLECTOMY Right 2019    LAPAROSCOPIC RIGHT COLECTOMY, LYSIS OF ADHESIONS. performed by Lucia Sparrow MD at [address] Right 2019    Simple mastectomy with sentinel node biopsy    TUBAL LIGATION         FAMILY HISTORY    Family History   Problem Relation Age of Onset    Diabetes Father     Heart Disease Father     Thyroid Cancer Sister     Thyroid Cancer Brother     Thyroid Cancer Sister     No Known Problems Daughter        SOCIAL HISTORY    Social History     Tobacco Use    Smoking status: Former Smoker     Packs/day: 1.00     Years: 40.00     Pack years: 40.00     Types: Cigarettes     Start date: 3/8/1976     Last attempt to quit: 2018     Years since quittin.5    Smokeless tobacco: Never Used   Substance Use Topics    Alcohol use: No     Alcohol/week: 0.0 standard drinks    Drug use: No       ALLERGIES    Allergies   Allergen Reactions    Latex Itching and Rash    Sulfa Antibiotics Hives    Bactrim [Sulfamethoxazole-Trimethoprim] Hives and Other (See Comments)     Metallic taste    Nicotine Hives     Hives from the patch       MEDICATIONS    Current Outpatient Medications on File Prior to Encounter   Medication Sig Dispense Refill    cephALEXin (KEFLEX) 500 MG capsule Take 1 capsule by mouth 3 times daily 30 capsule 0    GAVILYTE-N WITH FLAVOR PACK 420 g solution TAKE 4,000 ML BY MOUTH AS DIRECTED  0    VORTIoxetine (TRINTELLIX) 10 MG TABS tablet Take 10 mg by mouth      furosemide (LASIX) 40 MG tablet TAKE 1 TABLET BY MOUTH EVERY DAY 90 tablet 1    sodium chloride 0.9 % irrigation Irrigate with as directed for 1 dose. 1000 mL 5    sodium chloride 0.9 % irrigation Irrigate with as directed for 1 dose.  1000 mL 5    albuterol sulfate HFA (VENTOLIN HFA) 108 (90 Base) MCG/ACT inhaler Inhale 2 puffs into the lungs every 6 hours as needed for Wheezing 1 Inhaler 5    oxybutynin (DITROPAN-XL) 10 MG extended release tablet Take 10 mg by mouth daily      simvastatin (ZOCOR) 40 MG tablet TAKE 1 TABLET BY MOUTH EVERY EVENING 90 tablet 1    losartan (COZAAR) 25 MG tablet Take 1 tablet by mouth daily 90 tablet 1    levothyroxine (SYNTHROID) 175 MCG tablet Take 1 tablet by mouth Daily 90 tablet 1    potassium chloride (KLOR-CON M) 10 MEQ extended release tablet Take 1 tablet by mouth daily 90 tablet 1    metFORMIN (GLUCOPHAGE XR) 500 MG extended release tablet Take 2 tablets by mouth daily (with breakfast) 180 tablet 1    ARIPiprazole (ABILIFY) 10 MG tablet TAKE 1 TABLET BY MOUTH EVERY DAY IN THE EVENING AS DIRECTED  3    Multiple Vitamin (MULTIVITAMIN) tablet Take 1 tablet by mouth daily      Multiple Vitamins-Minerals (ICAPS AREDS 2 PO) Take 1 tablet by mouth daily      vitamin D (CHOLECALCIFEROL) 1000 UNIT TABS tablet Take 1,000 Units by mouth daily      anastrozole (ARIMIDEX) 1 MG tablet Take 1 mg by mouth daily      ACCU-CHEK SMARTVIEW strip Test TID E11.8 300 each 3    ipratropium-albuterol (DUONEB) 0.5-2.5 (3) MG/3ML SOLN nebulizer solution 3 mLs      fluticasone-vilanterol (BREO ELLIPTA) 100-25 MCG/INH AEPB inhaler Inhale 1 puff into the lungs daily 28 each 5    SOFT TOUCH LANCETS MISC Use tid as directed dx: E11.9 300 each 3    hydrOXYzine (VISTARIL) 25 MG capsule Take 25 mg by mouth 3 times daily as needed       DULoxetine (CYMBALTA) 60 MG extended release capsule Take 60 mg by mouth daily        No current facility-administered medications on file prior to encounter. REVIEW OF SYSTEMS    Pertinent items are noted in HPI. Objective: There were no vitals taken for this visit. Wt Readings from Last 3 Encounters:   12/27/19 (!) 310 lb (140.6 kg)   12/13/19 (!) 310 lb (140.6 kg)   11/21/19 (!) 310 lb (140.6 kg)       PHYSICAL EXAM    Constitutional:   Well nourished and well developed. Appears neat and clean. Patient is alert, oriented x3, and in no apparent distress.      Respiratory:  Respiratory effort is easy and symmetric bilaterally. Rate is normal at rest and on room air. Vascular:  Pedal Pulses is palpable and audible signal noted with doppler. Capillary refill is <3 sec to digits bilateral.  Extremities have pitting edema. Neurological:  Gross and Light touch intact. Protective sensation intact    Dermatological:  Wound description noted in wound assessment. Psychiatric:  Judgement and insight intact. Short and long term memory intact. No evidence of depression, anxiety, or agitation. Patient is calm, cooperative, and communicative. Appropriate interactions and affect. Assessment:      Problem List Items Addressed This Visit     None           Procedure Note  Indications:  Based on my examination of this patient's wound(s)/ulcer(s) today, debridement is not required to promote healing and evaluate the wound base. Wound 01/16/20 Leg Right; Lower #1 (Active)   Wound Image   1/16/2020  3:14 PM   Wound Traumatic 1/16/2020  3:14 PM   Wound Cleansed Rinsed/Irrigated with saline 1/16/2020  3:14 PM   Wound Length (cm) 11.9 cm 1/16/2020  3:14 PM   Wound Width (cm) 11 cm 1/16/2020  3:14 PM   Wound Depth (cm) 0.1 cm 1/16/2020  3:14 PM   Wound Surface Area (cm^2) 130.9 cm^2 1/16/2020  3:14 PM   Wound Volume (cm^3) 13.09 cm^3 1/16/2020  3:14 PM   Drainage Amount Small 1/16/2020  3:14 PM   Drainage Description Serous 1/16/2020  3:14 PM   Odor None 1/16/2020  3:14 PM   Margins Undefined edges 1/16/2020  3:14 PM   Tamara-wound Assessment Red 1/16/2020  3:14 PM   Non-staged Wound Description Full thickness 1/16/2020  3:14 PM   [place]%Wound Bed 95 1/16/2020  3:14 PM   [place]%Wound Bed 5 1/16/2020  3:14 PM   Number of days: 0       Incision (Active)   Number of days:        Incision 05/13/19 Abdomen Right (Active)   Number of days: 248             Plan:     Cleanse wounds with 0.9 NS and dry with 4x4 gauze, then silvercell over wound areas and unna boot overall, apply Monday-Wednesday-and Friday.  Return for re-check in one week and if area of redness increases after completing antibiotics contact Dr. Merary Morales or call wound center for earlier appt. Treatment Note please see attached Discharge Instructions    Written patient dismissal instructions given to patient and signed by patient or POA. Discharge [address] and Hyper[facility]ic Medicine   Physician Orders and Discharge Instructions  [address]  Telephone: [phone]      NAME:  Clifton Angelo  YOB: 1955  MEDICAL RECORD NUMBER:  [id number]    Home Care/Facility: [facility] new referral (order dressings as needed)    Wound Location:  Right Leg    Dressing orders: 1. Cleanse wound(s) with normal saline. 2. Apply dry SILVERCEL OR CALCIUM ALGINATE WITH Ag or eqivalent to wound bed. 3. Cover with 4x4's , zinc unna boots and coban or ace wraps  4. Change  Every other day or Monday, Wednesday, and Friday    Compression: unna boot    Offloading Device:    Other Instructions: Finish up antibiotics    Keep all dressings clean, dry and intact. Keep pressure off the wound(s) at all times. Follow up visit   1 Weeks  January 23, 2020 @    Please give 24 hour notice if unable to keep appointment. [phone]    If you experience any of the following, please call the Wound Care Service at  [phone] or go to the nearest emergency room. *Increase in pain *Temperature over 101 *Increase in drainage from your wound or a foul odor  *Uncontrolled swelling *Need for compression bandage changes due to slippage, breakthrough drainage       PLEASE NOTE: IF YOU ARE UNABLE TO OBTAIN WOUND SUPPLIES, CONTINUE TO USE THE SUPPLIES YOU HAVE AVAILABLE UNTIL YOU ARE ABLE TO REACH US.  IT IS MOST IMPORTANT TO KEEP THE WOUND COVERED AT ALL TIMES  Electronically signed by MARIETTA Mohr NP on 1/16/2020 at 4:36

## 2020-01-23 ENCOUNTER — HOSPITAL ENCOUNTER (OUTPATIENT)
Dept: WOUND CARE | Age: 65
Discharge: HOME | End: 2020-01-23
Payer: COMMERCIAL

## 2020-01-23 VITALS
RESPIRATION RATE: 18 BRPM | SYSTOLIC BLOOD PRESSURE: 114 MMHG | DIASTOLIC BLOOD PRESSURE: 56 MMHG | TEMPERATURE: 97.9 F | HEART RATE: 75 BPM

## 2020-01-23 PROCEDURE — 29580 STRAPPING UNNA BOOT: CPT

## 2020-01-23 NOTE — PROGRESS NOTES
[facility]   Progress Note and Procedure Note      [address] [place] RECORD NUMBER:  [id number]  AGE: 59 y.o. GENDER: female  : 1955  EPISODE DATE:  2020    Subjective:     Chief Complaint   Patient presents with    Wound Check     right lower leg wound         HISTORY of PRESENT ILLNESS HPI     Quincy Johnston is a 59 y.o. female who presents today for wound/ulcer evaluation. History of Wound Context: Chronic full-thickness venous ulcerations right lower leg are improved. She finished her doxycycline as directed. Wounds are treated with Silvercel and unna boot. States wound on her belly has opened up again. She will see Faith Coughlin to follow this and her leg wounds in two weeks.    Wound/Ulcer Pain Timing/Severity: none  Quality of pain: N/A  Severity:  0 / 10   Modifying Factors: None  Associated Signs/Symptoms: edema    Ulcer Identification:  Ulcer Type: venous  Contributing Factors: venous stasis    Wound: N/A        PAST MEDICAL HISTORY        Diagnosis Date    Acquired hypothyroidism 3/15/2016    Allergic rhinitis     pollen, dust ragweed, hay and straw     Anxiety     Asthma     Bipolar affect, depressed (HCC)     Bipolar disorder ([address]     Breast cancer ([address] 2018    Invasive ductal cancer right breast    Cancer ([address] 1980    thyroid cancer     Chronic back pain     COPD (chronic obstructive pulmonary disease) ([address]     Depression     Diabetes mellitus ([address]     Emphysema of lung ([address]     Essential hypertension 3/15/2016    History of blood transfusion     2019    Hyperlipidemia     Hypothyroidism     Obesity     On home O2     3L NC at night    YARED (obstructive sleep apnea)     3 L NC    Osteoarthritis     Restless legs syndrome     Sleep apnea     Urinary incontinence        PAST SURGICAL HISTORY    Past Surgical History:   Procedure Laterality Date    APPENDECTOMY      BREAST BIOPSY Right 2018     SECTION      CHOLECYSTECTOMY      COLONOSCOPY N/A 2019    COLONOSCOPY DIAGNOSTIC performed by Janis Calle MD at [address] Right 2019    LAPAROSCOPIC RIGHT COLECTOMY, LYSIS OF ADHESIONS. performed by Laura Guy MD at [address] Right 2019    Simple mastectomy with sentinel node biopsy    TUBAL LIGATION         FAMILY HISTORY    Family History   Problem Relation Age of Onset    Diabetes Father     Heart Disease Father     Thyroid Cancer Sister     Thyroid Cancer Brother     Thyroid Cancer Sister     No Known Problems Daughter        SOCIAL HISTORY    Social History     Tobacco Use    Smoking status: Former Smoker     Packs/day: 1.00     Years: 40.00     Pack years: 40.00     Types: Cigarettes     Start date: 3/8/1976     Last attempt to quit: 2018     Years since quittin.5    Smokeless tobacco: Never Used   Substance Use Topics    Alcohol use: No     Alcohol/week: 0.0 standard drinks    Drug use: No       ALLERGIES    Allergies   Allergen Reactions    Latex Itching and Rash    Sulfa Antibiotics Hives    Bactrim [Sulfamethoxazole-Trimethoprim] Hives and Other (See Comments)     Metallic taste    Nicotine Hives     Hives from the patch       MEDICATIONS    Current Outpatient Medications on File Prior to Encounter   Medication Sig Dispense Refill    VORTIoxetine (TRINTELLIX) 10 MG TABS tablet Take 10 mg by mouth      furosemide (LASIX) 40 MG tablet TAKE 1 TABLET BY MOUTH EVERY DAY 90 tablet 1    sodium chloride 0.9 % irrigation Irrigate with as directed for 1 dose. 1000 mL 5    sodium chloride 0.9 % irrigation Irrigate with as directed for 1 dose.  1000 mL 5    albuterol sulfate HFA (VENTOLIN HFA) 108 (90 Base) MCG/ACT inhaler Inhale 2 puffs into the lungs every 6 hours as needed for Wheezing 1 Inhaler 5    oxybutynin (DITROPAN-XL) 10 MG extended release tablet Take 10 mg by mouth daily      simvastatin (ZOCOR) 40 MG tablet TAKE 1 TABLET BY MOUTH EVERY EVENING 90 tablet 1    losartan (COZAAR) 25 MG tablet Take 1 tablet by mouth daily 90 tablet 1    levothyroxine (SYNTHROID) 175 MCG tablet Take 1 tablet by mouth Daily 90 tablet 1    potassium chloride (KLOR-CON M) 10 MEQ extended release tablet Take 1 tablet by mouth daily 90 tablet 1    metFORMIN (GLUCOPHAGE XR) 500 MG extended release tablet Take 2 tablets by mouth daily (with breakfast) 180 tablet 1    ARIPiprazole (ABILIFY) 10 MG tablet TAKE 1 TABLET BY MOUTH EVERY DAY IN THE EVENING AS DIRECTED  3    Multiple Vitamin (MULTIVITAMIN) tablet Take 1 tablet by mouth daily      vitamin D (CHOLECALCIFEROL) 1000 UNIT TABS tablet Take 1,000 Units by mouth daily      anastrozole (ARIMIDEX) 1 MG tablet Take 1 mg by mouth daily      ACCU-CHEK SMARTVIEW strip Test TID E11.8 300 each 3    ipratropium-albuterol (DUONEB) 0.5-2.5 (3) MG/3ML SOLN nebulizer solution 3 mLs      fluticasone-vilanterol (BREO ELLIPTA) 100-25 MCG/INH AEPB inhaler Inhale 1 puff into the lungs daily 28 each 5    SOFT TOUCH LANCETS MISC Use tid as directed dx: E11.9 300 each 3    hydrOXYzine (VISTARIL) 25 MG capsule Take 25 mg by mouth 3 times daily as needed       DULoxetine (CYMBALTA) 60 MG extended release capsule Take 60 mg by mouth daily       Multiple Vitamins-Minerals (ICAPS AREDS 2 PO) Take 1 tablet by mouth daily       No current facility-administered medications on file prior to encounter. REVIEW OF SYSTEMS    Pertinent items are noted in HPI. Objective:      BP (!) 114/56   Pulse 75   Temp 97.9 °F (36.6 °C) (Temporal)   Resp 18     Wt Readings from Last 3 Encounters:   12/27/19 (!) 310 lb (140.6 kg)   12/13/19 (!) 310 lb (140.6 kg)   11/21/19 (!) 310 lb (140.6 kg)       PHYSICAL EXAM    Constitutional:   Well nourished and well developed. Appears neat and clean. Patient is alert, oriented x3, and in no apparent distress. Respiratory:  Respiratory effort is easy and symmetric bilaterally.   Rate is normal at rest and on room air. Vascular:  Pedal Pulses is palpable and audible with doppler. Capillary refill is <3 sec to digits bilateral.  Extremities positive for +2 pitting edema. Neurological:  Cranial nerves grossly intact. Deep tendon reflexes of the lower extremities are intact and symmetrical bilaterally. Sensation normal to touch and vibration. Sensation intact to 10 gram monofilament in extremities. Musculoskeletal:  Gait and station stable. Muscle strength +5/5 to all extrinsic muscles to the foot bilateral.  Full range of motion of ankle, subtalar, and midtarsal joints noted without crepitation. No cyanosis, clubbing or edema noted. Dermatological:  Wound description noted in wound assessment. Otherwise, skin is warm, dry, and well-hydrated with normal turgor, texture, and pigmentation. Psychiatric:  Judgement and insight intact. Short and long term memory intact. No evidence of depression, anxiety, or agitation. Patient is calm, cooperative, and communicative. Appropriate interactions and affect. Wound 01/16/20 Leg Right; Lower #1 (Active)   Wound Image   1/23/2020  3:23 PM   Wound Traumatic 1/23/2020  3:23 PM   Dressing/Treatment Alginate with Ag;Dry dressing 1/16/2020  4:42 PM   Wound Cleansed Rinsed/Irrigated with saline 1/23/2020  3:23 PM   Wound Length (cm) 5.1 cm 1/23/2020  3:23 PM   Wound Width (cm) 8 cm 1/23/2020  3:23 PM   Wound Depth (cm) 0.2 cm 1/23/2020  3:23 PM   Wound Surface Area (cm^2) 40.8 cm^2 1/23/2020  3:23 PM   Change in Wound Size % (l*w) 68.83 1/23/2020  3:23 PM   Wound Volume (cm^3) 8.16 cm^3 1/23/2020  3:23 PM   Wound Healing % 38 1/23/2020  3:23 PM   Drainage Amount Small 1/23/2020  3:23 PM   Drainage Description Serosanguinous 1/23/2020  3:23 PM   Odor None 1/23/2020  3:23 PM   Margins Defined edges 1/23/2020  3:23 PM   Tamara-wound Assessment Pink 1/23/2020  3:23 PM   Non-staged Wound Description Full thickness 1/23/2020  3:23 PM   [place]%Wound week.  She will follow-up with Ariana Solomon in two weeks. Treatment Note please see attached Discharge Instructions    In my professional opinion this patient would benefit from HBO Therapy: No    Written patient dismissal instructions given to patient and signed by patient or POA. Discharge Instructions         Discharge Instructions        [facility]   Physician Orders and Discharge Instructions  [address]  Telephone: [phone]      [phone]        NAME:  Pretty Aceves  YOB: 1955  MEDICAL RECORD NUMBER:  [id number]     Home Care/Facility: [address] [place] [address] (order dressings as needed)     Wound Location:  Right Leg     Dressing orders: 1. Cleanse wound(s) with normal saline. 2. Apply dry SILVERCEL OR CALCIUM ALGINATE WITH Ag or eqivalent to wound bed. 3. Cover with 4x4's , zinc unna boots and coban or ace wraps  4. Change  twice weekly and PRN     Compression: unna boot     Offloading Device:     Other Instructions: May put antibiotic ointment on open area on abdomen till next appointment. Change daily      Keep all dressings clean, dry and intact. Keep pressure off the wound(s) at all times.      Follow up visit   [address]  February 6, 2020 @     Please give 24 hour notice if unable to keep appointment. [phone]     If you experience any of the following, please call the Wound Care Service at  [phone] or go to the nearest emergency room. *Increase in pain         *Temperature over 101           *Increase in drainage from your wound or a foul odor  *Uncontrolled swelling            *Need for compression bandage changes due to slippage, breakthrough drainage       PLEASE NOTE: IF YOU ARE UNABLE TO OBTAIN WOUND SUPPLIES, CONTINUE TO USE THE SUPPLIES YOU HAVE AVAILABLE UNTIL YOU ARE ABLE TO REACH US.  IT IS MOST IMPORTANT TO KEEP

## 2020-02-06 ENCOUNTER — HOSPITAL ENCOUNTER (OUTPATIENT)
Dept: WOUND CARE | Age: 65
Discharge: HOME | End: 2020-02-06
Payer: COMMERCIAL

## 2020-02-06 VITALS
DIASTOLIC BLOOD PRESSURE: 67 MMHG | HEART RATE: 76 BPM | TEMPERATURE: 98.1 F | RESPIRATION RATE: 18 BRPM | SYSTOLIC BLOOD PRESSURE: 122 MMHG

## 2020-02-06 PROBLEM — L98.499: Status: ACTIVE | Noted: 2020-02-06

## 2020-02-06 PROCEDURE — 99213 OFFICE O/P EST LOW 20 MIN: CPT

## 2020-02-06 PROCEDURE — 29580 STRAPPING UNNA BOOT: CPT

## 2020-02-06 PROCEDURE — 99212 OFFICE O/P EST SF 10 MIN: CPT | Performed by: NURSE PRACTITIONER

## 2020-02-06 ASSESSMENT — PAIN SCALES - GENERAL: PAINLEVEL_OUTOF10: 0

## 2020-02-06 NOTE — DISCHARGE INSTR - COC
Access:  [facility] IV ACCESS:805864835}    Nursing Mobility/ADLs:  Walking   {CHP DME BSDI:703568080}  Transfer  {P DME FPGS:574382215}  Bathing  {P DME VIUV:402407707}  Dressing  {CHP DME APXJ:546211126}  Toileting  {CHP DME RHDE:658811107}  Feeding  {P DME WXWJ:469674502}  Med Admin  {[facility] DME GZYV:231557405}  Med Delivery   {[facility] [place] Delivery:246689523}    Wound Care Documentation and Therapy:  Wound 01/16/20 Leg Right; Lower #1 (Active)   Wound Image   2/6/2020 10:14 AM   Wound Traumatic 2/6/2020 10:14 AM   Wound Cleansed Rinsed/Irrigated with saline; Soap and water 2/6/2020 10:14 AM   Wound Length (cm) 0 cm 2/6/2020 10:14 AM   Wound Width (cm) 0 cm 2/6/2020 10:14 AM   Wound Depth (cm) 0 cm 2/6/2020 10:14 AM   Wound Surface Area (cm^2) 0 cm^2 2/6/2020 10:14 AM   Change in Wound Size % (l*w) 100 2/6/2020 10:14 AM   Wound Volume (cm^3) 0 cm^3 2/6/2020 10:14 AM   Wound Healing % 100 2/6/2020 10:14 AM   Drainage Amount None 2/6/2020 10:14 AM   Drainage Description Serosanguinous 1/23/2020  3:23 PM   Odor None 1/23/2020  3:23 PM   Margins Defined edges 1/23/2020  3:23 PM   Tamara-wound Assessment Pink 1/23/2020  3:23 PM   Non-staged Wound Description Full thickness 2/6/2020 10:14 AM   [place]%Wound Bed 95 1/16/2020  3:14 PM   Red%Wound Bed 15 1/23/2020  3:23 PM   Yellow%Wound Bed 85 1/23/2020  3:23 PM   Black%Wound Bed 5 1/16/2020  3:14 PM   Number of days: 20       Wound 02/06/20 Abdomen #2 (Active)   Wound Image   2/6/2020 10:14 AM   Wound Traumatic 2/6/2020 10:14 AM   Wound Cleansed Rinsed/Irrigated with saline 2/6/2020 10:14 AM   Wound Length (cm) 4.4 cm 2/6/2020 10:14 AM   Wound Width (cm) 2.5 cm 2/6/2020 10:14 AM   Wound Depth (cm) 0.2 cm 2/6/2020 10:14 AM   Wound Surface Area (cm^2) 11 cm^2 2/6/2020 10:14 AM   Wound Volume (cm^3) 2.2 cm^3 2/6/2020 10:14 AM   Drainage Amount Small 2/6/2020 10:14 AM   Drainage Description Serosanguinous 2/6/2020 10:14 AM   Odor None 2/6/2020 10:14 AM   Tamara-wound Assessment Dry 2020 10:14 AM   Non-staged Wound Description Full thickness 2020 10:14 AM   [place]%Wound Bed 100 2020 10:14 AM   Number of days: 0        Elimination:  Continence:   · Bowel: {YES / BF:50716}  · Bladder: {YES / EM:35140}  Urinary Catheter: {Urinary Catheter:196344684}   Colostomy/Ileostomy/Ileal Conduit: {YES / XY:83930}       Date of Last BM: ***  No intake or output data in the 24 hours ending 20 1038  No intake/output data recorded.     Safety Concerns:     [address] [employer] Safety Concerns:521455602}    Impairments/Disabilities:      [address] [employer] Impairments/Disabilities:811939789}    Nutrition Therapy:  Current Nutrition Therapy:   [address] [employer] Diet List:637218843}    Routes of Feeding: {CHP DME Other Feedings:254889447}  Liquids: {Slp liquid thickness:50364}  Daily Fluid Restriction: {CHP DME Yes amt example:832210988}  Last Modified Barium Swallow with Video (Video Swallowing Test): {Done Not Done ISLQ:738914057}    Treatments at the Time of Hospital Discharge:   Respiratory Treatments: ***  Oxygen Therapy:  {Therapy; copd oxygen:34608}  Ventilator:    { CC Vent RGIB:519732410}    Rehab Therapies: {THERAPEUTIC INTERVENTION:5286347295}  Weight Bearing Status/Restrictions: [address] [employer]  Weight Bearin}  Other Medical Equipment (for information only, NOT a DME order):  {EQUIPMENT:217507754}  Other Treatments: ***    Patient's personal belongings (please select all that are sent with patient):  {CHP DME Belongings:651060265}    RN SIGNATURE:  {Esignature:266694590}    CASE MANAGEMENT/SOCIAL WORK SECTION    Inpatient Status Date: ***    Readmission Risk Assessment Score:  Readmission Risk              Risk of Unplanned Readmission:        0           Discharging to Facility/ Agency   · Name:   · Address:  · Phone:  · Fax:    Dialysis Facility (if applicable)   · Name:  · Address:  · Dialysis Schedule:  · Phone:  · Fax:    / signature: {Esignature:243622162}    PHYSICIAN SECTION    Prognosis: {Prognosis:9464831254}    Condition at Discharge: Jose Alcazar Patient Condition:110961310}    Rehab Potential (if transferring to Rehab): {Prognosis:6461548521}    Recommended Labs or Other Treatments After Discharge: ***    Physician Certification: I certify the above information and transfer of Eladio Quintero  is necessary for the continuing treatment of the diagnosis listed and that she requires {Admit to Appropriate Level of Care:94667} for {GREATER/LESS:017302332} 30 days.      Update Admission H&P: {CHP DME Changes in DLKTS:967791980}    PHYSICIAN SIGNATURE:  {Esignature:463095345}

## 2020-02-06 NOTE — PROGRESS NOTES
SECTION      CHOLECYSTECTOMY      COLONOSCOPY N/A 2019    COLONOSCOPY DIAGNOSTIC performed by Yandel Watt MD at [address] Right 2019    LAPAROSCOPIC RIGHT COLECTOMY, LYSIS OF ADHESIONS. performed by Magalys Ta MD at [address] Right 2019    Simple mastectomy with sentinel node biopsy    TUBAL LIGATION         FAMILY HISTORY    Family History   Problem Relation Age of Onset    Diabetes Father     Heart Disease Father     Thyroid Cancer Sister     Thyroid Cancer Brother     Thyroid Cancer Sister     No Known Problems Daughter        SOCIAL HISTORY    Social History     Tobacco Use    Smoking status: Former Smoker     Packs/day: 1.00     Years: 40.00     Pack years: 40.00     Types: Cigarettes     Start date: 3/8/1976     Last attempt to quit: 2018     Years since quittin.6    Smokeless tobacco: Never Used   Substance Use Topics    Alcohol use: No     Alcohol/week: 0.0 standard drinks    Drug use: No       ALLERGIES    Allergies   Allergen Reactions    Latex Itching and Rash    Sulfa Antibiotics Hives    Bactrim [Sulfamethoxazole-Trimethoprim] Hives and Other (See Comments)     Metallic taste    Nicotine Hives     Hives from the patch       MEDICATIONS    Current Outpatient Medications on File Prior to Encounter   Medication Sig Dispense Refill    VORTIoxetine (TRINTELLIX) 10 MG TABS tablet Take 10 mg by mouth      furosemide (LASIX) 40 MG tablet TAKE 1 TABLET BY MOUTH EVERY DAY 90 tablet 1    sodium chloride 0.9 % irrigation Irrigate with as directed for 1 dose. 1000 mL 5    sodium chloride 0.9 % irrigation Irrigate with as directed for 1 dose.  1000 mL 5    albuterol sulfate HFA (VENTOLIN HFA) 108 (90 Base) MCG/ACT inhaler Inhale 2 puffs into the lungs every 6 hours as needed for Wheezing 1 Inhaler 5    oxybutynin (DITROPAN-XL) 10 MG extended release tablet Take 10 mg by mouth daily      simvastatin (ZOCOR) 40 MG tablet TAKE 1 TABLET BY MOUTH EVERY EVENING 90 tablet 1    losartan (COZAAR) 25 MG tablet Take 1 tablet by mouth daily 90 tablet 1    levothyroxine (SYNTHROID) 175 MCG tablet Take 1 tablet by mouth Daily 90 tablet 1    potassium chloride (KLOR-CON M) 10 MEQ extended release tablet Take 1 tablet by mouth daily 90 tablet 1    metFORMIN (GLUCOPHAGE XR) 500 MG extended release tablet Take 2 tablets by mouth daily (with breakfast) 180 tablet 1    ARIPiprazole (ABILIFY) 10 MG tablet TAKE 1 TABLET BY MOUTH EVERY DAY IN THE EVENING AS DIRECTED  3    Multiple Vitamin (MULTIVITAMIN) tablet Take 1 tablet by mouth daily      Multiple Vitamins-Minerals (ICAPS AREDS 2 PO) Take 1 tablet by mouth daily      vitamin D (CHOLECALCIFEROL) 1000 UNIT TABS tablet Take 1,000 Units by mouth daily      anastrozole (ARIMIDEX) 1 MG tablet Take 1 mg by mouth daily      ACCU-CHEK SMARTVIEW strip Test TID E11.8 300 each 3    ipratropium-albuterol (DUONEB) 0.5-2.5 (3) MG/3ML SOLN nebulizer solution 3 mLs      fluticasone-vilanterol (BREO ELLIPTA) 100-25 MCG/INH AEPB inhaler Inhale 1 puff into the lungs daily 28 each 5    SOFT TOUCH LANCETS MISC Use tid as directed dx: E11.9 300 each 3    hydrOXYzine (VISTARIL) 25 MG capsule Take 25 mg by mouth 3 times daily as needed       DULoxetine (CYMBALTA) 60 MG extended release capsule Take 60 mg by mouth daily        No current facility-administered medications on file prior to encounter. REVIEW OF SYSTEMS    Pertinent items are noted in HPI. Objective:      /67   Pulse 76   Temp 98.1 °F (36.7 °C) (Temporal)   Resp 18     Wt Readings from Last 3 Encounters:   12/27/19 (!) 310 lb (140.6 kg)   12/13/19 (!) 310 lb (140.6 kg)   11/21/19 (!) 310 lb (140.6 kg)       PHYSICAL EXAM    Constitutional:   Well nourished and well developed. Appears neat and clean. Patient is alert, oriented x3, and in no apparent distress.      Respiratory:  Respiratory effort is easy and symmetric

## 2020-02-19 ENCOUNTER — OFFICE VISIT (OUTPATIENT)
Dept: PULMONOLOGY | Age: 65
End: 2020-02-19
Payer: COMMERCIAL

## 2020-02-19 VITALS
WEIGHT: 293 LBS | DIASTOLIC BLOOD PRESSURE: 80 MMHG | HEIGHT: 67 IN | TEMPERATURE: 98 F | HEART RATE: 68 BPM | RESPIRATION RATE: 16 BRPM | OXYGEN SATURATION: 98 % | BODY MASS INDEX: 45.99 KG/M2 | SYSTOLIC BLOOD PRESSURE: 124 MMHG

## 2020-02-19 PROCEDURE — 3017F COLORECTAL CA SCREEN DOC REV: CPT | Performed by: INTERNAL MEDICINE

## 2020-02-19 PROCEDURE — G8427 DOCREV CUR MEDS BY ELIG CLIN: HCPCS | Performed by: INTERNAL MEDICINE

## 2020-02-19 PROCEDURE — G8926 SPIRO NO PERF OR DOC: HCPCS | Performed by: INTERNAL MEDICINE

## 2020-02-19 PROCEDURE — G8417 CALC BMI ABV UP PARAM F/U: HCPCS | Performed by: INTERNAL MEDICINE

## 2020-02-19 PROCEDURE — 99214 OFFICE O/P EST MOD 30 MIN: CPT | Performed by: INTERNAL MEDICINE

## 2020-02-19 PROCEDURE — G8484 FLU IMMUNIZE NO ADMIN: HCPCS | Performed by: INTERNAL MEDICINE

## 2020-02-19 PROCEDURE — G0296 VISIT TO DETERM LDCT ELIG: HCPCS | Performed by: INTERNAL MEDICINE

## 2020-02-19 PROCEDURE — 1036F TOBACCO NON-USER: CPT | Performed by: INTERNAL MEDICINE

## 2020-02-19 PROCEDURE — 3023F SPIROM DOC REV: CPT | Performed by: INTERNAL MEDICINE

## 2020-02-19 NOTE — PROGRESS NOTES
Subjective:     Cordell Cooper is a 59 y.o. female who complains today of:     Chief Complaint   Patient presents with    Follow-up     COPD-seen in hospital 2018       HPI  Patient presents for COPD and sleep apnea follow-up      I saw her February last year she was supposed to follow-up with me post hospital discharge however she missed her appointment she came now for follow-up, she is doing okay in general she has mild dyspnea on exertion, minimal cough occasionally productive, chronic lower extremity edema at baseline with no recent changes, no chest pain, no fever, she has oxygen at home at rest and while asleep but she has not been using she is asking if she can come off it, she does not have CPAP but she does have history of sleep apnea, she has Breo at home but she is not using, she quit smoking 1-1/2 years ago, she is on Lasix at home, no heartburn, no nasal congestion or postnasal drip            Allergies:  Latex; Sulfa antibiotics;  Bactrim [sulfamethoxazole-trimethoprim]; and Nicotine  Past Medical History:   Diagnosis Date    Acquired hypothyroidism 3/15/2016    Allergic rhinitis     pollen, dust ragweed, hay and straw     Anxiety     Asthma     Bipolar affect, depressed ([address]     Bipolar disorder ([address]     Breast cancer ([address]     Invasive ductal cancer right breast    Cancer ([address]     thyroid cancer     Chronic back pain     COPD (chronic obstructive pulmonary disease) ([address]     Depression     Diabetes mellitus ([address]     Emphysema of lung ([address]     Essential hypertension 3/15/2016    History of blood transfusion     2019    Hyperlipidemia     Hypothyroidism     Obesity     On home O2     3L NC at night    YARED (obstructive sleep apnea)     3 L NC    Osteoarthritis     Restless legs syndrome     Sleep apnea     Urinary incontinence      Past Surgical History:   Procedure Laterality Date    APPENDECTOMY      BREAST BIOPSY Right 2018     SECTION abused: Not on file     Physically abused: Not on file     Forced sexual activity: Not on file   Other Topics Concern    Not on file   Social History Narrative    Not on file         Review of Systems      ROS: 10 organs review of system is done including general, psychological, ENT, hematological, endocrine, respiratory, cardiovascular, gastrointestinal,musculoskeletal, neurological,  allergy and Immunology is done and is otherwise negative.     Current Outpatient Medications   Medication Sig Dispense Refill    umeclidinium-vilanterol (ANORO ELLIPTA) 62.5-25 MCG/INH AEPB inhaler Inhale 1 puff into the lungs daily 1 each 3    VORTIoxetine (TRINTELLIX) 10 MG TABS tablet Take 10 mg by mouth      furosemide (LASIX) 40 MG tablet TAKE 1 TABLET BY MOUTH EVERY DAY 90 tablet 1    albuterol sulfate HFA (VENTOLIN HFA) 108 (90 Base) MCG/ACT inhaler Inhale 2 puffs into the lungs every 6 hours as needed for Wheezing 1 Inhaler 5    oxybutynin (DITROPAN-XL) 10 MG extended release tablet Take 10 mg by mouth daily      simvastatin (ZOCOR) 40 MG tablet TAKE 1 TABLET BY MOUTH EVERY EVENING 90 tablet 1    losartan (COZAAR) 25 MG tablet Take 1 tablet by mouth daily 90 tablet 1    levothyroxine (SYNTHROID) 175 MCG tablet Take 1 tablet by mouth Daily 90 tablet 1    potassium chloride (KLOR-CON M) 10 MEQ extended release tablet Take 1 tablet by mouth daily 90 tablet 1    metFORMIN (GLUCOPHAGE XR) 500 MG extended release tablet Take 2 tablets by mouth daily (with breakfast) 180 tablet 1    ARIPiprazole (ABILIFY) 10 MG tablet TAKE 1 TABLET BY MOUTH EVERY DAY IN THE EVENING AS DIRECTED  3    Multiple Vitamin (MULTIVITAMIN) tablet Take 1 tablet by mouth daily      Multiple Vitamins-Minerals (ICAPS AREDS 2 PO) Take 1 tablet by mouth daily      vitamin D (CHOLECALCIFEROL) 1000 UNIT TABS tablet Take 1,000 Units by mouth daily      anastrozole (ARIMIDEX) 1 MG tablet Take 1 mg by mouth daily      ACCU-CHEK SMARTVIEW strip Test TID adverse effects of the medication prescribed today, as well as treatment plan and result expectations have been discussed with the patient who expresses understanding and desires to proceed.           Return in about 10 weeks (around 4/29/2020). Yobani Snyder MD      Low Dose CT (LDCT) Lung Screening criteria met   Age 50-69   Pack year smoking >30   Still smoking or less than 15 year since quit   No sign or symptoms of lung cancer   > 11 months since last LDCT     Risks and benefits of lung cancer screening with LDCT scans discussed:    Significance of positive screen - False-positive LDCT results often occur. 95% of all positive results do not lead to a diagnosis of cancer. Usually further imaging can resolve most false-positive results; however, some patients may require invasive procedures. Over diagnosis risk - 10% to 12% of screen-detected lung cancer cases are over diagnosed--that is, the cancer would not have been detected in the patient's lifetime without the screening. Need for follow up screens annually to continue lung cancer screening effectiveness     Risks associated with radiation from annual LDCT- Radiation exposure is about the same as for a mammogram, which is about 1/3 of the annual background radiation exposure from everyday life. Starting screening at age 54 is not likely to increase cancer risk from radiation exposure. Patients with comorbidities resulting in life expectancy of < 10 years, or that would preclude treatment of an abnormality identified on CT, should not be screened due to lack of benefit.     To obtain maximal benefit from this screening, smoking cessation and long-term abstinence from smoking is critical

## 2020-02-26 ENCOUNTER — TELEPHONE (OUTPATIENT)
Dept: CASE MANAGEMENT | Age: 65
End: 2020-02-26

## 2020-03-05 ENCOUNTER — HOSPITAL ENCOUNTER (OUTPATIENT)
Dept: WOUND CARE | Age: 65
Discharge: HOME | End: 2020-03-05
Payer: COMMERCIAL

## 2020-03-05 VITALS
HEART RATE: 70 BPM | TEMPERATURE: 98.2 F | DIASTOLIC BLOOD PRESSURE: 64 MMHG | SYSTOLIC BLOOD PRESSURE: 120 MMHG | RESPIRATION RATE: 16 BRPM

## 2020-03-05 PROCEDURE — 99213 OFFICE O/P EST LOW 20 MIN: CPT | Performed by: NURSE PRACTITIONER

## 2020-03-05 PROCEDURE — 29580 STRAPPING UNNA BOOT: CPT

## 2020-03-05 PROCEDURE — 99212 OFFICE O/P EST SF 10 MIN: CPT

## 2020-03-05 NOTE — DISCHARGE INSTR - COC
Continuity of Care Form    Patient Name: Marva Alejandra   :  1955  MRN:  [id number]    Admit date:  3/5/2020  Discharge date:  ***    Code Status Order: Prior   Advance Directives:     Admitting Physician:  No admitting provider for patient encounter. PCP: Avinash Love MD    Discharging Nurse: [facility] Unit/Room#: No information available for this encounter.   Discharging Unit Phone Number: ***    Emergency Contact:   Extended Emergency Contact Information  Primary Emergency Contact: Billie Sol [address] Phone: [phone]  Work Phone: [phone]  Mobile Phone: [phone]  Relation: Child    Past Surgical History:  Past Surgical History:   Procedure Laterality Date    APPENDECTOMY      BREAST BIOPSY Right 2018     SECTION      CHOLECYSTECTOMY      COLONOSCOPY N/A 2019    COLONOSCOPY DIAGNOSTIC performed by Bunny Turk MD at [address] Right 2019    LAPAROSCOPIC RIGHT COLECTOMY, LYSIS OF ADHESIONS. performed by David Dasilva MD at [address] [facility] Right 2019    Simple mastectomy with sentinel node biopsy    TUBAL LIGATION         Immunization History:   Immunization History   Administered Date(s) Administered    Influenza 10/28/2015    Influenza, [facility], IM, (6 mo and older Fluzone, Flulaval, Fluarix and 3 yrs and older Afluria) 2016, 2017, 10/01/2018    Pneumococcal Polysaccharide ([id number]) 10/06/2008, 2012, 2016    Tdap (Boostrix, Adacel) 2013       Active Problems:  Patient Active Problem List   Diagnosis Code    Type 2 diabetes mellitus with complication ([address] F60.1    Acquired hypothyroidism E03.9    Essential hypertension I10    Mixed hyperlipidemia E78.2    COPD with chronic bronchitis ([address] J44.9    Microalbuminuria R80.9    Moderate episode of recurrent major depressive disorder ([facility] F33.1    YARED (obstructive sleep apnea) G47.33  Restless leg syndrome G25.81    Vitamin B12 deficiency E53.8    Vitamin D deficiency E55.9    Left ventricular hypertrophy I51.7    Left ventricular diastolic dysfunction M50.2    Morbid obesity with BMI of 45.0-49.9, adult (HCC) E66.01, Z68.42    Breast carcinoma, female, right (HCC) C50.911    Iron deficiency anemia secondary to inadequate dietary iron intake D50.8    Breast cancer of lower-inner quadrant of right female breast (HCC) C50.311    Postoperative anemia D64.9    Malignant neoplasm of central portion of right breast in female, estrogen receptor positive (HCC) C50.111, Z17.0    Iron deficiency anemia due to chronic blood loss D50.0    Non-pressure chronic ulcer of other part of right lower leg with fat layer exposed ([address] L97.812    Chronic venous insufficiency I87.2    Adenomatous polyp of ascending colon D12.2    Adenomatous polyp of sigmoid colon D12.5    Dysplastic colon polyp K63.5    Cellulitis of right leg L03.115    Ulcer of abdomen wall, with unspecified severity ([address] L98.499       Isolation/Infection:   Isolation          No Isolation        Patient Infection Status     Infection Onset Added Last Indicated Last Indicated By Review Planned Expiration Resolved Resolved By    [facility] 19 Wound Culture        Right leg Wound positive for MRSA           Nurse Assessment:  Last Vital Signs: /64   Pulse 70   Temp 98.2 °F (36.8 °C) (Temporal)   Resp 16     Last documented pain score (0-10 scale):    Last Weight:   Wt Readings from Last 1 Encounters:   20 (!) 304 lb (137.9 kg)     Mental Status:  {IP PT MENTAL STATUS:}    IV Access:  {[facility] IV ACCESS:924425282}    Nursing Mobility/ADLs:  Walking   {CHP DME OXLO:599135975}  Transfer  {CHP DME ERCL:221487724}  Bathing  {CHP DME LKIH:262677443}  Dressing  {CHP DME XRDP:224254420}  Toileting  {CHP DME VCWW:776525741}  Feeding  {CHP DME AZGK:706555113}  Med Admin  {P DME SRZ}  Med Delivery   [facility] GMSQRJXO:055696438}    Wound Care Documentation and Therapy:  Wound 01/16/20 Leg Right; Lower #1 (Active)   Wound Image   3/5/2020 10:55 AM   Wound Traumatic 3/5/2020 10:55 AM   Wound Cleansed Rinsed/Irrigated with saline 3/5/2020 10:55 AM   Wound Length (cm) 0 cm 3/5/2020 10:55 AM   Wound Width (cm) 0 cm 3/5/2020 10:55 AM   Wound Depth (cm) 0 cm 3/5/2020 10:55 AM   Wound Surface Area (cm^2) 0 cm^2 3/5/2020 10:55 AM   Change in Wound Size % (l*w) 100 3/5/2020 10:55 AM   Wound Volume (cm^3) 0 cm^3 3/5/2020 10:55 AM   Wound Healing % 100 3/5/2020 10:55 AM   Drainage Amount None 3/5/2020 10:55 AM   Drainage Description Serous 1/16/2020  3:14 PM   Odor None 1/16/2020  3:14 PM   Margins Undefined edges 1/16/2020  3:14 PM   Tamara-wound Assessment Red 1/16/2020  3:14 PM   Non-staged Wound Description Full thickness 2/6/2020 10:14 AM   [place]%Wound Bed 95 1/16/2020  3:14 PM   Black%Wound Bed 5 1/16/2020  3:14 PM   Number of days: 48       Wound 02/06/20 Abdomen #2 (Active)   Wound Image   3/5/2020 10:55 AM   Wound Traumatic 3/5/2020 10:55 AM   Wound Cleansed Rinsed/Irrigated with saline 3/5/2020 10:55 AM   Wound Length (cm) 2.5 cm 3/5/2020 10:55 AM   Wound Width (cm) 0.5 cm 3/5/2020 10:55 AM   Wound Depth (cm) 0.1 cm 3/5/2020 10:55 AM   Wound Surface Area (cm^2) 1.25 cm^2 3/5/2020 10:55 AM   Change in Wound Size % (l*w) 88.64 3/5/2020 10:55 AM   Wound Volume (cm^3) 0.12 cm^3 3/5/2020 10:55 AM   Wound Healing % 95 3/5/2020 10:55 AM   Drainage Amount Scant 3/5/2020 10:55 AM   Drainage Description Serosanguinous 3/5/2020 10:55 AM   Odor None 3/5/2020 10:55 AM   Margins Defined edges 3/5/2020 10:55 AM   Tamara-wound Assessment Dry 3/5/2020 10:55 AM   Non-staged Wound Description Full thickness 3/5/2020 10:55 AM   [place]%Wound Bed 100 3/5/2020 10:55 AM   Number of days: 28        Elimination:  Continence:   · Bowel: {YES / AB:66172}  · Bladder: {YES / QN:07468}  Urinary Catheter: {Urinary Catheter:585904323}

## 2020-03-05 NOTE — PROGRESS NOTES
Anna Watkins 37   Progress Note and Procedure Note      [address] [place] RECORD NUMBER:  [id number]  AGE: 59 y.o. GENDER: female  : 1955  EPISODE DATE:  3/5/2020    Subjective:     Chief Complaint   Patient presents with    Wound Check     right leg / abdomin         HISTORY of PRESENT ILLNESS HPI     Anastacio Hauser is a 59 y.o. female who presents today for wound/ulcer evaluation.    History of Wound Context: recheck on right lower extremity venous insufficiency ulcer and old non-healing abdominal surgical incision/ulceration   Wound/Ulcer Pain Timing/Severity: none  Quality of pain: N/A  Severity:  0 / 10   Modifying Factors: None  Associated Signs/Symptoms: none    Ulcer Identification:  Ulcer Type: venous and non-healing/non-surgical  Contributing Factors: edema, venous stasis, obesity and abdominal wound/ulcer patient frequently picks at     Wound: N/A        PAST MEDICAL HISTORY        Diagnosis Date    Acquired hypothyroidism 3/15/2016    Allergic rhinitis     pollen, dust ragweed, hay and straw     Anxiety     Asthma     Bipolar affect, depressed (HCC)     Bipolar disorder ([address]     Breast cancer ([address]     Invasive ductal cancer right breast    Cancer ([address] 1980    thyroid cancer     Chronic back pain     COPD (chronic obstructive pulmonary disease) ([address]     Depression     Diabetes mellitus ([address]     Emphysema of lung ([address]     Essential hypertension 3/15/2016    History of blood transfusion     2019    Hyperlipidemia     Hypothyroidism     Obesity     On home O2     3L NC at night    YARED (obstructive sleep apnea)     3 L NC    Osteoarthritis     Restless legs syndrome     Sleep apnea     Urinary incontinence        PAST SURGICAL HISTORY    Past Surgical History:   Procedure Laterality Date    APPENDECTOMY      BREAST BIOPSY Right 2018     SECTION      CHOLECYSTECTOMY      COLONOSCOPY N/A 2019    COLONOSCOPY DIAGNOSTIC losartan (COZAAR) 25 MG tablet Take 1 tablet by mouth daily 90 tablet 1    levothyroxine (SYNTHROID) 175 MCG tablet Take 1 tablet by mouth Daily 90 tablet 1    potassium chloride (KLOR-CON M) 10 MEQ extended release tablet Take 1 tablet by mouth daily 90 tablet 1    metFORMIN (GLUCOPHAGE XR) 500 MG extended release tablet Take 2 tablets by mouth daily (with breakfast) 180 tablet 1    ARIPiprazole (ABILIFY) 10 MG tablet TAKE 1 TABLET BY MOUTH EVERY DAY IN THE EVENING AS DIRECTED  3    Multiple Vitamin (MULTIVITAMIN) tablet Take 1 tablet by mouth daily      Multiple Vitamins-Minerals (ICAPS AREDS 2 PO) Take 1 tablet by mouth daily      vitamin D (CHOLECALCIFEROL) 1000 UNIT TABS tablet Take 1,000 Units by mouth daily      anastrozole (ARIMIDEX) 1 MG tablet Take 1 mg by mouth daily      ACCU-CHEK SMARTVIEW strip Test TID E11.8 300 each 3    ipratropium-albuterol (DUONEB) 0.5-2.5 (3) MG/3ML SOLN nebulizer solution 3 mLs      SOFT TOUCH LANCETS MISC Use tid as directed dx: E11.9 300 each 3    hydrOXYzine (VISTARIL) 25 MG capsule Take 25 mg by mouth 3 times daily as needed       DULoxetine (CYMBALTA) 60 MG extended release capsule Take 60 mg by mouth daily       sodium chloride 0.9 % irrigation Irrigate with as directed for 1 dose. (Patient not taking: Reported on 2/19/2020) 1000 mL 5     No current facility-administered medications on file prior to encounter. REVIEW OF SYSTEMS    Pertinent items are noted in HPI. Objective:      /64   Pulse 70   Temp 98.2 °F (36.8 °C) (Temporal)   Resp 16     Wt Readings from Last 3 Encounters:   02/19/20 (!) 304 lb (137.9 kg)   12/27/19 (!) 310 lb (140.6 kg)   12/13/19 (!) 310 lb (140.6 kg)       PHYSICAL EXAM    Constitutional:   Well nourished and well developed. Appears neat and clean. Patient is alert, oriented x3, and in no apparent distress. Respiratory:  Respiratory effort is easy and symmetric bilaterally.   Rate is normal at rest and on room air.    Vascular:  Pedal Pulses is palpable and audible signal noted with doppler. Capillary refill is <3 sec to digits bilateral.  Extremities negative for pitting edema. Neurological:  Gross and Light touch intact. Protective sensation intact    Dermatological:  Wound description noted in wound assessment. Psychiatric:  Judgement and insight intact. Short and long term memory intact. No evidence of depression, anxiety, or agitation. Patient is calm, cooperative, and communicative. Appropriate interactions and affect. Assessment:      Problem List Items Addressed This Visit     None           Procedure Note  Indications:  Based on my examination of this patient's wound(s)/ulcer(s) today, debridement is not required to promote healing and evaluate the wound base. Wound 01/16/20 Leg Right; Lower #1 (Active)   Wound Image   3/5/2020 10:55 AM   Wound Traumatic 3/5/2020 10:55 AM   Wound Cleansed Rinsed/Irrigated with saline 3/5/2020 10:55 AM   Wound Length (cm) 0 cm 3/5/2020 10:55 AM   Wound Width (cm) 0 cm 3/5/2020 10:55 AM   Wound Depth (cm) 0 cm 3/5/2020 10:55 AM   Wound Surface Area (cm^2) 0 cm^2 3/5/2020 10:55 AM   Change in Wound Size % (l*w) 100 3/5/2020 10:55 AM   Wound Volume (cm^3) 0 cm^3 3/5/2020 10:55 AM   Wound Healing % 100 3/5/2020 10:55 AM   Drainage Amount None 3/5/2020 10:55 AM   Drainage Description Serous 1/16/2020  3:14 PM   Odor None 1/16/2020  3:14 PM   Margins Undefined edges 1/16/2020  3:14 PM   Tamara-wound Assessment Red 1/16/2020  3:14 PM   Non-staged Wound Description Full thickness 2/6/2020 10:14 AM   [place]%Wound Bed 95 1/16/2020  3:14 PM   Black%Wound Bed 5 1/16/2020  3:14 PM   Number of days: 48       Wound 02/06/20 Abdomen #2 (Active)   Wound Image   3/5/2020 10:55 AM   Wound Traumatic 3/5/2020 10:55 AM   Wound Cleansed Rinsed/Irrigated with saline 3/5/2020 10:55 AM   Wound Length (cm) 2.5 cm 3/5/2020 10:55 AM   Wound Width (cm) 0.5 cm 3/5/2020 10:55 AM   Wound Depth (cm) 0.1 cm 3/5/2020 10:55 AM   Wound Surface Area (cm^2) 1.25 cm^2 3/5/2020 10:55 AM   Change in Wound Size % (l*w) 88.64 3/5/2020 10:55 AM   Wound Volume (cm^3) 0.12 cm^3 3/5/2020 10:55 AM   Wound Healing % 95 3/5/2020 10:55 AM   Drainage Amount Scant 3/5/2020 10:55 AM   Drainage Description Serosanguinous 3/5/2020 10:55 AM   Odor None 3/5/2020 10:55 AM   Margins Defined edges 3/5/2020 10:55 AM   Tamara-wound Assessment Dry 3/5/2020 10:55 AM   Non-staged Wound Description Full thickness 3/5/2020 10:55 AM   [place]%Wound Bed 100 3/5/2020 10:55 AM   Number of days: 28       Incision (Active)   Number of days:        Incision 05/13/19 Abdomen Right (Active)   Number of days: 946             Plan:       Continue use of right leg unna boot x 1 more week, then home health may discontinue. After discontinuing, may shower. Keep appointment to get fitted with lower extremity compression garment as previously ordered. After unna boot removed use moderate compression spandagrip hose which will be provided to you today until fitted for compression hose previously ordered. For abdominal wound cleanse daily gently with sterile 4 x 4 gauze until able to shower and apply bacitracin ointment, then cover with Mepilex border dressing. Apply bacitracin daily, but unless soiled only change dressing several times per week. Return for follow up in two weeks. Treatment Note please see attached Discharge Instructions    Written patient dismissal instructions given to patient and signed by patient or POA.          Discharge Instructions         Discharge Instructions           Discharge [address] [facility]   Physician Orders and Discharge [address]  [facility], [address] [place] [address]  Telephone: [phone]      [phone]        NAME: Christian Rcie  DATE OF BIRTH:  1955  MEDICAL RECORD NUMBER:  [id number]     Home Care/Facility: [id number]  (order dressings as needed; extra durga sent home with patient)     Wound Location: Abdomen     Dressing orders: Cleanse with saline and dry. Thin layer of Bacitracin. Cover with Mepilex border or equivalent. Change 3 times weekly      Wound Location:  Right Leg      Dressing order:  Wash leg with soap and water, rinse and dry. Zinc unna boot, coban or ace wrap. Remove in 1 week and apply spandagrip. Unwrap unna boot ; do not cut off with scissors. Compression: Apply Spandagrip to right(10-20) med lower leg, apply first thing in the morning, remove at bedtime, elevate legs as much as possible during the day. Send stocking home with patient for when unna boot removed in 1 week.     Offloading Device:     Other Instructions: In 1 week, go to [facility] and get measured for compression stockings.     Keep all dressings clean, dry and intact.  Keep pressure off the wound(s) at all times.      Follow up visit   2 Weeks   March 19, 2020 @  10:15     Please give 24 hour notice if unable to keep appointment. [phone]     If you experience any of the following, please call the Wound Care Service at  [phone] or go to the nearest emergency room.        *Increase in pain         *Temperature over 101           *Increase in drainage from your wound or a foul odor  *Uncontrolled swelling            *Need for compression bandage changes due to slippage, breakthrough drainage     PLEASE NOTE: IF YOU ARE UNABLE TO OBTAIN WOUND SUPPLIES, CONTINUE TO USE THE SUPPLIES YOU HAVE AVAILABLE UNTIL YOU ARE ABLE TO [facility].  IT IS MOST IMPORTANT TO KEEP THE WOUND COVERED AT ALL TIMES    Electronically signed by MARIETTA Orozco NP on 3/5/2020 at 11:24 AM        Electronically signed by MARIETTA Orozco NP on 3/5/2020 at 11:25 AM

## 2020-03-05 NOTE — PLAN OF CARE
Problem: Blood Glucose:  Goal: Ability to maintain appropriate glucose levels will improve  Description  Ability to maintain appropriate glucose levels will improve  Outcome: Ongoing

## 2020-03-10 ENCOUNTER — HOSPITAL ENCOUNTER (OUTPATIENT)
Dept: CT IMAGING | Age: 65
Discharge: HOME | End: 2020-03-12
Payer: COMMERCIAL

## 2020-03-10 PROCEDURE — G0297 LDCT FOR LUNG CA SCREEN: HCPCS

## 2020-03-12 ENCOUNTER — OFFICE VISIT (OUTPATIENT)
Dept: GASTROENTEROLOGY | Age: 65
End: 2020-03-12
Payer: COMMERCIAL

## 2020-03-12 VITALS
RESPIRATION RATE: 12 BRPM | BODY MASS INDEX: 45.99 KG/M2 | HEART RATE: 67 BPM | HEIGHT: 67 IN | OXYGEN SATURATION: 92 % | WEIGHT: 293 LBS

## 2020-03-12 PROCEDURE — 99203 OFFICE O/P NEW LOW 30 MIN: CPT | Performed by: SPECIALIST

## 2020-03-12 PROCEDURE — G8484 FLU IMMUNIZE NO ADMIN: HCPCS | Performed by: SPECIALIST

## 2020-03-12 PROCEDURE — 1036F TOBACCO NON-USER: CPT | Performed by: SPECIALIST

## 2020-03-12 PROCEDURE — 3017F COLORECTAL CA SCREEN DOC REV: CPT | Performed by: SPECIALIST

## 2020-03-12 PROCEDURE — G8417 CALC BMI ABV UP PARAM F/U: HCPCS | Performed by: SPECIALIST

## 2020-03-12 PROCEDURE — G8427 DOCREV CUR MEDS BY ELIG CLIN: HCPCS | Performed by: SPECIALIST

## 2020-03-12 ASSESSMENT — ENCOUNTER SYMPTOMS
GASTROINTESTINAL NEGATIVE: 1
RECTAL PAIN: 0
CONSTIPATION: 0
EYES NEGATIVE: 1
BACK PAIN: 1
ANAL BLEEDING: 0
BLOOD IN STOOL: 0
DIARRHEA: 0
ABDOMINAL DISTENTION: 0
NAUSEA: 0
ABDOMINAL PAIN: 0
SHORTNESS OF BREATH: 1
VOMITING: 0

## 2020-03-12 NOTE — PROGRESS NOTES
 Restless legs syndrome     Sleep apnea     Urinary incontinence       Past Surgical History:   Procedure Laterality Date    APPENDECTOMY      BREAST BIOPSY Right 2018     SECTION      CHOLECYSTECTOMY      COLONOSCOPY N/A 2019    COLONOSCOPY DIAGNOSTIC performed by Rose Carpenter MD at [address] Right 2019    LAPAROSCOPIC RIGHT COLECTOMY, LYSIS OF ADHESIONS. performed by Kg Vyas MD at [address] [facility] Right 2019    Simple mastectomy with sentinel node biopsy    TUBAL LIGATION       Current Outpatient Medications on File Prior to Visit   Medication Sig Dispense Refill    umeclidinium-vilanterol (ANORO ELLIPTA) 62.5-25 MCG/INH AEPB inhaler Inhale 1 puff into the lungs daily 1 each 3    VORTIoxetine (TRINTELLIX) 10 MG TABS tablet Take 10 mg by mouth      furosemide (LASIX) 40 MG tablet TAKE 1 TABLET BY MOUTH EVERY DAY 90 tablet 1    sodium chloride 0.9 % irrigation Irrigate with as directed for 1 dose. 1000 mL 5    sodium chloride 0.9 % irrigation Irrigate with as directed for 1 dose.  1000 mL 5    albuterol sulfate HFA (VENTOLIN HFA) 108 (90 Base) MCG/ACT inhaler Inhale 2 puffs into the lungs every 6 hours as needed for Wheezing 1 Inhaler 5    oxybutynin (DITROPAN-XL) 10 MG extended release tablet Take 10 mg by mouth daily      simvastatin (ZOCOR) 40 MG tablet TAKE 1 TABLET BY MOUTH EVERY EVENING 90 tablet 1    losartan (COZAAR) 25 MG tablet Take 1 tablet by mouth daily 90 tablet 1    levothyroxine (SYNTHROID) 175 MCG tablet Take 1 tablet by mouth Daily 90 tablet 1    potassium chloride (KLOR-CON M) 10 MEQ extended release tablet Take 1 tablet by mouth daily 90 tablet 1    metFORMIN (GLUCOPHAGE XR) 500 MG extended release tablet Take 2 tablets by mouth daily (with breakfast) 180 tablet 1    ARIPiprazole (ABILIFY) 10 MG tablet TAKE 1 TABLET BY MOUTH EVERY DAY IN THE EVENING AS DIRECTED  3    Multiple Vitamin (MULTIVITAMIN) tablet Take 1 tablet by mouth daily      Multiple Vitamins-Minerals (ICAPS AREDS 2 PO) Take 1 tablet by mouth daily      vitamin D (CHOLECALCIFEROL) 1000 UNIT TABS tablet Take 1,000 Units by mouth daily      anastrozole (ARIMIDEX) 1 MG tablet Take 1 mg by mouth daily      ACCU-CHEK SMARTVIEW strip Test TID E11.8 300 each 3    ipratropium-albuterol (DUONEB) 0.5-2.5 (3) MG/3ML SOLN nebulizer solution 3 mLs      SOFT TOUCH LANCETS MISC Use tid as directed dx: E11.9 300 each 3    hydrOXYzine (VISTARIL) 25 MG capsule Take 25 mg by mouth 3 times daily as needed       DULoxetine (CYMBALTA) 60 MG extended release capsule Take 60 mg by mouth daily        No current facility-administered medications on file prior to visit. Family History   Problem Relation Age of Onset    Diabetes Father     Heart Disease Father     Thyroid Cancer Sister     Thyroid Cancer Brother     Thyroid Cancer Sister     No Known Problems Daughter       Social History     Socioeconomic History    Marital status:       Spouse name: None    Number of children: None    Years of education: None    Highest education level: None   Occupational History    None   Social Needs    Financial resource strain: None    Food insecurity     Worry: None     Inability: None    Transportation needs     Medical: None     Non-medical: None   Tobacco Use    Smoking status: Former Smoker     Packs/day: 1.00     Years: 40.00     Pack years: 40.00     Types: Cigarettes     Start date: 3/8/1976     Last attempt to quit: 2018     Years since quittin.7    Smokeless tobacco: Never Used   Substance and Sexual Activity    Alcohol use: No     Alcohol/week: 0.0 standard drinks    Drug use: No    Sexual activity: Never   Lifestyle    Physical activity     Days per week: None     Minutes per session: None    Stress: None   Relationships    Social connections     Talks on phone: None     Gets together: None     Attends Gnosticist service: None     Active member of club or organization: None     Attends meetings of clubs or organizations: None     Relationship status: None    Intimate partner violence     Fear of current or ex partner: None     Emotionally abused: None     Physically abused: None     Forced sexual activity: None   Other Topics Concern    None   Social History Narrative    None       Pulse 67, resp. rate 12, height 5' 6.5\" (1.689 m), weight (!) 301 lb (136.5 kg), SpO2 92 %, not currently breastfeeding. Physical Exam  Constitutional:       Appearance: She is well-developed. HENT:      Head: Normocephalic and atraumatic. Eyes:      Conjunctiva/sclera: Conjunctivae normal.      Pupils: Pupils are equal, round, and reactive to light. Neck:      Musculoskeletal: Normal range of motion. Cardiovascular:      Rate and Rhythm: Normal rate. Pulmonary:      Effort: Pulmonary effort is normal.   Abdominal:      General: Bowel sounds are normal.      Palpations: Abdomen is soft. Comments: Obeese, soft,non-tender,no palpable mass. Musculoskeletal: Normal range of motion. Skin:     General: Skin is warm. Neurological:      Mental Status: She is alert. Laboratory, Pathology, Radiology reviewed indetail with relevant important investigations summarized below:  Lab Results   Component Value Date    WBC 11.2 (H) 05/16/2019    HGB 12.2 05/16/2019    HCT 38.7 05/16/2019    MCV 89.3 05/16/2019     05/16/2019     Lab Results   Component Value Date    ALT 7 08/06/2019    AST 14 08/06/2019    ALKPHOS 76 08/06/2019    BILITOT <0.2 08/06/2019       Ct Lung Screen (annual)    Result Date: 3/10/2020  LOW DOSE LUNG CANCER SCREENING PROTOCOL CT CHEST WITHOUT IV CONTRAST: HISTORY: NICOTINE DEPENDENCE COMPARISON: TECHNIQUE: Low dose lung cancer screening protocol spiral CT of the chest without IV contrast.  Axial, coronal and sagittal reformatted images were reviewed.  FINDINGS: There is less than 2 mm noncalcified nodule

## 2020-03-26 ENCOUNTER — HOSPITAL ENCOUNTER (OUTPATIENT)
Dept: WOUND CARE | Age: 65
Discharge: HOME | End: 2020-03-26
Payer: COMMERCIAL

## 2020-03-26 VITALS
SYSTOLIC BLOOD PRESSURE: 135 MMHG | DIASTOLIC BLOOD PRESSURE: 63 MMHG | RESPIRATION RATE: 16 BRPM | HEART RATE: 72 BPM | TEMPERATURE: 97.5 F

## 2020-03-26 PROCEDURE — 99213 OFFICE O/P EST LOW 20 MIN: CPT

## 2020-03-26 PROCEDURE — 99213 OFFICE O/P EST LOW 20 MIN: CPT | Performed by: NURSE PRACTITIONER

## 2020-03-26 NOTE — PROGRESS NOTES
  SECTION      CHOLECYSTECTOMY      COLONOSCOPY N/A 2019    COLONOSCOPY DIAGNOSTIC performed by Zac Swanson MD at [address] Right 2019    LAPAROSCOPIC RIGHT COLECTOMY, LYSIS OF ADHESIONS. performed by Nelson Arrieta MD at [address] Right 2019    Simple mastectomy with sentinel node biopsy    TUBAL LIGATION         FAMILY HISTORY    Family History   Problem Relation Age of Onset    Diabetes Father     Heart Disease Father     Thyroid Cancer Sister     Thyroid Cancer Brother     Thyroid Cancer Sister     No Known Problems Daughter        SOCIAL HISTORY    Social History     Tobacco Use    Smoking status: Former Smoker     Packs/day: 1.00     Years: 40.00     Pack years: 40.00     Types: Cigarettes     Start date: 3/8/1976     Last attempt to quit: 2018     Years since quittin.7    Smokeless tobacco: Never Used   Substance Use Topics    Alcohol use: No     Alcohol/week: 0.0 standard drinks    Drug use: No       ALLERGIES    Allergies   Allergen Reactions    Latex Itching and Rash    Sulfa Antibiotics Hives    Bactrim [Sulfamethoxazole-Trimethoprim] Hives and Other (See Comments)     Metallic taste    Nicotine Hives     Hives from the patch       MEDICATIONS    Current Outpatient Medications on File Prior to Encounter   Medication Sig Dispense Refill    umeclidinium-vilanterol (ANORO ELLIPTA) 62.5-25 MCG/INH AEPB inhaler Inhale 1 puff into the lungs daily 1 each 3    VORTIoxetine (TRINTELLIX) 10 MG TABS tablet Take 10 mg by mouth      furosemide (LASIX) 40 MG tablet TAKE 1 TABLET BY MOUTH EVERY DAY 90 tablet 1    sodium chloride 0.9 % irrigation Irrigate with as directed for 1 dose.  1000 mL 5    albuterol sulfate HFA (VENTOLIN HFA) 108 (90 Base) MCG/ACT inhaler Inhale 2 puffs into the lungs every 6 hours as needed for Wheezing 1 Inhaler 5    oxybutynin (DITROPAN-XL) 10 MG extended release tablet Take 10 mg by mouth

## 2020-03-26 NOTE — DISCHARGE INSTR - COC
 Restless leg syndrome G25.81    Vitamin B12 deficiency E53.8    Vitamin D deficiency E55.9    Left ventricular hypertrophy I51.7    Left ventricular diastolic dysfunction C21.3    Morbid obesity with BMI of 45.0-49.9, adult (HCC) E66.01, Z68.42    Breast carcinoma, female, right (HCC) C50.911    Iron deficiency anemia secondary to inadequate dietary iron intake D50.8    Breast cancer of lower-inner quadrant of right female breast (HCC) C50.311    Postoperative anemia D64.9    Malignant neoplasm of central portion of right breast in female, estrogen receptor positive (HCC) C50.111, Z17.0    Iron deficiency anemia due to chronic blood loss D50.0    Non-pressure chronic ulcer of other part of right lower leg with fat layer exposed ([address] L97.812    Chronic venous insufficiency I87.2    Adenomatous polyp of ascending colon D12.2    Adenomatous polyp of sigmoid colon D12.5    Dysplastic colon polyp K63.5    Cellulitis of right leg L03.115    Ulcer of abdomen wall, with unspecified severity ([address] L98.499       Isolation/Infection:   Isolation          No Isolation        Patient Infection Status     Infection Onset Added Last Indicated Last Indicated By Review Planned Expiration Resolved Resolved By    [facility] 02/21/19 02/22/19 02/21/19 Wound Culture        Right leg Wound positive for MRSA           Nurse Assessment:  Last Vital Signs: /63   Pulse 72   Temp 97.5 °F (36.4 °C) (Temporal)   Resp 16     Last documented pain score (0-10 scale):    Last Weight:   Wt Readings from Last 1 Encounters:   03/12/20 (!) 301 lb (136.5 kg)     Mental Status:  {IP PT MENTAL STATUS:20030}    IV Access:  {[facility] IV ACCESS:707286035}    Nursing Mobility/ADLs:  Walking   {CHP DME KFAI:998411040}  Transfer  {P DME FGVH:548785729}  Bathing  {CHP DME BQNJ:145632267}  Dressing  {CHP DME MNPB:725656636}  Toileting  {P DME PVZN:032750274}  Feeding  {P DME BZWN:678061521}  Med Admin  {[facility] DME IEDL:382378670}  Med ST:98593}  Urinary Catheter: {Urinary Catheter:087276529}   Colostomy/Ileostomy/Ileal Conduit: {YES / II:47834}       Date of Last BM: ***  No intake or output data in the 24 hours ending 20 0955  No intake/output data recorded.     Safety Concerns:     [address] Safety Concerns:983966160}    Impairments/Disabilities:      [address] Alcazar [facility] Impairments/Disabilities:329358627}    Nutrition Therapy:  Current Nutrition Therapy:   [address] Al[address] [facility] Diet List:621630527}    Routes of Feeding: {CHP DME Other Feedings:655228271}  Liquids: {Slp liquid thickness:66885}  Daily Fluid Restriction: {CHP DME Yes amt example:287870214}  Last Modified Barium Swallow with Video (Video Swallowing Test): {Done Not Done NCIA:770418607}    Treatments at the Time of Hospital Discharge:   Respiratory Treatments: ***  Oxygen Therapy:  {Therapy; copd oxygen:37672}  Ventilator:    { CC Vent HHAF:041572732}    Rehab Therapies: {THERAPEUTIC INTERVENTION:6971712154}  Weight Bearing Status/Restrictions: [address]  Weight Bearin}  Other Medical Equipment (for information only, NOT a DME order):  {EQUIPMENT:843967005}  Other Treatments: ***    Patient's personal belongings (please select all that are sent with patient):  {P DME Belongings:332955118}    RN SIGNATURE:  {Esignature:535636251}    CASE MANAGEMENT/SOCIAL WORK SECTION    Inpatient Status Date: ***    Readmission Risk Assessment Score:  Readmission Risk              Risk of Unplanned Readmission:        0           Discharging to Facility/ Agency   · Name:   · Address:  · Phone:  · Fax:    Dialysis Facility (if applicable)   · Name:  · Address:  · Dialysis Schedule:  · Phone:  · Fax:    / signature: {Esignature:446018780}    PHYSICIAN SECTION    Prognosis: {Prognosis:5091483080}    Condition at Discharge: [address] Alcazar Patient Condition:994023542}    Rehab Potential (if transferring to Rehab): {Prognosis:3007701693}    Recommended Labs or Other Treatments After Discharge:

## 2020-04-23 ENCOUNTER — TELEPHONE (OUTPATIENT)
Dept: WOUND CARE | Age: 65
End: 2020-04-23

## 2020-05-14 ENCOUNTER — VIRTUAL VISIT (OUTPATIENT)
Dept: PULMONOLOGY | Age: 65
End: 2020-05-14
Payer: COMMERCIAL

## 2020-05-14 ENCOUNTER — HOSPITAL ENCOUNTER (OUTPATIENT)
Dept: WOUND CARE | Age: 65
Discharge: HOME | End: 2020-05-14
Payer: COMMERCIAL

## 2020-05-14 PROBLEM — L97.912 ULCER OF RIGHT LOWER EXTREMITY WITH FAT LAYER EXPOSED (HCC): Status: ACTIVE | Noted: 2020-05-14

## 2020-05-14 PROCEDURE — 99443 PR PHYS/QHP TELEPHONE EVALUATION 21-30 MIN: CPT | Performed by: INTERNAL MEDICINE

## 2020-05-14 PROCEDURE — 99213 OFFICE O/P EST LOW 20 MIN: CPT | Performed by: NURSE PRACTITIONER

## 2020-05-14 PROCEDURE — 99212 OFFICE O/P EST SF 10 MIN: CPT

## 2020-05-14 ASSESSMENT — ENCOUNTER SYMPTOMS
ALLERGIC/IMMUNOLOGIC NEGATIVE: 1
EYES NEGATIVE: 1
GASTROINTESTINAL NEGATIVE: 1

## 2020-05-14 NOTE — PROGRESS NOTES
[] Motor grossly intact in visible upper extremities    [] Motor grossly intact in visible lower extremities    [] Normal Mood  [] Anxious appearing    [] Depressed appearing  [] Confused appearing      [] Poor short term memory  [] Poor long term memory    [] OTHER:      Due to this being a TeleHealth encounter, evaluation of the following organ systems is limited: Vitals/Constitutional/EENT/Resp/CV/GI//MS/Neuro/Skin/Heme-Lymph-Imm. Imaging studies CT lung cancer screening March 2020 no mass or nodule  Labs reviewed   PFT February 2019 shows FEV1 42%  ECHO: 2018 shows EF 72% with diastolic dysfunction      ASSESSMENT/PLAN:  1. Chronic obstructive pulmonary disease, unspecified COPD type ([facility]  · Still symptomatic  · Will change Anoro to Trelegy Ellipta  · Continue O2 while asleep  · Maintain euvolemic  · Referred to pulmonary rehab    2. YARED (obstructive sleep apnea)  Sleep study ordered in the past, provided patient with a phone number to call and schedule    3. Chronic respiratory failure with hypoxia and hypercapnia (HCC)  Continue O2 at 2 L while asleep    4. Class 3 severe obesity due to excess calories without serious comorbidity with body mass index (BMI) of 45.0 to 49.9 in adult (HCC)  Weight loss is recommended    5. Diastolic dysfunction  New diuretics, maintain euvolemic, and continue cardioprotective medication    Orders Placed This Encounter   Procedures   [facility]     Referral Priority:   Routine     Referral Type:   Eval and Treat     Referral Reason:   Specialty Services Required     Requested Specialty:   Pulmonology     Number of Visits Requested:   1       Return in about 3 months (around 8/14/2020). An  electronic signature was used to authenticate this note.     --Belén Danielle MD on 5/14/2020 at 11:36 AM  Total time 24 minutes      Pursuant to the emergency declaration under the [facility] waiver authority and the Coronavirus Preparedness and Response Supplemental Appropriations Act, this Virtual  Visit was conducted, with patient's consent, to reduce the patient's risk of exposure to COVID-19 and provide continuity of care for an established patient. Services were provided through a video synchronous discussion virtually to substitute for in-person clinic visit.

## 2020-05-14 NOTE — PROGRESS NOTES
Virtual Visit Via [employer]   Progress Note and Procedure Note    [address] [place] RECORD NUMBER:  [id number]  AGE: 59 y.o. GENDER: female  : 1955  EPISODE DATE:  2020    Subjective:     Chief Complaint   Patient presents with    Wound Check     right lower leg/ abd        Consent obtained to communicate via Virtual Visit:  Yes     HISTORY of PRESENT ILLNESS HPI     Sandrita Verma is a 59 y.o. female who presents today via Virtual Visit wound/ulcer evaluation. History of Wound Context: chronic wound old incision mid abdomen, patient picks at frequently when stressed causing wound to re-open, right lower anterior leg with additional chronic wound secondary to vascular insufficiency/diabetes reopened. Patient reports was being followed by home health who discharged her from care and now has picked both wounds open again.    Wound/Ulcer Pain Timing/Severity: intermittent  Quality of pain: tender  Severity:  2 / 10   Modifying Factors: Pain is relieved/improved with rest  Associated Signs/Symptoms: edema and drainage    Ulcer Identification:  Ulcer Type: diabetic, trauma (from frequent picking at wound), and venous and non-healing/non-surgical    Contributing Factors: edema, obesity and patient compulsively picks at wounds until open     Acute Wound: N/A not an acute wound    PAST MEDICAL HISTORY        Diagnosis Date    Acquired hypothyroidism 3/15/2016    Allergic rhinitis     pollen, dust ragweed, hay and straw     Anxiety     Asthma     Bipolar affect, depressed ([address]     Bipolar disorder ([address]     Breast cancer ([address] 2018    Invasive ductal cancer right breast    Cancer ([address] 1980    thyroid cancer     Chronic back pain     COPD (chronic obstructive pulmonary disease) ([address]     Depression     Diabetes mellitus ([address]     Emphysema of lung ([address]     Essential hypertension 3/15/2016    History of blood transfusion     2019    visible areas of erythema surrounding either wound. Dermatologic: see nurses notes and wound documentation pictures. Abdominal wound has re-opened and has dark/scabbed areas, appears larger than previously seen in wound center. Right anterior lower leg has large area of red wound bed, with multiple areas of dark/necrotic appearing tissue. Visual limitations of visit and patients ability to facilitate accurate picture placement make actual exam difficult. During visit both areas appear significantly larger than on previous visits. Respiratory: no respiratory distress noted, no dyspnea during online exam.     Psychological: patient reports being very stressed recently due to boyfriends son being in a serious accident, states it has caused her to again start picking at previously healed abdominal wound. Affect at time of virtual visit, patient is friendly, with appropriate affect and mood. Assessment:      Problem List Items Addressed This Visit     Ulcer of right lower extremity with fat layer exposed ([facility] [address] [address]          Performed by: MARIETTA Castanon NP    Wound/Ulcer #: 2    Wound 01/16/20 Leg Right; Lower #1 (Active)   Number of days: 118       Wound 02/06/20 Abdomen #2 (Active)   Drainage Amount None 5/14/2020 10:06 AM   Number of days: 97     Incision (Active)   Number of days:        Incision 05/13/19 Abdomen Right (Active)   Number of days: 115       Diabetic/Pressure/Non Pressure Ulcers only:  Ulcer: Non-Pressure ulcer, fat layer exposed       Plan:     Please see attached Discharge Instructions    Based upon this virtual visit, it was recommended for the patient to come in for an actual exam to allow debridement as  indicated. Patient reports being hesitant to come to hospital OP department due to [place]. Discussedwith patient how to reduce exposure and patient agreed to come in for appointment in 3 weeks for first a.m. visit of day to reduce contact with other patients.  Reviewed plan of care and will re-order home health nursing. Patient encouraged to have an in-person visit as soon as possible. Wound care instructions and supplies ordered and will re-order home health to follow until then. Needs reinforcement, patient has tendency to pick at skin when not being frequently seen and expresses she is having considerable social/family stress d/t boyfriends son at this time. Written patient dismissal instructions available to Patient/POA       Discharge Instructions       [facility]   Physician Orders and Discharge [address]  [facility], [facility]  Telephone: [phone]      [phone]        NAME: Tabitha Paiz  DATE OF BIRTH:  1955  MEDICAL RECORD NUMBER:  [id number]     Home Care/Facility: [facility] (New Referral)     Wound Location: Abdomen     Dressing orders: 1. Cleanse wound(s) with normal saline. 2. Apply dry EARL  Or equivalent to wound bed. 3. Moisten EARL with a few drops of normal saline. 4. Cover with 4x4's and wrap with gauze (deng or kerlix)  5. Change  Every other day or Monday, Wednesday, and Friday    Wound Location: Right lower Leg   Dressing orders: 1. Cleanse wound(s) with normal saline. 2. Apply dry SILVERCEL OR CALCIUM ALGINATE WITH Ag or eqivalent to wound bed. 3. Cover with 4x4's and wrap with gauze (deng or kerlix)  4. Change  Every other day or Monday, Wednesday, and Friday    Compression: Apply Spandagrip to both(10-20) med lower leg, apply first thing in the morning, remove at bedtime, elevate legs as much as possible during the day.       Offloading Device:     Other Instructions: Go to [facility] and get measured for compression stockings.    Lotion legs daily     Keep all dressings clean, dry and intact.  Keep pressure off the wound(s) at all times.      Follow up visit  2  [facility] 2020 @       Please give 24 hour notice if

## 2020-05-14 NOTE — PROGRESS NOTES
Virtual Visit Wound Care  Clinic Level of Care   NAME:  Portia Reynolds  YOB: 1955 GENDER: female  MEDICAL RECORD NUMBER:  [id number]   DATE:  5/14/2020     Patient Type Points   No documentation completed by nursing staff. []   0   Nursing staff documented in the navigator for an ESTABLISHED patient including Episode, Patient ID, Chief Complaint, Travel Screen, Allergies, Latex Allergy, Home Medication, History, Psychosocial Screen, C-SSRS Screen, Fall Risk, Nutritional Screen, Advanced Directive, Education and Plan of Care, and Discharge Instructions. The Functional Screening tab is only required if the patient's status changes. [x]   1   Nursing staff documented in the navigator for a NEW patient including Patient ID, Chief Complaint, Travel Screen, Allergies, Latex Allergy, Home Medication, History, Psychosocial Screen, C-SSRS Screen, Fall Risk, Nutritional Screen, Advanced Directive, Functional Screen, Education and Plan of Care, and Discharge Instructions. []   2   Nursing staff documented in the navigator for a CONSULT patient including Episode, Patient ID, Chief Complaint, Travel Screen, Allergies, Latex Allergy, Home Medication, History, Psychosocial Screen, C-SSRS Screen, Fall Risk, Nutritional Screen, Advanced Directive, Functional Screen, Education and Plan of Care, and Discharge Instructions. []   2     Wound Description Points   Unable to obtain image of Wound. For example, patient/caregiver is instructed not to remove dressing, is unable to correctly position smart phone, no smart phone is available, patient is unable to maintain connectivity or the patient's wound is healed. []   0   1-3 wound images annotated. Images of the wound(s) is obtained and annotated along with completed description in [address] [place] [address]. [x]   1   4-5 wound images annotated. Images of the wound(s) is obtained and annotated along with completed description in [address] [place] [address]. []   2   Greater than 6 wound images annotated.

## 2020-05-14 NOTE — DISCHARGE INSTR - COC
applicable)   · Name:  · Address:  · Dialysis Schedule:  · Phone:  · Fax:    / signature: {Esignature:[id number]}    PHYSICIAN SECTION    Prognosis: {Prognosis:6003694667}    Condition at Discharge: Jose Alcazar Patient Condition:444901057}    Rehab Potential (if transferring to Rehab): {Prognosis:6758471621}    Recommended Labs or Other Treatments After Discharge: ***    Physician Certification: I certify the above information and transfer of Magy Camacho  is necessary for the continuing treatment of the diagnosis listed and that she requires {Admit to Appropriate Level of Care:81125} for {GREATER/LESS:213509823} 30 days.      Update Admission H&P: {CHP DME Changes in RLZCZ:005251841}    PHYSICIAN SIGNATURE:  {Esignature:[id number]}

## 2020-05-18 ENCOUNTER — TELEPHONE (OUTPATIENT)
Dept: GASTROENTEROLOGY | Age: 65
End: 2020-05-18

## 2020-05-18 ENCOUNTER — TELEPHONE (OUTPATIENT)
Dept: WOUND CARE | Age: 65
End: 2020-05-18

## 2020-05-18 NOTE — PROGRESS NOTES
Home Health Agency seeing patient called and reports that patients lower extremity wound is draining purulent matter, and \"looks infected\", patient not willing [facility] provider discussing with patient that during virtual visit on 5/15/20 wound appeared to need debriding. Will have home health do lab for CBC,CMP, and C & S to know how to direct tx. Based on results will treat accordingly.

## 2020-05-18 NOTE — TELEPHONE ENCOUNTER
Returned Patria's phone call from [facility]. She stated patient has been picking her wound more and it was more red and had an odor. Spoke with Tyrel Johnson NP, who gave orders of wound culture a, BUM  And Creatinine lab draw. Told her to tell patient when she goes to  her Wednesday, that she can make an earlier appointment if she is available.

## 2020-05-18 NOTE — TELEPHONE ENCOUNTER
The patient does not want to have her EGD done, she is canceling her procedure with Dr. Cristal Adams and Covid test. She does not have a follow up visit scheduled.

## 2020-05-28 RX ORDER — DOXYCYCLINE HYCLATE 100 MG
100 TABLET ORAL 2 TIMES DAILY
Qty: 20 TABLET | Refills: 0 | Status: SHIPPED | OUTPATIENT
Start: 2020-05-28 | End: 2020-06-07

## 2020-05-28 NOTE — PROGRESS NOTES
C & S of lower extremity wound received from [facility], +MRSA with moderate growth, per sensitivity sensitive to tetracycline. Patient with allergy to sulfa and bactrim. Called in doxycycline 100 mg. Oral #20, twice daily for 10 days. Patient notified that prescription for called in to [facility] on [address] [facility] in [facility]. Patient said will see next week for virtual visit. Reminded patient that during last virtual visit she had agreed to come to wound center for first appointment of morning to prevent unecessary exposure to others during COVID restrictions/precautions. Discussed with patient need to monitor wound in department periodically to determine need for debridement and to monitor circulation of extremities. Patient said will reevaluate on that date, but would prefer virtual visit.

## 2020-06-04 ENCOUNTER — HOSPITAL ENCOUNTER (OUTPATIENT)
Dept: WOUND CARE | Age: 65
Discharge: HOME | End: 2020-06-04
Payer: COMMERCIAL

## 2020-06-04 PROCEDURE — 99203 OFFICE O/P NEW LOW 30 MIN: CPT | Performed by: NURSE PRACTITIONER

## 2020-06-04 PROCEDURE — 99212 OFFICE O/P EST SF 10 MIN: CPT

## 2020-06-04 RX ORDER — KETOROLAC TROMETHAMINE 10 MG/1
10 TABLET, FILM COATED ORAL EVERY 6 HOURS PRN
COMMUNITY
End: 2020-10-22

## 2020-06-04 NOTE — PROGRESS NOTES
telephone calls made to home health, primary care providers, pharmacy, or DME. May include filling out forms and writing letters, arranging transportation, communication with insurance , vendors, etc.  Discharge, instructions and/or After Visit Summary given to patient/caregiver and instructions completed.    []   3     Is this the Patient's First Visit to the [address] [place] [address] Road  No    Is this Patient Established @ [facility]  Yes             Virtual Visit Clinical Level of Care Wound Care      Points  1-3  Level 1 []     Points  4-5  Level 2 [x]     Points  6-7  Level 3 []     Points  8-9  Level 4 []     Points  10-11  Level 5 []       Electronically signed by Lv Joseph RN on 6/4/2020 at @NO

## 2020-06-04 NOTE — PROGRESS NOTES
Virtual Visit Via [employer]   Progress Note and Procedure Note    [address] [place] RECORD NUMBER:  [id number]  AGE: 59 y.o. GENDER: female  : 1955  EPISODE DATE:  2020    Subjective:     Chief Complaint   Patient presents with    Wound Check     abd / right lower leg        Consent obtained to communicate via Virtual Visit:  Yes     HISTORY of PRESENT ILLNESS BRITTANY Ribeiro is a 59 y.o. female who presents today via Virtual Visit wound/ulcer evaluation. History of Wound Context: virtual visit follow up for chronic abdominal wound from old incision - patient reports is now fully healed, right anterior lower extremity wound chronic, secondary to venous insufficiency/diabetes, opened prior to last appointment, remains open. Home health agency following called wound center reporting leg wound looked infected, sent C & S, based on C & S and home health assessment, ordered doxycycline 100 mg. Bid. Per patient obtained the antibiotic and is still on it. Also reports wound is slightly smaller in size with no surrounding redness or excess warmth. Denies any purulent drainage from wound. Dressings removed from leg wound have bloody drainage on them, no evidence of purulent drainage.    Wound/Ulcer Pain Timing/Severity: none  Quality of pain: N/A  Severity:  0 / 10   Modifying Factors: Pain is relieved/improved with rest-  Associated Signs/Symptoms: drainage    Ulcer Identification:  Ulcer Type: venous, diabetic and traumatic- patient has habit of picking at any wounds     Contributing Factors: diabetes and decreased mobility    Acute Wound: N/A not an acute wound    PAST MEDICAL HISTORY        Diagnosis Date    Acquired hypothyroidism 3/15/2016    Allergic rhinitis     pollen, dust ragweed, hay and straw     Anxiety     Asthma     B[facility] affect, depressed ([facility]     Bipolar disorder ([facility]     [facility]    Invasive ductal cancer Medications on File Prior to Encounter   Medication Sig Dispense Refill    ketorolac (TORADOL) 10 MG tablet Take 10 mg by mouth every 6 hours as needed for Pain      doxycycline hyclate (VIBRA-TABS) 100 MG tablet Take 1 tablet by mouth 2 times daily for 10 days 20 tablet 0    fluticasone-umeclidin-vilant (TRELEGY ELLIPTA) 100-62.5-25 MCG/INH AEPB Inhale 1 puff into the lungs daily 1 each 3    VORTIoxetine (TRINTELLIX) 10 MG TABS tablet Take 10 mg by mouth      furosemide (LASIX) 40 MG tablet TAKE 1 TABLET BY MOUTH EVERY DAY 90 tablet 1    sodium chloride 0.9 % irrigation Irrigate with as directed for 1 dose. 1000 mL 5    sodium chloride 0.9 % irrigation Irrigate with as directed for 1 dose.  1000 mL 5    albuterol sulfate HFA (VENTOLIN HFA) 108 (90 Base) MCG/ACT inhaler Inhale 2 puffs into the lungs every 6 hours as needed for Wheezing 1 Inhaler 5    oxybutynin (DITROPAN-XL) 10 MG extended release tablet Take 10 mg by mouth daily      simvastatin (ZOCOR) 40 MG tablet TAKE 1 TABLET BY MOUTH EVERY EVENING 90 tablet 1    losartan (COZAAR) 25 MG tablet Take 1 tablet by mouth daily 90 tablet 1    levothyroxine (SYNTHROID) 175 MCG tablet Take 1 tablet by mouth Daily 90 tablet 1    potassium chloride (KLOR-CON M) 10 MEQ extended release tablet Take 1 tablet by mouth daily 90 tablet 1    metFORMIN (GLUCOPHAGE XR) 500 MG extended release tablet Take 2 tablets by mouth daily (with breakfast) 180 tablet 1    ARIPiprazole (ABILIFY) 10 MG tablet TAKE 1 TABLET BY MOUTH EVERY DAY IN THE EVENING AS DIRECTED  3    Multiple Vitamin (MULTIVITAMIN) tablet Take 1 tablet by mouth daily      Multiple Vitamins-Minerals (ICAPS AREDS 2 PO) Take 1 tablet by mouth daily      vitamin D (CHOLECALCIFEROL) 1000 UNIT TABS tablet Take 1,000 Units by mouth daily      anastrozole (ARIMIDEX) 1 MG tablet Take 1 mg by mouth daily      ACCU-CHEK SMARTVIEW strip Test TID E11.8 300 each 3    ipratropium-albuterol (DUONEB) 0.5-2.5 (3) Area (cm^2) 0 cm^2 3/26/2020  9:37 AM   Change in Wound Size % (l*w) 100 3/26/2020  9:37 AM   Wound Volume (cm^3) 0 cm^3 3/26/2020  9:37 AM   Wound Healing % 100 3/26/2020  9:37 AM   Drainage Amount Small 6/4/2020  9:11 AM   Drainage Description Sanguinous 6/4/2020  9:11 AM   Odor None 6/4/2020  9:11 AM   Margins Undefined edges 1/16/2020  3:14 PM   Tamara-wound Assessment Red 1/16/2020  3:14 PM   Non-staged Wound Description Full thickness 6/4/2020  9:11 AM   [place]%Wound Bed 95 1/16/2020  3:14 PM   Red%Wound Bed 100 6/4/2020  9:11 AM   Black%Wound Bed 5 1/16/2020  3:14 PM   Number of days: 139       Wound 02/06/20 Abdomen #2 (Active)   Wound Image   6/4/2020  9:11 AM   Wound Traumatic 5/14/2020 10:26 AM   Dressing/Treatment Collagen with Ag;Silicone border 8/14/2834  9:57 AM   Wound Cleansed Rinsed/Irrigated with saline 3/26/2020  9:37 AM   Wound Length (cm) 0 cm 6/4/2020  9:11 AM   Wound Width (cm) 0 cm 6/4/2020  9:11 AM   Wound Depth (cm) 0 cm 6/4/2020  9:11 AM   Wound Surface Area (cm^2) 0 cm^2 6/4/2020  9:11 AM   Change in Wound Size % (l*w) 100 6/4/2020  9:11 AM   Wound Volume (cm^3) 0 cm^3 6/4/2020  9:11 AM   Wound Healing % 100 6/4/2020  9:11 AM   Drainage Amount None 6/4/2020  9:11 AM   Drainage Description Serosanguinous 5/14/2020 10:26 AM   Odor None 5/14/2020 10:26 AM   Margins Defined edges 3/5/2020 10:55 AM   Tamara-wound Assessment Dry;Red 3/26/2020  9:37 AM   Non-staged Wound Description Full thickness 5/14/2020 10:26 AM   [place]%Wound Bed 60 3/26/2020  9:37 AM   Red%Wound Bed 100 5/14/2020 10:26 AM   Number of days: 118     Incision (Active)   Number of days:        Incision 05/13/19 Abdomen Right (Active)   Number of days: 219       Diabetic/Pressure/Non Pressure Ulcers only:  Ulcer: Diabetic ulcer, fat layer exposed       Plan:     Please see attached Discharge Instructions    Based upon this virtual visit it is not required to have an in-person visit of this time.     Patient encouraged to be seen in wound center when possible to more adequately evaluate wound bed. Patient expresses discomfort with coming to wound center/hospital during COVID precautions. Encouraged discussing possibility of making patient first patient in a.m. and avoiding other patients in area. Will reevaluate prior to next appointment. Written patient dismissal instructions available to Patient/POA       Discharge Instructions         Discharge Instructions        [facility]   Physician Orders and Discharge [address]  [address] [facility]  Melquiades Orellana, [facility]  Telephone: [phone]      [phone]        NAME: Zaira Wilcox  DATE OF BIRTH:  1955  MEDICAL RECORD NUMBER:  [id number]     Home Care/Facility: [address] [place] [address] (order dressings as needed)      Wound Location: Right lower Leg   Dressing orders: 1. Cleanse wound(s) with normal saline. 2. Apply dry SILVERCEL OR CALCIUM ALGINATE WITH Ag or eqivalent to wound bed. 3. Cover with 4x4's and wrap with gauze (deng or kerlix)  4. Change  Every other day or Monday, Wednesday, and Friday     Compression: Apply Spandagrip to both(10-20) med lower leg, apply first thing in the morning, remove at bedtime, elevate legs as much as possible during the day.       Offloading Device:     Other Instructions: Go to [facility] and get measured for compression stockings.   Lotion legs daily around wounds. Continue with antibiotics.     Keep all dressings clean, dry and intact.  Keep pressure off the wound(s) at all times.      Follow up visit [address] @  8:45 Doxy me visit     Please give 24 hour notice if unable to keep appointment.  [phone]     If you experience any of the following, please call the Wound Care Service at  [phone] or go to the nearest emergency room.        *Increase in pain         *Temperature over 101           *Increase in drainage from

## 2020-06-04 NOTE — DISCHARGE INSTR - COC
 Restless leg syndrome G25.81    Vitamin B12 deficiency E53.8    Vitamin D deficiency E55.9    Left ventricular hypertrophy I51.7    Left ventricular diastolic dysfunction Z88.2    Morbid obesity with BMI of 45.0-49.9, adult (HCC) E66.01, Z68.42    Breast carcinoma, female, right (HCC) C50.911    Iron deficiency anemia secondary to inadequate dietary iron intake D50.8    Breast cancer of lower-inner quadrant of right female breast (HCC) C50.311    Postoperative anemia D64.9    Malignant neoplasm of central portion of right breast in female, estrogen receptor positive (HCC) C50.111, Z17.0    Iron deficiency anemia due to chronic blood loss D50.0    Non-pressure chronic ulcer of other part of right lower leg with fat layer exposed ([address] L97.812    Chronic venous insufficiency I87.2    Adenomatous polyp of ascending colon D12.2    Adenomatous polyp of sigmoid colon D12.5    Dysplastic colon polyp K63.5    Cellulitis of right leg L03.115    Ulcer of abdomen wall, with unspecified severity ([address] L98.499    Ulcer of right lower extremity with fat layer exposed ([address] L97.912       Isolation/Infection:   Isolation          No Isolation        Patient Infection Status     Infection Onset Added Last Indicated Last Indicated By Review Planned Expiration Resolved Resolved By    [facility] 19 Wound Culture        Right leg Wound positive for MRSA           Nurse Assessment:  Last Vital Signs: There were no vitals taken for this visit.     Last documented pain score (0-10 scale):    Last Weight:   Wt Readings from Last 1 Encounters:   20 (!) 301 lb (136.5 kg)     Mental Status:  {IP PT MENTAL STATUS:}    IV Access:  [facility] JEREMY IV ACCESS:[id number]}    Nursing Mobility/ADLs:  Walking   {CHP DME IZO}  Transfer  {CHP DME GDAW:977409341}  Bathing  {CHP DME ONNO:239810785}  Dressing  {CHP DME DKHM:479487808}  Toileting  {CHP DME PQEY:153025509}  Feeding  {CHP DME Patient Condition:236072671}    Rehab Potential (if transferring to Rehab): {Prognosis:0196330093}    Recommended Labs or Other Treatments After Discharge: ***    Physician Certification: I certify the above information and transfer of Glendy Yepez  is necessary for the continuing treatment of the diagnosis listed and that she requires {Admit to Appropriate Level of Care:50794} for {GREATER/LESS:364474666} 30 days.      Update Admission H&P: {CHP DME Changes in DQTNY:932549890}    PHYSICIAN SIGNATURE:  {Esignature:097846024}

## 2020-06-25 ENCOUNTER — HOSPITAL ENCOUNTER (OUTPATIENT)
Dept: WOUND CARE | Age: 65
Discharge: HOME | End: 2020-06-25
Payer: COMMERCIAL

## 2020-06-25 PROCEDURE — 99212 OFFICE O/P EST SF 10 MIN: CPT

## 2020-06-25 PROCEDURE — 99212 OFFICE O/P EST SF 10 MIN: CPT | Performed by: NURSE PRACTITIONER

## 2020-06-25 NOTE — PROGRESS NOTES
Bed 5 1/16/2020  3:14 PM   Number of days: 160     Incision (Active)   Number of days:        Incision 05/13/19 Abdomen Right (Active)   Number of days: 772       Diabetic/Pressure/Non Pressure Ulcers only:  Ulcer: N/A       Plan:     Please see attached Discharge Instructions    Based upon this virtual visit it is not required to have an in-person visit of this time. The patient's vv identifies that the patient would likely benefit from unna boot application, discussed with patient, would benefit from being seen in  prior to application. In agreement to come to  in two weeks. In meantime will continue with silvercell application and C & S prior to appt. By her home health nurse. Written patient dismissal instructions available to Patient/POA       Discharge Instructions         Discharge [address] [facility]   Physician Orders and Discharge [address]  Telephone: [phone]      [phone]        NAME: Puneet Leonard  DATE OF BIRTH:  1955  MEDICAL RECORD NUMBER:  [id number]     Home Care/Facility: [address] [facility] (order dressings as needed)      Wound Location: Right lower Leg   Dressing orders: 1. Cleanse wound(s) with normal saline. 2. Apply dry SILVERCEL OR CALCIUM ALGINATE WITH Ag or eqivalent to wound bed. 3. Cover with 4x4's and wrap with gauze (deng or kerlix)  4.  Change  Every other day or Monday, Wednesday, and Friday     Compression: Apply Spandagrip to both(10-20) med lower leg, apply first thing in the morning, remove at bedtime, elevate legs as much as possible during the day.       Offloading Device:     Other Instructions: Go to [facility] and get measured for compression stockings. [address] take culture at next visit and send us results please (fax [phone])     Keep all dressings clean, dry and intact.  Keep [facility]. Provider was located in [place] [address].     Electronically signed by MARIETTA Llanes NP on 6/25/2020 at 9:13 AM

## 2020-06-25 NOTE — PROGRESS NOTES
telephone calls made to home health, primary care providers, pharmacy, or DME. May include filling out forms and writing letters, arranging transportation, communication with insurance , vendors, etc.  Discharge, instructions and/or After Visit Summary given to patient/caregiver and instructions completed.    []   3     Is this the Patient's First Visit to the [address]  No    Is this Patient Established @ [facility]  Yes             Virtual Visit Clinical Level of Care Wound Care      Points  1-3  Level 1 []     Points  4-5  Level 2 [x]     Points  6-7  Level 3 []     Points  8-9  Level 4 []     Points  10-11  Level 5 []       Electronically signed by Georgi Lainez RN on 6/25/2020 at @NO

## 2020-07-07 ENCOUNTER — HOSPITAL ENCOUNTER (OUTPATIENT)
Dept: MRI IMAGING | Age: 65
Discharge: HOME | End: 2020-07-09
Payer: COMMERCIAL

## 2020-07-07 PROCEDURE — 72148 MRI LUMBAR SPINE W/O DYE: CPT

## 2020-07-09 ENCOUNTER — HOSPITAL ENCOUNTER (OUTPATIENT)
Dept: WOUND CARE | Age: 65
Discharge: HOME | End: 2020-07-09
Payer: COMMERCIAL

## 2020-07-09 VITALS
DIASTOLIC BLOOD PRESSURE: 65 MMHG | HEART RATE: 67 BPM | SYSTOLIC BLOOD PRESSURE: 129 MMHG | RESPIRATION RATE: 18 BRPM | TEMPERATURE: 97.5 F

## 2020-07-09 PROCEDURE — 29580 STRAPPING UNNA BOOT: CPT

## 2020-07-09 PROCEDURE — 99212 OFFICE O/P EST SF 10 MIN: CPT | Performed by: NURSE PRACTITIONER

## 2020-07-09 NOTE — PROGRESS NOTES
Anna Watkins 37   Progress Note and Procedure Note      [address] Susan Gonzalez RECORD NUMBER:  [id number]  AGE: 59 y.o. GENDER: female  : 1955  EPISODE DATE:  2020    Subjective:     Chief Complaint   Patient presents with    Wound Check     RIGHT LOWER LEG WOUND         HISTORY of PRESENT ILLNESS HPI     Celsa Lockwood is a 59 y.o. female who presents today for wound/ulcer evaluation. History of Wound Context: follow up for non-healing venous insufficiency wounds right lower extremity . Has been having virtual visits, with home health reporting increased erythema right lower anterior leg. HH cultured and per culture patient was started on doxycycline 100 mg. BID last week. Per patient, improved in last few days.    Wound/Ulcer Pain Timing/Severity: none  Quality of pain: N/A  Severity:  0 / 10   Modifying Factors: None  Associated Signs/Symptoms: edema, erythema and drainage    Ulcer Identification:  Ulcer Type: venous and diabetes  Contributing Factors: diabetes, decreased mobility and obesity    Wound: N/A        PAST MEDICAL HISTORY        Diagnosis Date    Acquired hypothyroidism 3/15/2016    Allergic rhinitis     pollen, dust ragweed, hay and straw     Anxiety     Asthma     Bipolar affect, depressed ([address]     Bipolar disorder ([address]     Breast cancer ([address] 2018    Invasive ductal cancer right breast    Cancer ([address] 1980    thyroid cancer     Chronic back pain     COPD (chronic obstructive pulmonary disease) ([address]     Depression     Diabetes mellitus ([address]     Emphysema of lung ([address]     Essential hypertension 3/15/2016    History of blood transfusion     2019    Hyperlipidemia     Hypothyroidism     Obesity     On home O2     3L NC at night    YARED (obstructive sleep apnea)     3 L NC    Osteoarthritis     Restless legs syndrome     Sleep apnea     Urinary incontinence        PAST SURGICAL HISTORY    Past Surgical History:   Procedure Laterality Date  APPENDECTOMY      BREAST BIOPSY Right 2018     SECTION      CHOLECYSTECTOMY      COLONOSCOPY N/A 2019    COLONOSCOPY DIAGNOSTIC performed by Eder Machado MD at [address] Right 2019    LAPAROSCOPIC RIGHT COLECTOMY, LYSIS OF ADHESIONS. performed by Dom Henderson MD at [address] [facility] Right 2019    Simple mastectomy with sentinel node biopsy    TUBAL LIGATION         FAMILY HISTORY    Family History   Problem Relation Age of Onset    Diabetes Father     Heart Disease Father     Thyroid Cancer Sister     Thyroid Cancer Brother     Thyroid Cancer Sister     No Known Problems Daughter        SOCIAL HISTORY    Social History     Tobacco Use    Smoking status: Former Smoker     Packs/day: 1.00     Years: 40.00     Pack years: 40.00     Types: Cigarettes     Start date: 3/8/1976     Last attempt to quit: 2018     Years since quittin.0    Smokeless tobacco: Never Used   Substance Use Topics    Alcohol use: No     Alcohol/week: 0.0 standard drinks    Drug use: No       ALLERGIES    Allergies   Allergen Reactions    Latex Itching and Rash    Sulfa Antibiotics Hives    Bactrim [Sulfamethoxazole-Trimethoprim] Hives and Other (See Comments)     Metallic taste    Nicotine Hives     Hives from the patch       MEDICATIONS    Current Outpatient Medications on File Prior to Encounter   Medication Sig Dispense Refill    ketorolac (TORADOL) 10 MG tablet Take 10 mg by mouth every 6 hours as needed for Pain      fluticasone-umeclidin-vilant (TRELEGY ELLIPTA) 100-62.5-25 MCG/INH AEPB Inhale 1 puff into the lungs daily 1 each 3    VORTIoxetine (TRINTELLIX) 10 MG TABS tablet Take 10 mg by mouth      furosemide (LASIX) 40 MG tablet TAKE 1 TABLET BY MOUTH EVERY DAY 90 tablet 1    sodium chloride 0.9 % irrigation Irrigate with as directed for 1 dose. 1000 mL 5    sodium chloride 0.9 % irrigation Irrigate with as directed for 1 dose.  1000 mL developed. Appears neat and clean. Patient is alert, oriented x3, and in no apparent distress. Respiratory:  Respiratory effort is easy and symmetric bilaterally. Rate is normal at rest and on room air. Vascular:  Pedal Pulses is palpable and audible signal noted with doppler. Capillary refill is <5 sec to digits bilateral.  Extremities positive for 2+ pitting edema. Neurological:  Gross and Light touch intact. Protective sensation diminished     Dermatological:  Wound description noted in wound assessment. Right anterior leg edematous with generalized erythema, slightly warm to touch, clear drainage from venous dermatitis wounds. Psychiatric:  Judgement and insight intact. Short and long term memory intact. No evidence of depression, anxiety, or agitation. Patient is calm, cooperative, and communicative. Appropriate interactions and affect. Assessment:      Problem List Items Addressed This Visit     None           Procedure Note  Indications:  Based on my examination of this patient's wound(s)/ulcer(s) today, debridement is not required to promote healing and evaluate the wound base. Wound 01/16/20 Leg Right; Lower #1 (Active)   Wound Image   7/9/2020  9:19 AM   Wound Traumatic 7/9/2020  9:19 AM   Wound Cleansed Rinsed/Irrigated with saline 3/5/2020 10:55 AM   Wound Length (cm) 1.7 cm 7/9/2020  9:19 AM   Wound Width (cm) 7 cm 7/9/2020  9:19 AM   Wound Depth (cm) 0.2 cm 7/9/2020  9:19 AM   Wound Surface Area (cm^2) 11.9 cm^2 7/9/2020  9:19 AM   Change in Wound Size % (l*w) 90.91 7/9/2020  9:19 AM   Wound Volume (cm^3) 2.38 cm^3 7/9/2020  9:19 AM   Wound Healing % 82 7/9/2020  9:19 AM   Drainage Amount Moderate 7/9/2020  9:19 AM   Drainage Description Serosanguinous 7/9/2020  9:19 AM   Odor None 7/9/2020  9:19 AM   Margins Undefined edges 1/16/2020  3:14 PM   Tamara-wound Assessment Red 1/16/2020  3:14 PM   Non-staged Wound Description Full thickness 7/9/2020  9:19 AM   [place]%Wound Bed 95 1/16/2020  3:14 PM   Red%Wound Bed 25 7/9/2020  9:19 AM   Yellow%Wound Bed 75 7/9/2020  9:19 AM   Black%Wound Bed 5 1/16/2020  3:14 PM   Number of days: 174       Incision (Active)   Number of days:        Incision 05/13/19 Abdomen Right (Active)   Number of days: 328             Plan:     Patient reports unable to afford compression hose and pharmacy told her not available in her size. Needs compression to reduce edema and allow ulcerations to heal. Will use unna boot with home health to maintain. Return in 3 weeks. Treatment Note please see attached Discharge Instructions    Written patient dismissal instructions given to patient and signed by patient or POA. Discharge Instructions         Discharge [address] [facility]   Physician Orders and Discharge [address], [address] [place] [address]  Telephone: [phone]      [phone]        NAME: Tanner Mills  DATE OF BIRTH:  1955  MEDICAL RECORD NUMBER:  [id number]     Home Care/Facility: [address] [place] [address] (order dressings as needed)      Wound Location: Right lower Leg   Dressing orders: 1. Cleanse wound(s) with normal saline. 2. Apply dry SILVERCEL OR CALCIUM ALGINATE WITH Ag or eqivalent to wound bed. 3. Cover with 4x4's, Zinc unna boot and coban or ace wrap. Change weekly and PRN.    Compression: Apply Spandagrip to both(10-20) med lower leg, apply first thing in the morning, remove at bedtime, elevate legs as much as possible during the day.       Offloading Device:     Other Instructions: finish up Doxycycline     Keep all dressings clean, dry and intact.  Keep pressure off the wound(s) at all times.      Follow up visit  3  Weeks  July 30 , 2020 @  9:00     Please give 24 hour notice if unable to keep appointment.  [phone]     If you experience any of the following, please call the Wound Care Service at  [phone] or go to the nearest emergency room.        *Increase in pain         *Temperature over 101           *Increase in drainage from your wound or a foul odor  *Uncontrolled swelling            *Need for compression bandage changes due to slippage, breakthrough drainage     PLEASE NOTE: IF YOU ARE UNABLE TO OBTAIN WOUND SUPPLIES, CONTINUE TO USE THE SUPPLIES YOU HAVE AVAILABLE UNTIL YOU ARE ABLE TO [facility].  IT IS MOST IMPORTANT TO KEEP THE WOUND COVERED AT ALL TIMES  Electronically signed by MARIETTA Gregory NP on 7/9/2020 at 9:39 AM          Electronically signed by MARIETTA Gregory NP on 7/9/2020 at 9:39 AM

## 2020-07-30 ENCOUNTER — HOSPITAL ENCOUNTER (OUTPATIENT)
Dept: WOUND CARE | Age: 65
Discharge: HOME | End: 2020-07-30
Payer: COMMERCIAL

## 2020-07-30 VITALS
HEART RATE: 68 BPM | SYSTOLIC BLOOD PRESSURE: 116 MMHG | RESPIRATION RATE: 20 BRPM | DIASTOLIC BLOOD PRESSURE: 68 MMHG | TEMPERATURE: 96.7 F

## 2020-07-30 PROCEDURE — 99212 OFFICE O/P EST SF 10 MIN: CPT | Performed by: NURSE PRACTITIONER

## 2020-07-30 PROCEDURE — 29580 STRAPPING UNNA BOOT: CPT

## 2020-07-30 NOTE — PROGRESS NOTES
Anna Watkins 37   Progress Note and Procedure Note      [address] [facility] [address] RECORD NUMBER:  [id number]  AGE: 59 y.o. GENDER: female  : 1955  EPISODE DATE:  2020    Subjective:     Chief Complaint   Patient presents with    Wound Check     left leg         HISTORY of PRESENT ILLNESS HPI     Micky Parra is a 59 y.o. female who presents today for wound/ulcer evaluation.    History of Wound Context: venous insufficiency   Wound/Ulcer Pain Timing/Severity: intermittent  Quality of pain: intermittent   Severity:  0 / 10   Modifying Factors: Pain is relieved/improved with rest  Associated Signs/Symptoms: edema    Ulcer Identification:  Ulcer Type: venous  Contributing Factors: venous stasis, diabetes, decreased mobility and obesity    Wound: N/A        PAST MEDICAL HISTORY        Diagnosis Date    Acquired hypothyroidism 3/15/2016    Allergic rhinitis     pollen, dust ragweed, hay and straw     Anxiety     Asthma     Bipolar affect, depressed (HCC)     Bipolar disorder ([address]     Breast cancer ([address]     Invasive ductal cancer right breast    Cancer ([address]     thyroid cancer     Chronic back pain     COPD (chronic obstructive pulmonary disease) ([address]     Depression     Diabetes mellitus ([address]     Emphysema of lung ([address]     Essential hypertension 3/15/2016    History of blood transfusion     2019    Hyperlipidemia     Hypothyroidism     Obesity     On home O2     3L NC at night    YARED (obstructive sleep apnea)     3 L NC    Osteoarthritis     Restless legs syndrome     Sleep apnea     Urinary incontinence        PAST SURGICAL HISTORY    Past Surgical History:   Procedure Laterality Date    APPENDECTOMY      BREAST BIOPSY Right 2018     SECTION      CHOLECYSTECTOMY      COLONOSCOPY N/A 2019    COLONOSCOPY DIAGNOSTIC performed by Celeste Baeza MD at [address] Right 2019    LAPAROSCOPIC RIGHT COLECTOMY, LYSIS OF ADHESIONS. performed by Ruben Spence MD at [address] Right 2019    Simple mastectomy with sentinel node biopsy    TUBAL LIGATION         FAMILY HISTORY    Family History   Problem Relation Age of Onset    Diabetes Father     Heart Disease Father     Thyroid Cancer Sister     Thyroid Cancer Brother     Thyroid Cancer Sister     No Known Problems Daughter        SOCIAL HISTORY    Social History     Tobacco Use    Smoking status: Former Smoker     Packs/day: 1.00     Years: 40.00     Pack years: 40.00     Types: Cigarettes     Start date: 3/8/1976     Last attempt to quit: 2018     Years since quittin.0    Smokeless tobacco: Never Used   Substance Use Topics    Alcohol use: No     Alcohol/week: 0.0 standard drinks    Drug use: No       ALLERGIES    Allergies   Allergen Reactions    Latex Itching and Rash    Sulfa Antibiotics Hives    Bactrim [Sulfamethoxazole-Trimethoprim] Hives and Other (See Comments)     Metallic taste    Nicotine Hives     Hives from the patch       MEDICATIONS    Current Outpatient Medications on File Prior to Encounter   Medication Sig Dispense Refill    ketorolac (TORADOL) 10 MG tablet Take 10 mg by mouth every 6 hours as needed for Pain      fluticasone-umeclidin-vilant (TRELEGY ELLIPTA) 100-62.5-25 MCG/INH AEPB Inhale 1 puff into the lungs daily 1 each 3    VORTIoxetine (TRINTELLIX) 10 MG TABS tablet Take 10 mg by mouth      furosemide (LASIX) 40 MG tablet TAKE 1 TABLET BY MOUTH EVERY DAY 90 tablet 1    sodium chloride 0.9 % irrigation Irrigate with as directed for 1 dose. 1000 mL 5    sodium chloride 0.9 % irrigation Irrigate with as directed for 1 dose.  1000 mL 5    albuterol sulfate HFA (VENTOLIN HFA) 108 (90 Base) MCG/ACT inhaler Inhale 2 puffs into the lungs every 6 hours as needed for Wheezing 1 Inhaler 5    oxybutynin (DITROPAN-XL) 10 MG extended release tablet Take 10 mg by mouth daily      simvastatin (ZOCOR) 40 MG tablet TAKE 1 TABLET BY MOUTH EVERY EVENING 90 tablet 1    losartan (COZAAR) 25 MG tablet Take 1 tablet by mouth daily 90 tablet 1    levothyroxine (SYNTHROID) 175 MCG tablet Take 1 tablet by mouth Daily 90 tablet 1    potassium chloride (KLOR-CON M) 10 MEQ extended release tablet Take 1 tablet by mouth daily 90 tablet 1    metFORMIN (GLUCOPHAGE XR) 500 MG extended release tablet Take 2 tablets by mouth daily (with breakfast) 180 tablet 1    ARIPiprazole (ABILIFY) 10 MG tablet TAKE 1 TABLET BY MOUTH EVERY DAY IN THE EVENING AS DIRECTED  3    Multiple Vitamin (MULTIVITAMIN) tablet Take 1 tablet by mouth daily      Multiple Vitamins-Minerals (ICAPS AREDS 2 PO) Take 1 tablet by mouth daily      vitamin D (CHOLECALCIFEROL) 1000 UNIT TABS tablet Take 1,000 Units by mouth daily      anastrozole (ARIMIDEX) 1 MG tablet Take 1 mg by mouth daily      ACCU-CHEK SMARTVIEW strip Test TID E11.8 300 each 3    ipratropium-albuterol (DUONEB) 0.5-2.5 (3) MG/3ML SOLN nebulizer solution 3 mLs      SOFT TOUCH LANCETS MISC Use tid as directed dx: E11.9 300 each 3    hydrOXYzine (VISTARIL) 25 MG capsule Take 25 mg by mouth 3 times daily as needed       DULoxetine (CYMBALTA) 60 MG extended release capsule Take 60 mg by mouth daily        No current facility-administered medications on file prior to encounter. REVIEW OF SYSTEMS    Pertinent items are noted in HPI. Objective:      /68   Pulse 68   Temp 96.7 °F (35.9 °C) (Temporal)   Resp 20     Wt Readings from Last 3 Encounters:   03/12/20 (!) 301 lb (136.5 kg)   02/19/20 (!) 304 lb (137.9 kg)   12/27/19 (!) 310 lb (140.6 kg)       PHYSICAL EXAM    Constitutional:   Well nourished and well developed. Appears neat and clean. Patient is alert, oriented x3, and in no apparent distress. Respiratory:  Respiratory effort is easy and symmetric bilaterally. Rate is normal at rest and on room air.     Vascular:  Pedal Pulses is palpable and audible signal noted with doppler. Capillary refill is <5 sec to digits bilateral.  Extremities with 1 + plus edema bilaterally . Neurological:  Gross and Light touch intact. Protective sensation diminished. Dermatological:  Wound description noted in wound assessment. Open ulcerations have healed/dried to right lower extremity, erythema considerably lessened. Left lower extremity, erythematous, will monitor. At present time no left leg open ulcerations observed. Edema remains 1+ to 2 + bilaterally     Psychiatric:  Judgement and insight intact. Short and long term memory intact. No evidence of depression, anxiety, or agitation. Patient is calm, cooperative, and communicative. Appropriate interactions and affect. Assessment:      Problem List Items Addressed This Visit     None           Procedure Note  Indications:  Based on my examination of this patient's wound(s)/ulcer(s) today, debridement is not required to promote healing and evaluate the wound base. Wound 01/16/20 Leg Right; Lower #1 (Active)   Wound Image   07/30/20 0911   Wound Traumatic 07/30/20 0911   Dressing/Treatment Alginate with Ag;Dry dressing 07/09/20 0954   Wound Cleansed Rinsed/Irrigated with saline 07/30/20 0911   Wound Length (cm) 0 cm 07/30/20 0911   Wound Width (cm) 0 cm 07/30/20 0911   Wound Depth (cm) 0 cm 07/30/20 0911   Wound Surface Area (cm^2) 0 cm^2 07/30/20 0911   Change in Wound Size % (l*w) 100 07/30/20 0911   Wound Volume (cm^3) 0 cm^3 07/30/20 0911   Wound Healing % 100 07/30/20 0911   Drainage Amount None 07/30/20 0911   Drainage Description Serosanguinous 07/09/20 0919   Odor None 07/09/20 0919   Margins Undefined edges 01/16/20 1514   Tamara-wound Assessment Red 01/16/20 1514   Non-staged Wound Description Full thickness 07/09/20 0919   [place]%Wound Bed 95 01/16/20 1514   Red%Wound Bed 25 07/09/20 0919   Yellow%Wound Bed 75 07/09/20 0919   Black%Wound Bed 5 01/16/20 1514   Other%Wound Bed 100% scabbed 07/30/20

## 2020-08-13 ENCOUNTER — HOSPITAL ENCOUNTER (OUTPATIENT)
Dept: WOUND CARE | Age: 65
Discharge: HOME | End: 2020-08-13
Payer: COMMERCIAL

## 2020-08-13 VITALS
SYSTOLIC BLOOD PRESSURE: 127 MMHG | RESPIRATION RATE: 20 BRPM | TEMPERATURE: 97 F | DIASTOLIC BLOOD PRESSURE: 55 MMHG | HEART RATE: 72 BPM

## 2020-08-13 PROCEDURE — 29580 STRAPPING UNNA BOOT: CPT

## 2020-08-13 PROCEDURE — 99213 OFFICE O/P EST LOW 20 MIN: CPT | Performed by: NURSE PRACTITIONER

## 2020-08-13 NOTE — PROGRESS NOTES
0.9 % irrigation Irrigate with as directed for 1 dose. 1000 mL 5    albuterol sulfate HFA (VENTOLIN HFA) 108 (90 Base) MCG/ACT inhaler Inhale 2 puffs into the lungs every 6 hours as needed for Wheezing 1 Inhaler 5    oxybutynin (DITROPAN-XL) 10 MG extended release tablet Take 10 mg by mouth daily      simvastatin (ZOCOR) 40 MG tablet TAKE 1 TABLET BY MOUTH EVERY EVENING 90 tablet 1    losartan (COZAAR) 25 MG tablet Take 1 tablet by mouth daily 90 tablet 1    levothyroxine (SYNTHROID) 175 MCG tablet Take 1 tablet by mouth Daily 90 tablet 1    potassium chloride (KLOR-CON M) 10 MEQ extended release tablet Take 1 tablet by mouth daily 90 tablet 1    metFORMIN (GLUCOPHAGE XR) 500 MG extended release tablet Take 2 tablets by mouth daily (with breakfast) 180 tablet 1    ARIPiprazole (ABILIFY) 10 MG tablet TAKE 1 TABLET BY MOUTH EVERY DAY IN THE EVENING AS DIRECTED  3    Multiple Vitamin (MULTIVITAMIN) tablet Take 1 tablet by mouth daily      Multiple Vitamins-Minerals (ICAPS AREDS 2 PO) Take 1 tablet by mouth daily      vitamin D (CHOLECALCIFEROL) 1000 UNIT TABS tablet Take 1,000 Units by mouth daily      anastrozole (ARIMIDEX) 1 MG tablet Take 1 mg by mouth daily      ACCU-CHEK SMARTVIEW strip Test TID E11.8 300 each 3    ipratropium-albuterol (DUONEB) 0.5-2.5 (3) MG/3ML SOLN nebulizer solution 3 mLs      SOFT TOUCH LANCETS MISC Use tid as directed dx: E11.9 300 each 3    hydrOXYzine (VISTARIL) 25 MG capsule Take 25 mg by mouth 3 times daily as needed       DULoxetine (CYMBALTA) 60 MG extended release capsule Take 60 mg by mouth daily        No current facility-administered medications on file prior to encounter. REVIEW OF SYSTEMS    Pertinent items are noted in HPI.     Objective:      BP (!) 127/55   Pulse 72   Temp 97 °F (36.1 °C) (Temporal)   Resp 20     Wt Readings from Last 3 Encounters:   03/12/20 (!) 301 lb (136.5 kg)   02/19/20 (!) 304 lb (137.9 kg)   12/27/19 (!) 310 lb (140.6 kg) PHYSICAL EXAM    Constitutional:   Well nourished and well developed. Appears neat and clean. Patient is alert, oriented x3, and in no apparent distress. Respiratory:  Respiratory effort is easy and symmetric bilaterally. Rate is normal at rest and on room air. Vascular:  Pedal Pulses is not palpable- audible signal noted with doppler bilaterally. Capillary refill is <5 sec to digits bilateral.  Extremities positive for 1+-2+ edema. Neurological:  Gross and Light touch intact. Protective sensation present . Dermatological:  Wound description noted in wound assessment. Right lower leg with open weeping ulcerations anterior leg, with surrounding erythema. Slightly warm to touch. Left lower leg skin is intact, slight erythema present. Psychiatric:  Judgement and insight intact. Short and long term memory intact. No evidence of depression, anxiety, or agitation. Patient is calm, cooperative, and communicative. Appropriate interactions and affect. Assessment:      Problem List Items Addressed This Visit     Ulcer of right lower extremity with fat layer exposed ([facility] [address]           Procedure Note  Indications:  Based on my examination of this patient's wound(s)/ulcer(s) today, debridement is not required to promote healing and evaluate the wound base. Wound 01/16/20 Leg Right; Lower #1 (Active)   Wound Image    08/13/20 0858   Wound Traumatic 08/13/20 0858   Dressing/Treatment Alginate with Ag 08/13/20 0930   Wound Cleansed Rinsed/Irrigated with saline 08/13/20 0858   Wound Length (cm) 10 cm 08/13/20 0858   Wound Width (cm) 10.8 cm 08/13/20 0858   Wound Depth (cm) 0.2 cm 08/13/20 0858   Wound Surface Area (cm^2) 108 cm^2 08/13/20 0858   Change in Wound Size % (l*w) 17.49 08/13/20 0858   Wound Volume (cm^3) 21.6 cm^3 08/13/20 0858   Wound Healing % -65 08/13/20 0858   Drainage Amount Moderate 08/13/20 0858   Drainage Description Serous 08/13/20 0858   Odor None 08/13/20 0858   Margins Undefined edges 08/13/20 0858   Tamara-wound Assessment Red 08/13/20 0858   Non-staged Wound Description Full thickness 08/13/20 0858   [place]%Wound Bed 95 01/16/20 1514   Red%Wound Bed 30 08/13/20 0858   Yellow%Wound Bed 70 08/13/20 0858   Black%Wound Bed 5 01/16/20 1514   Other%Wound Bed 100% scabbed 07/30/20 0911   Number of days: 209       Incision (Active)   Number of days:        Incision 05/13/19 Abdomen Right (Active)   Number of days: 458             Plan:     Continue with unna boot to right leg with home health to oversee and manage. HH to do 2 unna boot changes this week, for 3 per week, with patient returning in 2 weeks. Patient re-educated on precautions to take with newly healed/open skin. Treatment Note please see attached Discharge Instructions    Written patient dismissal instructions given to patient and signed by patient or POA. Discharge Instructions         Discharge [address] [facility]   Physician Orders and Discharge [address] [place] [address]  Telephone: [phone]      [phone]        NAME: Carlos Milligan  DATE OF BIRTH:  1955  MEDICAL RECORD NUMBER:  [id number]     Home Care/Facility: [address] [place] [address] (order dressings as needed)      Wound Location: Right lower Leg   Dressing orders: 1. Cleanse wound(s) with normal saline. Silver alginate and dry gauze.   2. Zinc unna boot and coban or ace wrap.  Change twice weekly      Compression: Apply Spandagrip to left(10-20) med lower leg, apply first thing in the morning, remove at bedtime, elevate legs as much as possible during the day.       Offloading Device:     Other Instructions:     Keep all dressings clean, dry and intact.  Keep pressure off the wound(s) at all times.      Follow up visit Ofelia Mancini 27 , 2020 @   8:45     Please give 24 hour notice if unable to keep appointment. [phone]     If you experience any of the following, please call the Wound Care Service at  [phone] or go to the nearest emergency room.        *Increase in pain         *Temperature over 101           *Increase in drainage from your wound or a foul odor  *Uncontrolled swelling            *Need for compression bandage changes due to slippage, breakthrough drainage     PLEASE NOTE: IF YOU ARE UNABLE TO OBTAIN WOUND SUPPLIES, CONTINUE TO USE THE SUPPLIES YOU HAVE AVAILABLE UNTIL YOU ARE ABLE TO [address].  IT IS MOST IMPORTANT TO KEEP THE WOUND COVE  Electronically signed by MARIETTA Paige NP on 8/13/2020 at 9:31 AM            Electronically signed by MARIETTA Paige NP on 8/13/2020 at 9:42 AM

## 2020-09-03 ENCOUNTER — HOSPITAL ENCOUNTER (OUTPATIENT)
Dept: WOUND CARE | Age: 65
Discharge: HOME | End: 2020-09-03
Payer: COMMERCIAL

## 2020-09-03 VITALS
HEART RATE: 63 BPM | DIASTOLIC BLOOD PRESSURE: 76 MMHG | SYSTOLIC BLOOD PRESSURE: 119 MMHG | RESPIRATION RATE: 18 BRPM | TEMPERATURE: 96.5 F

## 2020-09-03 PROCEDURE — 99213 OFFICE O/P EST LOW 20 MIN: CPT | Performed by: NURSE PRACTITIONER

## 2020-09-03 PROCEDURE — 29580 STRAPPING UNNA BOOT: CPT

## 2020-09-03 ASSESSMENT — PAIN SCALES - GENERAL: PAINLEVEL_OUTOF10: 0

## 2020-09-03 NOTE — PROGRESS NOTES
Anna Watkins 37   Progress Note and Procedure Note      [address] RECORD NUMBER:  [id number]  AGE: 59 y.o. GENDER: female  : 1955  EPISODE DATE:  9/3/2020    Subjective:     Chief Complaint   Patient presents with    Wound Check     right leg          HISTORY of PRESENT ILLNESS HPI     Alexander Reilly is a 59 y.o. female who presents today for wound/ulcer evaluation. History of Wound Context: follow up for chronic venous insufficiency ulcerations right lower leg with recurrent cellulitis. Cellulitis appears to be resolving-only slight generalized discoloration remains.    Wound/Ulcer Pain Timing/Severity: none  Quality of pain: N/A  Severity:  0 / 10   Modifying Factors: None  Associated Signs/Symptoms: none    Ulcer Identification:  Ulcer Type: venous  Contributing Factors: edema, venous stasis, diabetes, decreased mobility and obesity    Wound: N/A        PAST MEDICAL HISTORY        Diagnosis Date    Acquired hypothyroidism 3/15/2016    Allergic rhinitis     pollen, dust ragweed, hay and straw     Anxiety     Asthma     Bipolar affect, depressed (HCC)     Bipolar disorder ([address]     Breast cancer ([address]     Invasive ductal cancer right breast    Cancer ([address] 1980    thyroid cancer     Chronic back pain     COPD (chronic obstructive pulmonary disease) ([address]     Depression     Diabetes mellitus ([address]     Emphysema of lung ([address]     Essential hypertension 3/15/2016    History of blood transfusion     2019    Hyperlipidemia     Hypothyroidism     Obesity     On home O2     3L NC at night    YARED (obstructive sleep apnea)     3 L NC    Osteoarthritis     Restless legs syndrome     Sleep apnea     Urinary incontinence        PAST SURGICAL HISTORY    Past Surgical History:   Procedure Laterality Date    APPENDECTOMY      BREAST BIOPSY Right 2018     SECTION      CHOLECYSTECTOMY      COLONOSCOPY N/A 2019    COLONOSCOPY DIAGNOSTIC performed by Shannan Douglas MD at [address] Right 2019    LAPAROSCOPIC RIGHT COLECTOMY, LYSIS OF ADHESIONS. [facility]    Simple mastectomy with sentinel node biopsy    TUBAL LIGATION         FAMILY HISTORY    Family History   Problem Relation Age of Onset    Diabetes Father     Heart Disease Father     Thyroid Cancer Sister     Thyroid Cancer Brother     Thyroid Cancer Sister     No Known Problems Daughter        SOCIAL HISTORY    Social History     Tobacco Use    Smoking status: Former Smoker     Packs/day: 1.00     Years: 40.00     Pack years: 40.00     Types: Cigarettes     Start date: 3/8/1976     Last attempt to quit: 2018     Years since quittin.1    Smokeless tobacco: Never Used   Substance Use Topics    Alcohol use: No     Alcohol/week: 0.0 standard drinks    Drug use: No       ALLERGIES    Allergies   Allergen Reactions    Latex Itching and Rash    Sulfa Antibiotics Hives    Bactrim [Sulfamethoxazole-Trimethoprim] Hives and Other (See Comments)     Metallic taste    Nicotine Hives     Hives from the patch       MEDICATIONS    Current Outpatient Medications on File Prior to Encounter   Medication Sig Dispense Refill    ketorolac (TORADOL) 10 MG tablet Take 10 mg by mouth every 6 hours as needed for Pain      fluticasone-umeclidin-vilant (TRELEGY ELLIPTA) 100-62.5-25 MCG/INH AEPB Inhale 1 puff into the lungs daily 1 each 3    VORTIoxetine (TRINTELLIX) 10 MG TABS tablet Take 10 mg by mouth      furosemide (LASIX) 40 MG tablet TAKE 1 TABLET BY MOUTH EVERY DAY 90 tablet 1    sodium chloride 0.9 % irrigation Irrigate with as directed for 1 dose. 1000 mL 5    sodium chloride 0.9 % irrigation Irrigate with as directed for 1 dose.  1000 mL 5    albuterol sulfate HFA (VENTOLIN HFA) 108 (90 Base) MCG/ACT inhaler Inhale 2 puffs into the lungs every 6 hours as needed for Wheezing 1 Inhaler 5  oxybutynin (DITROPAN-XL) 10 MG extended release tablet Take 10 mg by mouth daily      simvastatin (ZOCOR) 40 MG tablet TAKE 1 TABLET BY MOUTH EVERY EVENING 90 tablet 1    losartan (COZAAR) 25 MG tablet Take 1 tablet by mouth daily 90 tablet 1    levothyroxine (SYNTHROID) 175 MCG tablet Take 1 tablet by mouth Daily 90 tablet 1    potassium chloride (KLOR-CON M) 10 MEQ extended release tablet Take 1 tablet by mouth daily 90 tablet 1    metFORMIN (GLUCOPHAGE XR) 500 MG extended release tablet Take 2 tablets by mouth daily (with breakfast) 180 tablet 1    ARIPiprazole (ABILIFY) 10 MG tablet TAKE 1 TABLET BY MOUTH EVERY DAY IN THE EVENING AS DIRECTED  3    Multiple Vitamins-Minerals (ICAPS AREDS 2 PO) Take 1 tablet by mouth daily      vitamin D (CHOLECALCIFEROL) 1000 UNIT TABS tablet Take 1,000 Units by mouth daily      anastrozole (ARIMIDEX) 1 MG tablet Take 1 mg by mouth daily      ACCU-CHEK SMARTVIEW strip Test TID E11.8 300 each 3    ipratropium-albuterol (DUONEB) 0.5-2.5 (3) MG/3ML SOLN nebulizer solution 3 mLs      SOFT TOUCH LANCETS MISC Use tid as directed dx: E11.9 300 each 3    DULoxetine (CYMBALTA) 60 MG extended release capsule Take 60 mg by mouth daily       Multiple Vitamin (MULTIVITAMIN) tablet Take 1 tablet by mouth daily      hydrOXYzine (VISTARIL) 25 MG capsule Take 25 mg by mouth 3 times daily as needed        No current facility-administered medications on file prior to encounter. REVIEW OF SYSTEMS    Pertinent items are noted in HPI. Objective:      /76   Pulse 63   Temp 96.5 °F (35.8 °C) (Temporal)   Resp 18     Wt Readings from Last 3 Encounters:   03/12/20 (!) 301 lb (136.5 kg)   02/19/20 (!) 304 lb (137.9 kg)   12/27/19 (!) 310 lb (140.6 kg)       PHYSICAL EXAM    Constitutional:   Well nourished and well developed. Appears neat and clean. Patient is alert, oriented x3, and in no apparent distress.      Respiratory:  Respiratory effort is easy and symmetric bilaterally. Rate is normal at rest and on room air. Vascular:  Pedal Pulses is palpable and audible signal noted with doppler. Capillary refill is <5 sec to digits bilateral.  Extremities positive for pitting edema 1+ bilateral.     Neurological:  Gross and Light touch intact. Protective sensation intact. Dermatological:  Wound description noted in wound assessment. Right lower extremity with one area of ulceration remaining. Other areas dried with scab formation over. Leg with generalized discoloration    Psychiatric:  Judgement and insight intact. Short and long term memory intact. No evidence of depression, anxiety, or agitation. Patient is calm, cooperative, and communicative. Appropriate interactions and affect. Assessment:      Problem List Items Addressed This Visit     Ulcer of right lower extremity with fat layer exposed ([facility] [address]           Procedure Note  Indications:  Based on my examination of this patient's wound(s)/ulcer(s) today, debridement is not required to promote healing and evaluate the wound base. Wound 01/16/20 Leg Right; Lower #1 (Active)   Wound Image   09/03/20 1032   Wound Traumatic 09/03/20 1032   Dressing/Treatment Alginate with Ag 08/13/20 0930   Wound Cleansed Rinsed/Irrigated with saline 09/03/20 1032   Wound Length (cm) 1.9 cm 09/03/20 1032   Wound Width (cm) 0.7 cm 09/03/20 1032   Wound Depth (cm) 0.1 cm 09/03/20 1032   Wound Surface Area (cm^2) 1.33 cm^2 09/03/20 1032   Change in Wound Size % (l*w) 98.98 09/03/20 1032   Wound Volume (cm^3) 0.13 cm^3 09/03/20 1032   Wound Healing % 99 09/03/20 1032   Drainage Amount Moderate 09/03/20 1032   Drainage Description Serosanguinous; Yellow 09/03/20 1032   Odor None 09/03/20 1032   Margins Undefined edges 08/13/20 0858   Tamara-wound Assessment Pink 09/03/20 1032   Non-staged Wound Description Full thickness 09/03/20 1032   [place]%Wound Bed 95 01/16/20 1514   Red%Wound Bed 30 08/13/20 0858   Yellow%Wound Bed 70 08/13/20 0858

## 2020-09-03 NOTE — PLAN OF CARE
Problem: Blood Glucose:  Goal: Ability to maintain appropriate glucose levels will improve  Outcome: Ongoing     Problem: Wound:  Goal: Will show signs of wound healing; wound closure and no evidence of infection  Outcome: Ongoing

## 2020-09-17 ENCOUNTER — HOSPITAL ENCOUNTER (OUTPATIENT)
Dept: WOUND CARE | Age: 65
Discharge: HOME | End: 2020-09-17
Payer: COMMERCIAL

## 2020-09-17 VITALS
DIASTOLIC BLOOD PRESSURE: 67 MMHG | TEMPERATURE: 96.7 F | RESPIRATION RATE: 18 BRPM | HEART RATE: 76 BPM | SYSTOLIC BLOOD PRESSURE: 114 MMHG

## 2020-09-17 PROCEDURE — 87075 CULTR BACTERIA EXCEPT BLOOD: CPT

## 2020-09-17 PROCEDURE — 87070 CULTURE OTHR SPECIMN AEROBIC: CPT

## 2020-09-17 PROCEDURE — 87147 CULTURE TYPE IMMUNOLOGIC: CPT

## 2020-09-17 PROCEDURE — 29580 STRAPPING UNNA BOOT: CPT

## 2020-09-17 PROCEDURE — 99213 OFFICE O/P EST LOW 20 MIN: CPT | Performed by: NURSE PRACTITIONER

## 2020-09-17 PROCEDURE — 87186 SC STD MICRODIL/AGAR DIL: CPT

## 2020-09-17 PROCEDURE — 87077 CULTURE AEROBIC IDENTIFY: CPT

## 2020-09-17 PROCEDURE — 87205 SMEAR GRAM STAIN: CPT

## 2020-09-17 NOTE — PROGRESS NOTES
Anna Watkins 37   Progress Note and Procedure Note      [address] RECORD NUMBER:  [id number]  AGE: 59 y.o. GENDER: female  : 1955  EPISODE DATE:  2020    Subjective:     Chief Complaint   Patient presents with    Wound Check     right lower leg wound         HISTORY of PRESENT ILLNESS BRITTANY Christianson is a 59 y.o. female who presents today for wound/ulcer evaluation.    History of Wound Context: follow up visit for chronic venous insufficiency, right lower leg recurrent venous ulceration  Wound/Ulcer Pain Timing/Severity: intermittent  Quality of pain: itching of open areas   Severity:  2 / 10   Modifying Factors: Pain worsens with care  Associated Signs/Symptoms: erythema and drainage    Ulcer Identification:  Ulcer Type: venous  Contributing Factors: edema, venous stasis, diabetes, decreased mobility and obesity    Wound: N/A        PAST MEDICAL HISTORY        Diagnosis Date    Acquired hypothyroidism 3/15/2016    Allergic rhinitis     pollen, dust ragweed, hay and straw     Anxiety     Asthma     Bipolar affect, depressed ([address]     Bipolar disorder ([address]     Breast cancer ([address]     Invasive ductal cancer right breast    Cancer ([address] 1980    thyroid cancer     Chronic back pain     COPD (chronic obstructive pulmonary disease) ([address]     Depression     Diabetes mellitus ([address]     Emphysema of lung ([address]     Essential hypertension 3/15/2016    History of blood transfusion     2019    Hyperlipidemia     Hypothyroidism     Obesity     On home O2     3L NC at night    YARED (obstructive sleep apnea)     3 L NC    Osteoarthritis     Restless legs syndrome     Sleep apnea     Urinary incontinence        PAST SURGICAL HISTORY    Past Surgical History:   Procedure Laterality Date    APPENDECTOMY      BREAST BIOPSY Right 2018     SECTION      CHOLECYSTECTOMY      COLONOSCOPY N/A 2019    COLONOSCOPY DIAGNOSTIC performed by Dary Barnes MD at [address] Right 2019    LAPAROSCOPIC RIGHT COLECTOMY, LYSIS OF ADHESIONS. performed by Renetta Guzman MD at [address] [facility] Right 2019    Simple mastectomy with sentinel node biopsy    TUBAL LIGATION         FAMILY HISTORY    Family History   Problem Relation Age of Onset    Diabetes Father     Heart Disease Father     Thyroid Cancer Sister     Thyroid Cancer Brother     Thyroid Cancer Sister     No Known Problems Daughter        SOCIAL HISTORY    Social History     Tobacco Use    Smoking status: Former Smoker     Packs/day: 1.00     Years: 40.00     Pack years: 40.00     Types: Cigarettes     Start date: 3/8/1976     Last attempt to quit: 2018     Years since quittin.2    Smokeless tobacco: Never Used   Substance Use Topics    Alcohol use: No     Alcohol/week: 0.0 standard drinks    Drug use: No       ALLERGIES    Allergies   Allergen Reactions    Latex Itching and Rash    Sulfa Antibiotics Hives    Bactrim [Sulfamethoxazole-Trimethoprim] Hives and Other (See Comments)     Metallic taste    Nicotine Hives     Hives from the patch       MEDICATIONS    Current Outpatient Medications on File Prior to Encounter   Medication Sig Dispense Refill    ketorolac (TORADOL) 10 MG tablet Take 10 mg by mouth every 6 hours as needed for Pain      fluticasone-umeclidin-vilant (TRELEGY ELLIPTA) 100-62.5-25 MCG/INH AEPB Inhale 1 puff into the lungs daily 1 each 3    VORTIoxetine (TRINTELLIX) 10 MG TABS tablet Take 10 mg by mouth      furosemide (LASIX) 40 MG tablet TAKE 1 TABLET BY MOUTH EVERY DAY 90 tablet 1    sodium chloride 0.9 % irrigation Irrigate with as directed for 1 dose. 1000 mL 5    sodium chloride 0.9 % irrigation Irrigate with as directed for 1 dose.  1000 mL 5    albuterol sulfate HFA (VENTOLIN HFA) 108 (90 Base) MCG/ACT inhaler Inhale 2 puffs into the lungs every 6 hours as needed for Wheezing 1 Inhaler 5    bilaterally. Rate is normal at rest and on room air. Vascular:  Pedal Pulses is palpable and audible signal noted with doppler. Capillary refill is <5 sec to digits bilateral.  Extremities positive for pitting edema, 1+. Neurological:  Gross and Light touch intact. Protective sensation diminished but present. Dermatological:  Wound description noted in wound assessment. Wounds x 3 to right lower anterior/lateral leg draining small to moderate amount serosanguinous drainage with generalized erythema anterior leg only. No excessive warmth of erythematous area as compared to remainder of leg, cultured and will monitor. Generalized venous dermatitis present. Psychiatric:  Judgement and insight intact. Short and long term memory intact. No evidence of depression, anxiety, or agitation. Patient is calm, cooperative, and communicative. Appropriate interactions and affect. Assessment:      Problem List Items Addressed This Visit     None           Procedure Note  Indications:  Based on my examination of this patient's wound(s)/ulcer(s) today, debridement is not required to promote healing and evaluate the wound base. Wound 01/16/20 Leg Right; Lower #1 (Active)   Wound Image   09/17/20 0918   Wound Traumatic 09/17/20 0918   Dressing/Treatment Alginate with Ag 08/13/20 0930   Wound Cleansed Rinsed/Irrigated with saline 09/17/20 0918   Wound Length (cm) 1.6 cm 09/17/20 0918   Wound Width (cm) 1 cm 09/17/20 0918   Wound Depth (cm) 0.1 cm 09/17/20 0918   Wound Surface Area (cm^2) 1.6 cm^2 09/17/20 0918   Change in Wound Size % (l*w) 98.78 09/17/20 0918   Wound Volume (cm^3) 0.16 cm^3 09/17/20 0918   Wound Healing % 99 09/17/20 0918   Wound Assessment Red;Granulation tissue 09/17/20 0918   Drainage Amount Moderate 09/17/20 0918   Drainage Description Serosanguinous 09/17/20 0918   Odor None 09/17/20 0918   Margins Defined edges 09/17/20 0918   Tamara-wound Assessment Red 09/17/20 0918   Non-staged Wound (order dressings as needed)      Wound Location: Right lower Leg      Dressing orders: 1. Cleanse wound(s) with normal saline.  Silver alginate. Dry gauze. Mupirocin to scabbed areas. 2. Zinc unna boot and coban or ace wrap. [facility] to Change twice      Compression: Apply Spandagrip to left(10-20) med lower leg, apply first thing in the morning, remove at bedtime, elevate legs as much as possible during the day.       Offloading Device:     Other Instructions: Culture in [facility] today     Keep all dressings clean, dry and intact.  Keep pressure off the wound(s) at all times.      Follow up visit  1 Weeks September 24, 2020 @   10:00     Please give 24 hour notice if unable to keep appointment. [phone]     If you experience any of the following, please call the Wound Care Service at  [phone] or go to the nearest emergency room.        *Increase in pain         *Temperature over 101           *Increase in drainage from your wound or a foul odor  *Uncontrolled swelling            *Need for compression bandage changes due to slippage, breakthrough drainage     PLEASE NOTE: IF YOU ARE UNABLE TO OBTAIN WOUND SUPPLIES, CONTINUE TO USE THE SUPPLIES YOU HAVE AVAILABLE UNTIL YOU ARE ABLE [facility].  IT IS MOST IMPORTANT TO KEEP THE WOUND COVE    Electronically signed by MARIETTA Sheldon NP on 9/17/2020 at 9:52 AM        Electronically signed by MARIETTA Sheldon NP on 9/17/2020 at 9:53 AM

## 2020-09-19 LAB
ANAEROBIC CULTURE: ABNORMAL
GRAM STAIN RESULT: ABNORMAL
ORGANISM: ABNORMAL
WOUND/ABSCESS: ABNORMAL

## 2020-09-24 ENCOUNTER — HOSPITAL ENCOUNTER (OUTPATIENT)
Dept: WOUND CARE | Age: 65
Discharge: HOME | End: 2020-09-24

## 2020-10-08 NOTE — PROGRESS NOTES
Contacted by [facility] wound center, notified Wyatt Lopez is currently an inpatient. The patient reported to the nursing staff that her Doneta Hacker is scheduled to be changed on Friday 10/9/2020. Call returned with message left for Gwendloyn Reason at [phone] that since Wyatt Lopez is an inpatient and will likely not be ambulating for maximum benefit of the Unna boot, may discontinue it tomorrow, and use Silvercell as needed to any open weeping areas, held in place with Kerlix/Yan, until discharged and able to be seen in the wound center at [facility].

## 2020-10-22 ENCOUNTER — HOSPITAL ENCOUNTER (OUTPATIENT)
Dept: WOUND CARE | Age: 65
Discharge: HOME | End: 2020-10-22
Payer: COMMERCIAL

## 2020-10-22 VITALS
DIASTOLIC BLOOD PRESSURE: 79 MMHG | RESPIRATION RATE: 18 BRPM | HEART RATE: 78 BPM | TEMPERATURE: 97 F | SYSTOLIC BLOOD PRESSURE: 116 MMHG

## 2020-10-22 PROCEDURE — 99213 OFFICE O/P EST LOW 20 MIN: CPT | Performed by: NURSE PRACTITIONER

## 2020-10-22 PROCEDURE — 29580 STRAPPING UNNA BOOT: CPT

## 2020-10-22 RX ORDER — MECLIZINE HYDROCHLORIDE 25 MG/1
TABLET ORAL
COMMUNITY
Start: 2020-09-23

## 2020-10-22 NOTE — DISCHARGE INSTR - COC
with complication ([address] N72.6    Acquired hypothyroidism E03.9    Essential hypertension I10    Mixed hyperlipidemia E78.2    COPD with chronic bronchitis ([facility]) J44.9    Microalbuminuria R80.9    Moderate episode of recurrent major depressive disorder ([facility]) F33.1    YARED (obstructive sleep apnea) G47.33    Restless leg syndrome G25.81    Vitamin B12 deficiency E53.8    Vitamin D deficiency E55.9    Left ventricular hypertrophy I51.7    Left ventricular diastolic dysfunction V58.6    Morbid obesity with BMI of 45.0-49.9, adult ([facility]) E66.01, Z68.42    Breast carcinoma, female, right ([address] C50.911    Iron deficiency anemia secondary to inadequate dietary iron intake D50.8    Breast cancer of lower-inner quadrant of right female breast ([facility]) C50.311    Postoperative anemia D64.9    Malignant neoplasm of central portion of right breast in female, estrogen receptor positive ([facility]) C50.111, Z17.0    Iron deficiency anemia due to chronic blood loss D50.0    Non-pressure chronic ulcer of other part of right lower leg with fat layer exposed ([address] L97.812    Chronic venous insufficiency I87.2    Adenomatous polyp of ascending colon D12.2    Adenomatous polyp of sigmoid colon D12.5    Dysplastic colon polyp K63.5    Cellulitis of right leg L03.115    Ulcer of abdomen wall, with unspecified severity ([address] L98.499    Ulcer of right lower extremity with fat layer exposed ([address] L97.912       Isolation/Infection:   Isolation          No Isolation        Patient Infection Status     Infection Onset Added Last Indicated Last Indicated By Review Planned Expiration Resolved Resolved By    MRSA 02/21/19 02/22/19 09/17/20 Culture, Anaerobic and Aerobic        MRSA right lower leg wound 09.17.20. Electronically signed by Kristina Matias RN on 9/21/20 at 6:46 AM EDT             Nurse Assessment:  Last Vital Signs: /79   Pulse 78   Temp 97 °F (36.1 °C) (Temporal)   Resp 18     Last documented pain score (0-10 scale):    Last Weight:   Wt Readings from Last 1 Encounters:   20 (!) 301 lb (136.5 kg)     Mental Status:  {IP PT MENTAL STATUS:}    IV Access:  { JEREMY IV ACCESS:514219044}    Nursing Mobility/ADLs:  Walking   {CHP DME GPIQ:682899127}  Transfer  {CHP DME PLCA:703956536}  Bathing  {CHP DME XEEF:795656633}  Dressing  {CHP DME RDBJ:738726769}  Toileting  {CHP DME HYU}  Feeding  {CHP DME JBNU:690106088}  Med Admin  {P DME LGGQ:802517638}  Med Delivery   { JEREMY MED Delivery:619725523}    Wound Care Documentation and Therapy:  Wound 20 Leg Right; Lower #1 (Active)   Wound Image   10/22/20 0956   Wound Etiology Traumatic 10/22/20 0956   Wound Cleansed Cleansed with saline 10/22/20 0956   Dressing/Treatment Alginate with Ag;Dry dressing 20 1004   Wound Length (cm) 8.2 cm 10/22/20 0956   Wound Width (cm) 8.5 cm 10/22/20 0956   Wound Depth (cm) 0.2 cm 10/22/20 0956   Wound Surface Area (cm^2) 69.7 cm^2 10/22/20 0956   Change in Wound Size % (l*w) 46.75 10/22/20 0956   Wound Volume (cm^3) 13.94 cm^3 10/22/20 0956   Wound Healing % -6 10/22/20 0956   Wound Assessment Granulation tissue;Slough 10/22/20 0956   Drainage Amount Moderate 10/22/20 0956   Drainage Description Serosanguinous 10/22/20 0956   Odor None 10/22/20 0956   Tamara-wound Assessment Dry/flaky; Intact 10/22/20 0956   Margins Defined edges 10/22/20 0956   Wound Thickness Description not for Pressure Injury Full thickness 10/22/20 0956   Number of days: 279        Elimination:  Continence:   · Bowel: {YES / EY:85816}  · Bladder: {YES / CC:30711}  Urinary Catheter: {Urinary Catheter:378166686}   Colostomy/Ileostomy/Ileal Conduit: {YES / TD:98444}       Date of Last BM: ***  No intake or output data in the 24 hours ending 10/22/20 1010  No intake/output data recorded.     Safety Concerns:     [facility] Safety Concerns:358448514}    Impairments/Disabilities:      [facility] Impairments/Disabilities:808151496}    Nutrition Therapy:  Current Nutrition Therapy:   [address] [facility] JEREMY Diet List:016397676}    Routes of Feeding: {CHP DME Other Feedings:033853659}  Liquids: {Slp liquid thickness:36842}  Daily Fluid Restriction: {CHP DME Yes amt example:106751194}  Last Modified Barium Swallow with Video (Video Swallowing Test): {Done Not Done DYIA:137978064}    Treatments at the Time of Hospital Discharge:   Respiratory Treatments: ***  Oxygen Therapy:  {Therapy; copd oxygen:16971}  Ventilator:    { CC Vent GJVQ:559429670}    Rehab Therapies: {THERAPEUTIC INTERVENTION:5374147475}  Weight Bearing Status/Restrictions: { CC Weight Bearin}  Other Medical Equipment (for information only, NOT a DME order):  {EQUIPMENT:838004374}  Other Treatments: ***    Patient's personal belongings (please select all that are sent with patient):  {[facility] DME Belongings:595086827}    RN SIGNATURE:  {Esignature:716274833}    CASE MANAGEMENT/SOCIAL WORK SECTION    Inpatient Status Date: ***    Readmission Risk Assessment Score:  Readmission Risk              Risk of Unplanned Readmission:        0           Discharging to Facility/ Agency   · Name:   · Address:  · Phone:  · Fax:    Dialysis Facility (if applicable)   · Name:  · Address:  · Dialysis Schedule:  · Phone:  · Fax:    / signature: {Esignature:059892063}    PHYSICIAN SECTION    Prognosis: {Prognosis:5286828346}    Condition at Discharge: [address] [facility] Patient Condition:514849357}    Rehab Potential (if transferring to Rehab): {Prognosis:7900327253}    Recommended Labs or Other Treatments After Discharge: ***    Physician Certification: I certify the above information and transfer of Pastora Catalan  is necessary for the continuing treatment of the diagnosis listed and that she requires {Admit to Appropriate Level of Care:10915} for {GREATER/LESS:274596366} 30 days.      Update Admission H&P: {CHP DME Changes in [facility]:372226721}    PHYSICIAN SIGNATURE:  {Esignature:360207947}

## 2020-10-22 NOTE — PROGRESS NOTES
[facility]   Progress Note and Procedure Note      [address] [place] RECORD NUMBER:  [id number]  AGE: 72 y.o. GENDER: female  : 1955  EPISODE DATE:  10/22/2020    Subjective:     Chief Complaint   Patient presents with    Wound Check     right lower leg wounds         HISTORY of PRESENT ILLNESS HPI     Kurt Bermudez is a 72 y.o. female who presents today for wound/ulcer evaluation.    History of Wound Context: follow up for chronic venous insufficiency   Wound/Ulcer Pain Timing/Severity: none  Quality of pain: N/A  Severity:  0 / 10   Modifying Factors: None  Associated Signs/Symptoms: none    Ulcer Identification:  Ulcer Type: venous  Contributing Factors: edema, venous stasis, diabetes, decreased mobility and obesity    Wound: N/A        PAST MEDICAL HISTORY        Diagnosis Date    Acquired hypothyroidism 3/15/2016    Allergic rhinitis     pollen, dust ragweed, hay and straw     Anxiety     Asthma     Bipolar affect, depressed ([address]     Bipolar disorder ([address]     [facility]     Invasive ductal cancer right breast    Cancer ([address] 1980    thyroid cancer     Chronic back pain     COPD (chronic obstructive pulmonary disease) ([address]     Depression     Diabetes mellitus ([address]     Emphysema of lung ([address]     Essential hypertension 3/15/2016    History of blood transfusion     2019    Hyperlipidemia     Hypothyroidism     Obesity     On home O2     3L NC at night    YARED (obstructive sleep apnea)     3 L NC    Osteoarthritis     Restless legs syndrome     Sleep apnea     Urinary incontinence        Problem List:    Patient Active Problem List   Diagnosis    Type 2 diabetes mellitus with complication ([address]    Acquired hypothyroidism    Essential hypertension    Mixed hyperlipidemia    COPD with chronic bronchitis (HCC)    Microalbuminuria    Moderate episode of recurrent major depressive disorder (HCC)    YARED (obstructive sleep apnea)    Restless leg syndrome    Vitamin B12 deficiency    Vitamin D deficiency    Left ventricular hypertrophy    Left ventricular diastolic dysfunction    Morbid obesity with BMI of 45.0-49.9, adult (HCC)    Breast carcinoma, female, right (HCC)    Iron deficiency anemia secondary to inadequate dietary iron intake    Breast cancer of lower-inner quadrant of right female breast (HCC)    Postoperative anemia    Malignant neoplasm of central portion of right breast in female, estrogen receptor positive (HCC)    Iron deficiency anemia due to chronic blood loss    Non-pressure chronic ulcer of other part of right lower leg with fat layer exposed (HCC)    Chronic venous insufficiency    Adenomatous polyp of ascending colon    Adenomatous polyp of sigmoid colon    Dysplastic colon polyp    Cellulitis of right leg    Ulcer of abdomen wall, with unspecified severity ([address]    Ulcer of right lower extremity with fat layer exposed ([address]        PAST SURGICAL HISTORY    Past Surgical History:   Procedure Laterality Date    APPENDECTOMY      BREAST BIOPSY Right 2018     SECTION      CHOLECYSTECTOMY      COLONOSCOPY N/A 2019    COLONOSCOPY DIAGNOSTIC performed by Leatha Cristobal MD at [facility] Right 2019    LAPAROSCOPIC RIGHT COLECTOMY, LYSIS OF ADHESIONS. performed by Ayala Bolton MD at [address] Right 2019    Simple mastectomy with sentinel node biopsy    TUBAL LIGATION         FAMILY HISTORY    Family History   Problem Relation Age of Onset    Diabetes Father     Heart Disease Father     Thyroid Cancer Sister     Thyroid Cancer Brother     Thyroid Cancer Sister     No Known Problems Daughter        SOCIAL HISTORY    Social History     Tobacco Use    Smoking status: Former Smoker     Packs/day: 1.00     Years: 40.00     Pack years: 40.00     Types: Cigarettes     Start date: 3/8/1976     Last attempt to quit: 2018 Years since quittin.3    Smokeless tobacco: Never Used   Substance Use Topics    Alcohol use: No     Alcohol/week: 0.0 standard drinks    Drug use: No       ALLERGIES    Allergies   Allergen Reactions    Latex Itching and Rash    Sulfa Antibiotics Hives    Bactrim [Sulfamethoxazole-Trimethoprim] Hives and Other (See Comments)     Metallic taste    Nicotine Hives     Hives from the patch       MEDICATIONS    Current Outpatient Medications on File Prior to Encounter   Medication Sig Dispense Refill    meclizine (ANTIVERT) 25 MG tablet Take by mouth      fluticasone-umeclidin-vilant (TRELEGY ELLIPTA) 100-62.5-25 MCG/INH AEPB Inhale 1 puff into the lungs daily 1 each 3    VORTIoxetine (TRINTELLIX) 10 MG TABS tablet Take 10 mg by mouth      furosemide (LASIX) 40 MG tablet TAKE 1 TABLET BY MOUTH EVERY DAY 90 tablet 1    sodium chloride 0.9 % irrigation Irrigate with as directed for 1 dose. 1000 mL 5    sodium chloride 0.9 % irrigation Irrigate with as directed for 1 dose.  1000 mL 5    albuterol sulfate HFA (VENTOLIN HFA) 108 (90 Base) MCG/ACT inhaler Inhale 2 puffs into the lungs every 6 hours as needed for Wheezing 1 Inhaler 5    oxybutynin (DITROPAN-XL) 10 MG extended release tablet Take 10 mg by mouth daily      simvastatin (ZOCOR) 40 MG tablet TAKE 1 TABLET BY MOUTH EVERY EVENING 90 tablet 1    losartan (COZAAR) 25 MG tablet Take 1 tablet by mouth daily 90 tablet 1    levothyroxine (SYNTHROID) 175 MCG tablet Take 1 tablet by mouth Daily 90 tablet 1    potassium chloride (KLOR-CON M) 10 MEQ extended release tablet Take 1 tablet by mouth daily 90 tablet 1    metFORMIN (GLUCOPHAGE XR) 500 MG extended release tablet Take 2 tablets by mouth daily (with breakfast) 180 tablet 1    ARIPiprazole (ABILIFY) 10 MG tablet TAKE 1 TABLET BY MOUTH EVERY DAY IN THE EVENING AS DIRECTED  3    Multiple Vitamins-Minerals (ICAPS AREDS 2 PO) Take 1 tablet by mouth daily      vitamin D (CHOLECALCIFEROL) 1000 UNIT TABS tablet Take 1,000 Units by mouth daily      anastrozole (ARIMIDEX) 1 MG tablet Take 1 mg by mouth daily      ACCU-CHEK SMARTVIEW strip Test TID E11.8 300 each 3    ipratropium-albuterol (DUONEB) 0.5-2.5 (3) MG/3ML SOLN nebulizer solution 3 mLs      SOFT TOUCH LANCETS MISC Use tid as directed dx: E11.9 300 each 3    hydrOXYzine (VISTARIL) 25 MG capsule Take 25 mg by mouth 3 times daily as needed       DULoxetine (CYMBALTA) 60 MG extended release capsule Take 60 mg by mouth daily       Multiple Vitamin (MULTIVITAMIN) tablet Take 1 tablet by mouth daily       No current facility-administered medications on file prior to encounter. REVIEW OF SYSTEMS    Pertinent items are noted in HPI. Objective:      /79   Pulse 78   Temp 97 °F (36.1 °C) (Temporal)   Resp 18     Wt Readings from Last 3 Encounters:   03/12/20 (!) 301 lb (136.5 kg)   02/19/20 (!) 304 lb (137.9 kg)   12/27/19 (!) 310 lb (140.6 kg)       PHYSICAL EXAM    Constitutional:   Well nourished and well developed. Appears neat and clean. Patient is alert, oriented x3, and in no apparent distress. Respiratory:  Respiratory effort is easy and symmetric bilaterally. Rate is normal at rest and on room air. Vascular:  Pedal Pulses is palpable and audible signal noted with doppler. Capillary refill is <5 sec to digits bilateral.  Extremities 1+ BLE edema. Neurological:  Gross and Light touch intact. Protective sensation intact. Dermatological:  Wound description noted in wound assessment. Right lower lateral and anterior extremity, with generalized redness, no excess warmth noted, and not circumferential. No pain during palpation. Patient reports fell against bedframe at home, falling onto the bed with her unna boot on, now has additional abrasion/open area of leg. Noted full thickness wound over reported area of injury.  Wounds, moist, with several areas of scabs at wound edges, with wound base pink, moist. No odor. Most distal wound with small amount of slough covering, easily debrides off with coarse gauze and 0.9 NS during examination. Patient shown how to cleanse wound beds prior to reapplication of unna boots by home health, and encouraged to discuss with home health importance of doing so. Psychiatric:  Judgement and insight intact. Short and long term memory intact. No evidence of depression, anxiety, or agitation. Patient is calm, cooperative, and communicative. Appropriate interactions and affect. Assessment:      Problem List Items Addressed This Visit     None           Procedure Note  Indications:  Based on my examination of this patient's wound(s)/ulcer(s) today, debridement is not required to promote healing and evaluate the wound base. Wound 01/16/20 Leg Right; Lower #1 (Active)   Wound Image   10/22/20 0956   Wound Etiology Traumatic 10/22/20 0956   Wound Cleansed Cleansed with saline 10/22/20 0956   Dressing/Treatment Antibacterial ointment;Alginate with Ag 10/22/20 1012   Wound Length (cm) 8.2 cm 10/22/20 0956   Wound Width (cm) 8.5 cm 10/22/20 0956   Wound Depth (cm) 0.2 cm 10/22/20 0956   Wound Surface Area (cm^2) 69.7 cm^2 10/22/20 0956   Change in Wound Size % (l*w) 46.75 10/22/20 0956   Wound Volume (cm^3) 13.94 cm^3 10/22/20 0956   Wound Healing % -6 10/22/20 0956   Wound Assessment Granulation tissue;Slough 10/22/20 0956   Drainage Amount Moderate 10/22/20 0956   Drainage Description Serosanguinous 10/22/20 0956   Odor None 10/22/20 0956   Tamara-wound Assessment Dry/flaky; Intact 10/22/20 0956   Margins Defined edges 10/22/20 0956   Wound Thickness Description not for Pressure Injury Full thickness 10/22/20 0956   Number of days: 279       Incision (Active)   Number of days:        Incision 05/13/19 Abdomen Right (Active)   Number of days: 950             Plan:     Continue same tx plan as below, with unna boot to be changed 3 x week by home health until return to [facility].  Continue gentamicin ointment to wound areas prior to unna boot changes. Treatment Note please see attached Discharge Instructions    Written patient dismissal instructions given to patient and signed by patient or POA. Discharge Instructions         Discharge [address] [facility]   Physician Orders and Discharge [address]  [facility] [address] [place] [address]  Telephone: [phone]      [phone]        NAME: Zac Engle  DATE OF BIRTH:  1955  MEDICAL RECORD NUMBER:  [id number]     Home Care/Facility: [id number] [place] [address] Rd (order dressings as needed)      Wound Location: Right lower Leg      Dressing orders: 1. Cleanse wound(s) with normal saline.  Silver alginate. Dry gauze. Thin layer of Gentamicin. 2. Zinc unna boot and coban or ace wrap. [facility] to change three weekly     Compression: Apply Spandagrip to left(10-20) med lower leg, apply first thing in the morning, remove at bedtime, elevate legs as much as possible during the day.       Offloading Device:     Other Instructions:      Keep all dressings clean, dry and intact.  Keep pressure off the wound(s) at all times.      Follow up visit  2 Weeks November 5, 2020 @   10:00     Please give 24 hour notice if unable to keep appointment. [phone]     If you experience any of the following, please call the Wound Care Service at  [phone] or go to the nearest emergency room.        *Increase in pain         *Temperature over 101           *Increase in drainage from your wound or a foul odor  *Uncontrolled swelling            *Need for compression bandage changes due to slippage, breakthrough drainage     PLEASE NOTE: IF YOU ARE UNABLE TO OBTAIN WOUND SUPPLIES, CONTINUE TO USE THE SUPPLIES YOU HAVE AVAILABLE UNTIL YOU ARE ABLE TO [address].  IT IS MOST IMPORTANT TO KEEP THE WOUND COVE  Electronically signed by Izzy Diaz APRN - NP on 10/22/2020 at 10:24 AM         Electronically signed by MARIETTA Kaur NP on 10/22/2020 at 10:25 AM

## 2020-11-05 ENCOUNTER — HOSPITAL ENCOUNTER (OUTPATIENT)
Dept: WOUND CARE | Age: 65
Discharge: HOME | End: 2020-11-05
Payer: COMMERCIAL

## 2020-11-05 VITALS
DIASTOLIC BLOOD PRESSURE: 65 MMHG | SYSTOLIC BLOOD PRESSURE: 134 MMHG | RESPIRATION RATE: 20 BRPM | HEART RATE: 83 BPM | TEMPERATURE: 96.7 F

## 2020-11-05 PROCEDURE — 11042 DBRDMT SUBQ TIS 1ST 20SQCM/<: CPT

## 2020-11-05 PROCEDURE — 11042 DBRDMT SUBQ TIS 1ST 20SQCM/<: CPT | Performed by: NURSE PRACTITIONER

## 2020-11-05 RX ORDER — DOXYCYCLINE HYCLATE 100 MG
100 TABLET ORAL 2 TIMES DAILY
Qty: 20 TABLET | Refills: 0 | Status: SHIPPED | OUTPATIENT
Start: 2020-11-05 | End: 2020-11-15

## 2020-11-05 NOTE — PROGRESS NOTES
Anna Watkins 37                                                   Progress Note and Procedure Note      [address] RECORD NUMBER:  [id number]  AGE: 72 y.o. GENDER: female  : 1955  EPISODE DATE:  2020    Subjective:     Chief Complaint   Patient presents with    Wound Check     right leg         HISTORY of PRESENT ILLNESS HPI     Rajani Ortgea is a 72 y.o. female who presents today for wound/ulcer evaluation. History of Wound Context: chronic venous insufficiency patient presents for recheck. Home health has been applying unna boots 3 x week. More slough in open wounds this week.    Wound/Ulcer Pain Timing/Severity: intermittent  Quality of pain: aching, tender  Severity:  3 / 10   Modifying Factors: Pain is relieved/improved with rest  Associated Signs/Symptoms: edema, erythema and drainage    Ulcer Identification:  Ulcer Type: venous  Contributing Factors: edema, venous stasis, diabetes, decreased mobility and obesity    Wound: N/A        PAST MEDICAL HISTORY        Diagnosis Date    Acquired hypothyroidism 3/15/2016    Allergic rhinitis     pollen, dust ragweed, hay and straw     Anxiety     Asthma     Bipolar affect, depressed (HCC)     Bipolar disorder ([address]     Breast cancer ([address] 2018    Invasive ductal cancer right breast    Cancer ([address] 1980    thyroid cancer     Chronic back pain     COPD (chronic obstructive pulmonary disease) ([address]     Depression     Diabetes mellitus ([address]     Emphysema of lung ([address]     Essential hypertension 3/15/2016    History of blood transfusion     2019    Hyperlipidemia     Hypothyroidism     Obesity     On home O2     3L NC at night    YARED (obstructive sleep apnea)     3 L NC    Osteoarthritis     Restless legs syndrome     Sleep apnea     Urinary incontinence        PAST SURGICAL HISTORY    Past Surgical History:   Procedure Laterality Date    APPENDECTOMY      BREAST BIOPSY Right 2018     SECTION      CHOLECYSTECTOMY      COLONOSCOPY N/A 4/25/2019    COLONOSCOPY DIAGNOSTIC performed by Adia Metcalf MD at [address] Right 5/13/2019    LAPAROSCOPIC RIGHT COLECTOMY, LYSIS OF ADHESIONS. performed by Tommy Barry MD at [address] Right 2/5/2019    Simple mastectomy with sentinel node biopsy    TUBAL LIGATION         Problem List:    Patient Active Problem List   Diagnosis    Type 2 diabetes mellitus with complication ([address]    Acquired hypothyroidism    Essential hypertension    Mixed hyperlipidemia    COPD with chronic bronchitis (HCC)    Microalbuminuria    Moderate episode of recurrent major depressive disorder ([address]    YARED (obstructive sleep apnea)    Restless leg syndrome    Vitamin B12 deficiency    Vitamin D deficiency    Left ventricular hypertrophy    Left ventricular diastolic dysfunction    Morbid obesity with BMI of 45.0-49.9, adult ([address]    Breast carcinoma, female, right (HCC)    Iron deficiency anemia secondary to inadequate dietary iron intake    Breast cancer of lower-inner quadrant of right female breast (HCC)    Postoperative anemia    Malignant neoplasm of central portion of right breast in female, estrogen receptor positive (HCC)    Iron deficiency anemia due to chronic blood loss    Non-pressure chronic ulcer of other part of right lower leg with fat layer exposed ([address]    Chronic venous insufficiency    Adenomatous polyp of ascending colon    Adenomatous polyp of sigmoid colon    Dysplastic colon polyp    Cellulitis of right leg    Ulcer of abdomen wall, with unspecified severity (HCC)    Ulcer of right lower extremity with fat layer exposed ([address]        FAMILY HISTORY    Family History   Problem Relation Age of Onset    Diabetes Father     Heart Disease Father     Thyroid Cancer Sister     Thyroid Cancer Brother     Thyroid Cancer Sister     No Known Problems Daughter        SOCIAL HISTORY    Social History     Tobacco Use    Smoking status: Former Smoker     Packs/day: 1.00     Years: 40.00     Pack years: 40.00     Types: Cigarettes     Start date: 3/8/1976     Last attempt to quit: 2018     Years since quittin.3    Smokeless tobacco: Never Used   Substance Use Topics    Alcohol use: No     Alcohol/week: 0.0 standard drinks    Drug use: No       ALLERGIES    Allergies   Allergen Reactions    Latex Itching and Rash    Sulfa Antibiotics Hives    Bactrim [Sulfamethoxazole-Trimethoprim] Hives and Other (See Comments)     Metallic taste    Nicotine Hives     Hives from the patch       MEDICATIONS    Current Outpatient Medications on File Prior to Encounter   Medication Sig Dispense Refill    meclizine (ANTIVERT) 25 MG tablet Take by mouth      fluticasone-umeclidin-vilant (TRELEGY ELLIPTA) 100-62.5-25 MCG/INH AEPB Inhale 1 puff into the lungs daily 1 each 3    VORTIoxetine (TRINTELLIX) 10 MG TABS tablet Take 10 mg by mouth      furosemide (LASIX) 40 MG tablet TAKE 1 TABLET BY MOUTH EVERY DAY 90 tablet 1    sodium chloride 0.9 % irrigation Irrigate with as directed for 1 dose. 1000 mL 5    sodium chloride 0.9 % irrigation Irrigate with as directed for 1 dose.  1000 mL 5    albuterol sulfate HFA (VENTOLIN HFA) 108 (90 Base) MCG/ACT inhaler Inhale 2 puffs into the lungs every 6 hours as needed for Wheezing 1 Inhaler 5    oxybutynin (DITROPAN-XL) 10 MG extended release tablet Take 10 mg by mouth daily      simvastatin (ZOCOR) 40 MG tablet TAKE 1 TABLET BY MOUTH EVERY EVENING 90 tablet 1    losartan (COZAAR) 25 MG tablet Take 1 tablet by mouth daily 90 tablet 1    levothyroxine (SYNTHROID) 175 MCG tablet Take 1 tablet by mouth Daily 90 tablet 1    potassium chloride (KLOR-CON M) 10 MEQ extended release tablet Take 1 tablet by mouth daily 90 tablet 1    metFORMIN (GLUCOPHAGE XR) 500 MG extended release tablet Take 2 tablets by mouth daily (with breakfast) 180 tablet 1    continued application of unna boots. Psychiatric:  Judgement and insight intact. Short and long term memory intact. No evidence of depression, anxiety, or agitation. Patient is calm, cooperative, and communicative. Appropriate interactions and affect. Assessment:      Problem List Items Addressed This Visit     None           Procedure Note  Indications:  Based on my examination of this patient's wound(s)/ulcer(s) today, debridement is required to promote healing and evaluate the wound base. Performed by: MARIETTA Mancera NP    Consent obtained:  Yes    Time out taken:  Yes    Pain Control:   none-required      Debridement:Excisional Debridement    Using curette the wound(s)/ulcer(s) was/were sharply debrided down through and including the removal of epidermis, dermis and subcutaneous tissue. Devitalized Tissue Debrided:  fibrin, biofilm and slough    Pre Debridement Measurements:  Are located in the [place]  Documentation Flow Sheet    Wound/Ulcer #: 1    Post Debridement Measurements:  Wound/Ulcer Descriptions are Pre Debridement except measurements:    Wound 01/16/20 Leg Right; Lower #1 (Active)   Wound Image   11/05/20 0934   Wound Etiology Traumatic 11/05/20 0934   Wound Cleansed Cleansed with saline 11/05/20 0934   Dressing/Treatment Antibacterial ointment;Alginate with Ag 10/22/20 1012   Wound Length (cm) 8 cm 11/05/20 0934   Wound Width (cm) 9.5 cm 11/05/20 0934   Wound Depth (cm) 0.2 cm 11/05/20 0934   Wound Surface Area (cm^2) 76 cm^2 11/05/20 0934   Change in Wound Size % (l*w) 41.94 11/05/20 0934   Wound Volume (cm^3) 15.2 cm^3 11/05/20 0934   Wound Healing % -16 11/05/20 0934   Post-Procedure Length (cm) 8 cm 11/05/20 0958   Post-Procedure Width (cm) 9.5 cm 11/05/20 0958   Post-Procedure Depth (cm) 0.2 cm 11/05/20 0958   Post-Procedure Surface Area (cm^2) 76 cm^2 11/05/20 0958   Post-Procedure Volume (cm^3) 15.2 cm^3 11/05/20 0958   Wound Assessment Granulation tissue;Slough 11/05/20 0934   Drainage Amount Moderate 11/05/20 0934   Drainage Description Serosanguinous; Yellow 11/05/20 0934   Odor None 11/05/20 0934   Tamara-wound Assessment Fragile; Hyperpigmented 11/05/20 0934   Margins Undefined edges 11/05/20 0934   Wound Thickness Description not for Pressure Injury Full thickness 11/05/20 0934   Number of days: 293     Incision (Active)   Number of days:        Incision 05/13/19 Abdomen Right (Active)   Number of days: 542         Percent of Wound/Ulcer Debrided: 10%    Total Surface Area Debrided:  7.6 sq cm     Diabetic/Pressure/Non Pressure Ulcers:  Ulcer: N/A      Bleeding:  Minimal    Hemostasis Achieved:  by pressure    Procedural Pain:  3  / 10     Post Procedural Pain:  0 / 10     Response to treatment:  Well tolerated by patient. Plan:     Continue with plan of care as below. Will order doxycycline for increased erythema to affected area x 10 days. Recheck in 2 weeks. Treatment Note please see attached Discharge Instructions    Written patient dismissal instructions given to patient and signed by patient or POA. Discharge Instructions         Discharge [address] [facility]   Physician Orders and Discharge [address]  Telephone: [phone]      [phone]        NAME: Kiersten Bender  DATE OF BIRTH:  1955  MEDICAL RECORD NUMBER:  [id number]     Home Care/Facility: [address] [place] [address] (order dressings as needed)      Wound Location: Right lower Leg      Dressing orders: 1. Cleanse wound(s) with normal saline.  Thin layer of Gentamicin. Silver alginate. Dry gauze. 2. Zinc unna boot and coban or ace wrap.    [facility] to change three times weekly     Compression: Apply Spandagrip to left(10-20) med lower leg, apply first thing in the morning, remove at bedtime, elevate legs as much as possible during the day.       Offloading Device:     Other Instructions:  Doxycycline at pharmacy     Keep all dressings clean, dry and intact.  Keep pressure off the wound(s) at all times.      Follow up visit  2 Weeks November 19, 2020 @   10:15     Please give 24 hour notice if unable to keep appointment. [phone]     If you experience any of the following, please call the Wound Care Service at  [phone] or go to the nearest emergency room.        *Increase in pain         *Temperature over 101           *Increase in drainage from your wound or a foul odor  *Uncontrolled swelling            *Need for compression bandage changes due to slippage, breakthrough drainage     PLEASE NOTE: IF YOU ARE UNABLE TO OBTAIN WOUND SUPPLIES, CONTINUE TO USE THE SUPPLIES YOU HAVE AVAILABLE UNTIL YOU ARE ABLE TO [address].  IT IS MOST IMPORTANT TO KEEP THE WOUND COVE  Electronically signed by MARIETTA Hill NP on 11/5/2020 at 10:05 AM        Electronically signed by MARIETTA Hill NP on 11/5/2020 at 10:07 AM

## 2020-12-03 ENCOUNTER — HOSPITAL ENCOUNTER (OUTPATIENT)
Dept: WOUND CARE | Age: 65
Discharge: HOME | End: 2020-12-03
Payer: COMMERCIAL

## 2020-12-03 VITALS
SYSTOLIC BLOOD PRESSURE: 134 MMHG | RESPIRATION RATE: 20 BRPM | DIASTOLIC BLOOD PRESSURE: 73 MMHG | HEART RATE: 80 BPM | TEMPERATURE: 96.8 F

## 2020-12-03 PROCEDURE — 99212 OFFICE O/P EST SF 10 MIN: CPT | Performed by: NURSE PRACTITIONER

## 2020-12-03 PROCEDURE — 99212 OFFICE O/P EST SF 10 MIN: CPT

## 2020-12-03 NOTE — PROGRESS NOTES
Anna Watkins 37   Progress Note and Procedure Note      [address] RECORD NUMBER:  [id number]  AGE: 72 y.o. GENDER: female  : 1955  EPISODE DATE:  12/3/2020    Subjective:     Chief Complaint   Patient presents with    Wound Check     right lower leg wound         HISTORY of PRESENT ILLNESS HPI     Kalen Billingsley is a 72 y.o. female who presents today for wound/ulcer evaluation. History of Wound Context: follow up on chronic venous dermatitis/venous insufficiency. Weekly unna boot applications followed by home health.    Wound/Ulcer Pain Timing/Severity: none  Quality of pain: N/A  Severity:  0 / 10   Modifying Factors: None  Associated Signs/Symptoms: edema and erythema    Ulcer Identification:  Ulcer Type: venous and diabetic  Contributing Factors: edema, venous stasis, diabetes, poor glucose control, decreased mobility, shear force and obesity    Wound: N/A        PAST MEDICAL HISTORY        Diagnosis Date    Acquired hypothyroidism 3/15/2016    Allergic rhinitis     pollen, dust ragweed, hay and straw     Anxiety     Asthma     Bipolar affect, depressed ([address]     Bipolar disorder ([address]     Breast cancer ([address]     Invasive ductal cancer right breast    Cancer ([address] 1980    thyroid cancer     Chronic back pain     COPD (chronic obstructive pulmonary disease) ([address]     Depression     Diabetes mellitus ([address]     Emphysema of lung ([address]     Essential hypertension 3/15/2016    History of blood transfusion     2019    Hyperlipidemia     Hypothyroidism     Obesity     On home O2     3L NC at night    YARED (obstructive sleep apnea)     3 L NC    Osteoarthritis     Restless legs syndrome     Sleep apnea     Urinary incontinence        Problem List:    Patient Active Problem List   Diagnosis    Type 2 diabetes mellitus with complication ([address]    Acquired hypothyroidism    Essential hypertension    Mixed hyperlipidemia    COPD with chronic Packs/day: 1.00     Years: 40.00     Pack years: 40.00     Types: Cigarettes     Start date: 3/8/1976     Last attempt to quit: 2018     Years since quittin.4    Smokeless tobacco: Never Used   Substance Use Topics    Alcohol use: No     Alcohol/week: 0.0 standard drinks    Drug use: No       ALLERGIES    Allergies   Allergen Reactions    Latex Itching and Rash    Sulfa Antibiotics Hives    Bactrim [Sulfamethoxazole-Trimethoprim] Hives and Other (See Comments)     Metallic taste    Nicotine Hives     Hives from the patch       MEDICATIONS    Current Outpatient Medications on File Prior to Encounter   Medication Sig Dispense Refill    meclizine (ANTIVERT) 25 MG tablet Take by mouth      fluticasone-umeclidin-vilant (TRELEGY ELLIPTA) 100-62.5-25 MCG/INH AEPB Inhale 1 puff into the lungs daily 1 each 3    VORTIoxetine (TRINTELLIX) 10 MG TABS tablet Take 10 mg by mouth      furosemide (LASIX) 40 MG tablet TAKE 1 TABLET BY MOUTH EVERY DAY 90 tablet 1    sodium chloride 0.9 % irrigation Irrigate with as directed for 1 dose. 1000 mL 5    sodium chloride 0.9 % irrigation Irrigate with as directed for 1 dose.  1000 mL 5    albuterol sulfate HFA (VENTOLIN HFA) 108 (90 Base) MCG/ACT inhaler Inhale 2 puffs into the lungs every 6 hours as needed for Wheezing 1 Inhaler 5    oxybutynin (DITROPAN-XL) 10 MG extended release tablet Take 10 mg by mouth daily      simvastatin (ZOCOR) 40 MG tablet TAKE 1 TABLET BY MOUTH EVERY EVENING 90 tablet 1    losartan (COZAAR) 25 MG tablet Take 1 tablet by mouth daily 90 tablet 1    levothyroxine (SYNTHROID) 175 MCG tablet Take 1 tablet by mouth Daily 90 tablet 1    potassium chloride (KLOR-CON M) 10 MEQ extended release tablet Take 1 tablet by mouth daily 90 tablet 1    metFORMIN (GLUCOPHAGE XR) 500 MG extended release tablet Take 2 tablets by mouth daily (with breakfast) 180 tablet 1    ARIPiprazole (ABILIFY) 10 MG tablet TAKE 1 TABLET BY MOUTH EVERY DAY IN THE EVENING AS DIRECTED  3    Multiple Vitamin (MULTIVITAMIN) tablet Take 1 tablet by mouth daily      Multiple Vitamins-Minerals (ICAPS AREDS 2 PO) Take 1 tablet by mouth daily      vitamin D (CHOLECALCIFEROL) 1000 UNIT TABS tablet Take 1,000 Units by mouth daily      anastrozole (ARIMIDEX) 1 MG tablet Take 1 mg by mouth daily      ACCU-CHEK SMARTVIEW strip Test TID E11.8 300 each 3    ipratropium-albuterol (DUONEB) 0.5-2.5 (3) MG/3ML SOLN nebulizer solution 3 mLs      SOFT TOUCH LANCETS MISC Use tid as directed dx: E11.9 300 each 3    hydrOXYzine (VISTARIL) 25 MG capsule Take 25 mg by mouth 3 times daily as needed       DULoxetine (CYMBALTA) 60 MG extended release capsule Take 60 mg by mouth daily        No current facility-administered medications on file prior to encounter. REVIEW OF SYSTEMS    Pertinent items are noted in HPI. Objective:      /73   Pulse 80   Temp 96.8 °F (36 °C) (Temporal)   Resp 20     Wt Readings from Last 3 Encounters:   03/12/20 (!) 301 lb (136.5 kg)   02/19/20 (!) 304 lb (137.9 kg)   12/27/19 (!) 310 lb (140.6 kg)       PHYSICAL EXAM    Constitutional:   Well nourished and well developed. Appears neat and clean. Patient is alert, oriented x3, and in no apparent distress. Respiratory:  Respiratory effort is easy and symmetric bilaterally. Rate is normal at rest and on room air. Vascular:  Pedal Pulses is not palpable and audible signal noted with doppler. Capillary refill is <5 sec to digits bilateral.  Extremities with for 1+ edema bilaterally. Neurological:  Gross and Light touch intact. Protective sensation present but diminished. Dermatological:  Wound description noted in wound assessment. Right leg with slight erythematous discoloration/chronic secondary to chronic venous insufficiency. Wound is covered with epithelial tissue.  Discussed tx options with patient and reminded to treat newly healed skin with great caution, will reopen easily. Psychiatric:  Judgement and insight intact. Short and long term memory intact. No evidence of depression, anxiety, or agitation. Patient is calm, cooperative, and communicative. Appropriate interactions and affect. Assessment:      Problem List Items Addressed This Visit     None           Procedure Note  Indications:  Based on my examination of this patient's wound(s)/ulcer(s) today, debridement is not required to promote healing and evaluate the wound base. Wound 01/16/20 Leg Right; Lower #1 (Active)   Wound Image   12/03/20 0956   Wound Etiology Traumatic 12/03/20 0956   Wound Cleansed Cleansed with saline 12/03/20 0956   Dressing/Treatment Hydrofera blue 11/05/20 1213   Wound Length (cm) 0 cm 12/03/20 0956   Wound Width (cm) 0 cm 12/03/20 0956   Wound Depth (cm) 0 cm 12/03/20 0956   Wound Surface Area (cm^2) 0 cm^2 12/03/20 0956   Change in Wound Size % (l*w) 100 12/03/20 0956   Wound Volume (cm^3) 0 cm^3 12/03/20 0956   Wound Healing % 100 12/03/20 0956   Post-Procedure Length (cm) 8 cm 11/05/20 0958   Post-Procedure Width (cm) 9.5 cm 11/05/20 0958   Post-Procedure Depth (cm) 0.2 cm 11/05/20 0958   Post-Procedure Surface Area (cm^2) 76 cm^2 11/05/20 0958   Post-Procedure Volume (cm^3) 15.2 cm^3 11/05/20 0958   Wound Assessment Epithelialization 12/03/20 0956   Drainage Amount None 12/03/20 0956   Drainage Description Serosanguinous; Yellow 11/05/20 0934   Odor None 12/03/20 0956   Tamara-wound Assessment Dry/flaky 12/03/20 0956   Margins Defined edges 12/03/20 0956   Wound Thickness Description not for Pressure Injury Full thickness 11/05/20 0934   Number of days: 321       Incision (Active)   Number of days:        Incision 05/13/19 Abdomen Right (Active)   Number of days: 570             Plan:     Apply lac hydrin daily after show and wear spandagrips as instructed. If wounds reopen, return as soon as possible.        Treatment Note please see attached Discharge Instructions    Written patient dismissal instructions given to patient and signed by patient or POA. Discharge Instructions       Wound Clinic Physician Orders and Discharge [address] [facility]  [address]   [place], [address] [facility]  Telephone: [phone]    NAME:  Brett King OF BIRTH:  1955  MEDICAL RECORD NUMBER:  [id number]  DATE:  12/3/2020      [address] [facility]      Congratulations!! You have completed your treatment. 1. Return to your Primary Care Physician for all your health issues. 2. Resume your ordinary activities as tolerated. 3. Take your medications as prescribed by your primary care physician. 4. Check your skin daily for cracks, bruises, sores, or dryness. Use a moisturizer as needed. 5. Clean and dry your skin, using mild soap and warm water (not hot). 6. Avoid alcohol and caffeine and do not smoke. 7. Maintain a nutritious diet. 8. Avoid pressure on your wound site. Keep your legs elevated above the level of the heart whenever possible. 9. Continue to use wraps/stockings/compression as prescribed. 10. Replace compression stockings every four to six months as needed to ensure proper fit. 11. Wear well-fitting shoes and leg garments. 12. Dry gauze to right leg. [facility] to apply Monday . May remove after 2 days and leave open to air.  [address] at pharmacy to apply to dry skin on legs and feet daily. 13.Apply Spandagrip to both(10-20) lower leg, apply first thing in the morning, remove at bedtime, elevate legs as much as possible during the day. THANK YOU FOR ALLOWING US TO SERVE YOU.  PLEASE CALL IF YOU DEVELOP ANOTHER WOUND. [facility]  Due to the COVID-19 surge and the mandate to reduce the Wound Center hours to only 2 days per week until further notice, the patient was told that if there is any worsening of the wound(s), he/she should call us immediately to prevent having to go to the Emergency Room.  Shaniqua  Electronically signed by MARIETTA Harrison NP on 12/3/2020 at 10:17 AM        Electronically signed by MARIETTA Harrison NP on 12/3/2020 at 10:18 AM

## 2020-12-14 RX ORDER — GENTAMICIN SULFATE 1 MG/G
OINTMENT TOPICAL
Qty: 1 TUBE | Refills: 1 | Status: SHIPPED | OUTPATIENT
Start: 2020-12-14 | End: 2020-12-21

## 2020-12-14 RX ORDER — GENTAMICIN SULFATE 1 MG/G
CREAM TOPICAL
Qty: 15 G | Refills: 1 | Status: SHIPPED | OUTPATIENT
Start: 2020-12-14

## 2020-12-14 NOTE — TELEPHONE ENCOUNTER
Okay to refill x 1 time.  Apply to wound after cleansing with 0.9 NS, then thin layer of gentamicin 0.1% cream.

## 2021-01-04 ENCOUNTER — HOSPITAL ENCOUNTER (OUTPATIENT)
Dept: WOUND CARE | Age: 66
Discharge: HOME | End: 2021-01-04
Payer: COMMERCIAL

## 2021-01-04 VITALS
DIASTOLIC BLOOD PRESSURE: 70 MMHG | HEART RATE: 76 BPM | SYSTOLIC BLOOD PRESSURE: 141 MMHG | BODY MASS INDEX: 45.99 KG/M2 | TEMPERATURE: 97.5 F | RESPIRATION RATE: 20 BRPM | WEIGHT: 293 LBS | HEIGHT: 67 IN

## 2021-01-04 DIAGNOSIS — L97.912 ULCER OF RIGHT LOWER EXTREMITY WITH FAT LAYER EXPOSED (HCC): ICD-10-CM

## 2021-01-04 PROCEDURE — 99212 OFFICE O/P EST SF 10 MIN: CPT | Performed by: NURSE PRACTITIONER

## 2021-01-04 PROCEDURE — 29580 STRAPPING UNNA BOOT: CPT

## 2021-01-04 NOTE — PROGRESS NOTES
Anna Watkins 37   Progress Note and Procedure Note      [address] RECORD NUMBER:  [id number]  AGE: 72 y.o. GENDER: female  : 1955  EPISODE DATE:  2021    Subjective:     Chief Complaint   Patient presents with    Wound Check     right leg ulcer: reopened. Pt scratching/itching         HISTORY of PRESENT ILLNESS BRITTANY Garcia is a 72 y.o. female who presents today for wound/ulcer evaluation.    History of Wound Context: follow up for chronic venous insufficiency with ulcerations to right lower extremity   Wound/Ulcer Pain Timing/Severity: intermittent  Quality of pain: c/o chronic itching   Severity:  1 / 10   Modifying Factors: Pain is relieved/improved with rest  Associated Signs/Symptoms: edema, erythema and pruritus     Ulcer Identification:  Ulcer Type: venous and diabetic  Contributing Factors: edema, venous stasis, diabetes, decreased mobility, shear force and obesity    Wound: N/A        PAST MEDICAL HISTORY        Diagnosis Date    Acquired hypothyroidism 3/15/2016    Allergic rhinitis     pollen, dust ragweed, hay and straw     Anxiety     Asthma     Bipolar affect, depressed ([address]     Bipolar disorder ([address]     Breast cancer ([address] 2018    Invasive ductal cancer right breast    Cancer ([address]     thyroid cancer     Chronic back pain     COPD (chronic obstructive pulmonary disease) ([address]     Depression     Diabetes mellitus ([address]     Emphysema of lung ([address]     Essential hypertension 3/15/2016    History of blood transfusion     2019    Hyperlipidemia     Hypothyroidism     Obesity     On home O2     3L NC at night    YARED (obstructive sleep apnea)     3 L NC    Osteoarthritis     Restless legs syndrome     Sleep apnea     Urinary incontinence        Problem List:    Patient Active Problem List   Diagnosis    Type 2 diabetes mellitus with complication ([address]    Acquired hypothyroidism    Essential hypertension    Mixed hyperlipidemia    COPD with chronic bronchitis (HCC)    Microalbuminuria    Moderate episode of recurrent major depressive disorder (HCC)    YARED (obstructive sleep apnea)    Restless leg syndrome    Vitamin B12 deficiency    Vitamin D deficiency    Left ventricular hypertrophy    Left ventricular diastolic dysfunction    Morbid obesity with BMI of 45.0-49.9, adult (HCC)    Breast carcinoma, female, right (HCC)    Iron deficiency anemia secondary to inadequate dietary iron intake    Breast cancer of lower-inner quadrant of right female breast (HCC)    Postoperative anemia    Malignant neoplasm of central portion of right breast in female, estrogen receptor positive (HCC)    Iron deficiency anemia due to chronic blood loss    Non-pressure chronic ulcer of other part of right lower leg with fat layer exposed ([address]    Chronic venous insufficiency of lower extremity    Adenomatous polyp of ascending colon    Adenomatous polyp of sigmoid colon    Dysplastic colon polyp    Cellulitis of right leg    Ulcer of abdomen wall, with unspecified severity ([address]    Ulcer of right lower extremity with fat layer exposed ([address]        PAST SURGICAL HISTORY    Past Surgical History:   Procedure Laterality Date    APPENDECTOMY      BREAST BIOPSY Right 2018     SECTION      CHOLECYSTECTOMY      COLONOSCOPY N/A 2019    COLONOSCOPY DIAGNOSTIC performed by Dashawn Carrillo MD at [address] Right 2019    LAPAROSCOPIC RIGHT COLECTOMY, LYSIS OF ADHESIONS. performed by Sachin Monterroso MD at [address] [facility] Right 2019    Simple mastectomy with sentinel node biopsy    TUBAL LIGATION         FAMILY HISTORY    Family History   Problem Relation Age of Onset    Diabetes Father     Heart Disease Father     Thyroid Cancer Sister     Thyroid Cancer Brother     Thyroid Cancer Sister     No Known Problems Daughter        SOCIAL HISTORY    Social History Tobacco Use    Smoking status: Former Smoker     Packs/day: 1.00     Years: 40.00     Pack years: 40.00     Types: Cigarettes     Start date: 3/8/1976     Quit date: 2018     Years since quittin.5    Smokeless tobacco: Never Used   Substance Use Topics    Alcohol use: No     Alcohol/week: 0.0 standard drinks    Drug use: No       ALLERGIES    Allergies   Allergen Reactions    Latex Itching and Rash    Sulfa Antibiotics Hives    Bactrim [Sulfamethoxazole-Trimethoprim] Hives and Other (See Comments)     Metallic taste    Nicotine Hives     Hives from the patch       MEDICATIONS    Current Outpatient Medications on File Prior to Encounter   Medication Sig Dispense Refill    gentamicin (GARAMYCIN) 0.1 % cream APPLY TO AFFECTED AREA ON LEG DAILY BEFORE DRESSING CHANGE 15 g 1    meclizine (ANTIVERT) 25 MG tablet Take by mouth      fluticasone-umeclidin-vilant (TRELEGY ELLIPTA) 100-62.5-25 MCG/INH AEPB Inhale 1 puff into the lungs daily 1 each 3    VORTIoxetine (TRINTELLIX) 10 MG TABS tablet Take 10 mg by mouth      furosemide (LASIX) 40 MG tablet TAKE 1 TABLET BY MOUTH EVERY DAY 90 tablet 1    sodium chloride 0.9 % irrigation Irrigate with as directed for 1 dose. 1000 mL 5    sodium chloride 0.9 % irrigation Irrigate with as directed for 1 dose.  1000 mL 5    albuterol sulfate HFA (VENTOLIN HFA) 108 (90 Base) MCG/ACT inhaler Inhale 2 puffs into the lungs every 6 hours as needed for Wheezing 1 Inhaler 5    oxybutynin (DITROPAN-XL) 10 MG extended release tablet Take 10 mg by mouth daily      simvastatin (ZOCOR) 40 MG tablet TAKE 1 TABLET BY MOUTH EVERY EVENING 90 tablet 1    losartan (COZAAR) 25 MG tablet Take 1 tablet by mouth daily 90 tablet 1    levothyroxine (SYNTHROID) 175 MCG tablet Take 1 tablet by mouth Daily 90 tablet 1    potassium chloride (KLOR-CON M) 10 MEQ extended release tablet Take 1 tablet by mouth daily 90 tablet 1    metFORMIN (GLUCOPHAGE XR) 500 MG extended release intact. Protective sensation present but diminished. Dermatological:  Wound description noted in wound assessment. Right anterior lower extremity has open areas of ulceration with localized surrounding erythema, no excessive warmth in comparison to unaffected areas of leg. Patient reports \"area itches\" and has been applying steroid cream between unna boot changes/applications by home health. Will change treatment plan to eliminate possible sources of contact allergens such as antibiotic ointments and silver containing products. Psychiatric:  Judgement and insight intact. Short and long term memory intact. No evidence of depression, anxiety, or agitation. Patient is calm, cooperative, and communicative. Appropriate interactions and affect. Assessment:      Problem List Items Addressed This Visit     Ulcer of right lower extremity with fat layer exposed ([facility]           Procedure Note  Indications:  Based on my examination of this patient's wound(s)/ulcer(s) today, debridement is not required to promote healing and evaluate the wound base. Wound 01/04/21 Pretibial Right #1 (Active)   Wound Image   01/04/21 1209   Wound Etiology Venous 01/04/21 1209   Wound Cleansed Cleansed with saline 01/04/21 1209   Wound Length (cm) 8 cm 01/04/21 1209   Wound Width (cm) 7 cm 01/04/21 1209   Wound Depth (cm) 0.1 cm 01/04/21 1209   Wound Surface Area (cm^2) 56 cm^2 01/04/21 1209   Wound Volume (cm^3) 5.6 cm^3 01/04/21 1209   Wound Assessment Pink/red 01/04/21 1209   Drainage Amount Moderate 01/04/21 1209   Drainage Description Serosanguinous 01/04/21 1209   Odor None 01/04/21 1209   Tamara-wound Assessment Blanchable erythema 01/04/21 1209   Wound Thickness Description not for Pressure Injury Full thickness 01/04/21 1209   Number of days: 0         Plan:     Treatment plan change. Stop use of antibacterial ointments or silver containing products to affected area.    Cleanse leg (per home health) with mild soap and water and dry thoroughly twice weekly, then home health to apply hydrofera blue dressing to affected area followed by unna boot. Change twice weekly and return in two weeks for reevaluation. Treatment Note please see attached Discharge Instructions    Written patient dismissal instructions given to patient and signed by patient or POA.          Electronically signed by MARIETTA Liu NP on 1/4/2021 at 12:42 PM

## 2021-01-18 ENCOUNTER — HOSPITAL ENCOUNTER (OUTPATIENT)
Dept: WOUND CARE | Age: 66
Discharge: HOME | End: 2021-01-18
Payer: COMMERCIAL

## 2021-01-18 VITALS — RESPIRATION RATE: 22 BRPM | TEMPERATURE: 96.8 F

## 2021-01-18 PROCEDURE — 29580 STRAPPING UNNA BOOT: CPT

## 2021-01-18 PROCEDURE — 99212 OFFICE O/P EST SF 10 MIN: CPT | Performed by: NURSE PRACTITIONER

## 2021-01-18 NOTE — PROGRESS NOTES
Anna Watkins 37   Progress Note and Procedure Note      [address] RECORD NUMBER:  [id number]  AGE: 72 y.o. GENDER: female  : 1955  EPISODE DATE:  2021    Subjective:     Chief Complaint   Patient presents with    Wound Check     right leg wound         HISTORY of PRESENT ILLNESS HPI     Quoc Elise is a 72 y.o. female who presents today for wound/ulcer evaluation. History of Wound Context: Recheck on chronic venous insufficiency wound ulcerations. Improved today with one remaining area of ulceration, edema continues.    Wound/Ulcer Pain Timing/Severity: mild  Quality of pain: tender  Severity:  1 / 10   Modifying Factors: Pain worsens with care and improves after completing  Associated Signs/Symptoms: edema, erythema and drainage    Ulcer Identification:  Ulcer Type: venous  Contributing Factors: edema, venous stasis, diabetes, decreased mobility, shear force and obesity    Wound: N/A        PAST MEDICAL HISTORY        Diagnosis Date    Acquired hypothyroidism 3/15/2016    Allergic rhinitis     pollen, dust ragweed, hay and straw     Anxiety     Asthma     Bipolar affect, depressed ([address]     Bipolar disorder ([address]     Breast cancer ([address] 2018    Invasive ductal cancer right breast    Cancer ([address] 1980    thyroid cancer     Chronic back pain     COPD (chronic obstructive pulmonary disease) ([address]     Depression     Diabetes mellitus ([address]     Emphysema of lung ([address]     Essential hypertension 3/15/2016    History of blood transfusion     2019    Hyperlipidemia     Hypothyroidism     Obesity     On home O2     3L NC at night    YARED (obstructive sleep apnea)     3 L NC    Osteoarthritis     Restless legs syndrome     Sleep apnea     Urinary incontinence        Problem List:    Patient Active Problem List   Diagnosis    Type 2 diabetes mellitus with complication ([address]    Acquired hypothyroidism    Essential hypertension    Mixed hyperlipidemia    COPD with chronic bronchitis (HCC)    Microalbuminuria    Moderate episode of recurrent major depressive disorder (HCC)    YARED (obstructive sleep apnea)    Restless leg syndrome    Vitamin B12 deficiency    Vitamin D deficiency    Left ventricular hypertrophy    Left ventricular diastolic dysfunction    Morbid obesity with BMI of 45.0-49.9, adult (HCC)    Breast carcinoma, female, right (HCC)    Iron deficiency anemia secondary to inadequate dietary iron intake    Breast cancer of lower-inner quadrant of right female breast (HCC)    Postoperative anemia    Malignant neoplasm of central portion of right breast in female, estrogen receptor positive (HCC)    Iron deficiency anemia due to chronic blood loss    Non-pressure chronic ulcer of other part of right lower leg with fat layer exposed ([address]    Chronic venous insufficiency of lower extremity    Adenomatous polyp of ascending colon    Adenomatous polyp of sigmoid colon    Dysplastic colon polyp    Cellulitis of right leg    Ulcer of abdomen wall, with unspecified severity ([address]    Ulcer of right lower extremity with fat layer exposed ([address]        PAST SURGICAL HISTORY    Past Surgical History:   Procedure Laterality Date    APPENDECTOMY      BREAST BIOPSY Right 2018     SECTION      CHOLECYSTECTOMY      COLONOSCOPY N/A 2019    COLONOSCOPY DIAGNOSTIC performed by Angeline Camilo MD at [address] Right 2019    LAPAROSCOPIC RIGHT COLECTOMY, LYSIS OF ADHESIONS. performed by Tasia Reich MD at [address] Right 2019    Simple mastectomy with sentinel node biopsy    TUBAL LIGATION         FAMILY HISTORY    Family History   Problem Relation Age of Onset    Diabetes Father     Heart Disease Father     Thyroid Cancer Sister     Thyroid Cancer Brother     Thyroid Cancer Sister     No Known Problems Daughter        SOCIAL HISTORY    Social History Tobacco Use    Smoking status: Former Smoker     Packs/day: 1.00     Years: 40.00     Pack years: 40.00     Types: Cigarettes     Start date: 3/8/1976     Quit date: 2018     Years since quittin.5    Smokeless tobacco: Never Used   Substance Use Topics    Alcohol use: No     Alcohol/week: 0.0 standard drinks    Drug use: No       ALLERGIES    Allergies   Allergen Reactions    Latex Itching and Rash    Sulfa Antibiotics Hives    Bactrim [Sulfamethoxazole-Trimethoprim] Hives and Other (See Comments)     Metallic taste    Nicotine Hives     Hives from the patch       MEDICATIONS    Current Outpatient Medications on File Prior to Encounter   Medication Sig Dispense Refill    gentamicin (GARAMYCIN) 0.1 % cream APPLY TO AFFECTED AREA ON LEG DAILY BEFORE DRESSING CHANGE 15 g 1    meclizine (ANTIVERT) 25 MG tablet Take by mouth      fluticasone-umeclidin-vilant (TRELEGY ELLIPTA) 100-62.5-25 MCG/INH AEPB Inhale 1 puff into the lungs daily 1 each 3    VORTIoxetine (TRINTELLIX) 10 MG TABS tablet Take 10 mg by mouth      furosemide (LASIX) 40 MG tablet TAKE 1 TABLET BY MOUTH EVERY DAY 90 tablet 1    sodium chloride 0.9 % irrigation Irrigate with as directed for 1 dose. 1000 mL 5    sodium chloride 0.9 % irrigation Irrigate with as directed for 1 dose.  1000 mL 5    albuterol sulfate HFA (VENTOLIN HFA) 108 (90 Base) MCG/ACT inhaler Inhale 2 puffs into the lungs every 6 hours as needed for Wheezing 1 Inhaler 5    oxybutynin (DITROPAN-XL) 10 MG extended release tablet Take 10 mg by mouth daily      simvastatin (ZOCOR) 40 MG tablet TAKE 1 TABLET BY MOUTH EVERY EVENING 90 tablet 1    losartan (COZAAR) 25 MG tablet Take 1 tablet by mouth daily 90 tablet 1    levothyroxine (SYNTHROID) 175 MCG tablet Take 1 tablet by mouth Daily 90 tablet 1    potassium chloride (KLOR-CON M) 10 MEQ extended release tablet Take 1 tablet by mouth daily 90 tablet 1    metFORMIN (GLUCOPHAGE XR) 500 MG extended release tablet Take 2 tablets by mouth daily (with breakfast) 180 tablet 1    ARIPiprazole (ABILIFY) 10 MG tablet TAKE 1 TABLET BY MOUTH EVERY DAY IN THE EVENING AS DIRECTED  3    Multiple Vitamin (MULTIVITAMIN) tablet Take 1 tablet by mouth daily      Multiple Vitamins-Minerals (ICAPS AREDS 2 PO) Take 1 tablet by mouth daily      vitamin D (CHOLECALCIFEROL) 1000 UNIT TABS tablet Take 1,000 Units by mouth daily      anastrozole (ARIMIDEX) 1 MG tablet Take 1 mg by mouth daily      ACCU-CHEK SMARTVIEW strip Test TID E11.8 300 each 3    ipratropium-albuterol (DUONEB) 0.5-2.5 (3) MG/3ML SOLN nebulizer solution 3 mLs      SOFT TOUCH LANCETS MISC Use tid as directed dx: E11.9 300 each 3    hydrOXYzine (VISTARIL) 25 MG capsule Take 25 mg by mouth 3 times daily as needed       DULoxetine (CYMBALTA) 60 MG extended release capsule Take 60 mg by mouth daily        No current facility-administered medications on file prior to encounter. REVIEW OF SYSTEMS    Pertinent items are noted in HPI. Objective:      Temp 96.8 °F (36 °C) (Temporal)   Resp 22     Wt Readings from Last 3 Encounters:   01/04/21 (!) 350 lb (158.8 kg)   03/12/20 (!) 301 lb (136.5 kg)   02/19/20 (!) 304 lb (137.9 kg)       PHYSICAL EXAM    Constitutional:   Well nourished and well developed. Appears neat and clean. Patient is alert, oriented x3, and in no apparent distress. Respiratory:  Respiratory effort is easy and symmetric bilaterally. Rate is normal at rest and on room air. Vascular:  Pedal Pulses is palpable and audible signal noted with doppler. Capillary refill is <5 sec to digits bilateral.  Extremities remain positive for pitting edema. Neurological:  Gross and Light touch intact. Protective sensation intact. Dermatological:  Wound description noted in wound assessment. One remaining open area, slight discoloration surrounding area, more of permanent discoloration then erythema, no excess warmth.      Psychiatric: Judgement and insight intact. Short and long term memory intact. No evidence of depression, anxiety, or agitation. Patient is calm, cooperative, and communicative. Appropriate interactions and affect. Assessment:      Problem List Items Addressed This Visit     None           Procedure Note  Indications:  Based on my examination of this patient's wound(s)/ulcer(s) today, debridement is not required to promote healing and evaluate the wound base. Wound 01/04/21 Pretibial Right #1 (Active)   Wound Image   01/18/21 1324   Wound Etiology Venous 01/18/21 1324   Wound Cleansed Cleansed with saline 01/18/21 1324   Dressing/Treatment Hydrofera blue;Zinc paste 01/04/21 1245   Wound Length (cm) 7 cm 01/18/21 1324   Wound Width (cm) 7 cm 01/18/21 1324   Wound Depth (cm) 0.1 cm 01/18/21 1324   Wound Surface Area (cm^2) 49 cm^2 01/18/21 1324   Change in Wound Size % (l*w) 12.5 01/18/21 1324   Wound Volume (cm^3) 4.9 cm^3 01/18/21 1324   Wound Healing % 12 01/18/21 1324   Wound Assessment Granulation tissue 01/18/21 1324   Drainage Amount Small 01/18/21 1324   Drainage Description Serosanguinous 01/18/21 1324   Odor None 01/18/21 1324   Tamara-wound Assessment Fragile; Intact 01/18/21 1324   Margins Defined edges 01/18/21 1324   Wound Thickness Description not for Pressure Injury Full thickness 01/18/21 1324   Number of days: 14                  Plan:     Continue hydrofera blue to open area, and unna boots , as below. Recheck in 2 weeks. Treatment Note please see attached Discharge Instructions    Written patient dismissal instructions given to patient and signed by patient or POA.          Discharge Instructions         Discharge [address] [facility]   Physician Orders and Discharge [address]  Telephone: [phone]      [phone]        NAME: [facility]  DATE OF

## 2021-02-04 ENCOUNTER — HOSPITAL ENCOUNTER (OUTPATIENT)
Dept: WOUND CARE | Age: 66
Discharge: HOME | End: 2021-02-04
Payer: COMMERCIAL

## 2021-02-04 PROCEDURE — 29580 STRAPPING UNNA BOOT: CPT

## 2021-02-04 PROCEDURE — 99212 OFFICE O/P EST SF 10 MIN: CPT | Performed by: NURSE PRACTITIONER

## 2021-02-04 PROCEDURE — 99212 OFFICE O/P EST SF 10 MIN: CPT

## 2021-02-04 NOTE — FLOWSHEET NOTE
[facility] Application   Below Knee    NAME:  Petra Anderson  YOB: 1955  MEDICAL RECORD NUMBER:  [id number]  DATE:  2/4/2021    Enzo Torrez boot: Applied moisturizing agent to dry skin as needed. Applied Unna roll from toes to knee overlapping each time. Applied ace wrap or coban from toes to below the knee. Secured with tape and/or metal clips covered with tape. Instructed patient/caregiver to keep dressing dry and intact. DO NOT REMOVE DRESSING. Instructed pt/family/caregiver to report excessive draining, loose bandage, wet dressing, severe pain or tingling in toes. Applied [facility] dressing below the knee to right lower leg. Unna Boot(s) were applied per  Guidelines.      Electronically signed by Jose Dodd RN on 2/4/2021 at 12:09 PM

## 2021-02-04 NOTE — PROGRESS NOTES
[facility] [address] [facility]   Progress Note and Procedure Note      [address] RECORD NUMBER:  [id number]  AGE: 72 y.o. GENDER: female  : 1955  EPISODE DATE:  2021    Subjective:     Chief Complaint   Patient presents with    Wound Check     right leg and abdoment wounds recheck         HISTORY of PRESENT ILLNESS HPI     Zaria Tran is a 72 y.o. female who presents today for wound/ulcer evaluation. History of Wound Context: Recheck on venous insufficiency with right lower leg ulcerations present. Generalized erythema improved since last visit. Today presents with open area of former scar to upper mid abdomen. Per history when stressed patient picks at the scar re-opening the area. Today the length of the entire scar has re-opened.    Wound/Ulcer Pain Timing/Severity: intermittent  Quality of pain: tender  Severity:  0 / 10   Modifying Factors: Pain is relieved/improved with rest  Associated Signs/Symptoms: edema, erythema and drainage    Ulcer Identification:  Ulcer Type: venous and traumatic  Contributing Factors: edema, venous stasis, poor glucose control, shear force and obesity    Wound: N/A        PAST MEDICAL HISTORY        Diagnosis Date    Acquired hypothyroidism 3/15/2016    Allergic rhinitis     pollen, dust ragweed, hay and straw     Anxiety     Asthma     Bipolar affect, depressed (HCC)     Bipolar disorder ([address]     Breast cancer ([address]     Invasive ductal cancer right breast    Cancer ([address] 1980    thyroid cancer     Chronic back pain     COPD (chronic obstructive pulmonary disease) ([address]     Depression     Diabetes mellitus ([address]     Emphysema of lung ([address]     Essential hypertension 3/15/2016    History of blood transfusion     2019    Hyperlipidemia     Hypothyroidism     Obesity     On home O2     3L NC at night    YARED (obstructive sleep apnea)     3 L NC    Osteoarthritis     Restless legs syndrome     Sleep apnea     Urinary incontinence        Problem List:    Patient Active Problem List   Diagnosis    Type 2 diabetes mellitus with complication ([address]    Acquired hypothyroidism    Essential hypertension    Mixed hyperlipidemia    COPD with chronic bronchitis (HCC)    Microalbuminuria    Moderate episode of recurrent major depressive disorder (HCC)    YARED (obstructive sleep apnea)    Restless leg syndrome    Vitamin B12 deficiency    Vitamin D deficiency    Left ventricular hypertrophy    Left ventricular diastolic dysfunction    Morbid obesity with BMI of 45.0-49.9, adult ([address]    Breast carcinoma, female, right (HCC)    Iron deficiency anemia secondary to inadequate dietary iron intake    Breast cancer of lower-inner quadrant of right female breast (HCC)    Postoperative anemia    Malignant neoplasm of central portion of right breast in female, estrogen receptor positive (HCC)    Iron deficiency anemia due to chronic blood loss    Non-pressure chronic ulcer of other part of right lower leg with fat layer exposed ([address]    Chronic venous insufficiency of lower extremity    Adenomatous polyp of ascending colon    Adenomatous polyp of sigmoid colon    Dysplastic colon polyp    Cellulitis of right leg    Ulcer of abdomen wall, with unspecified severity ([address]    Ulcer of right lower extremity with fat layer exposed ([address]        PAST SURGICAL HISTORY    Past Surgical History:   Procedure Laterality Date    APPENDECTOMY      BREAST BIOPSY Right 2018     SECTION      CHOLECYSTECTOMY      COLONOSCOPY N/A 2019    COLONOSCOPY DIAGNOSTIC performed by Oksana Bridges MD at [facility] Right 2019    LAPAROSCOPIC RIGHT COLECTOMY, LYSIS OF ADHESIONS. performed by Srinivas Mack MD at [address] [facility] Right 2019    Simple mastectomy with sentinel node biopsy    TUBAL LIGATION         FAMILY HISTORY    Family History   Problem Relation Age of Onset    tablet by mouth Daily 90 tablet 1    potassium chloride (KLOR-CON M) 10 MEQ extended release tablet Take 1 tablet by mouth daily 90 tablet 1    metFORMIN (GLUCOPHAGE XR) 500 MG extended release tablet Take 2 tablets by mouth daily (with breakfast) 180 tablet 1    ARIPiprazole (ABILIFY) 10 MG tablet TAKE 1 TABLET BY MOUTH EVERY DAY IN THE EVENING AS DIRECTED  3    Multiple Vitamin (MULTIVITAMIN) tablet Take 1 tablet by mouth daily      Multiple Vitamins-Minerals (ICAPS AREDS 2 PO) Take 1 tablet by mouth daily      vitamin D (CHOLECALCIFEROL) 1000 UNIT TABS tablet Take 1,000 Units by mouth daily      anastrozole (ARIMIDEX) 1 MG tablet Take 1 mg by mouth daily      ACCU-CHEK SMARTVIEW strip Test TID E11.8 300 each 3    ipratropium-albuterol (DUONEB) 0.5-2.5 (3) MG/3ML SOLN nebulizer solution 3 mLs      SOFT TOUCH LANCETS MISC Use tid as directed dx: E11.9 300 each 3    hydrOXYzine (VISTARIL) 25 MG capsule Take 25 mg by mouth 3 times daily as needed       DULoxetine (CYMBALTA) 60 MG extended release capsule Take 60 mg by mouth daily        No current facility-administered medications on file prior to encounter. REVIEW OF SYSTEMS    Pertinent items are noted in HPI. Objective: There were no vitals taken for this visit. Wt Readings from Last 3 Encounters:   01/04/21 (!) 350 lb (158.8 kg)   03/12/20 (!) 301 lb (136.5 kg)   02/19/20 (!) 304 lb (137.9 kg)       PHYSICAL EXAM    Constitutional:   Well nourished and well developed. Appears neat and clean. Patient is alert, oriented x3, and in no apparent distress. Respiratory:  Respiratory effort is easy and symmetric bilaterally. Rate is normal at rest and on room air. Vascular:   Extremities have 1+ bilateral edema. Neurological:  Gross and Light touch intact. Protective sensation intact    Dermatological:  Wound description noted in wound assessment.   Right lower leg erythema improved, edema continues, two remaining open areas to leg. Mid upper abdomen scar is open partial thickness. Patient picks at it when stressed. Clean with no surrounding erythema, no drainage. Psychiatric:  Judgement and insight intact. Short and long term memory intact. No evidence of depression, anxiety, or agitation. Patient is calm, cooperative, and communicative. Appropriate interactions and affect. Assessment:      Problem List Items Addressed This Visit     None           Procedure Note  Indications:  Based on my examination of this patient's wound(s)/ulcer(s) today, debridement is not required to promote healing and evaluate the wound base. Wound 01/04/21 Pretibial Right #1 (Active)   Wound Image   02/04/21 0918   Wound Etiology Venous 02/04/21 0918   Wound Cleansed Cleansed with saline 02/04/21 0918   Dressing/Treatment Hydrofera blue 01/18/21 1415   Wound Length (cm) 1.7 cm 02/04/21 0918   Wound Width (cm) 2 cm 02/04/21 0918   Wound Depth (cm) 0.1 cm 02/04/21 0918   Wound Surface Area (cm^2) 3.4 cm^2 02/04/21 0918   Change in Wound Size % (l*w) 93.93 02/04/21 0918   Wound Volume (cm^3) 0.34 cm^3 02/04/21 0918   Wound Healing % 94 02/04/21 0918   Wound Assessment Granulation tissue 02/04/21 0918   Drainage Amount Scant 02/04/21 0918   Drainage Description Serosanguinous 02/04/21 0918   Odor None 02/04/21 0918   Tamara-wound Assessment Fragile; Intact 02/04/21 0918   Margins Defined edges 02/04/21 0918   Wound Thickness Description not for Pressure Injury Full thickness 02/04/21 0918   Number of days: 31       Wound 02/04/21 Abdomen #2  (Active)   Wound Image   02/04/21 0924   Wound Etiology Traumatic 02/04/21 0924   Wound Cleansed Cleansed with saline 02/04/21 0924   Dressing/Treatment Antibacterial ointment;Dry dressing 02/04/21 0948   Wound Length (cm) 6 cm 02/04/21 0924   Wound Width (cm) 0.5 cm 02/04/21 0924   Wound Depth (cm) 0.1 cm 02/04/21 0924   Wound Surface Area (cm^2) 3 cm^2 02/04/21 0924   Wound Volume (cm^3) 0.3 cm^3 02/04/21 2012 Wound Assessment Fibrinous;Granulation tissue 02/04/21 0924   Drainage Amount Small 02/04/21 0924   Drainage Description Sanguinous 02/04/21 0924   Odor None 02/04/21 0924   Tamara-wound Assessment Fragile 02/04/21 0924   Margins Defined edges 02/04/21 0924   Wound Thickness Description not for Pressure Injury Full thickness 02/04/21 0924   Number of days: 0                  Plan:    Leave abdominal dressing on until home health changes to avoid picking at it. Continue HH for 2 x weekly unna boot changes. Leave dressing material off of wound allowing unna boot to contact the ulcerated area. Recheck 2 weeks. Treatment Note please see attached Discharge Instructions    Written patient dismissal instructions given to patient and signed by patient or POA. Discharge Instructions         Discharge [address] [facility]   Physician Orders and Discharge [address], [address] [place] [address]  Telephone: [phone]      [phone]        NAME: Reynaldo Watkins  DATE OF BIRTH:  1955  MEDICAL RECORD NUMBER:  [id number]     Home Care/Facility: [address] [place] [address] Rd (order dressings as needed)      Wound Location: Right lower Leg      Dressing orders: 1. Cleanse wound(s) with normal saline.    2. Zinc unna boot and coban or ace wrap.   C to change twice weekly    Wound Location:  Abdomen    Dressing orders:  Cleanse with saline and dry. Thin layer of Gentamicin ointment. Cover with dry gauze.   Change twice weekly     Compression: Apply Spandagrip to left(10-20) med lower leg, apply first thing in the morning, remove at bedtime, elevate legs as much as possible during the day.       Offloading Device:     Other Instructions: Cast cover for showering     Keep all dressings clean, dry and intact.  Keep pressure off the wound(s) at all times.      Follow up visit  3 Weeks February 25 , [id number] @       Please give 24 hour notice if unable to keep appointment. [phone]     If you experience any of the following, please call the Wound Care Service at  [phone] or go to the nearest emergency room.        *Increase in pain         *Temperature over 101           *Increase in drainage from your wound or a foul odor  *Uncontrolled swelling            *Need for compression bandage changes due to slippage, breakthrough drainage     PLEASE NOTE: IF YOU ARE UNABLE TO OBTAIN WOUND SUPPLIES, CONTINUE TO USE THE SUPPLIES YOU HAVE AVAILABLE UNTIL YOU ARE ABLE TO [address].  IT IS MOST IMPORTANT TO KEEP THE WOUND COVE    Electronically signed by MARIETTA Colon NP on 2/4/2021 at 10:18 AM        Electronically signed by MARIETTA Colon NP on 2/4/2021 at 10:19 AM

## 2021-03-25 ENCOUNTER — HOSPITAL ENCOUNTER (OUTPATIENT)
Dept: WOUND CARE | Age: 66
Discharge: HOME | End: 2021-03-25
Payer: MEDICARE

## 2021-03-25 VITALS
TEMPERATURE: 96.9 F | HEART RATE: 80 BPM | SYSTOLIC BLOOD PRESSURE: 119 MMHG | DIASTOLIC BLOOD PRESSURE: 69 MMHG | RESPIRATION RATE: 20 BRPM

## 2021-03-25 PROCEDURE — 99212 OFFICE O/P EST SF 10 MIN: CPT | Performed by: NURSE PRACTITIONER

## 2021-03-25 PROCEDURE — 99213 OFFICE O/P EST LOW 20 MIN: CPT

## 2021-03-25 RX ORDER — MAGNESIUM HYDROXIDE 1200 MG/15ML
LIQUID ORAL
Qty: 1000 ML | Refills: 1 | Status: SHIPPED | OUTPATIENT
Start: 2021-03-25 | End: 2021-04-01

## 2021-03-25 NOTE — PROGRESS NOTES
[facility]   Progress Note and Procedure Note      [address] [place] RECORD NUMBER:  [id number]  AGE: 72 y.o. GENDER: female  : 1955  EPISODE DATE:  3/25/2021    Subjective:     Chief Complaint   Patient presents with    Wound Check     abdominal wound         HISTORY of PRESENT ILLNESS HPI     Wilfredo Davalos is a 72 y.o. female who presents today for wound/ulcer evaluation. History of Wound Context: Patient recently discharged from [facility] following tx for pneumonia. Reports during hospitalization previously healed abdominal scar wound reopened, when staff removed dressing during admission to monitor wound. Presents with large area of former scar open and draining. Historically, patient has nervous habit of picking at wound when stressed, however, reports \"that's not what happened this time\", it was from the staff removing the dressing.    Wound/Ulcer Pain Timing/Severity: intermittent  Quality of pain: tender  Severity:  2 / 10   Modifying Factors: Pain worsens with care, and subsides following completion of   Associated Signs/Symptoms: erythema and drainage    Ulcer Identification:  Ulcer Type: traumatic  Contributing Factors: obesity and decreased tissue oxygenation    Wound: N/A        PAST MEDICAL HISTORY        Diagnosis Date    Acquired hypothyroidism 3/15/2016    Allergic rhinitis     pollen, dust ragweed, hay and straw     Anxiety     Asthma     Bipolar affect, depressed (HCC)     Bipolar disorder ([address]     Breast cancer ([address]     Invasive ductal cancer right breast    Cancer ([address] 1980    thyroid cancer     Chronic back pain     COPD (chronic obstructive pulmonary disease) ([address]     Depression     Diabetes mellitus ([address]     Emphysema of lung ([address]     Essential hypertension 3/15/2016    History of blood transfusion     2019    Hyperlipidemia     Hypothyroidism     Obesity     On home O2     3L NC at night    YARED (obstructive sleep apnea) 3 L NC    Osteoarthritis     Restless legs syndrome     Sleep apnea     Urinary incontinence        Problem List:    Patient Active Problem List   Diagnosis    Type 2 diabetes mellitus with complication ([address]    Acquired hypothyroidism    Essential hypertension    Mixed hyperlipidemia    COPD with chronic bronchitis (HCC)    Microalbuminuria    Moderate episode of recurrent major depressive disorder (HCC)    YARED (obstructive sleep apnea)    Restless leg syndrome    Vitamin B12 deficiency    Vitamin D deficiency    Left ventricular hypertrophy    Left ventricular diastolic dysfunction    Morbid obesity with BMI of 45.0-49.9, adult (HCC)    Breast carcinoma, female, right (HCC)    Iron deficiency anemia secondary to inadequate dietary iron intake    Breast cancer of lower-inner quadrant of right female breast (HCC)    Postoperative anemia    Malignant neoplasm of central portion of right breast in female, estrogen receptor positive (HCC)    Iron deficiency anemia due to chronic blood loss    Non-pressure chronic ulcer of other part of right lower leg with fat layer exposed ([address]    Chronic venous insufficiency of lower extremity    Adenomatous polyp of ascending colon    Adenomatous polyp of sigmoid colon    Dysplastic colon polyp    Cellulitis of right leg    Ulcer of abdomen wall, with unspecified severity ([address]    Ulcer of right lower extremity with fat layer exposed ([address]      Problem List Items Addressed This Visit     None           PAST SURGICAL HISTORY    Past Surgical History:   Procedure Laterality Date    APPENDECTOMY      BREAST BIOPSY Right 2018     SECTION      CHOLECYSTECTOMY      COLONOSCOPY N/A 2019    COLONOSCOPY DIAGNOSTIC performed by Pancho Bear MD at [facility] Right 2019    LAPAROSCOPIC RIGHT COLECTOMY, LYSIS OF ADHESIONS. [facility]    Simple mastectomy with sentinel node biopsy    TUBAL LIGATION         FAMILY HISTORY    Family History   Problem Relation Age of Onset    Diabetes Father     Heart Disease Father     Thyroid Cancer Sister     Thyroid Cancer Brother     Thyroid Cancer Sister     No Known Problems Daughter        SOCIAL HISTORY    Social History     Tobacco Use    Smoking status: Former Smoker     Packs/day: 1.00     Years: 40.00     Pack years: 40.00     Types: Cigarettes     Start date: 3/8/1976     Quit date: 2018     Years since quittin.7    Smokeless tobacco: Never Used   Substance Use Topics    Alcohol use: No     Alcohol/week: 0.0 standard drinks    Drug use: No       ALLERGIES    Allergies   Allergen Reactions    Latex Itching and Rash    Sulfa Antibiotics Hives    Bactrim [Sulfamethoxazole-Trimethoprim] Hives and Other (See Comments)     Metallic taste    Nicotine Hives     Hives from the patch       MEDICATIONS    Current Outpatient Medications on File Prior to Encounter   Medication Sig Dispense Refill    gentamicin (GARAMYCIN) 0.1 % cream APPLY TO AFFECTED AREA ON LEG DAILY BEFORE DRESSING CHANGE 15 g 1    meclizine (ANTIVERT) 25 MG tablet Take by mouth      fluticasone-umeclidin-vilant (TRELEGY ELLIPTA) 100-62.5-25 MCG/INH AEPB Inhale 1 puff into the lungs daily 1 each 3    VORTIoxetine (TRINTELLIX) 10 MG TABS tablet Take 10 mg by mouth      furosemide (LASIX) 40 MG tablet TAKE 1 TABLET BY MOUTH EVERY DAY 90 tablet 1    albuterol sulfate HFA (VENTOLIN HFA) 108 (90 Base) MCG/ACT inhaler Inhale 2 puffs into the lungs every 6 hours as needed for Wheezing 1 Inhaler 5    oxybutynin (DITROPAN-XL) 10 MG extended release tablet Take 10 mg by mouth daily      simvastatin (ZOCOR) 40 MG tablet TAKE 1 TABLET BY MOUTH EVERY EVENING 90 tablet 1    losartan (COZAAR) 25 MG tablet Take 1 tablet by mouth daily 90 tablet 1    levothyroxine (SYNTHROID) 175 MCG tablet Take 1 tablet by mouth Daily 90 tablet 1    potassium chloride (KLOR-CON M) 10 MEQ extended release tablet Take 1 tablet by mouth daily 90 tablet 1    metFORMIN (GLUCOPHAGE XR) 500 MG extended release tablet Take 2 tablets by mouth daily (with breakfast) 180 tablet 1    ARIPiprazole (ABILIFY) 10 MG tablet TAKE 1 TABLET BY MOUTH EVERY DAY IN THE EVENING AS DIRECTED  3    Multiple Vitamin (MULTIVITAMIN) tablet Take 1 tablet by mouth daily      Multiple Vitamins-Minerals (ICAPS AREDS 2 PO) Take 1 tablet by mouth daily      vitamin D (CHOLECALCIFEROL) 1000 UNIT TABS tablet Take 1,000 Units by mouth daily      anastrozole (ARIMIDEX) 1 MG tablet Take 1 mg by mouth daily      ACCU-CHEK SMARTVIEW strip Test TID E11.8 300 each 3    ipratropium-albuterol (DUONEB) 0.5-2.5 (3) MG/3ML SOLN nebulizer solution 3 mLs      SOFT TOUCH LANCETS MISC Use tid as directed dx: E11.9 300 each 3    hydrOXYzine (VISTARIL) 25 MG capsule Take 25 mg by mouth 3 times daily as needed       DULoxetine (CYMBALTA) 60 MG extended release capsule Take 60 mg by mouth daily        No current facility-administered medications on file prior to encounter. REVIEW OF SYSTEMS    Pertinent items are noted in HPI. Objective:      /69   Pulse 80   Temp 96.9 °F (36.1 °C) (Temporal)   Resp 20     Wt Readings from Last 3 Encounters:   01/04/21 (!) 350 lb (158.8 kg)   03/12/20 (!) 301 lb (136.5 kg)   02/19/20 (!) 304 lb (137.9 kg)       PHYSICAL EXAM    Constitutional:   Well nourished and well developed. Appears neat and clean. Patient is alert, oriented x3, and in no apparent distress. Respiratory:  Respiratory effort is easy and symmetric bilaterally. Rate is normal at rest and on room air. Vascular:   Extremities negative for pitting edema. Neurological:  Gross and Light touch intact. Protective sensation intact. Dermatological:  Wound description noted in wound assessment.   Mid abdomen wounds x 2, along entire length of previously healed scar is open, and

## 2021-03-25 NOTE — DISCHARGE INSTR - COC
Continuity of Care Form    Patient Name: Thuan Cantu   :  1955  MRN:  [id number]    Admit date:  3/25/2021  Discharge date:  ***    Code Status Order: Prior   Advance Directives:   [address] [place] [address] Documentation     Date/Time Healthcare Directive Type of Healthcare Directive Copy in [facility] [address] Agent's Name Healthcare Agent's Phone Number    [phone]  No, patient does not have an advance directive for healthcare treatment -- -- -- -- --          Admitting Physician:  No admitting provider for patient encounter. PCP: Kelly Islas MD    Discharging Nurse: [facility] Unit/Room#: No information available for this encounter.   Discharging Unit Phone Number: ***    Emergency Contact:   Extended Emergency Contact Information  Primary Emergency Contact: Carl Gonzalez [address] Phone: [phone]  Work Phone: [phone]  Mobile Phone: [phone]  Relation: Child    Past Surgical History:  Past Surgical History:   Procedure Laterality Date    APPENDECTOMY      BREAST BIOPSY Right 2018     SECTION      CHOLECYSTECTOMY      COLONOSCOPY N/A 2019    COLONOSCOPY DIAGNOSTIC performed by Chris Miller MD at [facility] Right 2019    LAPAROSCOPIC RIGHT COLECTOMY, LYSIS OF ADHESIONS. performed by Obed Corrales MD at [facility] Right 2019    Simple mastectomy with sentinel node biopsy    TUBAL LIGATION         Immunization History:   Immunization History   Administered Date(s) Administered    Influenza 10/28/2015    Influenza, [facility], IM, (6 mo and older Fluzone, Flulaval, Fluarix and 3 yrs and older Afluria) 2016, 2017, 10/01/2018    Pneumococcal Polysaccharide (Lyrbturdl57) 10/06/2008, 2012, 2016    Tdap (Boostrix, Adacel) 2013       Active Problems:  Patient Active Problem List   Diagnosis Code    Type 2 diabetes mellitus with complication ([address] W64.6    Acquired hypothyroidism E03.9    Essential hypertension I10    Mixed hyperlipidemia E78.2    COPD with chronic bronchitis ([facility]) J44.9    Microalbuminuria R80.9    Moderate episode of recurrent major depressive disorder ([facility]) F33.1    YARED (obstructive sleep apnea) G47.33    Restless leg syndrome G25.81    Vitamin B12 deficiency E53.8    Vitamin D deficiency E55.9    Left ventricular hypertrophy I51.7    Left ventricular diastolic dysfunction U32.9    Morbid obesity with BMI of 45.0-49.9, adult ([facility]) E66.01, Z68.42    Breast carcinoma, female, right ([address] C50.911    Iron deficiency anemia secondary to inadequate dietary iron intake D50.8    Breast cancer of lower-inner quadrant of right female breast ([facility]) C50.311    Postoperative anemia D64.9    Malignant neoplasm of central portion of right breast in female, estrogen receptor positive ([facility]) C50.111, Z17.0    Iron deficiency anemia due to chronic blood loss D50.0    Non-pressure chronic ulcer of other part of right lower leg with fat layer exposed ([address] L97.812    Chronic venous insufficiency of lower extremity I87.2    Adenomatous polyp of ascending colon D12.2    Adenomatous polyp of sigmoid colon D12.5    Dysplastic colon polyp K63.5    Cellulitis of right leg L03.115    Ulcer of abdomen wall, with unspecified severity ([address] L98.499    Ulcer of right lower extremity with fat layer exposed ([address] L97.912       Isolation/Infection:   Isolation          No Isolation        Patient Infection Status     Infection Onset Added Last Indicated Last Indicated By Review Planned Expiration Resolved Resolved By    MRSA 02/21/19 02/22/19 09/17/20 Culture, Anaerobic and Aerobic        MRSA right lower leg wound 09.17.20. Electronically signed by Sonja Nur RN on 9/21/20 at 6:46 AM EDT             Nurse Assessment:  Last Vital Signs: /69   Pulse 80   Temp 96.9 °F (36.1 °C) (Temporal)   Resp 20     Last documented pain score (0-10 scale):    Last Weight:   Wt Readings from Last 1 Encounters:   21 (!) 350 lb (158.8 kg)     Mental Status:  {IP PT MENTAL STATUS:35460}    IV Access:  { JEREMY IV ACCESS:587888845}    Nursing Mobility/ADLs:  Walking   {CHP DME TZWT:916332822}  Transfer  {CHP DME REIP:257064343}  Bathing  {CHP DME EXAK:155182969}  Dressing  {CHP DME IPGN:146809699}  Toileting  {CHP DME LHUL:808954016}  Feeding  {CHP DME BNPU:825509665}  Med Admin  {CHP DME UGF}  Med Delivery   { JEREMY MED Delivery:228760108}    Wound Care Documentation and Therapy:  Wound 21 Pretibial Right #1 (Active)   Wound Image   21 0915   Wound Etiology Venous 21 0915   Wound Cleansed Cleansed with saline 21 0918   Dressing/Treatment Hydrofera blue 21 1415   Wound Length (cm) 0 cm 21 0915   Wound Width (cm) 0 cm 21 0915   Wound Depth (cm) 0 cm 21 0915   Wound Surface Area (cm^2) 0 cm^2 21 0915   Change in Wound Size % (l*w) 100 21 0915   Wound Volume (cm^3) 0 cm^3 21 0915   Wound Healing % 100 21 0915   Wound Assessment Epithelialization 21 0915   Drainage Amount None 21 0915   Drainage Description Serosanguinous 21 0918   Odor None 21 0915   Tamara-wound Assessment Intact 21 0915   Margins Defined edges 21 0918   Wound Thickness Description not for Pressure Injury Full thickness 21 0918   Number of days: 80       Wound 21 Abdomen #2  (Active)   Wound Image   21 0915   Wound Etiology Traumatic 21 0915   Wound Cleansed Cleansed with saline 21 0915   Dressing/Treatment Antibacterial ointment;Dry dressing 21 0948   Wound Length (cm) 8.8 cm 2115   Wound Width (cm) 1 cm 21   Wound Depth (cm) 0.1 cm 21   Wound Surface Area (cm^2) 8.8 cm^2 21   Change in Wound Size % (l*w) -193.33 21   Wound Volume (cm^3) 0.88 cm^3 21 Wound Healing % -193 03/25/21 0915   Wound Assessment Epithelialization;Granulation tissue 03/25/21 0915   Drainage Amount Moderate 03/25/21 0915   Drainage Description Serosanguinous;Brown 03/25/21 0915   Odor None 03/25/21 0915   Tamara-wound Assessment Fragile; Intact 03/25/21 0915   Margins Defined edges 03/25/21 0915   Wound Thickness Description not for Pressure Injury Full thickness 03/25/21 0915   Number of days: 48       Wound 03/25/21 Abdomen Lower #3 (Active)   Wound Image   03/25/21 0915   Wound Etiology Traumatic 03/25/21 0915   Wound Cleansed Cleansed with saline 03/25/21 0915   Wound Length (cm) 2.5 cm 03/25/21 0915   Wound Width (cm) 1.2 cm 03/25/21 0915   Wound Depth (cm) 0.1 cm 03/25/21 0915   Wound Surface Area (cm^2) 3 cm^2 03/25/21 0915   Wound Volume (cm^3) 0.3 cm^3 03/25/21 0915   Wound Assessment Epithelialization;Granulation tissue 03/25/21 0915   Drainage Amount Moderate 03/25/21 0915   Drainage Description Serosanguinous 03/25/21 0915   Odor None 03/25/21 0915   Tamara-wound Assessment Intact 03/25/21 0915   Margins Defined edges 03/25/21 0915   Wound Thickness Description not for Pressure Injury Full thickness 03/25/21 0915   Number of days: 0        Elimination:  Continence:   · Bowel: {YES / KM:43422}  · Bladder: {YES / NH:72920}  Urinary Catheter: {Urinary Catheter:586481580}   Colostomy/Ileostomy/Ileal Conduit: {YES / ND:04094}       Date of Last BM: ***  No intake or output data in the 24 hours ending 03/25/21 0936  No intake/output data recorded.     Safety Concerns:     [address] Safety Concerns:505253579}    Impairments/Disabilities:      [address] Impairments/Disabilities:697229146}    Nutrition Therapy:  Current Nutrition Therapy:   [address] Diet List:593695342}    Routes of Feeding: {CHP DME Other Feedings:453864205}  Liquids: {Slp liquid thickness:41981}  Daily Fluid Restriction: {CHP DME Yes amt example:420538186}  Last Modified Barium Swallow with Video (Video Swallowing Test): {Done

## 2021-04-05 ENCOUNTER — TELEPHONE (OUTPATIENT)
Dept: WOUND CARE | Age: 66
End: 2021-04-05

## 2021-04-05 NOTE — TELEPHONE ENCOUNTER
Ohiosana [facility] , [place], called and left a VM. Patient has some venous stasis ulcers reopened on her legs. Per Moise Avelar NP,  Cleanse with saline, dry, hydrofera blue,zinc unna boot and coban, which was previous order. I also left a VM.

## 2021-04-22 ENCOUNTER — HOSPITAL ENCOUNTER (OUTPATIENT)
Dept: WOUND CARE | Age: 66
Discharge: HOME | End: 2021-04-22
Payer: MEDICARE

## 2021-04-22 VITALS
TEMPERATURE: 97 F | SYSTOLIC BLOOD PRESSURE: 118 MMHG | RESPIRATION RATE: 20 BRPM | HEART RATE: 76 BPM | DIASTOLIC BLOOD PRESSURE: 78 MMHG

## 2021-04-22 DIAGNOSIS — L97.912 ULCER OF RIGHT LOWER EXTREMITY WITH FAT LAYER EXPOSED (HCC): ICD-10-CM

## 2021-04-22 PROCEDURE — 87075 CULTR BACTERIA EXCEPT BLOOD: CPT

## 2021-04-22 PROCEDURE — 29580 STRAPPING UNNA BOOT: CPT

## 2021-04-22 PROCEDURE — 99213 OFFICE O/P EST LOW 20 MIN: CPT | Performed by: NURSE PRACTITIONER

## 2021-04-22 PROCEDURE — 87070 CULTURE OTHR SPECIMN AEROBIC: CPT

## 2021-04-22 PROCEDURE — 87205 SMEAR GRAM STAIN: CPT

## 2021-04-22 PROCEDURE — 87077 CULTURE AEROBIC IDENTIFY: CPT

## 2021-04-22 PROCEDURE — 87186 SC STD MICRODIL/AGAR DIL: CPT

## 2021-04-22 PROCEDURE — 99213 OFFICE O/P EST LOW 20 MIN: CPT

## 2021-04-22 PROCEDURE — 87147 CULTURE TYPE IMMUNOLOGIC: CPT

## 2021-04-22 NOTE — PLAN OF CARE
Problem: Wound:  Goal: Will show signs of wound healing; wound closure and no evidence of infection  Outcome: Ongoing     Problem: Blood Glucose:  Goal: Ability to maintain appropriate glucose levels will improve  Outcome: Ongoing
Patient on 1:1

## 2021-04-22 NOTE — PROGRESS NOTES
[facility]   Progress Note and Procedure Note      [address] [place] RECORD NUMBER:  [id number]  AGE: 72 y.o. GENDER: female  : 1955  EPISODE DATE:  2021    Subjective:     Chief Complaint   Patient presents with    Wound Check     abd         HISTORY of PRESENT ILLNESS HPI     Ruddy Esquivel is a 72 y.o. female who presents today for wound/ulcer evaluation. History of Wound Context: during recent hospitalization previously healed abdominal scar wound reopened, when staff removed dressing during admission to monitor wound. Presents with large area of former scar open and draining. Historically, patient has nervous habit of picking at wound when stressed, however, per patient she has not picked at it this time. Today area of right lateral abdomen with excoriation- patient reports \"irritation from the tape on the dressings (Sorbact)\". Wound beds are clean with some epithelialization of wound bed, candy wound red and erythematous. Culture obtained from distal abdomen wound. Right leg has several open areas of ulceration- candy wounds dry and peeling. Will continue unna boots and increase frequency to 3 x week. Wound/Ulcer Pain Timing/Severity: intermittent  Quality of pain: tender  Severity:  2 / 10   Modifying Factors: Pain worsens with care- reports candy wound area itches.    Associated Signs/Symptoms: erythema    Ulcer Identification:  Ulcer Type: venous and traumatic (abdominal wound is from picking) lower right leg due to venous insufficiency  Contributing Factors: edema, venous stasis, diabetes, poor glucose control, decreased mobility, obesity and decreased tissue oxygenation    Wound: N/A        PAST MEDICAL HISTORY        Diagnosis Date    Acquired hypothyroidism 3/15/2016    Allergic rhinitis     pollen, dust ragweed, hay and straw     Anxiety     Asthma     [facility] affect, depressed [facility]     Bipolar disorder [facility]     Breast cancer ([facility]  Invasive ductal cancer right breast    Cancer ([address] 1980    thyroid cancer     Chronic back pain     COPD (chronic obstructive pulmonary disease) (HCC)     Depression     Diabetes mellitus ([address]     Emphysema of lung ([address]     Essential hypertension 3/15/2016    History of blood transfusion     Feb 4 2019    Hyperlipidemia     Hypothyroidism     Obesity     On home O2     3L NC at night    YARED (obstructive sleep apnea)     3 L NC    Osteoarthritis     Restless legs syndrome     Sleep apnea     Urinary incontinence        Problem List:    Patient Active Problem List   Diagnosis    Type 2 diabetes mellitus with complication ([address]    Acquired hypothyroidism    Essential hypertension    Mixed hyperlipidemia    COPD with chronic bronchitis (HCC)    Microalbuminuria    Moderate episode of recurrent major depressive disorder (HCC)    YARED (obstructive sleep apnea)    Restless leg syndrome    Vitamin B12 deficiency    Vitamin D deficiency    Left ventricular hypertrophy    Left ventricular diastolic dysfunction    Morbid obesity with BMI of 45.0-49.9, adult (HCC)    Breast carcinoma, female, right (HCC)    Iron deficiency anemia secondary to inadequate dietary iron intake    Breast cancer of lower-inner quadrant of right female breast (HCC)    Postoperative anemia    Malignant neoplasm of central portion of right breast in female, estrogen receptor positive (HCC)    Iron deficiency anemia due to chronic blood loss    Non-pressure chronic ulcer of other part of right lower leg with fat layer exposed ([address]    Chronic venous insufficiency of lower extremity    Adenomatous polyp of ascending colon    Adenomatous polyp of sigmoid colon    Dysplastic colon polyp    Cellulitis of right leg    Ulcer of abdomen wall, with unspecified severity (HCC)    Ulcer of right lower extremity with fat layer exposed ([address]      Problem List Items Addressed This Visit     Ulcer of right lower extremity with fat layer exposed ([facility]           PAST SURGICAL HISTORY    Past Surgical History:   Procedure Laterality Date    APPENDECTOMY      BREAST BIOPSY Right 2018     SECTION      CHOLECYSTECTOMY      COLONOSCOPY N/A 2019    COLONOSCOPY DIAGNOSTIC performed by Baljit Shrestha MD at [email or web address] Right 2019    LAPAROSCOPIC RIGHT COLECTOMY, LYSIS OF ADHESIONS. performed by Nahed Brewer MD at [address] Right [facility]    Simple mastectomy with sentinel node biopsy    TUBAL LIGATION         FAMILY HISTORY    Family History   Problem Relation Age of Onset    Diabetes Father     Heart Disease Father     Thyroid Cancer Sister     Thyroid Cancer Brother     Thyroid Cancer Sister     No Known Problems Daughter        SOCIAL HISTORY    Social History     Tobacco Use    Smoking status: Former Smoker     Packs/day: 1.00     Years: 40.00     Pack years: 40.00     Types: Cigarettes     Start date: 3/8/1976     Quit date: 2018     Years since quittin.8    Smokeless tobacco: Never Used   Substance Use Topics    Alcohol use: No     Alcohol/week: 0.0 standard drinks    Drug use: No       ALLERGIES    Allergies   Allergen Reactions    Latex Itching and Rash    Sulfa Antibiotics Hives    Bactrim [Sulfamethoxazole-Trimethoprim] Hives and Other (See Comments)     Metallic taste    Nicotine Hives     Hives from the patch       MEDICATIONS    Current Outpatient Medications on File Prior to Encounter   Medication Sig Dispense Refill    gentamicin (GARAMYCIN) 0.1 % cream APPLY TO AFFECTED AREA ON LEG DAILY BEFORE DRESSING CHANGE 15 g 1    meclizine (ANTIVERT) 25 MG tablet Take by mouth      fluticasone-umeclidin-vilant (TRELEGY ELLIPTA) 100-62.5-25 MCG/INH AEPB Inhale 1 puff into the lungs daily 1 each 3    VORTIoxetine (TRINTELLIX) 10 MG TABS tablet Take 10 mg by mouth      furosemide (LASIX) 40 MG tablet TAKE 1 TABLET BY MOUTH EVERY DAY 90 tablet 1    albuterol sulfate HFA (VENTOLIN HFA) 108 (90 Base) MCG/ACT inhaler Inhale 2 puffs into the lungs every 6 hours as needed for Wheezing 1 Inhaler 5    oxybutynin (DITROPAN-XL) 10 MG extended release tablet Take 10 mg by mouth daily      simvastatin (ZOCOR) 40 MG tablet TAKE 1 TABLET BY MOUTH EVERY EVENING 90 tablet 1    losartan (COZAAR) 25 MG tablet Take 1 tablet by mouth daily 90 tablet 1    levothyroxine (SYNTHROID) 175 MCG tablet Take 1 tablet by mouth Daily 90 tablet 1    potassium chloride (KLOR-CON M) 10 MEQ extended release tablet Take 1 tablet by mouth daily 90 tablet 1    metFORMIN (GLUCOPHAGE XR) 500 MG extended release tablet Take 2 tablets by mouth daily (with breakfast) 180 tablet 1    ARIPiprazole (ABILIFY) 10 MG tablet TAKE 1 TABLET BY MOUTH EVERY DAY IN THE EVENING AS DIRECTED  3    Multiple Vitamin (MULTIVITAMIN) tablet Take 1 tablet by mouth daily      Multiple Vitamins-Minerals (ICAPS AREDS 2 PO) Take 1 tablet by mouth daily      vitamin D (CHOLECALCIFEROL) 1000 UNIT TABS tablet Take 1,000 Units by mouth daily      anastrozole (ARIMIDEX) 1 MG tablet Take 1 mg by mouth daily      ACCU-CHEK SMARTVIEW strip Test TID E11.8 300 each 3    ipratropium-albuterol (DUONEB) 0.5-2.5 (3) MG/3ML SOLN nebulizer solution 3 mLs      SOFT TOUCH LANCETS MISC Use tid as directed dx: E11.9 300 each 3    hydrOXYzine (VISTARIL) 25 MG capsule Take 25 mg by mouth 3 times daily as needed       DULoxetine (CYMBALTA) 60 MG extended release capsule Take 60 mg by mouth daily        No current facility-administered medications on file prior to encounter. REVIEW OF SYSTEMS    Pertinent items are noted in HPI.     Objective:      /78   Pulse 76   Temp 97 °F (36.1 °C) (Temporal)   Resp 20     Wt Readings from Last 3 Encounters:   01/04/21 (!) 350 lb (158.8 kg)   03/12/20 (!) 301 lb (136.5 kg)   02/19/20 (!) 304 lb (137.9 kg)       PHYSICAL EXAM    Constitutional:   Well nourished and well developed. Appears neat and clean. Patient is alert, oriented x3, and in no apparent distress. Respiratory:  Respiratory effort is easy and symmetric bilaterally. Rate is normal at rest and on room air. Vascular:  Pedal Pulses is palpable and audible signal noted with doppler. Capillary refill is <5 sec to digits bilateral.  Extremities have continued pitting edema 2+. Neurological:  Gross and Light touch intact. Protective sensation intact. Dermatological:  Wound description noted in wound assessment. Abdominal wound (proximal) is pale pink, with some epithelialization of wound bed. Candy wound erythematous. Patient reports \"itches\". Will add liquid skin prep barrier to candy wound prior to application of sorbact dressings. Culture obtained from distal abdominal wound. Right leg has several open wounds with surrounding dried skin. Will increase frequency of unna boot application to 3 x week. Reminded to elevate and monitor     Psychiatric:  Judgement and insight intact. Short and long term memory intact. No evidence of depression, anxiety, or agitation. Patient is calm, cooperative, and communicative. Appropriate interactions and affect. Assessment:      Problem List Items Addressed This Visit     Ulcer of right lower extremity with fat layer exposed ([facility]           Procedure Note  Indications:  Based on my examination of this patient's wound(s)/ulcer(s) today, debridement is not required to promote healing and evaluate the wound base.     Wound 02/04/21 Abdomen #2  (Active)   Wound Image   04/22/21 1003   Wound Etiology Traumatic 04/22/21 1003   Wound Cleansed Cleansed with saline 04/22/21 1003   Dressing/Treatment Cutimed/Sorbact 03/25/21 0946   Wound Length (cm) 10 cm 04/22/21 1003   Wound Width (cm) 0.7 cm 04/22/21 1003   Wound Depth (cm) 0.1 cm 04/22/21 1003   Wound Surface Area (cm^2) 7 cm^2 04/22/21 1003   Change in Wound Size % (l*w) -133.33 04/22/21 1003   Wound Volume (cm^3) 0.7 Serosanguinous 04/22/21 1020   Odor None 04/22/21 1020   Tamara-wound Assessment Fragile 04/22/21 1020   Margins Undefined edges 04/22/21 1020   Wound Thickness Description not for Pressure Injury Full thickness 04/22/21 1020   Number of days: 0                  Plan:     Continue with plan of care as below. Return in one week to allow recheck on abdominal wound erythema and for re-application of unna boot since home health can only come out 2 x week. Treatment Note please see attached Discharge Instructions    Written patient dismissal instructions given to patient and signed by patient or POA. Discharge Instructions         Discharge Instructions           Discharge [address] [facility]   Physician Orders and Discharge [address]  [facility], [facility]  Telephone: [phone]      [phone]        NAME: Compa Prescott  DATE OF BIRTH:  1955  MEDICAL RECORD NUMBER:  [id number]     Home Care/Facility: [address] [place] [address] (order dressings as needed)      Wound Location:  Abdomen x 2     Dressing orders:  Cleanse with saline and dry.  Cutimed siltac sorbact. Change 3 times weekly. Skin prep to periwound before applying dessings      Wound Location:  Right leg    Dressing orders: 1. Cleanse wound(s) with normal saline. 2. Apply dry HYDROFERA BLUE READY FOAM or equivalent to wound bed. NOTE: If your Auther Chance is not soft and pliable, moisten with saline until it is sponge like and then ring out excess saline and apply to wound bed. 3. Zinc unna boot and coban or ace wrap.   Change twice weekly    Compression: Apply Spandagrip to left(10-20) med lower leg, apply first thing in the morning, remove at bedtime, elevate legs as much as possible during the day.       Offloading Device:     Other Instructions: Cast cover for showering;  Culture in [facility] today     Keep

## 2021-04-22 NOTE — DISCHARGE INSTR - COC
 Restless leg syndrome G25.81    Vitamin B12 deficiency E53.8    Vitamin D deficiency E55.9    Left ventricular hypertrophy I51.7    Left ventricular diastolic dysfunction H55.6    Morbid obesity with BMI of 45.0-49.9, adult ([facility]) E66.01, Z68.42    Breast carcinoma, female, right (HCC) C50.911    Iron deficiency anemia secondary to inadequate dietary iron intake D50.8    Breast cancer of lower-inner quadrant of right female breast (HCC) C50.311    Postoperative anemia D64.9    Malignant neoplasm of central portion of right breast in female, estrogen receptor positive (HCC) C50.111, Z17.0    Iron deficiency anemia due to chronic blood loss D50.0    Non-pressure chronic ulcer of other part of right lower leg with fat layer exposed ([address] L97.812    Chronic venous insufficiency of lower extremity I87.2    Adenomatous polyp of ascending colon D12.2    Adenomatous polyp of sigmoid colon D12.5    Dysplastic colon polyp K63.5    Cellulitis of right leg L03.115    Ulcer of abdomen wall, with unspecified severity ([address] L98.499    Ulcer of right lower extremity with fat layer exposed ([address] L97.912       Isolation/Infection:   Isolation          No Isolation        Patient Infection Status     Infection Onset Added Last Indicated Last Indicated By Review Planned Expiration Resolved Resolved By    MRSA 19 Culture, Anaerobic and Aerobic        MRSA right lower leg wound 20. Electronically signed by Romero Ibanez RN on 20 at 6:46 AM EDT             Nurse Assessment:  Last Vital Signs: /78   Pulse 76   Temp 97 °F (36.1 °C) (Temporal)   Resp 20     Last documented pain score (0-10 scale):    Last Weight:   Wt Readings from Last 1 Encounters:   21 (!) 350 lb (158.8 kg)     Mental Status:  {IP PT MENTAL STATUS:87899}    IV Access:  {[facility] IV ACCESS:541423358}    Nursing Mobility/ADLs:  Walking   {[facility] EE}  Transfer  {[facility] MD:026030708}  Bathing  {[facility] DME HEYU:880520933}  Dressing  {CHP DME FFAH:684783058}  Toileting  {CHP DME XWST:835338917}  Feeding  {CHP DME [facility]:622428112}  Med Admin  {CHP DME CWWH:129459142}  Med Delivery   { [facility] MED Delivery:364981570}    Wound Care Documentation and Therapy:  Wound 02/04/21 Abdomen #2  (Active)   Wound Image   04/22/21 1003   Wound Etiology Traumatic 04/22/21 1003   Wound Cleansed Cleansed with saline 04/22/21 1003   Dressing/Treatment Cutimed/Sorbact 03/25/21 0946   Wound Length (cm) 10 cm 04/22/21 1003   Wound Width (cm) 0.7 cm 04/22/21 1003   Wound Depth (cm) 0.1 cm 04/22/21 1003   Wound Surface Area (cm^2) 7 cm^2 04/22/21 1003   Change in Wound Size % (l*w) -133.33 04/22/21 1003   Wound Volume (cm^3) 0.7 cm^3 04/22/21 1003   Wound Healing % -133 04/22/21 1003   Wound Assessment Granulation tissue; Epithelialization 04/22/21 1003   Drainage Amount Moderate 04/22/21 1003   Drainage Description Serous; Serosanguinous 04/22/21 1003   Odor None 04/22/21 1003   Tamara-wound Assessment Fragile; Intact 04/22/21 1003   Margins Defined edges 04/22/21 1003   Wound Thickness Description not for Pressure Injury Full thickness 04/22/21 1003   Number of days: 77       Wound 03/25/21 Abdomen Lower #3 (Active)   Wound Image   04/22/21 1003   Wound Etiology Traumatic 04/22/21 1003   Wound Cleansed Cleansed with saline 04/22/21 1003   Dressing/Treatment Cutimed/Sorbact 03/25/21 0946   Wound Length (cm) 3 cm 04/22/21 1003   Wound Width (cm) 0.6 cm 04/22/21 1003   Wound Depth (cm) 0.1 cm 04/22/21 1003   Wound Surface Area (cm^2) 1.8 cm^2 04/22/21 1003   Change in Wound Size % (l*w) 40 04/22/21 1003   Wound Volume (cm^3) 0.18 cm^3 04/22/21 1003   Wound Healing % 40 04/22/21 1003   Wound Assessment Epithelialization;Granulation tissue 04/22/21 1003   Drainage Amount Moderate 04/22/21 1003   Drainage Description Serous; Serosanguinous 04/22/21 1003   Odor None 04/22/21 1003   Tamara-wound Assessment Intact 04/22/21 1003   Margins Defined edges 04/22/21 1003   Wound Thickness Description not for Pressure Injury Full thickness 04/22/21 1003   Number of days: 28       Wound 04/22/21 Leg Right; Lower; Lateral #4 (Active)   Wound Image   04/22/21 1020   Wound Etiology Venous 04/22/21 1020   Wound Cleansed Cleansed with saline; Soap and water 04/22/21 1020   Wound Length (cm) 0.6 cm 04/22/21 1020   Wound Width (cm) 0.9 cm 04/22/21 1020   Wound Depth (cm) 0.2 cm 04/22/21 1020   Wound Surface Area (cm^2) 0.54 cm^2 04/22/21 1020   Wound Volume (cm^3) 0.11 cm^3 04/22/21 1020   Wound Assessment Epithelialization;Granulation tissue 04/22/21 1020   Drainage Amount Small 04/22/21 1020   Drainage Description Serosanguinous 04/22/21 1020   Odor None 04/22/21 1020   Tamara-wound Assessment Fragile 04/22/21 1020   Margins Undefined edges 04/22/21 1020   Wound Thickness Description not for Pressure Injury Full thickness 04/22/21 1020   Number of days: 0        Elimination:  Continence:   · Bowel: {YES / TF:70378}  · Bladder: {YES / XC:19829}  Urinary Catheter: {Urinary Catheter:869889848}   Colostomy/Ileostomy/Ileal Conduit: {YES / TV:84624}       Date of Last BM: ***  No intake or output data in the 24 hours ending 04/22/21 1047  No intake/output data recorded.     Safety Concerns:     [address] Safety Concerns:456551997}    Impairments/Disabilities:      [address] Impairments/Disabilities:394566476}    Nutrition Therapy:  Current Nutrition Therapy:   [address] Diet List:120446385}    Routes of Feeding: {CHP DME Other Feedings:164018180}  Liquids: {Slp liquid thickness:88285}  Daily Fluid Restriction: {CHP DME Yes amt example:982929190}  Last Modified Barium Swallow with Video (Video Swallowing Test): {Done Not Done [facility]:149039314}    Treatments at the Time of Hospital Discharge:   Respiratory Treatments: ***  Oxygen Therapy:  {Therapy; copd oxygen:37970}  Ventilator:    { CC Vent WTDM:172756853}    Rehab Therapies: {THERAPEUTIC INTERVENTION:3738375973}  Weight Bearing Status/Restrictions: 50Oliverio Alcazar CC Weight Bearin}  Other Medical Equipment (for information only, NOT a DME order):  {EQUIPMENT:597710853}  Other Treatments: ***    Patient's personal belongings (please select all that are sent with patient):  {CHP DME Belongings:955630660}    RN SIGNATURE:  {Esignature:799377706}    CASE MANAGEMENT/SOCIAL WORK SECTION    Inpatient Status Date: ***    Readmission Risk Assessment Score:  Readmission Risk              Risk of Unplanned Readmission:        0           Discharging to Facility/ Agency   · Name:   · Address:  · Phone:  · Fax:    Dialysis Facility (if applicable)   · Name:  · Address:  · Dialysis Schedule:  · Phone:  · Fax:    / signature: {Esignature:005853295}    PHYSICIAN SECTION    Prognosis: {Prognosis:5187045868}    Condition at Discharge: 50Oliverio Alcazar Patient Condition:445933018}    Rehab Potential (if transferring to Rehab): {Prognosis:9286262736}    Recommended Labs or Other Treatments After Discharge: ***    Physician Certification: I certify the above information and transfer of Ayanna Anderson  is necessary for the continuing treatment of the diagnosis listed and that she requires {Admit to Appropriate Level of Care:60955} for {GREATER/LESS:271840690} 30 days.      Update Admission H&P: {CHP DME Changes in ABKIL:324293703}    PHYSICIAN SIGNATURE:  {Esignature:777532962}

## 2021-04-22 NOTE — FLOWSHEET NOTE
[facility] Application   Below Knee    NAME:  Nuris Mccollum  YOB: 1955  MEDICAL RECORD NUMBER:  [id number]  DATE:  4/22/2021    Sreedhar Gave boot: Applied moisturizing agent to dry skin as needed. Appied primary and secondary dressing as ordered. Applied Unna roll from toes to knee overlapping each time. Applied ace wrap or coban from toes to below the knee. Secured with tape and/or metal clips covered with tape. Instructed patient/caregiver to keep dressing dry and intact. DO NOT REMOVE DRESSING. Instructed pt/family/caregiver to report excessive draining, loose bandage, wet dressing, severe pain or tingling in toes. Applied [facility] dressing below the knee to right lower leg. Unna Boot(s) were applied per  Guidelines.      Electronically signed by James Perry RN on 4/22/2021 at 1:14 PM

## 2021-04-26 LAB
ANAEROBIC CULTURE: ABNORMAL
GRAM STAIN RESULT: ABNORMAL
ORGANISM: ABNORMAL
WOUND/ABSCESS: ABNORMAL

## 2021-04-27 RX ORDER — GENTAMICIN SULFATE 1 MG/G
OINTMENT TOPICAL ONCE
Status: CANCELLED | OUTPATIENT
Start: 2021-04-29 | End: 2021-04-29

## 2021-04-27 RX ORDER — LIDOCAINE 40 MG/G
CREAM TOPICAL ONCE
Status: CANCELLED | OUTPATIENT
Start: 2021-04-29 | End: 2021-04-29

## 2021-04-27 RX ORDER — BETAMETHASONE DIPROPIONATE 0.05 %
OINTMENT (GRAM) TOPICAL ONCE
Status: CANCELLED | OUTPATIENT
Start: 2021-04-29 | End: 2021-04-29

## 2021-04-27 RX ORDER — LIDOCAINE 50 MG/G
OINTMENT TOPICAL ONCE
Status: CANCELLED | OUTPATIENT
Start: 2021-04-29 | End: 2021-04-29

## 2021-04-27 RX ORDER — GINSENG 100 MG
CAPSULE ORAL ONCE
Status: CANCELLED | OUTPATIENT
Start: 2021-04-29 | End: 2021-04-29

## 2021-04-27 RX ORDER — LIDOCAINE HYDROCHLORIDE 40 MG/ML
SOLUTION TOPICAL ONCE
Status: CANCELLED | OUTPATIENT
Start: 2021-04-29 | End: 2021-04-29

## 2021-04-27 RX ORDER — CLOBETASOL PROPIONATE 0.5 MG/G
OINTMENT TOPICAL ONCE
Status: CANCELLED | OUTPATIENT
Start: 2021-04-29 | End: 2021-04-29

## 2021-04-27 RX ORDER — LIDOCAINE HYDROCHLORIDE 20 MG/ML
JELLY TOPICAL ONCE
Status: CANCELLED | OUTPATIENT
Start: 2021-04-29 | End: 2021-04-29

## 2021-04-27 RX ORDER — BACITRACIN, NEOMYCIN, POLYMYXIN B 400; 3.5; 5 [USP'U]/G; MG/G; [USP'U]/G
OINTMENT TOPICAL ONCE
Status: CANCELLED | OUTPATIENT
Start: 2021-04-29 | End: 2021-04-29

## 2021-04-27 RX ORDER — BACITRACIN ZINC AND POLYMYXIN B SULFATE 500; 1000 [USP'U]/G; [USP'U]/G
OINTMENT TOPICAL ONCE
Status: CANCELLED | OUTPATIENT
Start: 2021-04-29 | End: 2021-04-29

## 2021-04-29 ENCOUNTER — HOSPITAL ENCOUNTER (OUTPATIENT)
Dept: WOUND CARE | Age: 66
Discharge: HOME | End: 2021-04-29
Payer: MEDICARE

## 2021-04-29 VITALS
DIASTOLIC BLOOD PRESSURE: 69 MMHG | SYSTOLIC BLOOD PRESSURE: 123 MMHG | RESPIRATION RATE: 20 BRPM | HEART RATE: 74 BPM | TEMPERATURE: 96.7 F

## 2021-04-29 DIAGNOSIS — L98.499: ICD-10-CM

## 2021-04-29 DIAGNOSIS — L97.912 ULCER OF RIGHT LOWER EXTREMITY WITH FAT LAYER EXPOSED (HCC): Primary | ICD-10-CM

## 2021-04-29 DIAGNOSIS — I87.2 CHRONIC VENOUS INSUFFICIENCY OF LOWER EXTREMITY: ICD-10-CM

## 2021-04-29 PROCEDURE — 29580 STRAPPING UNNA BOOT: CPT

## 2021-04-29 PROCEDURE — 99213 OFFICE O/P EST LOW 20 MIN: CPT

## 2021-04-29 PROCEDURE — 99212 OFFICE O/P EST SF 10 MIN: CPT | Performed by: NURSE PRACTITIONER

## 2021-04-29 RX ORDER — BACITRACIN ZINC AND POLYMYXIN B SULFATE 500; 1000 [USP'U]/G; [USP'U]/G
OINTMENT TOPICAL ONCE
Status: CANCELLED | OUTPATIENT
Start: 2021-04-29 | End: 2021-04-29

## 2021-04-29 RX ORDER — LIDOCAINE HYDROCHLORIDE 40 MG/ML
SOLUTION TOPICAL ONCE
Status: CANCELLED | OUTPATIENT
Start: 2021-04-29 | End: 2021-04-29

## 2021-04-29 RX ORDER — BETAMETHASONE DIPROPIONATE 0.05 %
OINTMENT (GRAM) TOPICAL ONCE
Status: CANCELLED | OUTPATIENT
Start: 2021-04-29 | End: 2021-04-29

## 2021-04-29 RX ORDER — BACITRACIN, NEOMYCIN, POLYMYXIN B 400; 3.5; 5 [USP'U]/G; MG/G; [USP'U]/G
OINTMENT TOPICAL ONCE
Status: CANCELLED | OUTPATIENT
Start: 2021-04-29 | End: 2021-04-29

## 2021-04-29 RX ORDER — CLOBETASOL PROPIONATE 0.5 MG/G
OINTMENT TOPICAL ONCE
Status: CANCELLED | OUTPATIENT
Start: 2021-04-29 | End: 2021-04-29

## 2021-04-29 RX ORDER — LIDOCAINE HYDROCHLORIDE 20 MG/ML
JELLY TOPICAL ONCE
Status: CANCELLED | OUTPATIENT
Start: 2021-04-29 | End: 2021-04-29

## 2021-04-29 RX ORDER — GINSENG 100 MG
CAPSULE ORAL ONCE
Status: CANCELLED | OUTPATIENT
Start: 2021-04-29 | End: 2021-04-29

## 2021-04-29 RX ORDER — GENTAMICIN SULFATE 1 MG/G
OINTMENT TOPICAL ONCE
Status: CANCELLED | OUTPATIENT
Start: 2021-04-29 | End: 2021-04-29

## 2021-04-29 RX ORDER — LIDOCAINE 40 MG/G
CREAM TOPICAL ONCE
Status: CANCELLED | OUTPATIENT
Start: 2021-04-29 | End: 2021-04-29

## 2021-04-29 RX ORDER — LIDOCAINE 50 MG/G
OINTMENT TOPICAL ONCE
Status: CANCELLED | OUTPATIENT
Start: 2021-04-29 | End: 2021-04-29

## 2021-04-29 NOTE — PROGRESS NOTES
Anna Watkins 37   Progress Note and Procedure Note      [address] [place] RECORD NUMBER:  [id number]  AGE: 72 y.o. GENDER: female  : 1955  EPISODE DATE:  2021    Subjective:     Chief Complaint   Patient presents with    Wound Check     right leg, abd         HISTORY of PRESENT ILLNESS BRITTANY Roman is a 72 y.o. female who presents today for wound/ulcer evaluation. History of Wound Context: Recheck on chronic upper abdomen scar which re-opened (x2). Patient picks at wounds when stressed, and this wound had previously healed, now has reopened. Chronic venous insufficiency with stasis dermatitis - right leg had been applying unna boot 3x week with hydrofera to open wounds. Today wounds are improved, two areas remain open, will continue unna application and stop hydrofera application. Surrounding erythema of abdominal wound is improved, no-induration, and erythema is immediately surrounding the wound only. No drainage, wound bed pink/clean. Wound/Ulcer Pain Timing/Severity: intermittent  Quality of pain: tender  Severity:  1 / 10   Modifying Factors: Pain worsens with care and subsides following.    Associated Signs/Symptoms: erythema    Ulcer Identification:  Ulcer Type: traumatic (abdomen) and venous   Contributing Factors: edema, venous stasis, diabetes, poor glucose control, decreased mobility, obesity and decreased tissue oxygenation    Wound: N/A        PAST MEDICAL HISTORY        Diagnosis Date    Acquired hypothyroidism 3/15/2016    Allergic rhinitis     pollen, dust ragweed, hay and straw     Anxiety     Asthma     Bipolar affect, depressed (HCC)     Bipolar disorder ([address]     Breast cancer ([address]     Invasive ductal cancer right breast    Cancer ([address]     thyroid cancer     Chronic back pain     COPD (chronic obstructive pulmonary disease) ([address]     Depression     Diabetes mellitus ([address]     Emphysema of lung ([address]     Essential layer exposed ([facility] - Primary    Relevant Orders    Initiate Outpatient Wound Care Protocol           PAST SURGICAL HISTORY    Past Surgical History:   Procedure Laterality Date    APPENDECTOMY      BREAST BIOPSY Right 2018     SECTION      CHOLECYSTECTOMY      COLONOSCOPY N/A 2019    COLONOSCOPY DIAGNOSTIC performed by Olu Branham MD at [address] Right 2019    LAPAROSCOPIC RIGHT COLECTOMY, LYSIS OF ADHESIONS. performed by Isak Sharma MD at [address] Right 2019    Simple mastectomy with sentinel node biopsy    TUBAL LIGATION         FAMILY HISTORY    Family History   Problem Relation Age of Onset    Diabetes Father     Heart Disease Father     Thyroid Cancer Sister     Thyroid Cancer Brother     Thyroid Cancer Sister     No Known Problems Daughter        SOCIAL HISTORY    Social History     Tobacco Use    Smoking status: Former Smoker     Packs/day: 1.00     Years: 40.00     Pack years: 40.00     Types: Cigarettes     Start date: 3/8/1976     Quit date: 2018     Years since quittin.8    Smokeless tobacco: Never Used   Substance Use Topics    Alcohol use: No     Alcohol/week: 0.0 standard drinks    Drug use: No       ALLERGIES    Allergies   Allergen Reactions    Latex Itching and Rash    Sulfa Antibiotics Hives    Bactrim [Sulfamethoxazole-Trimethoprim] Hives and Other (See Comments)     Metallic taste    Nicotine Hives     Hives from the patch       MEDICATIONS    Current Outpatient Medications on File Prior to Encounter   Medication Sig Dispense Refill    gentamicin (GARAMYCIN) 0.1 % cream APPLY TO AFFECTED AREA ON LEG DAILY BEFORE DRESSING CHANGE 15 g 1    meclizine (ANTIVERT) 25 MG tablet Take by mouth      fluticasone-umeclidin-vilant (TRELEGY ELLIPTA) 100-62.5-25 MCG/INH AEPB Inhale 1 puff into the lungs daily 1 each 3    VORTIoxetine (TRINTELLIX) 10 MG TABS tablet Take 10 mg by mouth      furosemide (LASIX) 40 MG tablet TAKE 1 TABLET BY MOUTH EVERY DAY 90 tablet 1    albuterol sulfate HFA (VENTOLIN HFA) 108 (90 Base) MCG/ACT inhaler Inhale 2 puffs into the lungs every 6 hours as needed for Wheezing 1 Inhaler 5    oxybutynin (DITROPAN-XL) 10 MG extended release tablet Take 10 mg by mouth daily      simvastatin (ZOCOR) 40 MG tablet TAKE 1 TABLET BY MOUTH EVERY EVENING 90 tablet 1    losartan (COZAAR) 25 MG tablet Take 1 tablet by mouth daily 90 tablet 1    levothyroxine (SYNTHROID) 175 MCG tablet Take 1 tablet by mouth Daily 90 tablet 1    potassium chloride (KLOR-CON M) 10 MEQ extended release tablet Take 1 tablet by mouth daily 90 tablet 1    metFORMIN (GLUCOPHAGE XR) 500 MG extended release tablet Take 2 tablets by mouth daily (with breakfast) 180 tablet 1    ARIPiprazole (ABILIFY) 10 MG tablet TAKE 1 TABLET BY MOUTH EVERY DAY IN THE EVENING AS DIRECTED  3    Multiple Vitamin (MULTIVITAMIN) tablet Take 1 tablet by mouth daily      Multiple Vitamins-Minerals (ICAPS AREDS 2 PO) Take 1 tablet by mouth daily      vitamin D (CHOLECALCIFEROL) 1000 UNIT TABS tablet Take 1,000 Units by mouth daily      anastrozole (ARIMIDEX) 1 MG tablet Take 1 mg by mouth daily      ACCU-CHEK SMARTVIEW strip Test TID E11.8 300 each 3    ipratropium-albuterol (DUONEB) 0.5-2.5 (3) MG/3ML SOLN nebulizer solution 3 mLs      SOFT TOUCH LANCETS MISC Use tid as directed dx: E11.9 300 each 3    hydrOXYzine (VISTARIL) 25 MG capsule Take 25 mg by mouth 3 times daily as needed       DULoxetine (CYMBALTA) 60 MG extended release capsule Take 60 mg by mouth daily        No current facility-administered medications on file prior to encounter. REVIEW OF SYSTEMS    Pertinent items are noted in HPI.     Objective:      /69   Pulse 74   Temp 96.7 °F (35.9 °C) (Temporal)   Resp 20     Wt Readings from Last 3 Encounters:   01/04/21 (!) 350 lb (158.8 kg)   03/12/20 (!) 301 lb (136.5 kg)   02/19/20 (!) 304 lb (137.9 kg) 3987   Wound Width (cm) 0.7 cm 04/29/21 0947   Wound Depth (cm) 0.1 cm 04/29/21 0947   Wound Surface Area (cm^2) 6.3 cm^2 04/29/21 0947   Change in Wound Size % (l*w) -110 04/29/21 0947   Wound Volume (cm^3) 0.63 cm^3 04/29/21 0947   Wound Healing % -110 04/29/21 0947   Wound Assessment Epithelialization;Granulation tissue 04/29/21 0947   Drainage Amount Small 04/29/21 0947   Drainage Description Serous;Clear 04/29/21 0947   Odor None 04/29/21 0947   Tamara-wound Assessment Intact 04/29/21 0947   Margins Defined edges 04/29/21 0947   Wound Thickness Description not for Pressure Injury Full thickness 04/29/21 0947   Number of days: 84       Wound 03/25/21 Abdomen Lower #3 (Active)   Wound Image   04/29/21 0947   Wound Etiology Traumatic 04/29/21 0947   Wound Cleansed Cleansed with saline 04/29/21 0947   Dressing/Treatment Cutimed/Sorbact 04/29/21 1026   Wound Length (cm) 1.1 cm 04/29/21 0947   Wound Width (cm) 0.4 cm 04/29/21 0947   Wound Depth (cm) 0.1 cm 04/29/21 0947   Wound Surface Area (cm^2) 0.44 cm^2 04/29/21 0947   Change in Wound Size % (l*w) 85.33 04/29/21 0947   Wound Volume (cm^3) 0.04 cm^3 04/29/21 0947   Wound Healing % 87 04/29/21 0947   Wound Assessment Epithelialization 04/29/21 0947   Drainage Amount Scant 04/29/21 0947   Drainage Description Sanguinous 04/29/21 0947   Odor None 04/29/21 0947   Tamara-wound Assessment Intact 04/29/21 0947   Margins Undefined edges 04/29/21 0947   Wound Thickness Description not for Pressure Injury Full thickness 04/29/21 0947   Number of days: 35       Wound 04/22/21 Leg Right; Lower; Lateral #4 (Active)   Wound Image   04/29/21 0947   Wound Etiology Venous 04/29/21 0947   Wound Cleansed Soap and water;Cleansed with saline 04/29/21 0947   Dressing/Treatment Hydrofera blue 04/22/21 1048   Wound Length (cm) 0 cm 04/29/21 0947   Wound Width (cm) 0 cm 04/29/21 0947   Wound Depth (cm) 0 cm 04/29/21 0947   Wound Surface Area (cm^2) 0 cm^2 04/29/21 0947   Change in Wound Size % (l*w) 100 04/29/21 0947   Wound Volume (cm^3) 0 cm^3 04/29/21 0947   Wound Healing % 100 04/29/21 0947   Wound Assessment Epithelialization;Granulation tissue 04/22/21 1020   Drainage Amount Small 04/22/21 1020   Drainage Description Serosanguinous 04/22/21 1020   Odor None 04/22/21 1020   Tamara-wound Assessment Fragile 04/22/21 1020   Margins Undefined edges 04/22/21 1020   Wound Thickness Description not for Pressure Injury Full thickness 04/22/21 1020   Number of days: 7         Plan:     Continue tx plan as below. Recheck in one week. Treatment Note please see attached Discharge Instructions    Written patient dismissal instructions given to patient and signed by patient or POA. Discharge Instructions         Discharge [address] [facility]   Physician Orders and Discharge [address]  Telephone: [phone]      [phone]        NAME: Robert Negrete  DATE OF BIRTH:  1955  MEDICAL RECORD NUMBER:  [id number]     Home Care/Facility: [address] [place] [address] Rd (order dressings as needed)      Wound Location:  Abdomen x 2     Dressing orders:  Cleanse with saline and dry.  Cutimed siltac sorbact.  Change 3 times weekly. Skin prep to periwound before applying dessings      Wound Location:  Right leg     Dressing orders:  Zinc unna boot and coban or ace wrap. Change twice weekly     Compression: Apply Spandagrip to left(10-20) med lower leg, apply first thing in the morning, remove at bedtime, elevate legs as much as possible during the day.       Offloading Device:     Other Instructions: Cast cover for showering     Keep all dressings clean, dry and intact.  Keep pressure off the wound(s) at all times.      Follow up visit  1 Weeks   May 6 ,  2021 @  9:00     Please give 24 hour notice if unable to keep appointment.  [phone]     If you experience any of the following, please call the Wound Care Service at  [phone] or go to the nearest emergency room.        *Increase in pain         *Temperature over 101           *Increase in drainage from your wound or a foul odor  *Uncontrolled swelling            *Need for compression bandage changes due to slippage, breakthrough drainage     PLEASE NOTE: IF YOU ARE UNABLE TO OBTAIN WOUND SUPPLIES, CONTINUE TO USE THE SUPPLIES YOU HAVE AVAILABLE UNTIL YOU ARE ABLE TO [facility].  IT IS MOST IMPORTANT TO KEEP THE WOUND COVERED  Electronically signed by MARIETTA Galo NP on 4/29/2021 at 12:03 PM        Electronically signed by MARIETTA Galo NP on 4/29/2021 at 12:05 PM

## 2021-04-29 NOTE — PLAN OF CARE
Problem: Blood Glucose:  Goal: Ability to maintain appropriate glucose levels will improve  Outcome: Ongoing

## 2021-04-29 NOTE — FLOWSHEET NOTE
[facility] Application   Below Knee    NAME:  Carlton Mayorga  YOB: 1955  MEDICAL RECORD NUMBER:  [id number]  DATE:  4/29/2021    Desire Stair boot: Applied Unna roll from toes to knee overlapping each time. Applied ace wrap or coban from toes to below the knee. Secured with tape and/or metal clips covered with tape. Instructed patient/caregiver to keep dressing dry and intact. DO NOT REMOVE DRESSING. Instructed pt/family/caregiver to report excessive draining, loose bandage, wet dressing, severe pain or tingling in toes. Applied [facility] dressing below the knee to right lower leg. Unna Boot(s) were applied per  Guidelines.      Electronically signed by Farhad Holliday RN on 4/29/2021 at 10:28 AM

## 2021-05-20 ENCOUNTER — HOSPITAL ENCOUNTER (OUTPATIENT)
Dept: WOUND CARE | Age: 66
Discharge: HOME | End: 2021-05-20
Payer: MEDICARE

## 2021-05-20 VITALS
RESPIRATION RATE: 26 BRPM | HEART RATE: 80 BPM | DIASTOLIC BLOOD PRESSURE: 71 MMHG | TEMPERATURE: 97 F | SYSTOLIC BLOOD PRESSURE: 149 MMHG

## 2021-05-20 DIAGNOSIS — L97.912 ULCER OF RIGHT LOWER EXTREMITY WITH FAT LAYER EXPOSED (HCC): Primary | ICD-10-CM

## 2021-05-20 DIAGNOSIS — I87.2 CHRONIC VENOUS INSUFFICIENCY OF LOWER EXTREMITY: ICD-10-CM

## 2021-05-20 DIAGNOSIS — L98.499: ICD-10-CM

## 2021-05-20 PROCEDURE — 99213 OFFICE O/P EST LOW 20 MIN: CPT | Performed by: NURSE PRACTITIONER

## 2021-05-20 PROCEDURE — 99213 OFFICE O/P EST LOW 20 MIN: CPT

## 2021-05-20 PROCEDURE — 29581 APPL MULTLAYER CMPRN SYS LEG: CPT

## 2021-05-20 RX ORDER — BACITRACIN, NEOMYCIN, POLYMYXIN B 400; 3.5; 5 [USP'U]/G; MG/G; [USP'U]/G
OINTMENT TOPICAL ONCE
Status: CANCELLED | OUTPATIENT
Start: 2021-05-20 | End: 2021-05-20

## 2021-05-20 RX ORDER — LIDOCAINE 50 MG/G
OINTMENT TOPICAL ONCE
Status: CANCELLED | OUTPATIENT
Start: 2021-05-20 | End: 2021-05-20

## 2021-05-20 RX ORDER — BACITRACIN ZINC AND POLYMYXIN B SULFATE 500; 1000 [USP'U]/G; [USP'U]/G
OINTMENT TOPICAL ONCE
Status: CANCELLED | OUTPATIENT
Start: 2021-05-20 | End: 2021-05-20

## 2021-05-20 RX ORDER — LIDOCAINE HYDROCHLORIDE 40 MG/ML
SOLUTION TOPICAL ONCE
Status: CANCELLED | OUTPATIENT
Start: 2021-05-20 | End: 2021-05-20

## 2021-05-20 RX ORDER — LIDOCAINE HYDROCHLORIDE 20 MG/ML
JELLY TOPICAL ONCE
Status: CANCELLED | OUTPATIENT
Start: 2021-05-20 | End: 2021-05-20

## 2021-05-20 RX ORDER — BETAMETHASONE DIPROPIONATE 0.05 %
OINTMENT (GRAM) TOPICAL ONCE
Status: CANCELLED | OUTPATIENT
Start: 2021-05-20 | End: 2021-05-20

## 2021-05-20 RX ORDER — GINSENG 100 MG
CAPSULE ORAL ONCE
Status: CANCELLED | OUTPATIENT
Start: 2021-05-20 | End: 2021-05-20

## 2021-05-20 RX ORDER — CLOBETASOL PROPIONATE 0.5 MG/G
OINTMENT TOPICAL ONCE
Status: CANCELLED | OUTPATIENT
Start: 2021-05-20 | End: 2021-05-20

## 2021-05-20 RX ORDER — GENTAMICIN SULFATE 1 MG/G
OINTMENT TOPICAL ONCE
Status: CANCELLED | OUTPATIENT
Start: 2021-05-20 | End: 2021-05-20

## 2021-05-20 RX ORDER — LIDOCAINE 40 MG/G
CREAM TOPICAL ONCE
Status: CANCELLED | OUTPATIENT
Start: 2021-05-20 | End: 2021-05-20

## 2021-05-20 RX ORDER — NYSTATIN 100000 [USP'U]/G
POWDER TOPICAL
Qty: 1 BOTTLE | Refills: 1 | Status: SHIPPED | OUTPATIENT
Start: 2021-05-20 | End: 2021-08-05

## 2021-05-20 NOTE — FLOWSHEET NOTE
[facility] Application   Below Knee    NAME:  Savanah Sharif  YOB: 1955  MEDICAL RECORD NUMBER:  [id number]  DATE:  5/20/2021    Iyanbito Peeling boot: Appied primary and secondary dressing as ordered. Applied Unna roll from toes to knee overlapping each time. Applied ace wrap or coban from toes to below the knee. Instructed patient/caregiver to keep dressing dry and intact. DO NOT REMOVE DRESSING. Instructed pt/family/caregiver to report excessive draining, loose bandage, wet dressing, severe pain or tingling in toes. Applied [facility] dressing below the knee to right lower leg. Unna Boot(s) were applied per  Guidelines.      Electronically signed by Tran Sparrow RN on 5/20/2021 at 10:44 AM

## 2021-05-20 NOTE — PROGRESS NOTES
Anna Watkins 37   Progress Note and Procedure Note      [address] RECORD NUMBER:  [id number]  AGE: 72 y.o. GENDER: female  : 1955  EPISODE DATE:  2021    Subjective:     No chief complaint on file. HISTORY of PRESENT ILLNESS HPI     Pretty Cooper is a 72 y.o. female who presents today for wound/ulcer evaluation. History of Wound Context:recheck on chronic non healing venous insufficiency stasis ulcerations right lower leg. Today erythema is much improved, one remaining area proximally of open wound/full thickness. Patient in agreement that she benefits from not being able to reach the wounds and okay with 2 more weeks of unna boot until fully healed. Abdomen wounds x 2, chronically picks at prior healed operative scar reopening- midline area almost healed, improved, with new area near umbilicus. Generalized red rash-fungal satellite type lesions may be increasing pruritis- will add nystatin powder. Edema much improved today.    Wound/Ulcer Pain Timing/Severity: intermittent  Quality of pain: N/A  Severity:  0 / 10   Modifying Factors: None  Associated Signs/Symptoms: generalized itching     Ulcer Identification:  Ulcer Type: behavioral component-patient tends to pick at any open areas when stressed   Contributing Factors: edema, venous stasis, diabetes, poor glucose control, obesity and decreased tissue oxygenation    Wound: N/A        PAST MEDICAL HISTORY        Diagnosis Date    Acquired hypothyroidism 3/15/2016    Allergic rhinitis     pollen, dust ragweed, hay and straw     Anxiety     Asthma     Bipolar affect, depressed ([address]     Bipolar disorder ([address]     Breast cancer ([address]     Invasive ductal cancer right breast    Cancer ([address]     thyroid cancer     Chronic back pain     COPD (chronic obstructive pulmonary disease) ([address]     Depression     Diabetes mellitus ([address]     Emphysema of lung ([address]     Essential hypertension 3/15/2016    History of blood transfusion     Feb 4 2019    Hyperlipidemia     Hypothyroidism     Obesity     On home O2     3L NC at night    YARED (obstructive sleep apnea)     3 L NC    Osteoarthritis     Restless legs syndrome     Sleep apnea     Urinary incontinence        Problem List:    Patient Active Problem List   Diagnosis    Type 2 diabetes mellitus with complication ([address]    Acquired hypothyroidism    Essential hypertension    Mixed hyperlipidemia    COPD with chronic bronchitis (HCC)    Microalbuminuria    Moderate episode of recurrent major depressive disorder (HCC)    YARED (obstructive sleep apnea)    Restless leg syndrome    Vitamin B12 deficiency    Vitamin D deficiency    Left ventricular hypertrophy    Left ventricular diastolic dysfunction    Morbid obesity with BMI of 45.0-49.9, adult (HCC)    Breast carcinoma, female, right (HCC)    Iron deficiency anemia secondary to inadequate dietary iron intake    Breast cancer of lower-inner quadrant of right female breast (HCC)    Postoperative anemia    Malignant neoplasm of central portion of right breast in female, estrogen receptor positive (HCC)    Iron deficiency anemia due to chronic blood loss    Non-pressure chronic ulcer of other part of right lower leg with fat layer exposed ([address]    Chronic venous insufficiency of lower extremity    Adenomatous polyp of ascending colon    Adenomatous polyp of sigmoid colon    Dysplastic colon polyp    Cellulitis of right leg    Ulcer of abdomen wall, with unspecified severity ([address]    Ulcer of right lower extremity with fat layer exposed ([address]      Problem List Items Addressed This Visit     Chronic venous insufficiency of lower extremity    Ulcer of abdomen wall, with unspecified severity ([address]    Ulcer of right lower extremity with fat layer exposed ([address]           PAST SURGICAL HISTORY    Past Surgical History:   Procedure Laterality Date    APPENDECTOMY      BREAST BIOPSY Right hours as needed for Wheezing 1 Inhaler 5    oxybutynin (DITROPAN-XL) 10 MG extended release tablet Take 10 mg by mouth daily      simvastatin (ZOCOR) 40 MG tablet TAKE 1 TABLET BY MOUTH EVERY EVENING 90 tablet 1    losartan (COZAAR) 25 MG tablet Take 1 tablet by mouth daily 90 tablet 1    levothyroxine (SYNTHROID) 175 MCG tablet Take 1 tablet by mouth Daily 90 tablet 1    potassium chloride (KLOR-CON M) 10 MEQ extended release tablet Take 1 tablet by mouth daily 90 tablet 1    metFORMIN (GLUCOPHAGE XR) 500 MG extended release tablet Take 2 tablets by mouth daily (with breakfast) 180 tablet 1    ARIPiprazole (ABILIFY) 10 MG tablet TAKE 1 TABLET BY MOUTH EVERY DAY IN THE EVENING AS DIRECTED  3    Multiple Vitamin (MULTIVITAMIN) tablet Take 1 tablet by mouth daily      Multiple Vitamins-Minerals (ICAPS AREDS 2 PO) Take 1 tablet by mouth daily      vitamin D (CHOLECALCIFEROL) 1000 UNIT TABS tablet Take 1,000 Units by mouth daily      anastrozole (ARIMIDEX) 1 MG tablet Take 1 mg by mouth daily      ACCU-CHEK SMARTVIEW strip Test TID E11.8 300 each 3    ipratropium-albuterol (DUONEB) 0.5-2.5 (3) MG/3ML SOLN nebulizer solution 3 mLs      SOFT TOUCH LANCETS MISC Use tid as directed dx: E11.9 300 each 3    hydrOXYzine (VISTARIL) 25 MG capsule Take 25 mg by mouth 3 times daily as needed       DULoxetine (CYMBALTA) 60 MG extended release capsule Take 60 mg by mouth daily        No current facility-administered medications on file prior to encounter. REVIEW OF SYSTEMS    Pertinent items are noted in HPI. Objective:      BP (!) 149/71   Pulse 80   Temp 97 °F (36.1 °C) (Temporal)   Resp 26     Wt Readings from Last 3 Encounters:   01/04/21 (!) 350 lb (158.8 kg)   03/12/20 (!) 301 lb (136.5 kg)   02/19/20 (!) 304 lb (137.9 kg)       PHYSICAL EXAM    Constitutional:   Well nourished and well developed. Appears neat and clean. Patient is alert, oriented x3, and in no apparent distress.      Respiratory: Respiratory effort is easy and symmetric bilaterally. Rate is normal at rest and on room air. Vascular:  Pedal Pulses is palpable and audible signal noted with doppler. Capillary refill is <5 sec to digits bilateral.  Extremities negative for pitting edema today, improved since last visit. Neurological:  Gross and Light touch intact. Protective sensation intact. Dermatological:  Wound description noted in wound assessment. Abdominal wounds showing some epithelialization, with improved dimensions. Has new area near umbilicus, will add sorbact to area. Some generalized fungal rash noted, may be contributing to patients picking. Will add Nystatin powder. Right leg edema and erythema improved, one open area remaining. Discussed with patient and in agreement to use unna boot for 2 more weeks since it has helped reduce her access to the open wounds and improves her healing. Psychiatric:  Judgement and insight intact. Short and long term memory intact. No evidence of depression, anxiety, or agitation. Patient is calm, cooperative, and communicative. Appropriate interactions and affect. Assessment:      Problem List Items Addressed This Visit     Chronic venous insufficiency of lower extremity    Ulcer of abdomen wall, with unspecified severity (HCC)    Ulcer of right lower extremity with fat layer exposed ([address]           Procedure Note  Indications:  Based on my examination of this patient's wound(s)/ulcer(s) today, debridement is not required to promote healing and evaluate the wound base.     Wound 02/04/21 Abdomen #2  (Active)   Wound Image   05/20/21 0908   Wound Etiology Traumatic 04/29/21 0947   Wound Cleansed Cleansed with saline 04/29/21 0947   Dressing/Treatment Cutimed/Sorbact 04/29/21 1026   Wound Length (cm) 9 cm 05/20/21 0908   Wound Width (cm) 0.8 cm 05/20/21 0908   Wound Depth (cm) 0.1 cm 05/20/21 0908   Wound Surface Area (cm^2) 7.2 cm^2 05/20/21 0908   Change in Wound Size % (l*w) 04/22/21 1020   Drainage Description Other (Comment) 05/20/21 0908   Odor None 05/20/21 0908   Candy-wound Assessment Intact 05/20/21 0908   Margins Undefined edges 04/22/21 1020   Wound Thickness Description not for Pressure Injury Full thickness 04/22/21 1020   Number of days: 27       Incision 05/20/21 Abdomen Other (Comment) (Active)   Wound Image   05/20/21 0908   Incision Length (cm) 1.5 05/20/21 0908   Incision Width (cm) 1.5 cm 05/20/21 0908   Incision Depth (cm) 0.1 cm 05/20/21 0908   Drainage Amount Small 05/20/21 0908   Drainage Description Serosanguinous 05/20/21 0908   Odor None 05/20/21 0908   Candy-incision Assessment Fragile 05/20/21 0908   Number of days: 0             Plan:     Recheck in 2 weeks. May apply Nystatin daily to abdomen- do not apply to open areas, only rash. Continue reduced sodium diet and elevate legs whenever possible. Treatment Note please see attached Discharge Instructions    Written patient dismissal instructions given to patient and signed by patient or POA. Discharge [address] and [facility] Medicine   Physician Orders and Discharge [address]  Telephone: [phone]      [phone]        NAME: William Bonner  DATE OF BIRTH:  1955  MEDICAL RECORD NUMBER:  [id number]     Home Care/Facility: [address] [place] [address] (order dressings as needed)      Wound Location:  Abdomen x 3   Dressing orders:    1. Cleanse with saline and dry.    2. Cutimed siltac sorbact. 3.Change 3 times weekly.    * Apply Antifungal powder to candy wounds daily. Do not get in the wounds.       Wound Location:  Right leg   1. Cleanse wound(s) with normal saline. 2. Apply dry SILVERCEL OR CALCIUM ALGINATE WITH Ag or eqivalent to wound bed. 3.Cover with zinc unna boot and coban wrap.    4. Change  Every other day or Monday, Wednesday, and Friday       Compression: Apply Spandagrip to left(10-20) med lower leg, apply first thing in the morning, remove at bedtime, elevate legs as much as possible during the day.       Offloading Device:     Other Instructions:  Keep blood sugar within normal limits to help promote wound healing. Cast cover for showering.     Keep all dressings clean, dry and intact.  Keep pressure off the wound(s) at all times.      Follow up visit 2 weeks on Alissa 3 ,  2021 @  10:00 am      Please give 24 hour notice if unable to keep appointment. [phone]     If you experience any of the following, please call the Wound Care Service at  [phone] or go to the nearest emergency room.        *Increase in pain         *Temperature over 101           *Increase in drainage from your wound or a foul odor  *Uncontrolled swelling            *Need for compression bandage changes due to slippage, breakthrough drainage     PLEASE NOTE: IF YOU ARE UNABLE TO OBTAIN WOUND SUPPLIES, CONTINUE TO USE THE SUPPLIES YOU HAVE AVAILABLE UNTIL YOU ARE ABLE TO [facility].  IT IS MOST IMPORTANT TO KEEP THE WOUND COVERED  Electronically signed by MARIETTA Sewell NP on 5/20/2021 at 9:29 AM        Electronically signed by MARIETTA Sewell NP on 5/20/2021 at 9:31 AM

## 2021-06-10 ENCOUNTER — HOSPITAL ENCOUNTER (OUTPATIENT)
Dept: WOUND CARE | Age: 66
Discharge: HOME | End: 2021-06-10
Payer: MEDICARE

## 2021-06-10 VITALS
RESPIRATION RATE: 22 BRPM | TEMPERATURE: 97.1 F | SYSTOLIC BLOOD PRESSURE: 136 MMHG | HEART RATE: 73 BPM | DIASTOLIC BLOOD PRESSURE: 59 MMHG

## 2021-06-10 DIAGNOSIS — L98.499: ICD-10-CM

## 2021-06-10 DIAGNOSIS — L97.912 ULCER OF RIGHT LOWER EXTREMITY WITH FAT LAYER EXPOSED (HCC): Primary | ICD-10-CM

## 2021-06-10 DIAGNOSIS — I87.2 CHRONIC VENOUS INSUFFICIENCY OF LOWER EXTREMITY: ICD-10-CM

## 2021-06-10 PROCEDURE — 99213 OFFICE O/P EST LOW 20 MIN: CPT | Performed by: NURSE PRACTITIONER

## 2021-06-10 PROCEDURE — 99213 OFFICE O/P EST LOW 20 MIN: CPT

## 2021-06-10 RX ORDER — BACITRACIN, NEOMYCIN, POLYMYXIN B 400; 3.5; 5 [USP'U]/G; MG/G; [USP'U]/G
OINTMENT TOPICAL ONCE
Status: CANCELLED | OUTPATIENT
Start: 2021-06-10 | End: 2021-06-10

## 2021-06-10 RX ORDER — GENTAMICIN SULFATE 1 MG/G
OINTMENT TOPICAL ONCE
Status: CANCELLED | OUTPATIENT
Start: 2021-06-10 | End: 2021-06-10

## 2021-06-10 RX ORDER — BACITRACIN ZINC AND POLYMYXIN B SULFATE 500; 1000 [USP'U]/G; [USP'U]/G
OINTMENT TOPICAL ONCE
Status: CANCELLED | OUTPATIENT
Start: 2021-06-10 | End: 2021-06-10

## 2021-06-10 RX ORDER — LIDOCAINE 50 MG/G
OINTMENT TOPICAL ONCE
Status: CANCELLED | OUTPATIENT
Start: 2021-06-10 | End: 2021-06-10

## 2021-06-10 RX ORDER — LIDOCAINE HYDROCHLORIDE 40 MG/ML
SOLUTION TOPICAL ONCE
Status: CANCELLED | OUTPATIENT
Start: 2021-06-10 | End: 2021-06-10

## 2021-06-10 RX ORDER — BETAMETHASONE DIPROPIONATE 0.05 %
OINTMENT (GRAM) TOPICAL ONCE
Status: CANCELLED | OUTPATIENT
Start: 2021-06-10 | End: 2021-06-10

## 2021-06-10 RX ORDER — LIDOCAINE HYDROCHLORIDE 20 MG/ML
JELLY TOPICAL ONCE
Status: CANCELLED | OUTPATIENT
Start: 2021-06-10 | End: 2021-06-10

## 2021-06-10 RX ORDER — LIDOCAINE 40 MG/G
CREAM TOPICAL ONCE
Status: CANCELLED | OUTPATIENT
Start: 2021-06-10 | End: 2021-06-10

## 2021-06-10 RX ORDER — GINSENG 100 MG
CAPSULE ORAL ONCE
Status: CANCELLED | OUTPATIENT
Start: 2021-06-10 | End: 2021-06-10

## 2021-06-10 RX ORDER — CLOBETASOL PROPIONATE 0.5 MG/G
OINTMENT TOPICAL ONCE
Status: CANCELLED | OUTPATIENT
Start: 2021-06-10 | End: 2021-06-10

## 2021-06-10 RX ORDER — GENTAMICIN SULFATE 1 MG/G
OINTMENT TOPICAL ONCE
Status: DISCONTINUED | OUTPATIENT
Start: 2021-06-10 | End: 2021-06-11 | Stop reason: HOSPADM

## 2021-06-10 NOTE — PROGRESS NOTES
[facility]   Progress Note and Procedure Note      [address] [place] RECORD NUMBER:  [id number]  AGE: 72 y.o. GENDER: female  : 1955  EPISODE DATE:  6/10/2021    Subjective:     Chief Complaint   Patient presents with    Wound Check     abdominal wounds         HISTORY of PRESENT ILLNESS HPI     Magnolia Santana is a 72 y.o. female who presents today for wound/ulcer evaluation. History of Wound Context: Recheck for chronic venous insufficiency of bilateral lower extremities. Non-healing abdominal wound from prior surgery, patient picks at scar when nervous/stressed causing it to re-open, frequently. Today has 2 new areas of wounds on abdomen, reporting \"it itches\". No erythema, no satellite lesions, original scar with some epithelialization noted. Right leg wounds are healed- will apply spandagrip only.    Wound/Ulcer Pain Timing/Severity: intermittent  Quality of pain: tender, and itches   Severity:  1 / 10   Modifying Factors: Pain worsens with care and subsides after completed  Associated Signs/Symptoms: drainage and itching    Ulcer Identification:  Ulcer Type: traumatic (to abdomen)  Contributing Factors: diabetes, obesity, decreased tissue oxygenation and picks at abdomen when stressed/nervous causing new wounds and re-opening of healed wounds     Wound: N/A        PAST MEDICAL HISTORY        Diagnosis Date    Acquired hypothyroidism 3/15/2016    Allergic rhinitis     pollen, dust ragweed, hay and straw     Anxiety     Asthma     Bipolar affect, depressed ([address]     Bipolar disorder ([address]     Breast cancer ([address]     Invasive ductal cancer right breast    Cancer ([address] 1980    thyroid cancer     Chronic back pain     COPD (chronic obstructive pulmonary disease) ([address]     Depression     Diabetes mellitus ([address]     Emphysema of lung ([address]     Essential hypertension 3/15/2016    History of blood transfusion     2019    Hyperlipidemia     Protocol    Ulcer of right lower extremity with fat layer exposed ([facility] - Primary    Relevant Medications    gentamicin (GARAMYCIN) 0.1 % ointment    Other Relevant Orders    Initiate Outpatient Wound Care Protocol           PAST SURGICAL HISTORY    Past Surgical History:   Procedure Laterality Date    APPENDECTOMY      BREAST BIOPSY Right 2018     SECTION      CHOLECYSTECTOMY      COLONOSCOPY N/A 2019    COLONOSCOPY DIAGNOSTIC performed by Trent Mahajan MD at [address] Right 2019    LAPAROSCOPIC RIGHT COLECTOMY, LYSIS OF ADHESIONS. performed by Francois Rainey MD at [address] Right 2019    Simple mastectomy with sentinel node biopsy    TUBAL LIGATION         FAMILY HISTORY    Family History   Problem Relation Age of Onset    Diabetes Father     Heart Disease Father     Thyroid Cancer Sister     Thyroid Cancer Brother     Thyroid Cancer Sister     No Known Problems Daughter        SOCIAL HISTORY    Social History     Tobacco Use    Smoking status: Former Smoker     Packs/day: 1.00     Years: 40.00     Pack years: 40.00     Types: Cigarettes     Start date: 3/8/1976     Quit date: 2018     Years since quittin.9    Smokeless tobacco: Never Used   Vaping Use    Vaping Use: Never assessed   Substance Use Topics    Alcohol use: No     Alcohol/week: 0.0 standard drinks    Drug use: No       ALLERGIES    Allergies   Allergen Reactions    Latex Itching and Rash    Sulfa Antibiotics Hives    Bactrim [Sulfamethoxazole-Trimethoprim] Hives and Other (See Comments)     Metallic taste    Nicotine Hives     Hives from the patch       MEDICATIONS    Current Outpatient Medications on File Prior to Encounter   Medication Sig Dispense Refill    nystatin (MYCOSTATIN) 439170 UNIT/GM powder Apply once daily to rash on abdomen, do not get into open wound areas.  1 Bottle 1    gentamicin (GARAMYCIN) 0.1 % cream APPLY TO AFFECTED AREA ON LEG DAILY BEFORE DRESSING CHANGE 15 g 1    meclizine (ANTIVERT) 25 MG tablet Take by mouth      fluticasone-umeclidin-vilant (TRELEGY ELLIPTA) 100-62.5-25 MCG/INH AEPB Inhale 1 puff into the lungs daily 1 each 3    VORTIoxetine (TRINTELLIX) 10 MG TABS tablet Take 10 mg by mouth      furosemide (LASIX) 40 MG tablet TAKE 1 TABLET BY MOUTH EVERY DAY 90 tablet 1    albuterol sulfate HFA (VENTOLIN HFA) 108 (90 Base) MCG/ACT inhaler Inhale 2 puffs into the lungs every 6 hours as needed for Wheezing 1 Inhaler 5    oxybutynin (DITROPAN-XL) 10 MG extended release tablet Take 10 mg by mouth daily      simvastatin (ZOCOR) 40 MG tablet TAKE 1 TABLET BY MOUTH EVERY EVENING 90 tablet 1    losartan (COZAAR) 25 MG tablet Take 1 tablet by mouth daily 90 tablet 1    levothyroxine (SYNTHROID) 175 MCG tablet Take 1 tablet by mouth Daily 90 tablet 1    potassium chloride (KLOR-CON M) 10 MEQ extended release tablet Take 1 tablet by mouth daily 90 tablet 1    metFORMIN (GLUCOPHAGE XR) 500 MG extended release tablet Take 2 tablets by mouth daily (with breakfast) 180 tablet 1    ARIPiprazole (ABILIFY) 10 MG tablet TAKE 1 TABLET BY MOUTH EVERY DAY IN THE EVENING AS DIRECTED  3    Multiple Vitamin (MULTIVITAMIN) tablet Take 1 tablet by mouth daily      Multiple Vitamins-Minerals (ICAPS AREDS 2 PO) Take 1 tablet by mouth daily      vitamin D (CHOLECALCIFEROL) 1000 UNIT TABS tablet Take 1,000 Units by mouth daily      anastrozole (ARIMIDEX) 1 MG tablet Take 1 mg by mouth daily      ACCU-CHEK SMARTVIEW strip Test TID E11.8 300 each 3    ipratropium-albuterol (DUONEB) 0.5-2.5 (3) MG/3ML SOLN nebulizer solution 3 mLs      SOFT TOUCH LANCETS MISC Use tid as directed dx: E11.9 300 each 3    hydrOXYzine (VISTARIL) 25 MG capsule Take 25 mg by mouth 3 times daily as needed       DULoxetine (CYMBALTA) 60 MG extended release capsule Take 60 mg by mouth daily        No current facility-administered medications on file prior to encounter. REVIEW OF SYSTEMS    Pertinent items are noted in HPI. Objective:      BP (!) 136/59   Pulse 73   Temp 97.1 °F (36.2 °C) (Temporal)   Resp 22     Wt Readings from Last 3 Encounters:   01/04/21 (!) 350 lb (158.8 kg)   03/12/20 (!) 301 lb (136.5 kg)   02/19/20 (!) 304 lb (137.9 kg)       PHYSICAL EXAM    Constitutional:   Well nourished and well developed. Appears neat and clean. Patient is alert, oriented x3, and in no apparent distress. Respiratory:  Respiratory effort is easy and symmetric bilaterally. Rate is normal at rest and on room air. Vascular:   Capillary refill is <5 sec to digits bilateral.  Extremities negative for pitting edema. Neurological:  Gross and Light touch intact. Dermatological:  Wound description noted in wound assessment. Right leg has several dried, scabbed healed areas. Some anterior lower leg erythema distal/lateral remains, not warm to touch. Chronic discoloration from venous insufficiency. Abdomen with 2 new open areas, not present at last appointment. Original midline scar beginning to epithelialize, no surrounding erythema, no satellite lesions. Patient complains \"it itches\". Will have her apply nystatin powder with coarse 4 x 4 after sorbact dressings on. Stop gentamicin ointment. Psychiatric:  Judgement and insight intact. Short and long term memory intact. No evidence of depression, anxiety, or agitation. Patient is calm, cooperative, and communicative. Appropriate interactions and affect.       Assessment:      Problem List Items Addressed This Visit     Chronic venous insufficiency of lower extremity    Relevant Medications    gentamicin (GARAMYCIN) 0.1 % ointment    Other Relevant Orders    Initiate Outpatient Wound Care Protocol    Ulcer of abdomen wall, with unspecified severity (HCC)    Relevant Medications    gentamicin (GARAMYCIN) 0.1 % ointment    Other Relevant Orders    Initiate Outpatient Wound Care Protocol    Ulcer of days: 21       Wound 06/10/21 Abdomen Lower; Left #6 (Active)   Wound Image   06/10/21 1021   Wound Etiology Traumatic 06/10/21 1021   Wound Cleansed Cleansed with saline 06/10/21 1021   Dressing/Treatment Cutimed/Sorbact 06/10/21 1100   Wound Length (cm) 1.2 cm 06/10/21 1021   Wound Width (cm) 0.7 cm 06/10/21 1021   Wound Depth (cm) 0.1 cm 06/10/21 1021   Wound Surface Area (cm^2) 0.84 cm^2 06/10/21 1021   Wound Volume (cm^3) 0.08 cm^3 06/10/21 1021   Wound Assessment Granulation tissue;Slough 06/10/21 1021   Drainage Amount Moderate 06/10/21 1021   Drainage Description Sanguinous 06/10/21 1021   Odor None 06/10/21 1021   Tamara-wound Assessment Fragile 06/10/21 1021   Margins Defined edges 06/10/21 1021   Wound Thickness Description not for Pressure Injury Full thickness 06/10/21 1021   Number of days: 0       Plan:     See plan of care below. Recheck in 3 weeks. Treatment Note please see attached Discharge Instructions    Written patient dismissal instructions given to patient and signed by patient or POA. Discharge Instructions         Discharge Instructions        [facility]   Physician Orders and Discharge [address]  Telephone: [phone]      [phone]        NAME: Gabe Alves  DATE OF BIRTH:  1955  MEDICAL RECORD NUMBER:  [id number]     Home Care/Facility: [address] [place] [address] Rd (order dressings as needed)      Wound Location:  Abdomen x 4   Dressing orders:    1. Cleanse with saline and dry.    2. Cutimed siltac sorbact. 3.Change 3 times weekly.    * Apply Antifungal powder to tamara wounds daily.  Do not get in the wounds.       Compression: Apply Spandagrip to both(10-20) med lower leg, apply first thing in the morning, remove at bedtime, elevate legs as much as possible during the day.       Offloading Device:     Other Instructions:

## 2021-07-01 ENCOUNTER — HOSPITAL ENCOUNTER (OUTPATIENT)
Dept: WOUND CARE | Age: 66
Discharge: HOME | End: 2021-07-01
Payer: MEDICARE

## 2021-07-01 VITALS
DIASTOLIC BLOOD PRESSURE: 60 MMHG | RESPIRATION RATE: 18 BRPM | HEART RATE: 73 BPM | SYSTOLIC BLOOD PRESSURE: 134 MMHG | TEMPERATURE: 97.1 F

## 2021-07-01 DIAGNOSIS — L98.499: ICD-10-CM

## 2021-07-01 DIAGNOSIS — L97.912 ULCER OF RIGHT LOWER EXTREMITY WITH FAT LAYER EXPOSED (HCC): Primary | ICD-10-CM

## 2021-07-01 DIAGNOSIS — I87.2 CHRONIC VENOUS INSUFFICIENCY OF LOWER EXTREMITY: ICD-10-CM

## 2021-07-01 PROCEDURE — 99214 OFFICE O/P EST MOD 30 MIN: CPT | Performed by: NURSE PRACTITIONER

## 2021-07-01 PROCEDURE — 99213 OFFICE O/P EST LOW 20 MIN: CPT

## 2021-07-01 RX ORDER — LIDOCAINE 40 MG/G
CREAM TOPICAL ONCE
Status: CANCELLED | OUTPATIENT
Start: 2021-07-01 | End: 2021-07-01

## 2021-07-01 RX ORDER — LIDOCAINE 50 MG/G
OINTMENT TOPICAL ONCE
Status: CANCELLED | OUTPATIENT
Start: 2021-07-01 | End: 2021-07-01

## 2021-07-01 RX ORDER — BACITRACIN ZINC AND POLYMYXIN B SULFATE 500; 1000 [USP'U]/G; [USP'U]/G
OINTMENT TOPICAL ONCE
Status: CANCELLED | OUTPATIENT
Start: 2021-07-01 | End: 2021-07-01

## 2021-07-01 RX ORDER — BETAMETHASONE DIPROPIONATE 0.05 %
OINTMENT (GRAM) TOPICAL ONCE
Status: CANCELLED | OUTPATIENT
Start: 2021-07-01 | End: 2021-07-01

## 2021-07-01 RX ORDER — BACITRACIN, NEOMYCIN, POLYMYXIN B 400; 3.5; 5 [USP'U]/G; MG/G; [USP'U]/G
OINTMENT TOPICAL ONCE
Status: CANCELLED | OUTPATIENT
Start: 2021-07-01 | End: 2021-07-01

## 2021-07-01 RX ORDER — GENTAMICIN SULFATE 1 MG/G
OINTMENT TOPICAL ONCE
Status: CANCELLED | OUTPATIENT
Start: 2021-07-01 | End: 2021-07-01

## 2021-07-01 RX ORDER — LIDOCAINE HYDROCHLORIDE 40 MG/ML
SOLUTION TOPICAL ONCE
Status: CANCELLED | OUTPATIENT
Start: 2021-07-01 | End: 2021-07-01

## 2021-07-01 RX ORDER — LIDOCAINE HYDROCHLORIDE 20 MG/ML
JELLY TOPICAL ONCE
Status: CANCELLED | OUTPATIENT
Start: 2021-07-01 | End: 2021-07-01

## 2021-07-01 RX ORDER — CLOBETASOL PROPIONATE 0.5 MG/G
OINTMENT TOPICAL ONCE
Status: CANCELLED | OUTPATIENT
Start: 2021-07-01 | End: 2021-07-01

## 2021-07-01 RX ORDER — GINSENG 100 MG
CAPSULE ORAL ONCE
Status: CANCELLED | OUTPATIENT
Start: 2021-07-01 | End: 2021-07-01

## 2021-07-01 RX ORDER — CLOTRIMAZOLE AND BETAMETHASONE DIPROPIONATE 10; .64 MG/G; MG/G
CREAM TOPICAL
Qty: 1 TUBE | Refills: 1 | Status: SHIPPED | OUTPATIENT
Start: 2021-07-01 | End: 2021-08-05

## 2021-07-01 NOTE — PROGRESS NOTES
Anna Watkins 37   Progress Note and Procedure Note      [address] RECORD NUMBER:  [id number]  AGE: 72 y.o. GENDER: female  : 1955  EPISODE DATE:  2021    Subjective:     Chief Complaint   Patient presents with    Wound Check     multiple areas on abdomen         HISTORY of PRESENT ILLNESS HPI      Elena Randhawa is a 72 y.o. female who presents today for wound/ulcer evaluation. History of Wound Context: Recheck for chronic venous insufficiency of bilateral lower extremities. Non-healing abdominal wound from prior surgery, patient picks at scar when nervous/stressed causing it to re-open, frequently. At last visit had 2 new areas of wounds on abdomen, reporting \"it itches\". No erythema, no satellite lesions, original scar with some epithelialization noted. Right leg wounds remain healed- abdomen wounds slightly improved, with no new ones present. Linear abrasions noted from itching.  Will order antifungal/corticosteroid topical.   Wound/Ulcer Pain Timing/Severity: intermittent  Quality of pain: tender, and itches   Severity:  1 / 10   Modifying Factors: Pain worsens with care and subsides after completed  Associated Signs/Symptoms: drainage and itching     Ulcer Identification:  Ulcer Type: traumatic (to abdomen)  Contributing Factors: diabetes, obesity, decreased tissue oxygenation and picks at abdomen when stressed/nervous causing new wounds and re-opening of healed wounds      Wound: N/A    PAST MEDICAL HISTORY        Diagnosis Date    Acquired hypothyroidism 3/15/2016    Allergic rhinitis     pollen, dust ragweed, hay and straw     Anxiety     Asthma     Bipolar affect, depressed ([address]     Bipolar disorder ([address]     Breast cancer ([address]     Invasive ductal cancer right breast    Cancer ([address]     thyroid cancer     Chronic back pain     COPD (chronic obstructive pulmonary disease) ([address]     Depression     Diabetes mellitus ([address]     Emphysema of lung [facility])     Essential hypertension 3/15/2016    History of blood transfusion     Feb 4 2019    Hyperlipidemia     Hypothyroidism     Obesity     On home O2     3L NC at night    YARED (obstructive sleep apnea)     3 L NC    Osteoarthritis     Restless legs syndrome     Sleep apnea     Urinary incontinence        Problem List:    Patient Active Problem List   Diagnosis    Type 2 diabetes mellitus with complication ([address]    Acquired hypothyroidism    Essential hypertension    Mixed hyperlipidemia    COPD with chronic bronchitis (HCC)    Microalbuminuria    Moderate episode of recurrent major depressive disorder (HCC)    YARED (obstructive sleep apnea)    Restless leg syndrome    Vitamin B12 deficiency    Vitamin D deficiency    Left ventricular hypertrophy    Left ventricular diastolic dysfunction    Morbid obesity with BMI of 45.0-49.9, adult (HCC)    Breast carcinoma, female, right (HCC)    Iron deficiency anemia secondary to inadequate dietary iron intake    Breast cancer of lower-inner quadrant of right female breast (HCC)    Postoperative anemia    Malignant neoplasm of central portion of right breast in female, estrogen receptor positive (HCC)    Iron deficiency anemia due to chronic blood loss    Non-pressure chronic ulcer of other part of right lower leg with fat layer exposed ([address]    Chronic venous insufficiency of lower extremity    Adenomatous polyp of ascending colon    Adenomatous polyp of sigmoid colon    Dysplastic colon polyp    Cellulitis of right leg    Ulcer of abdomen wall, with unspecified severity (HCC)    Ulcer of right lower extremity with fat layer exposed ([address]      Problem List Items Addressed This Visit     Chronic venous insufficiency of lower extremity    Relevant Orders    Initiate Outpatient Wound Care Protocol    Ulcer of abdomen wall, with unspecified severity ([address]    Relevant Orders    Initiate Outpatient Wound Care Protocol    Ulcer of right lower extremity with fat layer exposed ([facility] - Primary    Relevant Orders    Initiate Outpatient Wound Care Protocol           PAST SURGICAL HISTORY    Past Surgical History:   Procedure Laterality Date    APPENDECTOMY      BREAST BIOPSY Right 2018     SECTION      CHOLECYSTECTOMY      COLONOSCOPY N/A 2019    COLONOSCOPY DIAGNOSTIC performed by Brigid Patterson MD at [address] Right 2019    LAPAROSCOPIC RIGHT COLECTOMY, LYSIS OF ADHESIONS. performed by Adina Sams MD at [address] Right 2019    Simple mastectomy with sentinel node biopsy    TUBAL LIGATION         FAMILY HISTORY    Family History   Problem Relation Age of Onset    Diabetes Father     Heart Disease Father     Thyroid Cancer Sister     Thyroid Cancer Brother     Thyroid Cancer Sister     No Known Problems Daughter        SOCIAL HISTORY    Social History     Tobacco Use    Smoking status: Former Smoker     Packs/day: 1.00     Years: 40.00     Pack years: 40.00     Types: Cigarettes     Start date: 3/8/1976     Quit date: 2018     Years since quitting: 3.0    Smokeless tobacco: Never Used   Vaping Use    Vaping Use: Never assessed   Substance Use Topics    Alcohol use: No     Alcohol/week: 0.0 standard drinks    Drug use: No       ALLERGIES    Allergies   Allergen Reactions    Latex Itching and Rash    Sulfa Antibiotics Hives    Bactrim [Sulfamethoxazole-Trimethoprim] Hives and Other (See Comments)     Metallic taste    Nicotine Hives     Hives from the patch       MEDICATIONS    Current Outpatient Medications on File Prior to Encounter   Medication Sig Dispense Refill    nystatin (MYCOSTATIN) 572881 UNIT/GM powder Apply once daily to rash on abdomen, do not get into open wound areas.  1 Bottle 1    meclizine (ANTIVERT) 25 MG tablet Take by mouth      fluticasone-umeclidin-vilant (TRELEGY ELLIPTA) 100-62.5-25 MCG/INH AEPB Inhale 1 puff into the lungs daily 1 each 3    VORTIoxetine (TRINTELLIX) 10 MG TABS tablet Take 10 mg by mouth      furosemide (LASIX) 40 MG tablet TAKE 1 TABLET BY MOUTH EVERY DAY 90 tablet 1    albuterol sulfate HFA (VENTOLIN HFA) 108 (90 Base) MCG/ACT inhaler Inhale 2 puffs into the lungs every 6 hours as needed for Wheezing 1 Inhaler 5    oxybutynin (DITROPAN-XL) 10 MG extended release tablet Take 10 mg by mouth daily      simvastatin (ZOCOR) 40 MG tablet TAKE 1 TABLET BY MOUTH EVERY EVENING 90 tablet 1    losartan (COZAAR) 25 MG tablet Take 1 tablet by mouth daily 90 tablet 1    levothyroxine (SYNTHROID) 175 MCG tablet Take 1 tablet by mouth Daily 90 tablet 1    potassium chloride (KLOR-CON M) 10 MEQ extended release tablet Take 1 tablet by mouth daily 90 tablet 1    metFORMIN (GLUCOPHAGE XR) 500 MG extended release tablet Take 2 tablets by mouth daily (with breakfast) 180 tablet 1    ARIPiprazole (ABILIFY) 10 MG tablet TAKE 1 TABLET BY MOUTH EVERY DAY IN THE EVENING AS DIRECTED  3    Multiple Vitamins-Minerals (ICAPS AREDS 2 PO) Take 1 tablet by mouth daily      vitamin D (CHOLECALCIFEROL) 1000 UNIT TABS tablet Take 1,000 Units by mouth daily      anastrozole (ARIMIDEX) 1 MG tablet Take 1 mg by mouth daily      ACCU-CHEK SMARTVIEW strip Test TID E11.8 300 each 3    ipratropium-albuterol (DUONEB) 0.5-2.5 (3) MG/3ML SOLN nebulizer solution 3 mLs      SOFT TOUCH LANCETS MISC Use tid as directed dx: E11.9 300 each 3    hydrOXYzine (VISTARIL) 25 MG capsule Take 25 mg by mouth 3 times daily as needed       DULoxetine (CYMBALTA) 60 MG extended release capsule Take 60 mg by mouth daily       gentamicin (GARAMYCIN) 0.1 % cream APPLY TO AFFECTED AREA ON LEG DAILY BEFORE DRESSING CHANGE 15 g 1    Multiple Vitamin (MULTIVITAMIN) tablet Take 1 tablet by mouth daily       No current facility-administered medications on file prior to encounter. REVIEW OF SYSTEMS    Pertinent items are noted in HPI.     Objective:      /60   Pulse 73   Temp 97.1 °F (36.2 °C) (Infrared)   Resp 18     Wt Readings from Last 3 Encounters:   01/04/21 (!) 350 lb (158.8 kg)   03/12/20 (!) 301 lb (136.5 kg)   02/19/20 (!) 304 lb (137.9 kg)       PHYSICAL EXAM    Constitutional:   Well nourished and well developed. Appears neat and clean. Patient is alert, oriented x3, and in no apparent distress.      Respiratory:  Respiratory effort is easy and symmetric bilaterally. Rate is normal at rest and on room air.     Vascular:   Capillary refill is <5 sec to digits bilateral.  Extremities negative for pitting edema.     Neurological:  Gross and Light touch intact.      Dermatological:  Wound description noted in wound assessment. Right leg has generalized dry flaky skin. Some anterior lower leg erythema distal/lateral remains, not warm to touch. Abdomen has no new areas today, those present at last appointment are beginning to epithelialize with original midline wound now with healed tissue bridge across center of wound. Patient complains \"it itches\". Will change nystatin powder to mycolog cream.      Psychiatric:  Judgement and insight intact. Short and long term memory intact. No evidence of depression, anxiety, or agitation. Patient is calm, cooperative, and communicative. Appropriate interactions and affect. Assessment:      Problem List Items Addressed This Visit     Chronic venous insufficiency of lower extremity    Relevant Orders    Initiate Outpatient Wound Care Protocol    Ulcer of abdomen wall, with unspecified severity ([address]    Relevant Orders    Initiate Outpatient Wound Care Protocol    Ulcer of right lower extremity with fat layer exposed ([address] - Primary    Relevant Orders    Initiate Outpatient Wound Care Protocol           Procedure Note  Indications:  Based on my examination of this patient's wound(s)/ulcer(s) today, debridement is not required to promote healing and evaluate the wound base.     Wound 02/04/21 Abdomen #2  (Active)   Wound Image   07/01/21 0914   Wound Etiology Traumatic 07/01/21 0914   Wound Cleansed Cleansed with saline 07/01/21 0914   Dressing/Treatment Cutimed/Sorbact 07/01/21 0959   Wound Length (cm) 8 cm 07/01/21 0914   Wound Width (cm) 0.7 cm 07/01/21 0914   Wound Depth (cm) 0.1 cm 07/01/21 0914   Wound Surface Area (cm^2) 5.6 cm^2 07/01/21 0914   Change in Wound Size % (l*w) -86.67 07/01/21 0914   Wound Volume (cm^3) 0.56 cm^3 07/01/21 0914   Wound Healing % -87 07/01/21 0914   Wound Assessment Pink/red 07/01/21 0914   Drainage Amount Small 07/01/21 0914   Drainage Description Serosanguinous 07/01/21 0914   Odor None 06/10/21 1002   Tamara-wound Assessment Fragile 07/01/21 0914   Margins Defined edges 06/10/21 1002   Wound Thickness Description not for Pressure Injury Full thickness 07/01/21 0914   Number of days: 146       Wound 03/25/21 Abdomen Lower #3 (Active)   Wound Image   07/01/21 0914   Wound Etiology Traumatic 07/01/21 0914   Wound Cleansed Cleansed with saline 07/01/21 0914   Dressing/Treatment Cutimed/Sorbact 07/01/21 0959   Wound Length (cm) 0.9 cm 07/01/21 0914   Wound Width (cm) 0.8 cm 07/01/21 0914   Wound Depth (cm) 0.1 cm 07/01/21 0914   Wound Surface Area (cm^2) 0.72 cm^2 07/01/21 0914   Change in Wound Size % (l*w) 76 07/01/21 0914   Wound Volume (cm^3) 0.072 cm^3 07/01/21 0914   Wound Healing % 76 07/01/21 0914   Wound Assessment Pink/red;Slough 07/01/21 0914   Drainage Amount Moderate 07/01/21 0914   Drainage Description Serosanguinous 07/01/21 0914   Odor None 06/10/21 1002   Tamara-wound Assessment Fragile 07/01/21 0914   Margins Defined edges 06/10/21 1002   Wound Thickness Description not for Pressure Injury Full thickness 07/01/21 0914   Number of days: 98       Wound 79/51/20 Umbilicus #5 (Active)   Wound Image   07/01/21 0914   Wound Etiology Traumatic 07/01/21 0914   Wound Cleansed Cleansed with saline 07/01/21 0914   Dressing/Treatment Cutimed/Sorbact 07/01/21 0959   Wound Length (cm) 2.5 cm 07/01/21 8179   Wound Width (cm) 0.7 cm 07/01/21 0914   Wound Depth (cm) 0.1 cm 07/01/21 0914   Wound Surface Area (cm^2) 1.75 cm^2 07/01/21 0914   Change in Wound Size % (l*w) 22.22 07/01/21 0914   Wound Volume (cm^3) 0.175 cm^3 07/01/21 0914   Wound Healing % 20 07/01/21 0914   Wound Assessment Pink/red;Slough 07/01/21 0914   Drainage Amount Moderate 07/01/21 0914   Drainage Description Serosanguinous; Yellow 07/01/21 0914   Odor None 06/10/21 1002   Tamara-wound Assessment Fragile 07/01/21 0914   Margins Defined edges 06/10/21 1002   Wound Thickness Description not for Pressure Injury Full thickness 07/01/21 0914   Number of days: 42       Wound 06/10/21 Abdomen Lower; Left #6 (Active)   Wound Image   07/01/21 0914   Wound Etiology Traumatic 07/01/21 0914   Wound Cleansed Cleansed with saline 07/01/21 0914   Dressing/Treatment Cutimed/Sorbact 07/01/21 0959   Wound Length (cm) 1 cm 07/01/21 0914   Wound Width (cm) 1.2 cm 07/01/21 0914   Wound Depth (cm) 0.1 cm 07/01/21 0914   Wound Surface Area (cm^2) 1.2 cm^2 07/01/21 0914   Change in Wound Size % (l*w) -42.86 07/01/21 0914   Wound Volume (cm^3) 0.12 cm^3 07/01/21 0914   Wound Healing % -50 07/01/21 0914   Wound Assessment Pale granulation tissue 07/01/21 0914   Drainage Amount Moderate 07/01/21 0914   Drainage Description Serosanguinous 07/01/21 0914   Odor None 06/10/21 1021   Tamara-wound Assessment Fragile 07/01/21 0914   Margins Defined edges 06/10/21 1021   Wound Thickness Description not for Pressure Injury Full thickness 07/01/21 0914   Number of days: 20         Plan:     See plan of care below. If itching does not dissipate, contact us and will order an additional medication. Recheck in 3 weeks. Do not rub healing scar on abdomen, only touch outer aspect of dressing. Use the ammonium lactate lotion on the lower legs as ordered.        Treatment Note please see attached Discharge Instructions    Written patient dismissal instructions given to patient and signed by patient or POA. Discharge [address] and [facility]   Physician Orders and Discharge [address], [facility]  Telephone: [phone]      [phone]        NAME: Kasi Riggs  DATE OF BIRTH:  1955  MEDICAL RECORD NUMBER:  [id number]     Home Care/Facility: [address] [place] [address] (order dressings as needed)      Wound Location:  Abdomen x 4   Dressing orders:    1. Cleanse with saline and dry.    2. Cutimed siltac sorbact. 3.Change 3 times weekly.    * Apply MYCOLOG CREAM to candy wounds daily. Do not get in the wounds.       Compression: Apply Spandagrip to both(10-20) med lower leg, apply first thing in the morning, remove at bedtime, elevate legs as much as possible during the day.       Offloading Device:     Other Instructions:  Keep blood sugar within normal limits to help promote wound healing.  Cast cover for showering. May apply Lac-Hydrin to legs daily     Keep all dressings clean, dry and intact.  Keep pressure off the wound(s) at all times.      Follow up visit:   3 weeks on   July 22, 2021  AT   10:00AM     Please give 24 hour notice if unable to keep appointment. [phone]     If you experience any of the following, please call the Wound Care Service at  [phone] or go to the nearest emergency room.        *Increase in pain         *Temperature over 101           *Increase in drainage from your wound or a foul odor  *Uncontrolled swelling            *Need for compression bandage changes due to slippage, breakthrough drainage     PLEASE NOTE: IF YOU ARE UNABLE TO OBTAIN WOUND SUPPLIES, CONTINUE TO USE THE SUPPLIES YOU HAVE AVAILABLE UNTIL YOU ARE ABLE TO [facility].  IT IS MOST IMPORTANT TO KEEP THE WOUND COVERED        Electronically signed by MARIETTA Mcginnis NP on 7/1/2021 at 10:02 AM

## 2021-07-01 NOTE — PLAN OF CARE
Problem: Wound:  Goal: Will show signs of wound healing; wound closure and no evidence of infection  Outcome: Ongoing     Problem: Blood Glucose:  Goal: Ability to maintain appropriate glucose levels will improve  Outcome: Ongoing

## 2021-07-07 ENCOUNTER — TELEPHONE (OUTPATIENT)
Dept: WOUND CARE | Age: 66
End: 2021-07-07

## 2021-07-07 NOTE — TELEPHONE ENCOUNTER
Norm Chapa from [facility] called to say that patient has been rubbing her legs and the right one opened up and to see if all right to reapply her zinc unna boots again. I told her to do so and said she would with her visit today.

## 2021-08-05 ENCOUNTER — HOSPITAL ENCOUNTER (OUTPATIENT)
Dept: WOUND CARE | Age: 66
Discharge: HOME | End: 2021-08-05
Payer: MEDICARE

## 2021-08-05 VITALS
TEMPERATURE: 96.5 F | RESPIRATION RATE: 18 BRPM | DIASTOLIC BLOOD PRESSURE: 62 MMHG | SYSTOLIC BLOOD PRESSURE: 122 MMHG | HEART RATE: 69 BPM

## 2021-08-05 DIAGNOSIS — L97.912 ULCER OF RIGHT LOWER EXTREMITY WITH FAT LAYER EXPOSED (HCC): Primary | ICD-10-CM

## 2021-08-05 DIAGNOSIS — L98.499: ICD-10-CM

## 2021-08-05 DIAGNOSIS — I87.2 CHRONIC VENOUS INSUFFICIENCY OF LOWER EXTREMITY: ICD-10-CM

## 2021-08-05 PROCEDURE — 99213 OFFICE O/P EST LOW 20 MIN: CPT | Performed by: NURSE PRACTITIONER

## 2021-08-05 PROCEDURE — 6370000000 HC RX 637 (ALT 250 FOR IP): Performed by: NURSE PRACTITIONER

## 2021-08-05 PROCEDURE — 99213 OFFICE O/P EST LOW 20 MIN: CPT

## 2021-08-05 RX ORDER — CLOTRIMAZOLE AND BETAMETHASONE DIPROPIONATE 10; .64 MG/G; MG/G
CREAM TOPICAL
Qty: 1 TUBE | Refills: 1 | Status: SHIPPED | OUTPATIENT
Start: 2021-08-05

## 2021-08-05 RX ORDER — LIDOCAINE HYDROCHLORIDE 40 MG/ML
SOLUTION TOPICAL ONCE
Status: CANCELLED | OUTPATIENT
Start: 2021-08-05 | End: 2021-08-05

## 2021-08-05 RX ORDER — BETAMETHASONE DIPROPIONATE 0.05 %
OINTMENT (GRAM) TOPICAL ONCE
Status: CANCELLED | OUTPATIENT
Start: 2021-08-05 | End: 2021-08-05

## 2021-08-05 RX ORDER — BACITRACIN, NEOMYCIN, POLYMYXIN B 400; 3.5; 5 [USP'U]/G; MG/G; [USP'U]/G
OINTMENT TOPICAL ONCE
Status: CANCELLED | OUTPATIENT
Start: 2021-08-05 | End: 2021-08-05

## 2021-08-05 RX ORDER — LIDOCAINE 50 MG/G
OINTMENT TOPICAL ONCE
Status: CANCELLED | OUTPATIENT
Start: 2021-08-05 | End: 2021-08-05

## 2021-08-05 RX ORDER — LIDOCAINE 40 MG/G
CREAM TOPICAL ONCE
Status: CANCELLED | OUTPATIENT
Start: 2021-08-05 | End: 2021-08-05

## 2021-08-05 RX ORDER — GINSENG 100 MG
CAPSULE ORAL ONCE
Status: CANCELLED | OUTPATIENT
Start: 2021-08-05 | End: 2021-08-05

## 2021-08-05 RX ORDER — CLOBETASOL PROPIONATE 0.5 MG/G
OINTMENT TOPICAL ONCE
Status: CANCELLED | OUTPATIENT
Start: 2021-08-05 | End: 2021-08-05

## 2021-08-05 RX ORDER — LIDOCAINE HYDROCHLORIDE 20 MG/ML
JELLY TOPICAL ONCE
Status: CANCELLED | OUTPATIENT
Start: 2021-08-05 | End: 2021-08-05

## 2021-08-05 RX ORDER — GENTAMICIN SULFATE 1 MG/G
OINTMENT TOPICAL ONCE
Status: CANCELLED | OUTPATIENT
Start: 2021-08-05 | End: 2021-08-05

## 2021-08-05 RX ORDER — GENTAMICIN SULFATE 1 MG/G
OINTMENT TOPICAL ONCE
Status: COMPLETED | OUTPATIENT
Start: 2021-08-05 | End: 2021-08-05

## 2021-08-05 RX ORDER — BACITRACIN ZINC AND POLYMYXIN B SULFATE 500; 1000 [USP'U]/G; [USP'U]/G
OINTMENT TOPICAL ONCE
Status: CANCELLED | OUTPATIENT
Start: 2021-08-05 | End: 2021-08-05

## 2021-08-05 RX ADMIN — GENTAMICIN SULFATE: 1 OINTMENT TOPICAL at 10:48

## 2021-08-05 NOTE — PROGRESS NOTES
Anna Watkins 37   Progress Note and Procedure Note      [address] RECORD NUMBER:  [id number]  AGE: 72 y.o. GENDER: female  : 1955  EPISODE DATE:  2021    Subjective:     Chief Complaint   Patient presents with    Wound Check     abdominal wounds         HISTORY of PRESENT ILLNESS HPI  Mina Ferrell a 72 y. o. female who presents today for wound/ulcer evaluation. History of Wound Context: Recheck for chronic venous insufficiency of bilateral lower extremities- which remain healed. Non-healing abdominal wound from prior surgery, patient picks at scar when nervous/stressed causing it to re-open, frequently. At last visit had 3 new areas of wounds on abdomen, reporting \"it itches\". Erythema and some satellite lesions around open wounds- original scar with some epithelialization noted. Linear abrasions noted from itching. Will order antifungal/corticosteroid topical. Today two wounds have healed, the center midline wound is improved with the two remaining much improved. Fungal rash surrounding candy wounds has mostly cleared, only few small satellite lesions of one area remains.    Wound/Ulcer Pain Timing/Severity: intermittent  Quality of pain: tender, and itches   Severity:  1 / 10   Modifying Factors: Pain worsens with care and subsides after completed  Associated Signs/Symptoms: drainage and itching     Ulcer Identification:  Ulcer Type: traumatic (to abdomen)  Contributing Factors: diabetes, obesity, decreased tissue oxygenation and picks at abdomen when stressed/nervous causing new wounds and re-opening of healed wounds      Wound: N/A  PAST MEDICAL HISTORY        Diagnosis Date    Acquired hypothyroidism 3/15/2016    Allergic rhinitis     pollen, dust ragweed, hay and straw     Anxiety     Asthma     Bipolar affect, depressed (HCC)     [facility]     Breast cancer ([facility] 2018    Invasive ductal cancer right breast    Cancer ([facility]     thyroid cancer     Chronic back pain     COPD (chronic obstructive pulmonary disease) (HCC)     Depression     Diabetes mellitus ([address]     Emphysema of lung ([address]     Essential hypertension 3/15/2016    History of blood transfusion     Feb 4 2019    Hyperlipidemia     Hypothyroidism     Obesity     On home O2     3L NC at night    YARED (obstructive sleep apnea)     3 L NC    Osteoarthritis     Restless legs syndrome     Sleep apnea     Urinary incontinence        Problem List:    Patient Active Problem List   Diagnosis    Type 2 diabetes mellitus with complication ([address]    Acquired hypothyroidism    Essential hypertension    Mixed hyperlipidemia    COPD with chronic bronchitis (HCC)    Microalbuminuria    Moderate episode of recurrent major depressive disorder (HCC)    YARED (obstructive sleep apnea)    Restless leg syndrome    Vitamin B12 deficiency    Vitamin D deficiency    Left ventricular hypertrophy    Left ventricular diastolic dysfunction    Morbid obesity with BMI of 45.0-49.9, adult (HCC)    Breast carcinoma, female, right (HCC)    Iron deficiency anemia secondary to inadequate dietary iron intake    Breast cancer of lower-inner quadrant of right female breast (HCC)    Postoperative anemia    Malignant neoplasm of central portion of right breast in female, estrogen receptor positive (HCC)    Iron deficiency anemia due to chronic blood loss    Non-pressure chronic ulcer of other part of right lower leg with fat layer exposed ([address]    Chronic venous insufficiency of lower extremity    Adenomatous polyp of ascending colon    Adenomatous polyp of sigmoid colon    Dysplastic colon polyp    Cellulitis of right leg    Ulcer of abdomen wall, with unspecified severity (HCC)    Ulcer of right lower extremity with fat layer exposed ([address]      Problem List Items Addressed This Visit     Chronic venous insufficiency of lower extremity    Relevant Orders    Initiate Outpatient Wound Care Protocol    Ulcer of abdomen wall, with unspecified severity ([facility]    Relevant Orders    Initiate Outpatient Wound Care Protocol    Ulcer of right lower extremity with fat layer exposed ([facility] - Primary    Relevant Orders    Initiate Outpatient Wound Care Protocol           PAST SURGICAL HISTORY    Past Surgical History:   Procedure Laterality Date    APPENDECTOMY      BREAST BIOPSY Right 2018     SECTION      CHOLECYSTECTOMY      COLONOSCOPY N/A 2019    COLONOSCOPY DIAGNOSTIC performed by Adia Metcalf MD at [facility] [employer] Right 2019    LAPAROSCOPIC RIGHT COLECTOMY, LYSIS OF ADHESIONS. performed by Tommy Barry MD at [address] [facility] Right 2019    Simple mastectomy with sentinel node biopsy    TUBAL LIGATION         FAMILY HISTORY    Family History   Problem Relation Age of Onset    Diabetes Father     Heart Disease Father     Thyroid Cancer Sister     Thyroid Cancer Brother     Thyroid Cancer Sister     No Known Problems Daughter        SOCIAL HISTORY    Social History     Tobacco Use    Smoking status: Former Smoker     Packs/day: 1.00     Years: 40.00     Pack years: 40.00     Types: Cigarettes     Start date: 3/8/1976     Quit date: 2018     Years since quitting: 3.1    Smokeless tobacco: Never Used   Vaping Use    Vaping Use: Never assessed   Substance Use Topics    Alcohol use: No     Alcohol/week: 0.0 standard drinks    Drug use: No       ALLERGIES    Allergies   Allergen Reactions    Latex Itching and Rash    Sulfa Antibiotics Hives    Bactrim [Sulfamethoxazole-Trimethoprim] Hives and Other (See Comments)     Metallic taste    Nicotine Hives     Hives from the patch       MEDICATIONS    Current Outpatient Medications on File Prior to Encounter   Medication Sig Dispense Refill    gentamicin (GARAMYCIN) 0.1 % cream APPLY TO AFFECTED AREA ON LEG DAILY BEFORE DRESSING CHANGE 15 g 1    meclizine (ANTIVERT) 25 MG tablet Take by mouth      fluticasone-umeclidin-vilant (TRELEGY ELLIPTA) 100-62.5-25 MCG/INH AEPB Inhale 1 puff into the lungs daily 1 each 3    VORTIoxetine (TRINTELLIX) 10 MG TABS tablet Take 10 mg by mouth      furosemide (LASIX) 40 MG tablet TAKE 1 TABLET BY MOUTH EVERY DAY 90 tablet 1    albuterol sulfate HFA (VENTOLIN HFA) 108 (90 Base) MCG/ACT inhaler Inhale 2 puffs into the lungs every 6 hours as needed for Wheezing 1 Inhaler 5    oxybutynin (DITROPAN-XL) 10 MG extended release tablet Take 10 mg by mouth daily      simvastatin (ZOCOR) 40 MG tablet TAKE 1 TABLET BY MOUTH EVERY EVENING 90 tablet 1    losartan (COZAAR) 25 MG tablet Take 1 tablet by mouth daily 90 tablet 1    levothyroxine (SYNTHROID) 175 MCG tablet Take 1 tablet by mouth Daily 90 tablet 1    potassium chloride (KLOR-CON M) 10 MEQ extended release tablet Take 1 tablet by mouth daily 90 tablet 1    metFORMIN (GLUCOPHAGE XR) 500 MG extended release tablet Take 2 tablets by mouth daily (with breakfast) 180 tablet 1    ARIPiprazole (ABILIFY) 10 MG tablet TAKE 1 TABLET BY MOUTH EVERY DAY IN THE EVENING AS DIRECTED  3    Multiple Vitamin (MULTIVITAMIN) tablet Take 1 tablet by mouth daily      Multiple Vitamins-Minerals (ICAPS AREDS 2 PO) Take 1 tablet by mouth daily      vitamin D (CHOLECALCIFEROL) 1000 UNIT TABS tablet Take 1,000 Units by mouth daily      anastrozole (ARIMIDEX) 1 MG tablet Take 1 mg by mouth daily      ACCU-CHEK SMARTVIEW strip Test TID E11.8 300 each 3    ipratropium-albuterol (DUONEB) 0.5-2.5 (3) MG/3ML SOLN nebulizer solution 3 mLs      SOFT TOUCH LANCETS MISC Use tid as directed dx: E11.9 300 each 3    hydrOXYzine (VISTARIL) 25 MG capsule Take 25 mg by mouth 3 times daily as needed       DULoxetine (CYMBALTA) 60 MG extended release capsule Take 60 mg by mouth daily        No current facility-administered medications on file prior to encounter. REVIEW OF SYSTEMS    Pertinent items are noted in HPI.     Objective: /62   Pulse 69   Temp 96.5 °F (35.8 °C) (Temporal)   Resp 18     Wt Readings from Last 3 Encounters:   01/04/21 (!) 350 lb (158.8 kg)   03/12/20 (!) 301 lb (136.5 kg)   02/19/20 (!) 304 lb (137.9 kg)       PHYSICAL EXAM    Constitutional:   Well nourished and well developed. Appears neat and clean. Patient is alert, oriented x3, and in no apparent distress. Respiratory:  Respiratory effort is easy and symmetric bilaterally. Rate is normal at rest and on room air. Vascular:    Capillary refill is <5 sec to digits bilateral.  Extremities negative for pitting edema. Neurological:  Gross and Light touch intact. Dermatological:  Wound description noted in wound assessment. Patient reports feels two of the remaining wounds have stopped improving with use of the sorbact, since in hospital recently. Will change treatment plan to remaining three wounds. Midline incision remaining wound showing some epithelialization, will add gentamicin (MRSA+ per last culture-sensitive to gent). Two remaining wounds improved - red/granular. Same plan of care for all three. Psychiatric:  Judgement and insight intact. Short and long term memory intact. No evidence of depression, anxiety, or agitation. Patient is calm, cooperative, and communicative. Appropriate interactions and affect. Assessment:      Problem List Items Addressed This Visit     Chronic venous insufficiency of lower extremity    Relevant Orders    Initiate Outpatient Wound Care Protocol    Ulcer of abdomen wall, with unspecified severity ([address]    Relevant Orders    Initiate Outpatient Wound Care Protocol    Ulcer of right lower extremity with fat layer exposed ([address] - Primary    Relevant Orders    Initiate Outpatient Wound Care Protocol           Procedure Note  Indications:  Based on my examination of this patient's wound(s)/ulcer(s) today, debridement is not required to promote healing and evaluate the wound base.     Wound 02/04/21 Abdomen #2  (Active)   Wound Image   08/05/21 1012   Wound Etiology Traumatic 08/05/21 1012   Wound Cleansed Cleansed with saline 08/05/21 1012   Dressing/Treatment Antibacterial ointment;Alginate with Ag;Silicone border 78/14/38 1056   Wound Length (cm) 1 cm 08/05/21 1012   Wound Width (cm) 0.3 cm 08/05/21 1012   Wound Depth (cm) 0.1 cm 08/05/21 1012   Wound Surface Area (cm^2) 0.3 cm^2 08/05/21 1012   Change in Wound Size % (l*w) 90 08/05/21 1012   Wound Volume (cm^3) 0.03 cm^3 08/05/21 1012   Wound Healing % 90 08/05/21 1012   Wound Assessment Granulation tissue 08/05/21 1012   Drainage Amount Small 08/05/21 1012   Drainage Description Serosanguinous 08/05/21 1012   Odor None 08/05/21 1012   Tamara-wound Assessment Intact 08/05/21 1012   Margins Defined edges 08/05/21 1012   Wound Thickness Description not for Pressure Injury Full thickness 08/05/21 1012   Number of days: 182       Wound 98/89/32 Umbilicus #5 (Active)   Wound Image   08/05/21 1012   Wound Etiology Traumatic 08/05/21 1012   Wound Cleansed Cleansed with saline 08/05/21 1012   Dressing/Treatment Antibacterial ointment;Alginate with Ag;Silicone border 87/59/19 1056   Wound Length (cm) 2 cm 08/05/21 1012   Wound Width (cm) 1.2 cm 08/05/21 1012   Wound Depth (cm) 0.1 cm 08/05/21 1012   Wound Surface Area (cm^2) 2.4 cm^2 08/05/21 1012   Change in Wound Size % (l*w) -6.67 08/05/21 1012   Wound Volume (cm^3) 0.24 cm^3 08/05/21 1012   Wound Healing % -9 08/05/21 1012   Wound Assessment Granulation tissue 08/05/21 1012   Drainage Amount Small 08/05/21 1012   Drainage Description Sanguinous 08/05/21 1012   Odor None 08/05/21 1012   Tamara-wound Assessment Intact 08/05/21 1012   Margins Defined edges 08/05/21 1012   Wound Thickness Description not for Pressure Injury Full thickness 08/05/21 1012   Number of days: 77       Wound 06/10/21 Abdomen Lower; Left #6 (Active)   Wound Image   08/05/21 1012   Wound Etiology Traumatic 08/05/21 1012   Wound Cleansed Cleansed with saline 08/05/21 1012   Dressing/Treatment Antibacterial ointment;Alginate with Ag;Silicone border 54/79/65 1056   Wound Length (cm) 0.8 cm 08/05/21 1012   Wound Width (cm) 1 cm 08/05/21 1012   Wound Depth (cm) 0.1 cm 08/05/21 1012   Wound Surface Area (cm^2) 0.8 cm^2 08/05/21 1012   Change in Wound Size % (l*w) 4.76 08/05/21 1012   Wound Volume (cm^3) 0.08 cm^3 08/05/21 1012   Wound Healing % 0 08/05/21 1012   Wound Assessment Granulation tissue 08/05/21 1012   Drainage Amount Small 08/05/21 1012   Drainage Description Sanguinous 08/05/21 1012   Odor None 08/05/21 1012   Candy-wound Assessment Intact 08/05/21 1012   Margins Defined edges 08/05/21 1012   Wound Thickness Description not for Pressure Injury Full thickness 08/05/21 1012   Number of days: 56         Plan:     Continue with lotrisone to any red/rash areas of candy wounds, otherwise to wounds themselves change to saline cleansing daily, apply gentamicin ointment and silvercell over. Recheck in 3 weeks. Treatment Note please see attached Discharge Instructions    Written patient dismissal instructions given to patient and signed by patient or POA. Discharge Instructions         Discharge Instructions        [facility]   Physician Orders and Discharge [address]  Telephone: [phone]      [phone]        NAME: Oli Josue  DATE OF BIRTH:  1955  MEDICAL RECORD NUMBER:  [id number]     Home Care/Facility: [id number] [place] [address] Rd (order dressings as needed)      Wound Location:  Abdomen x 3    Dressing orders:    1. Cleanse with saline and dry.  THIN LAYER OF GENTAMICIN OINTMENT. 2.COVER WITH  SILVER ALGINATE. COVER WITH DRY GAUZE AND PAPER TAPE OR BORDER FOAM  3. Change 3 times weekly.    * Apply MYCOLOG CREAM to candy wounds daily.  Do not get in the wounds.       Compression: Apply Spandagrip to both(10-20) med lower leg, apply first thing in the morning, remove at bedtime, elevate legs as much as possible during the day.       Offloading Device:     Other Instructions:  Keep blood sugar within normal limits to help promote wound healing.  Cast cover for showering.  May apply Lac-Hydrin to legs daily     Keep all dressings clean, dry and intact.  Keep pressure off the wound(s) at all times.      Follow up visit:   3 weeks on   August 26, 2021  AT   10:15     Please give 24 hour notice if unable to keep appointment. [phone]     If you experience any of the following, please call the Wound Care Service at  [phone] or go to the nearest emergency room.        *Increase in pain         *Temperature over 101           *Increase in drainage from your wound or a foul odor  *Uncontrolled swelling            *Need for compression bandage changes due to slippage, breakthrough drainage     PLEASE NOTE: IF YOU ARE UNABLE TO OBTAIN WOUND SUPPLIES, CONTINUE TO USE THE SUPPLIES YOU HAVE AVAILABLE UNTIL YOU ARE ABLE TO [address].  IT IS MOST IMPORTANT TO KEEP THE WOUND COVERED                   Electronically signed by MARIETTA Skinner NP on 8/5/2021 at 11:23 AM

## 2021-08-05 NOTE — DISCHARGE INSTR - COC
Continuity of Care Form    Patient Name: Elena Randhawa   :  1955  MRN:  [id number]    Admit date:  2021  Discharge date:  ***    Code Status Order: Prior   Advance Directives:     Admitting Physician:  No admitting provider for patient encounter. PCP: Marshal Schmidt MD    Discharging Nurse: [facility] Unit/Room#: No information available for this encounter.   Discharging Unit Phone Number: ***    Emergency Contact:   Extended Emergency Contact Information  Primary Emergency Contact: Daysi Barry [address] Phone: [phone]  Work Phone: [phone]  Mobile Phone: [phone]  Relation: Child    Past Surgical History:  Past Surgical History:   Procedure Laterality Date    APPENDECTOMY      BREAST BIOPSY Right 2018     SECTION      CHOLECYSTECTOMY      COLONOSCOPY N/A 2019    COLONOSCOPY DIAGNOSTIC performed by Sina Schwartz MD at [address] Right 2019    LAPAROSCOPIC RIGHT COLECTOMY, LYSIS OF ADHESIONS. performed by Alice Desir MD at [address] [facility] Right 2019    Simple mastectomy with sentinel node biopsy    TUBAL LIGATION         Immunization History:   Immunization History   Administered Date(s) Administered    Influenza 10/28/2015    Influenza, Ora Andrew, IM, (6 mo and older Fluzone, Flulaval, Fluarix and 3 yrs and older Afluria) 2016, 2017, 10/01/2018    Pneumococcal Polysaccharide ([id number]) 10/06/2008, 2012, 2016    Tdap (Boostrix, Adacel) 2013       Active Problems:  Patient Active Problem List   Diagnosis Code    Type 2 diabetes mellitus with complication ([facility] B53.7    Acquired hypothyroidism E03.9    Essential hypertension I10    Mixed hyperlipidemia E78.2    COPD with chronic bronchitis ([address] J44.9    Microalbuminuria R80.9    Moderate episode of recurrent major depressive disorder ([facility] F33.1    YARED (obstructive sleep apnea) G47.33  Restless leg syndrome G25.81    Vitamin B12 deficiency E53.8    Vitamin D deficiency E55.9    Left ventricular hypertrophy I51.7    Left ventricular diastolic dysfunction S93.5    Morbid obesity with BMI of 45.0-49.9, adult ([facility]) E66.01, Z68.42    Breast carcinoma, female, right ([facility]) C50.911    Iron deficiency anemia secondary to inadequate dietary iron intake D50.8    Breast cancer of lower-inner quadrant of right female breast (HCC) C50.311    Postoperative anemia D64.9    Malignant neoplasm of central portion of right breast in female, estrogen receptor positive (HCC) C50.111, Z17.0    Iron deficiency anemia due to chronic blood loss D50.0    Non-pressure chronic ulcer of other part of right lower leg with fat layer exposed ([address] L97.812    Chronic venous insufficiency of lower extremity I87.2    Adenomatous polyp of ascending colon D12.2    Adenomatous polyp of sigmoid colon D12.5    Dysplastic colon polyp K63.5    Cellulitis of right leg L03.115    Ulcer of abdomen wall, with unspecified severity ([address] L98.499    Ulcer of right lower extremity with fat layer exposed ([address] L97.912       Isolation/Infection:   Isolation          No Isolation        Patient Infection Status     Infection Onset Added Last Indicated Last Indicated By Review Planned Expiration Resolved Resolved By    MRSA 02/21/19 02/22/19 04/22/21 Culture, Anaerobic and Aerobic        MRSA of abdomen 04.22.21. Electronically signed by Gris Marx RN on 4/24/21 at 9:11 AM EDT       MRSA right lower leg wound 09.17.20. Electronically signed by Gris Marx RN on 9/21/20 at 6:46 AM EDT             Nurse Assessment:  Last Vital Signs: There were no vitals taken for this visit.     Last documented pain score (0-10 scale):    Last Weight:   Wt Readings from Last 1 Encounters:   01/04/21 (!) 350 lb (158.8 kg)     Mental Status:  {IP PT MENTAL STATUS:51829}    IV Access:  [facility] IV ACCESS:379492699}    Nursing Mobility/ADLs:  Walking   {CHP DME MNGE:887279433}  Transfer  {CHP DME LGQA:668470553}  Bathing  {CHP DME QIVW:734561021}  Dressing  {CHP DME LFOR:586036569}  Toileting  {CHP DME RVRF:782051426}  Feeding  {CHP DME NGEO:863827062}  Med Admin  {P DME JOMI:810174704}  Med Delivery   {[facility] MED Delivery:075457423}    Wound Care Documentation and Therapy:  Wound 21 Abdomen #2  (Active)   Number of days: 181       Wound 21 Abdomen Lower #3 (Active)   Number of days: 133       Wound  Umbilicus #5 (Active)   Number of days: 77       Wound 06/10/21 Abdomen Lower; Left #6 (Active)   Number of days: 55        Elimination:  Continence:   · Bowel: {YES / EC:44640}  · Bladder: {YES / ZC:58273}  Urinary Catheter: {Urinary Catheter:123355159}   Colostomy/Ileostomy/Ileal Conduit: {YES / EN:60243}       Date of Last BM: ***  No intake or output data in the 24 hours ending 21 0923  No intake/output data recorded.     Safety Concerns:     [address] Safety Concerns:888829701}    Impairments/Disabilities:      [address] Impairments/Disabilities:347518191}    Nutrition Therapy:  Current Nutrition Therapy:   [address] Diet List:986099532}    Routes of Feeding: {[facility] DME Other Feedings:078838865}  Liquids: {Slp liquid thickness:19230}  Daily Fluid Restriction: {CHP DME Yes amt example:982611225}  Last Modified Barium Swallow with Video (Video Swallowing Test): {Done Not Done ETVO:541144143}    Treatments at the Time of Hospital Discharge:   Respiratory Treatments: ***  Oxygen Therapy:  {Therapy; copd oxygen:73031}  Ventilator:    { MARIANA Vent WFLV:990457903}    Rehab Therapies: {THERAPEUTIC INTERVENTION:7569738503}  Weight Bearing Status/Restrictions: [address]  Weight Bearin}  Other Medical Equipment (for information only, NOT a DME order):  {EQUIPMENT:735186400}  Other Treatments: ***    Patient's personal belongings (please select all that are sent with patient):  {ABRAHAM DME Belongings:589652714}    RN SIGNATURE: {Esignature:698067479}    CASE MANAGEMENT/SOCIAL WORK SECTION    Inpatient Status Date: ***    Readmission Risk Assessment Score:  Readmission Risk              Risk of Unplanned Readmission:  0           Discharging to Facility/ Agency   · Name:   · Address:  · Phone:  · Fax:    Dialysis Facility (if applicable)   · Name:  · Address:  · Dialysis Schedule:  · Phone:  · Fax:    / signature: {Esignature:617336684}    PHYSICIAN SECTION    Prognosis: {Prognosis:4625078395}    Condition at Discharge: [address] [place] [address] Patient Condition:650444438}    Rehab Potential (if transferring to Rehab): {Prognosis:7797677497}    Recommended Labs or Other Treatments After Discharge: ***    Physician Certification: I certify the above information and transfer of Tatiana   is necessary for the continuing treatment of the diagnosis listed and that she requires {Admit to Appropriate Level of Care:02083} for {GREATER/LESS:284266163} 30 days.      Update Admission H&P: {CHP DME Changes in PSKOU:620100426}    PHYSICIAN SIGNATURE:  {Esignature:772644658}

## 2021-08-12 ENCOUNTER — TELEPHONE (OUTPATIENT)
Dept: WOUND CARE | Age: 66
End: 2021-08-12

## 2021-08-12 NOTE — TELEPHONE ENCOUNTER
Spoke with Laverne Ferguson from [facility]. Patient has picked her leg again and has open areas. Per Dominic Harris NP, apply silver judie and zinc unna boot.

## 2021-08-26 ENCOUNTER — HOSPITAL ENCOUNTER (OUTPATIENT)
Dept: WOUND CARE | Age: 66
Discharge: HOME | End: 2021-08-26
Payer: MEDICARE

## 2021-08-26 ENCOUNTER — HOSPITAL ENCOUNTER (OUTPATIENT)
Dept: ULTRASOUND IMAGING | Age: 66
Discharge: HOME | End: 2021-08-28
Payer: MEDICARE

## 2021-08-26 VITALS
HEART RATE: 76 BPM | SYSTOLIC BLOOD PRESSURE: 133 MMHG | TEMPERATURE: 97 F | DIASTOLIC BLOOD PRESSURE: 69 MMHG | RESPIRATION RATE: 18 BRPM

## 2021-08-26 DIAGNOSIS — L97.912 ULCER OF RIGHT LOWER EXTREMITY WITH FAT LAYER EXPOSED (HCC): Primary | ICD-10-CM

## 2021-08-26 DIAGNOSIS — S81.801A WOUND OF RIGHT LEG, INITIAL ENCOUNTER: ICD-10-CM

## 2021-08-26 DIAGNOSIS — R60.0 EDEMA OF RIGHT LOWER LEG: ICD-10-CM

## 2021-08-26 DIAGNOSIS — I87.2 CHRONIC VENOUS INSUFFICIENCY OF LOWER EXTREMITY: ICD-10-CM

## 2021-08-26 DIAGNOSIS — L98.499: ICD-10-CM

## 2021-08-26 PROCEDURE — 99214 OFFICE O/P EST MOD 30 MIN: CPT | Performed by: NURSE PRACTITIONER

## 2021-08-26 PROCEDURE — 99213 OFFICE O/P EST LOW 20 MIN: CPT

## 2021-08-26 PROCEDURE — 93971 EXTREMITY STUDY: CPT

## 2021-08-26 PROCEDURE — 6370000000 HC RX 637 (ALT 250 FOR IP): Performed by: NURSE PRACTITIONER

## 2021-08-26 RX ORDER — LIDOCAINE HYDROCHLORIDE 20 MG/ML
JELLY TOPICAL ONCE
Status: CANCELLED | OUTPATIENT
Start: 2021-08-26 | End: 2021-08-26

## 2021-08-26 RX ORDER — GENTAMICIN SULFATE 1 MG/G
OINTMENT TOPICAL ONCE
Status: CANCELLED | OUTPATIENT
Start: 2021-08-26 | End: 2021-08-26

## 2021-08-26 RX ORDER — LIDOCAINE 40 MG/G
CREAM TOPICAL ONCE
Status: CANCELLED | OUTPATIENT
Start: 2021-08-26 | End: 2021-08-26

## 2021-08-26 RX ORDER — BACITRACIN, NEOMYCIN, POLYMYXIN B 400; 3.5; 5 [USP'U]/G; MG/G; [USP'U]/G
OINTMENT TOPICAL ONCE
Status: CANCELLED | OUTPATIENT
Start: 2021-08-26 | End: 2021-08-26

## 2021-08-26 RX ORDER — LIDOCAINE 50 MG/G
OINTMENT TOPICAL ONCE
Status: CANCELLED | OUTPATIENT
Start: 2021-08-26 | End: 2021-08-26

## 2021-08-26 RX ORDER — GENTAMICIN SULFATE 1 MG/G
OINTMENT TOPICAL ONCE
Status: COMPLETED | OUTPATIENT
Start: 2021-08-26 | End: 2021-08-26

## 2021-08-26 RX ORDER — CLOBETASOL PROPIONATE 0.5 MG/G
OINTMENT TOPICAL ONCE
Status: CANCELLED | OUTPATIENT
Start: 2021-08-26 | End: 2021-08-26

## 2021-08-26 RX ORDER — BETAMETHASONE DIPROPIONATE 0.05 %
OINTMENT (GRAM) TOPICAL ONCE
Status: CANCELLED | OUTPATIENT
Start: 2021-08-26 | End: 2021-08-26

## 2021-08-26 RX ORDER — GINSENG 100 MG
CAPSULE ORAL ONCE
Status: CANCELLED | OUTPATIENT
Start: 2021-08-26 | End: 2021-08-26

## 2021-08-26 RX ORDER — LIDOCAINE HYDROCHLORIDE 40 MG/ML
SOLUTION TOPICAL ONCE
Status: CANCELLED | OUTPATIENT
Start: 2021-08-26 | End: 2021-08-26

## 2021-08-26 RX ORDER — BACITRACIN ZINC AND POLYMYXIN B SULFATE 500; 1000 [USP'U]/G; [USP'U]/G
OINTMENT TOPICAL ONCE
Status: CANCELLED | OUTPATIENT
Start: 2021-08-26 | End: 2021-08-26

## 2021-08-26 RX ADMIN — GENTAMICIN SULFATE: 1 OINTMENT TOPICAL at 11:31

## 2021-08-26 NOTE — PROGRESS NOTES
Anna Watkins 37   Progress Note and Procedure Note      [address] [place] RECORD NUMBER:  [id number]  AGE: 72 y.o. GENDER: female  : 1955  EPISODE DATE:  2021    Subjective:     Chief Complaint   Patient presents with    Wound Check     abdominal wound, right lower leg wound         HISTORY of PRESENT ILLNESS HPI  Rodney Daniel a 72 y. o. female who presents today for wound/ulcer evaluation. History of Wound Context: Recheck for chronic venous insufficiency of bilateral lower extremities. Left leg remains healed, per patient \"had a blood blister on front of right lower leg which I popped- now has an open wound\". Non-healing abdominal wound from prior surgery, patient picks at scar when nervous/stressed causing it to re-open, frequently. At last visit had 3 new areas of wounds on abdomen, reporting \"it itches\". Erythema and some satellite lesions around open wounds- original scar with some epithelialization noted present during last visit are now healed, only one open wound to abdomen remains. Fungal rash surrounding candy wounds has fully resolved. No N/V/F/C, anterior right lower leg is very erythematous, no excessive warmth, no purulence, c/o pain in area of erythema. No pain on dorsiflexion of foot. Will order u/s to r/o DVT. Patient instructed if erythema increases past area marked, to have home health call and will begin oral antibiotic.    Wound/Ulcer Pain Timing/Severity: intermittent  Quality of pain: tender, and itches   Severity:  1 / 10   Modifying Factors: Pain worsens with care and subsides after completed  Associated Signs/Symptoms: drainage and itching     Ulcer Identification:  Ulcer Type: traumatic (to abdomen), venous insufficiency   Contributing Factors: diabetes, obesity, decreased tissue oxygenation and picks at abdomen when stressed/nervous causing new wounds and re-opening of healed wounds      Wound: N/A    PAST MEDICAL HISTORY        Diagnosis Date  Acquired hypothyroidism 3/15/2016    Allergic rhinitis     pollen, dust ragweed, hay and straw     Anxiety     Asthma     Bipolar affect, depressed (HCC)     Bipolar disorder ([facility]     Breast cancer ([address] 2018    Invasive ductal cancer right breast    Cancer ([facility] 1980    thyroid cancer     Chronic back pain     COPD (chronic obstructive pulmonary disease) ([address]     Depression     Diabetes mellitus ([address]     Emphysema of lung ([address]     Essential hypertension 3/15/2016    History of blood transfusion     Feb 4 2019    Hyperlipidemia     Hypothyroidism     Obesity     On home O2     3L NC at night    YARED (obstructive sleep apnea)     3 L NC    Osteoarthritis     Restless legs syndrome     Sleep apnea     Urinary incontinence        Problem List:    Patient Active Problem List   Diagnosis    Type 2 diabetes mellitus with complication ([facility]    Acquired hypothyroidism    Essential hypertension    Mixed hyperlipidemia    COPD with chronic bronchitis (HCC)    Microalbuminuria    Moderate episode of recurrent major depressive disorder ([address]    YARED (obstructive sleep apnea)    Restless leg syndrome    Vitamin B12 deficiency    Vitamin D deficiency    Left ventricular hypertrophy    Left ventricular diastolic dysfunction    Morbid obesity with BMI of 45.0-49.9, adult (HCC)    Breast carcinoma, female, right (HCC)    Iron deficiency anemia secondary to inadequate dietary iron intake    Breast cancer of lower-inner quadrant of right female breast (HCC)    Postoperative anemia    Malignant neoplasm of central portion of right breast in female, estrogen receptor positive (HCC)    Iron deficiency anemia due to chronic blood loss    Non-pressure chronic ulcer of other part of right lower leg with fat layer exposed ([address]    Chronic venous insufficiency of lower extremity    Adenomatous polyp of ascending colon    Adenomatous polyp of sigmoid colon    Dysplastic colon polyp    Cellulitis of right leg    Ulcer of abdomen wall, with unspecified severity (HCC)    Ulcer of right lower extremity with fat layer exposed ([address]    Edema of right lower leg    Wound of right leg, initial encounter      Problem List Items Addressed This Visit     Chronic venous insufficiency of lower extremity    Relevant Orders    Initiate Outpatient Wound Care Protocol    US DUP LOWER EXTREMITY RIGHT CORTNEY    Ulcer of abdomen wall, with unspecified severity ([address]    Relevant Orders    Initiate Outpatient Wound Care Protocol    Ulcer of right lower extremity with fat layer exposed ([address] - Primary    Relevant Orders    Initiate Outpatient Wound Care Protocol    Edema of right lower leg    Wound of right leg, initial encounter           PAST SURGICAL HISTORY    Past Surgical History:   Procedure Laterality Date    APPENDECTOMY      BREAST BIOPSY Right 2018     SECTION      CHOLECYSTECTOMY      COLONOSCOPY N/A 2019    COLONOSCOPY DIAGNOSTIC performed by Aparna Kitchen MD at [facility] Right 2019    LAPAROSCOPIC RIGHT COLECTOMY, LYSIS OF ADHESIONS. performed by Bushra Claros MD at [facility] Right 2019    Simple mastectomy with sentinel node biopsy    TUBAL LIGATION         FAMILY HISTORY    Family History   Problem Relation Age of Onset    Diabetes Father     Heart Disease Father     Thyroid Cancer Sister     Thyroid Cancer Brother     Thyroid Cancer Sister     No Known Problems Daughter        SOCIAL HISTORY    Social History     Tobacco Use    Smoking status: Former Smoker     Packs/day: 1.00     Years: 40.00     Pack years: 40.00     Types: Cigarettes     Start date: 3/8/1976     Quit date: 2018     Years since quitting: 3.1    Smokeless tobacco: Never Used   Vaping Use    Vaping Use: Never assessed   Substance Use Topics    Alcohol use: No     Alcohol/week: 0.0 standard drinks    Drug use: No       ALLERGIES    Allergies Allergen Reactions    Latex Itching and Rash    Sulfa Antibiotics Hives    Bactrim [Sulfamethoxazole-Trimethoprim] Hives and Other (See Comments)     Metallic taste    Nicotine Hives     Hives from the patch       MEDICATIONS    Current Outpatient Medications on File Prior to Encounter   Medication Sig Dispense Refill    clotrimazole-betamethasone (LOTRISONE) 1-0.05 % cream Apply topically 2 times daily as needed to area surrounding open wounds. DO not apply to wound bed.  1 Tube 1    gentamicin (GARAMYCIN) 0.1 % cream APPLY TO AFFECTED AREA ON LEG DAILY BEFORE DRESSING CHANGE 15 g 1    meclizine (ANTIVERT) 25 MG tablet Take by mouth      fluticasone-umeclidin-vilant (TRELEGY ELLIPTA) 100-62.5-25 MCG/INH AEPB Inhale 1 puff into the lungs daily 1 each 3    VORTIoxetine (TRINTELLIX) 10 MG TABS tablet Take 10 mg by mouth      furosemide (LASIX) 40 MG tablet TAKE 1 TABLET BY MOUTH EVERY DAY 90 tablet 1    albuterol sulfate HFA (VENTOLIN HFA) 108 (90 Base) MCG/ACT inhaler Inhale 2 puffs into the lungs every 6 hours as needed for Wheezing 1 Inhaler 5    oxybutynin (DITROPAN-XL) 10 MG extended release tablet Take 10 mg by mouth daily      simvastatin (ZOCOR) 40 MG tablet TAKE 1 TABLET BY MOUTH EVERY EVENING 90 tablet 1    losartan (COZAAR) 25 MG tablet Take 1 tablet by mouth daily 90 tablet 1    levothyroxine (SYNTHROID) 175 MCG tablet Take 1 tablet by mouth Daily 90 tablet 1    potassium chloride (KLOR-CON M) 10 MEQ extended release tablet Take 1 tablet by mouth daily 90 tablet 1    metFORMIN (GLUCOPHAGE XR) 500 MG extended release tablet Take 2 tablets by mouth daily (with breakfast) 180 tablet 1    ARIPiprazole (ABILIFY) 10 MG tablet TAKE 1 TABLET BY MOUTH EVERY DAY IN THE EVENING AS DIRECTED  3    Multiple Vitamin (MULTIVITAMIN) tablet Take 1 tablet by mouth daily      Multiple Vitamins-Minerals (ICAPS AREDS 2 PO) Take 1 tablet by mouth daily      vitamin D (CHOLECALCIFEROL) 1000 UNIT TABS tablet Take 1,000 Units by mouth daily      anastrozole (ARIMIDEX) 1 MG tablet Take 1 mg by mouth daily      ACCU-CHEK SMARTVIEW strip Test TID E11.8 300 each 3    ipratropium-albuterol (DUONEB) 0.5-2.5 (3) MG/3ML SOLN nebulizer solution 3 mLs      SOFT TOUCH LANCETS MISC Use tid as directed dx: E11.9 300 each 3    hydrOXYzine (VISTARIL) 25 MG capsule Take 25 mg by mouth 3 times daily as needed       DULoxetine (CYMBALTA) 60 MG extended release capsule Take 60 mg by mouth daily        No current facility-administered medications on file prior to encounter. REVIEW OF SYSTEMS    Pertinent items are noted in HPI. Objective:      /69   Pulse 76   Temp 97 °F (36.1 °C) (Temporal)   Resp 18     Wt Readings from Last 3 Encounters:   01/04/21 (!) 350 lb (158.8 kg)   03/12/20 (!) 301 lb (136.5 kg)   02/19/20 (!) 304 lb (137.9 kg)       PHYSICAL EXAM    Constitutional:   Well nourished and well developed. Appears neat and clean. Patient is alert, oriented x3, and in no apparent distress. Respiratory:  Respiratory effort is easy and symmetric bilaterally. Rate is normal at rest and on room air. Vascular:  Pedal Pulses is palpable and audible signal noted with doppler. Capillary refill is <5 sec to digits bilateral.  Extremities positive bilaterally for pitting edema. Neurological:  Gross and Light touch intact. Protective sensation intact. Dermatological:  Wound description noted in wound assessment. Abdominal wounds clean, all healed except for one, showing good epithelialization, nearly healed. Right anterior leg wound- patient reports developed a blood blister which she drained, now has open, crusted area remaining. No purulence, no excess warmth, surrounding area of area of wound is erythematous. Negative Alicia's on dorsiflexion, reports pain surrounding the wound. Will order ultrasound to r/o DVT stat, if area of erythema increases past that marked, will begin p.o. antibiotic. Psychiatric:  Judgement and insight intact. Short and long term memory intact. No evidence of depression, anxiety, or agitation. Patient is calm, cooperative, and communicative. Appropriate interactions and affect. Assessment:      Problem List Items Addressed This Visit     Chronic venous insufficiency of lower extremity    Relevant Orders    Initiate Outpatient Wound Care Protocol    US DUP LOWER EXTREMITY RIGHT CORTNEY    Ulcer of abdomen wall, with unspecified severity ([address]    Relevant Orders    Initiate Outpatient Wound Care Protocol    Ulcer of right lower extremity with fat layer exposed ([address] - Primary    Relevant Orders    Initiate Outpatient Wound Care Protocol    Edema of right lower leg    Wound of right leg, initial encounter           Procedure Note  Indications:  Based on my examination of this patient's wound(s)/ulcer(s) today, debridement is not required to promote healing and evaluate the wound base.     Wound 02/04/21 Abdomen #2  (Active)   Wound Image   08/26/21 1105   Wound Etiology Traumatic 08/26/21 1105   Wound Cleansed Cleansed with saline 08/26/21 1105   Dressing/Treatment Antibacterial ointment;Alginate with Ag;Silicone border 72/08/01 1056   Wound Length (cm) 1.9 cm 08/26/21 1105   Wound Width (cm) 0.4 cm 08/26/21 1105   Wound Depth (cm) 0.1 cm 08/26/21 1105   Wound Surface Area (cm^2) 0.76 cm^2 08/26/21 1105   Change in Wound Size % (l*w) 74.67 08/26/21 1105   Wound Volume (cm^3) 0.076 cm^3 08/26/21 1105   Wound Healing % 75 08/26/21 1105   Wound Assessment Epithelialization;Granulation tissue 08/26/21 1105   Drainage Amount Small 08/26/21 1105   Drainage Description Serosanguinous 08/26/21 1105   Odor None 08/26/21 1105   Tamara-wound Assessment Dry/flaky 08/26/21 1105   Margins Defined edges 08/26/21 1105   Wound Thickness Description not for Pressure Injury Full thickness 08/26/21 1105   Number of days: 203       Wound 09/55/04 Umbilicus #5 (Active)   Wound Image   08/26/21 1105 Wound Etiology Traumatic 08/26/21 1105   Wound Cleansed Cleansed with saline 08/05/21 1012   Dressing/Treatment Antibacterial ointment;Alginate with Ag;Silicone border 50/89/49 1056   Wound Length (cm) 0 cm 08/26/21 1105   Wound Width (cm) 0 cm 08/26/21 1105   Wound Depth (cm) 0 cm 08/26/21 1105   Wound Surface Area (cm^2) 0 cm^2 08/26/21 1105   Change in Wound Size % (l*w) 100 08/26/21 1105   Wound Volume (cm^3) 0 cm^3 08/26/21 1105   Wound Healing % 100 08/26/21 1105   Wound Assessment Dry 08/26/21 1105   Drainage Amount None 08/26/21 1105   Drainage Description Sanguinous 08/05/21 1012   Odor None 08/05/21 1012   Tamara-wound Assessment Intact 08/05/21 1012   Margins Defined edges 08/05/21 1012   Wound Thickness Description not for Pressure Injury Full thickness 08/05/21 1012   Number of days: 98       Wound 06/10/21 Abdomen Lower; Left #6 (Active)   Wound Image   08/26/21 1105   Wound Etiology Traumatic 08/26/21 1105   Wound Cleansed Cleansed with saline 08/05/21 1012   Dressing/Treatment Antibacterial ointment;Alginate with Ag;Silicone border 45/22/50 1056   Wound Length (cm) 0 cm 08/26/21 1105   Wound Width (cm) 0 cm 08/26/21 1105   Wound Depth (cm) 0 cm 08/26/21 1105   Wound Surface Area (cm^2) 0 cm^2 08/26/21 1105   Change in Wound Size % (l*w) 100 08/26/21 1105   Wound Volume (cm^3) 0 cm^3 08/26/21 1105   Wound Healing % 100 08/26/21 1105   Wound Assessment Epithelialization 08/26/21 1105   Drainage Amount None 08/26/21 1105   Drainage Description Sanguinous 08/05/21 1012   Odor None 08/05/21 1012   Tamara-wound Assessment Intact 08/05/21 1012   Margins Defined edges 08/05/21 1012   Wound Thickness Description not for Pressure Injury Full thickness 08/05/21 1012   Number of days: 77       Wound 08/26/21 Leg Right; Lower #7 (Active)   Wound Image   08/26/21 1105   Wound Etiology Traumatic 08/26/21 1105   Wound Cleansed Cleansed with saline 08/26/21 1105   Wound Length (cm) 2.8 cm 08/26/21 1105   Wound Width (cm) 1.8 cm 08/26/21 1105   Wound Depth (cm) 0.1 cm 08/26/21 1105   Wound Surface Area (cm^2) 5.04 cm^2 08/26/21 1105   Wound Volume (cm^3) 0.504 cm^3 08/26/21 1105   Wound Assessment Dry 08/26/21 1105   Drainage Amount None 08/26/21 1105   Odor None 08/26/21 1105   Candy-wound Assessment Fragile 08/26/21 1105   Margins Defined edges 08/26/21 1105   Wound Thickness Description not for Pressure Injury Full thickness 08/26/21 1105   Number of days: 0         Plan:     Continue home health, and have them monitor right leg erythema, if area increases past that marked, call and will begin oral antibiotic. Have US done today. Treatment Note please see attached Discharge Instructions    Written patient dismissal instructions given to patient and signed by patient or POA. Discharge Instructions         Discharge [address] [facility]   Physician Orders and Discharge [address]  Telephone: [phone]      [phone]        NAME: Zac Engle  DATE OF BIRTH:  1955  MEDICAL RECORD NUMBER:  [id number]     Home Care/Facility: [address] [place] [address] (order dressings as needed)      Wound Location:  Abdomen     Dressing orders:    1. Cleanse with saline and dry.  THIN LAYER OF GENTAMICIN OINTMENT. 2.COVER WITH  SILVER ALGINATE. COVER WITH DRY GAUZE AND PAPER TAPE OR BORDER FOAM  3. Change 3 times weekly.    * Apply MYCOLOG CREAM to candy wounds daily. Do not get in the wounds. Wound Location:  Right Leg    Dressing order: Cleanse with saline and dry. Gentamicin, silver alginate, Zinc unna boot ,coban.   Change twice weekly      Compression: Apply Spandagrip to left(10-20) med lower leg, apply first thing in the morning, remove at bedtime, elevate legs as much as possible during the day.       Offloading Device:     Other Instructions:  Keep blood sugar within normal limits to help promote wound healing.  Cast cover for showering.  May apply Lac-Hydrin to legs daily. If redness gets larger than area by marker, call [facility]. RLE US order.     Keep all dressings clean, dry and intact.  Keep pressure off the wound(s) at all times.      Follow up visit:   3 weeks on   September 16  AT   9:45     Please give 24 hour notice if unable to keep appointment. [phone]     If you experience any of the following, please call the Wound Care Service at  [phone] or go to the nearest emergency room.        *Increase in pain         *Temperature over 101           *Increase in drainage from your wound or a foul odor  *Uncontrolled swelling            *Need for compression bandage changes due to slippage, breakthrough drainage     PLEASE NOTE: IF YOU ARE UNABLE TO OBTAIN WOUND SUPPLIES, CONTINUE TO USE THE SUPPLIES YOU HAVE AVAILABLE UNTIL YOU ARE [facility].  IT IS MOST IMPORTANT TO KEEP THE WOUND COVERED             Electronically signed by MARIETTA Rose NP on 8/26/2021 at 11:32 AM

## 2021-09-16 ENCOUNTER — HOSPITAL ENCOUNTER (OUTPATIENT)
Dept: WOUND CARE | Age: 66
Discharge: HOME | End: 2021-09-16
Payer: MEDICARE

## 2021-09-16 VITALS
DIASTOLIC BLOOD PRESSURE: 67 MMHG | TEMPERATURE: 96.6 F | HEART RATE: 94 BPM | SYSTOLIC BLOOD PRESSURE: 153 MMHG | RESPIRATION RATE: 18 BRPM

## 2021-09-16 DIAGNOSIS — L97.912 ULCER OF RIGHT LOWER EXTREMITY WITH FAT LAYER EXPOSED (HCC): Primary | ICD-10-CM

## 2021-09-16 DIAGNOSIS — I87.2 CHRONIC VENOUS INSUFFICIENCY OF LOWER EXTREMITY: ICD-10-CM

## 2021-09-16 DIAGNOSIS — L98.499: ICD-10-CM

## 2021-09-16 DIAGNOSIS — F42.4 SKIN-PICKING DISORDER: ICD-10-CM

## 2021-09-16 PROCEDURE — 99213 OFFICE O/P EST LOW 20 MIN: CPT

## 2021-09-16 PROCEDURE — 29580 STRAPPING UNNA BOOT: CPT

## 2021-09-16 PROCEDURE — 6370000000 HC RX 637 (ALT 250 FOR IP): Performed by: NURSE PRACTITIONER

## 2021-09-16 PROCEDURE — 99213 OFFICE O/P EST LOW 20 MIN: CPT | Performed by: NURSE PRACTITIONER

## 2021-09-16 RX ORDER — GENTAMICIN SULFATE 1 MG/G
OINTMENT TOPICAL ONCE
Status: COMPLETED | OUTPATIENT
Start: 2021-09-16 | End: 2021-09-16

## 2021-09-16 RX ORDER — LIDOCAINE 50 MG/G
OINTMENT TOPICAL ONCE
OUTPATIENT
Start: 2021-09-16 | End: 2021-09-16

## 2021-09-16 RX ORDER — LIDOCAINE HYDROCHLORIDE 20 MG/ML
JELLY TOPICAL ONCE
OUTPATIENT
Start: 2021-09-16 | End: 2021-09-16

## 2021-09-16 RX ORDER — BETAMETHASONE DIPROPIONATE 0.05 %
OINTMENT (GRAM) TOPICAL ONCE
OUTPATIENT
Start: 2021-09-16 | End: 2021-09-16

## 2021-09-16 RX ORDER — CLOBETASOL PROPIONATE 0.5 MG/G
OINTMENT TOPICAL ONCE
OUTPATIENT
Start: 2021-09-16 | End: 2021-09-16

## 2021-09-16 RX ORDER — BACITRACIN, NEOMYCIN, POLYMYXIN B 400; 3.5; 5 [USP'U]/G; MG/G; [USP'U]/G
OINTMENT TOPICAL ONCE
OUTPATIENT
Start: 2021-09-16 | End: 2021-09-16

## 2021-09-16 RX ORDER — GINSENG 100 MG
CAPSULE ORAL ONCE
OUTPATIENT
Start: 2021-09-16 | End: 2021-09-16

## 2021-09-16 RX ORDER — BACITRACIN ZINC AND POLYMYXIN B SULFATE 500; 1000 [USP'U]/G; [USP'U]/G
OINTMENT TOPICAL ONCE
OUTPATIENT
Start: 2021-09-16 | End: 2021-09-16

## 2021-09-16 RX ORDER — LIDOCAINE HYDROCHLORIDE 40 MG/ML
SOLUTION TOPICAL ONCE
OUTPATIENT
Start: 2021-09-16 | End: 2021-09-16

## 2021-09-16 RX ORDER — LIDOCAINE 40 MG/G
CREAM TOPICAL ONCE
OUTPATIENT
Start: 2021-09-16 | End: 2021-09-16

## 2021-09-16 RX ORDER — GENTAMICIN SULFATE 1 MG/G
OINTMENT TOPICAL ONCE
OUTPATIENT
Start: 2021-09-16 | End: 2021-09-16

## 2021-09-16 RX ADMIN — GENTAMICIN SULFATE: 1 OINTMENT TOPICAL at 11:18

## 2021-09-16 NOTE — PROGRESS NOTES
Anna Watkins 37   Progress Note and Procedure Note      [address] RECORD NUMBER:  [id number]  AGE: 72 y.o. GENDER: female  : 1955  EPISODE DATE:  2021    Subjective:     Chief Complaint   Patient presents with    Wound Check     abd  right leg         HISTORY of PRESENT ILLNESS HPI  History of Wound Context: Recheck for chronic venous insufficiency of bilateral lower extremities. Left leg remains healed, at the last visit, patient \"had a blood blister on front of right lower leg which I popped- now has an open wound\"- wound still not healed this week. Non-healing abdominal wound from prior surgery, patient picks at scar when nervous/stressed causing it to re-open, frequently. At last visit had 3 new areas of wounds on abdomen, complained \"it itches\".  One large area of old healed scar to midline of abdomen, patient picks at and previously re-opened. Today, some epithelialization is present. Fungal rash surrounding candy wounds has fully resolved, yet multiple newly opened, areas which patient picks at remain reddened with erythema to generalized area. Two remaining open areas to lower abdomen remain. No N/V/F/C. Anterior right lower leg has one open wound area remaining, moderate drainage. Will continue unna boot right- left remains healed.     Wound/Ulcer Pain Timing/Severity: intermittent  Quality of pain: tender, and itches   Severity:  1  10   Modifying Factors: Pain worsens with care and subsides after completed  Associated Signs/Symptoms: drainage and itching     Ulcer Identification:  Ulcer Type: traumatic (to abdomen), venous insufficiency   Contributing Factors: diabetes, obesity, decreased tissue oxygenation and picks at abdomen when stressed/nervous causing new wounds and re-opening of healed wounds      Wound: N/A  PAST MEDICAL HISTORY        Diagnosis Date    Acquired hypothyroidism 3/15/2016    Allergic rhinitis     pollen, dust ragweed, hay and straw     Anxiety     Asthma     Bipolar affect, depressed ([address]     Bipolar disorder ([address]     Breast cancer ([address] 2018    Invasive ductal cancer right breast    Cancer ([address] 1980    thyroid cancer     Chronic back pain     COPD (chronic obstructive pulmonary disease) ([address]     Depression     Diabetes mellitus ([address]     Emphysema of lung ([address]     Essential hypertension 3/15/2016    History of blood transfusion     Feb 4 2019    Hyperlipidemia     Hypothyroidism     Obesity     On home O2     3L NC at night    YARED (obstructive sleep apnea)     3 L NC    Osteoarthritis     Restless legs syndrome     Sleep apnea     Urinary incontinence        Problem List:    Patient Active Problem List   Diagnosis    Type 2 diabetes mellitus with complication [facility]    Acquired hypothyroidism    Essential hypertension    Mixed hyperlipidemia    COPD with chronic bronchitis (HCC)    Microalbuminuria    Moderate episode of recurrent major depressive disorder (HCC)    YARED (obstructive sleep apnea)    Restless leg syndrome    Vitamin B12 deficiency    Vitamin D deficiency    Left ventricular hypertrophy    Left ventricular diastolic dysfunction    Morbid obesity with BMI of 45.0-49.9, adult (HCC)    Breast carcinoma, female, right (HCC)    Iron deficiency anemia secondary to inadequate dietary iron intake    Breast cancer of lower-inner quadrant of right female breast (HCC)    Postoperative anemia    Malignant neoplasm of central portion of right breast in female, estrogen receptor positive (HCC)    Iron deficiency anemia due to chronic blood loss    Non-pressure chronic ulcer of other part of right lower leg with fat layer exposed ([address]    Chronic venous insufficiency of lower extremity    Adenomatous polyp of ascending colon    Adenomatous polyp of sigmoid colon    Dysplastic colon polyp    Cellulitis of right leg    Ulcer of abdomen wall, with unspecified severity (HCC)    Ulcer of right lower extremity with fat layer exposed ([address]    Edema of right lower leg    Wound of right leg, initial encounter    Skin-picking disorder      Problem List Items Addressed This Visit     Chronic venous insufficiency of lower extremity    Relevant Orders    Initiate Outpatient Wound Care Protocol    Ulcer of abdomen wall, with unspecified severity ([address]    Relevant Orders    Initiate Outpatient Wound Care Protocol    Ulcer of right lower extremity with fat layer exposed ([address] - Primary    Relevant Orders    Initiate Outpatient Wound Care Protocol    Skin-picking disorder           PAST SURGICAL HISTORY    Past Surgical History:   Procedure Laterality Date    APPENDECTOMY      BREAST BIOPSY Right 2018     SECTION      CHOLECYSTECTOMY      COLONOSCOPY N/A 2019    COLONOSCOPY DIAGNOSTIC performed by West Houser MD at [facility] [address] Right 2019    LAPAROSCOPIC RIGHT COLECTOMY, LYSIS OF ADHESIONS. performed by Joce Linares MD at [address] Right 2019    Simple mastectomy with sentinel node biopsy    TUBAL LIGATION         FAMILY HISTORY    Family History   Problem Relation Age of Onset    Diabetes Father     Heart Disease Father     Thyroid Cancer Sister     Thyroid Cancer Brother     Thyroid Cancer Sister     No Known Problems Daughter        SOCIAL HISTORY    Social History     Tobacco Use    Smoking status: Former Smoker     Packs/day: 1.00     Years: 40.00     Pack years: 40.00     Types: Cigarettes     Start date: 3/8/1976     Quit date: 2018     Years since quitting: 3.2    Smokeless tobacco: Never Used   Vaping Use    Vaping Use: Never assessed   Substance Use Topics    Alcohol use: No     Alcohol/week: 0.0 standard drinks    Drug use: No       ALLERGIES    Allergies   Allergen Reactions    Latex Itching and Rash    Sulfa Antibiotics Hives    Bactrim [Sulfamethoxazole-Trimethoprim] Hives and Other (See Comments) Metallic taste    Nicotine Hives     Hives from the patch       MEDICATIONS    Current Outpatient Medications on File Prior to Encounter   Medication Sig Dispense Refill    clotrimazole-betamethasone (LOTRISONE) 1-0.05 % cream Apply topically 2 times daily as needed to area surrounding open wounds. DO not apply to wound bed.  1 Tube 1    gentamicin (GARAMYCIN) 0.1 % cream APPLY TO AFFECTED AREA ON LEG DAILY BEFORE DRESSING CHANGE 15 g 1    meclizine (ANTIVERT) 25 MG tablet Take by mouth      fluticasone-umeclidin-vilant (TRELEGY ELLIPTA) 100-62.5-25 MCG/INH AEPB Inhale 1 puff into the lungs daily 1 each 3    VORTIoxetine (TRINTELLIX) 10 MG TABS tablet Take 10 mg by mouth      furosemide (LASIX) 40 MG tablet TAKE 1 TABLET BY MOUTH EVERY DAY 90 tablet 1    albuterol sulfate HFA (VENTOLIN HFA) 108 (90 Base) MCG/ACT inhaler Inhale 2 puffs into the lungs every 6 hours as needed for Wheezing 1 Inhaler 5    oxybutynin (DITROPAN-XL) 10 MG extended release tablet Take 10 mg by mouth daily      simvastatin (ZOCOR) 40 MG tablet TAKE 1 TABLET BY MOUTH EVERY EVENING 90 tablet 1    losartan (COZAAR) 25 MG tablet Take 1 tablet by mouth daily 90 tablet 1    levothyroxine (SYNTHROID) 175 MCG tablet Take 1 tablet by mouth Daily 90 tablet 1    potassium chloride (KLOR-CON M) 10 MEQ extended release tablet Take 1 tablet by mouth daily 90 tablet 1    metFORMIN (GLUCOPHAGE XR) 500 MG extended release tablet Take 2 tablets by mouth daily (with breakfast) 180 tablet 1    ARIPiprazole (ABILIFY) 10 MG tablet TAKE 1 TABLET BY MOUTH EVERY DAY IN THE EVENING AS DIRECTED  3    Multiple Vitamin (MULTIVITAMIN) tablet Take 1 tablet by mouth daily      Multiple Vitamins-Minerals (ICAPS AREDS 2 PO) Take 1 tablet by mouth daily      vitamin D (CHOLECALCIFEROL) 1000 UNIT TABS tablet Take 1,000 Units by mouth daily      anastrozole (ARIMIDEX) 1 MG tablet Take 1 mg by mouth daily      ACCU-CHEK SMARTVIEW strip Test TID E11.8 300 each 3  ipratropium-albuterol (DUONEB) 0.5-2.5 (3) MG/3ML SOLN nebulizer solution 3 mLs      SOFT TOUCH LANCETS MISC Use tid as directed dx: E11.9 300 each 3    hydrOXYzine (VISTARIL) 25 MG capsule Take 25 mg by mouth 3 times daily as needed       DULoxetine (CYMBALTA) 60 MG extended release capsule Take 60 mg by mouth daily        No current facility-administered medications on file prior to encounter. REVIEW OF SYSTEMS    Pertinent items are noted in HPI. Objective:      BP (!) 153/67   Pulse 94   Temp 96.6 °F (35.9 °C) (Temporal)   Resp 18     Wt Readings from Last 3 Encounters:   01/04/21 (!) 350 lb (158.8 kg)   03/12/20 (!) 301 lb (136.5 kg)   02/19/20 (!) 304 lb (137.9 kg)       PHYSICAL EXAM    Constitutional:   Well nourished and well developed. Appears neat and clean. Patient is alert, oriented x3, and in no apparent distress. Respiratory:  Respiratory effort is easy and symmetric bilaterally. Rate is normal at rest and on room air. Vascular:  Pedal Pulses is palpable and audible signal noted with doppler. Capillary refill is <5 sec to digits bilateral.  Extremities have trace edema. Neurological:  Gross and Light touch intact. Protective sensation present. Dermatological:  Wound description noted in wound assessment. Mid abdomen old incision wound, improved, some epithelialization. Surrounding generalized lower abdomen, has multiple areas of excoriation present from being \"picked open\". Patient states \"they itch\". Will continue Mycolog to areas, and only apply gentamicin and silvercell to two open areas (near umbilicus) with minimal amount of tape-use medipore tape only. Right lower leg wound clean, less generalized erythema. Psychiatric:  Judgement and insight intact. Short and long term memory intact. No evidence of depression, anxiety, or agitation. Patient is calm, cooperative, and communicative. Appropriate interactions and affect.       Assessment:      Problem List Items Addressed This Visit     Chronic venous insufficiency of lower extremity    Relevant Orders    Initiate Outpatient Wound Care Protocol    Ulcer of abdomen wall, with unspecified severity ([address]    Relevant Orders    Initiate Outpatient Wound Care Protocol    Ulcer of right lower extremity with fat layer exposed ([facility] - Primary    Relevant Orders    Initiate Outpatient Wound Care Protocol    Skin-picking disorder           Procedure Note  Indications:  Based on my examination of this patient's wound(s)/ulcer(s) today, debridement is not required to promote healing and evaluate the wound base.     Wound 02/04/21 Abdomen #2  (Active)   Wound Image   09/16/21 1002   Wound Etiology Traumatic 09/16/21 1002   Wound Cleansed Cleansed with saline 09/16/21 1002   Dressing/Treatment Cutimed/Sorbact 09/16/21 1120   Wound Length (cm) 3.7 cm 09/16/21 1002   Wound Width (cm) 1 cm 09/16/21 1002   Wound Depth (cm) 0.1 cm 09/16/21 1002   Wound Surface Area (cm^2) 3.7 cm^2 09/16/21 1002   Change in Wound Size % (l*w) -23.33 09/16/21 1002   Wound Volume (cm^3) 0.37 cm^3 09/16/21 1002   Wound Healing % -23 09/16/21 1002   Wound Assessment Pink/red 09/16/21 1002   Drainage Amount Small 09/16/21 1002   Drainage Description Serosanguinous 09/16/21 1002   Odor None 09/16/21 1002   Tamara-wound Assessment Intact;Fragile 09/16/21 1002   Margins Defined edges 09/16/21 1002   Wound Thickness Description not for Pressure Injury Partial thickness 09/16/21 1002   Number of days: 224       Wound 07/95/29 Umbilicus #5 (Active)   Wound Image   09/16/21 1002   Wound Etiology Traumatic 09/16/21 1002   Wound Cleansed Cleansed with saline 08/05/21 1012   Dressing/Treatment Alginate with Ag;Dry dressing 09/16/21 1120   Wound Length (cm) 1.1 cm 09/16/21 1002   Wound Width (cm) 1.1 cm 09/16/21 1002   Wound Depth (cm) 0.1 cm 09/16/21 1002   Wound Surface Area (cm^2) 1.21 cm^2 09/16/21 1002   Change in Wound Size % (l*w) 46.22 09/16/21 1002   Wound Volume (cm^3) 0.121 cm^3 09/16/21 1002   Wound Healing % 45 09/16/21 1002   Wound Assessment Pink/red 09/16/21 1002   Drainage Amount Small 09/16/21 1002   Drainage Description Serosanguinous 09/16/21 1002   Odor None 09/16/21 1002   Tamara-wound Assessment Fragile 09/16/21 1002   Margins Undefined edges 09/16/21 1002   Wound Thickness Description not for Pressure Injury Partial thickness 09/16/21 1002   Number of days: 119       Wound 08/26/21 Leg Right; Lower #7 (Active)   Wound Image   08/26/21 1105   Wound Etiology Traumatic 09/16/21 1002   Wound Cleansed Cleansed with saline 09/16/21 1002   Dressing/Treatment Alginate with Ag 09/16/21 1120   Wound Length (cm) 1 cm 09/16/21 1002   Wound Width (cm) 1.2 cm 09/16/21 1002   Wound Depth (cm) 0.1 cm 09/16/21 1002   Wound Surface Area (cm^2) 1.2 cm^2 09/16/21 1002   Change in Wound Size % (l*w) 76.19 09/16/21 1002   Wound Volume (cm^3) 0.12 cm^3 09/16/21 1002   Wound Healing % 76 09/16/21 1002   Wound Assessment Pink/red 09/16/21 1002   Drainage Amount Small 09/16/21 1002   Drainage Description Serosanguinous 09/16/21 1002   Odor None 09/16/21 1002   Tamara-wound Assessment Fragile 09/16/21 1002   Margins Undefined edges 09/16/21 1002   Wound Thickness Description not for Pressure Injury Full thickness 09/16/21 1002   Number of days: 21         Plan:     Continue unna boot to right lower leg, with gentamicin and silvercell over wound before applying the unna boot. To mid abdomen wound: return to using sorbact dressing. To two open areas of lower abdomen, gentamicin with silvercell over, and medipore tape only. To surrounding areas of abdomen, use mycolog twice daily for itching. Treatment Note please see attached Discharge Instructions    Written patient dismissal instructions given to patient and signed by patient or POA.          Discharge Instructions         Discharge [id number] [facility]   Physician Orders and Discharge Instructions  [facility]  [place], [address] [facility]  Telephone: [phone]      [phone]        NAME: Zita Garcia  DATE OF BIRTH:  1955  MEDICAL RECORD NUMBER:  [id number]     Home Care/Facility: [address] (order dressings as needed)      Wound Location:  Abdomen    Dressing order:  Cleanse with saline and dry. Cutimed siltac sorbact. Change 3 times weekly     Wound Location:  Umbilicus    Dressing orders:    1. Cleanse with saline and dry.  THIN LAYER OF GENTAMICIN OINTMENT. 2.COVER WITH  SILVER ALGINATE.  COVER WITH DRY GAUZE AND PAPER TAPE OR BORDER FOAM  3. Change 3 times weekly.    * Apply MYCOLOG CREAM to red rashy areas daily. Do not get in the wounds.      Wound Location:  Right Leg     Dressing order: Cleanse with saline and dry. Gentamicin, silver alginate, Zinc unna boot ,coban. Change twice weekly      Compression: Apply Spandagrip to left(10-20) med lower leg, apply first thing in the morning, remove at bedtime, elevate legs as much as possible during the day.       Offloading Device:     Other Instructions:  Keep blood sugar within normal limits to help promote wound healing.  Cast cover for showering.  May apply Lac-Hydrin to legs daily.        Keep all dressings clean, dry and intact.  Keep pressure off the wound(s) at all times.      Follow up visit:   2 weeks on   September 30  AT   10:00   Please give 24 hour notice if unable to keep appointment.  [phone]     If you experience any of the following, please call the Wound Care Service at  [phone] or go to the nearest emergency room.        *Increase in pain         *Temperature over 101           *Increase in drainage from your wound or a foul odor  *Uncontrolled swelling            *Need for compression bandage changes due to slippage, breakthrough drainage     PLEASE NOTE: IF YOU ARE UNABLE TO OBTAIN WOUND SUPPLIES, CONTINUE TO USE THE SUPPLIES YOU HAVE AVAILABLE UNTIL YOU ARE ABLE TO REACH US.  IT IS MOST IMPORTANT TO KEEP THE WOUND COVERED                   Electronically signed by MARIETTA Bryan NP on 9/16/2021 at 11:55 AM

## 2021-09-17 NOTE — FLOWSHEET NOTE
[facility] Application   Below Knee    NAME:  Celi Erwin  YOB: 1955  MEDICAL RECORD NUMBER:  [id number]  DATE:  9/16/2021    Kobi Vo boot: Appied primary and secondary dressing as ordered. Applied Unna roll from toes to knee overlapping each time. Applied ace wrap or coban from toes to below the knee. Secured with tape and/or metal clips covered with tape. Instructed patient/caregiver to keep dressing dry and intact. DO NOT REMOVE DRESSING. Instructed pt/family/caregiver to report excessive draining, loose bandage, wet dressing, severe pain or tingling in toes. Applied [facility] dressing below the knee to right lower leg. Unna Boot(s) were applied per  Guidelines.      Electronically signed by Dieudonne Levin RN on 9/17/2021 at 11:02 AM

## 2022-01-31 NOTE — PROGRESS NOTES
Pt Name: Gabrielle Bhatia Record Number: [id number]  Date of Birth 1955   Admit date 5/13/2019  5:39 AM  Today's Date: 5/15/2019     ASSESSMENT  1. Hospital day # 2  2  postop day #2 laparoscopic right colectomy    PLAN  1.  DC PCA/oral pain medication  2. Low fiber diet      SUBJECTIVE  Chief complaint: Follow-up colectomy  Afebrile, vital signs are stable. She denies any nausea or vomiting, has not passed flatus or had a bowel movement. She is tolerating a DIET LOW FIBER;. Her pain is well controlled on current medications. She has been ambulating in the halls. has a past medical history of Acquired hypothyroidism, Allergic rhinitis, Anxiety, Asthma, Bipolar affect, depressed ([address] Bipolar disorder ([address] Breast cancer ([facility] Cancer ([facility] Chronic back pain, COPD (chronic obstructive pulmonary disease) ([facility] Depression, Diabetes mellitus ([facility] Emphysema of lung ([facility] Essential hypertension, History of blood transfusion, Hyperlipidemia, Hypothyroidism, Obesity, On home O2, YARED (obstructive sleep apnea), Osteoarthritis, Restless legs syndrome, Sleep apnea, and Urinary incontinence.     CURRENT MEDS  Scheduled Meds:   sodium chloride flush  10 mL Intravenous 2 times per day    anastrozole  1 mg Oral Daily    ARIPiprazole  10 mg Oral Daily    DULoxetine  60 mg Oral Daily    mometasone-formoterol  2 puff Inhalation BID    furosemide  40 mg Oral Daily    levothyroxine  175 mcg Oral Daily    losartan  25 mg Oral Daily    sodium chloride flush  10 mL Intravenous 2 times per day    enoxaparin  40 mg Subcutaneous Daily    insulin lispro  0-6 Units Subcutaneous Q6H     Continuous Infusions:   lactated ringers 125 mL/hr at 05/14/19 0542    sodium chloride 50 mL/hr at 05/14/19 1154    dextrose       PRN Meds:.oxyCODONE-acetaminophen, HYDROmorphone, ketorolac, sodium chloride flush, fentanNYL, HYDROmorphone, meperidine, albuterol sulfate HFA, sodium chloride flush, ondansetron, glucose, Results reviewed. Release via [facility], your blood report is back. Vitamin D came low and TSH is in normal range. I would recommend going up on the synthroid dose to 88 mcg as you have been feeling very cold. You should also take vitamin D3 2000 I.U daily dose. dextrose, glucagon (rDNA), dextrose    OBJECTIVE  CURRENT VITALS:  height is 5' 6.5\" (1.689 m) and weight is 313 lb (142 kg) (abnormal). Her oral temperature is 98.1 °F (36.7 °C). Her blood pressure is 100/43 (abnormal) and her pulse is 66. Her respiration is 22 and oxygen saturation is 99%. Temperature Range (24h):Temp: 98.1 °F (36.7 °C) Temp  Av.6 °F (37 °C)  Min: 98.1 °F (36.7 °C)  Max: 99 °F (37.2 °C)  BP Range (52O): Systolic (89ZFP), QIQ:693 , Min:100 , IAI:572     Diastolic (35LVC), HNL:83, Min:43, Max:51    Pulse Range (24h): Pulse  Av.5  Min: 66  Max: 73  Respiration Range (24h): Resp  Av.9  Min: 16  Max: 22    GENERAL: alert, no distress  LUNGS: clear to ausculation, without wheezes, rales or rhonci  HEART: normal rate and regular rhythm  ABDOMEN: soft, non-tender, non-distended, bowel sounds present in all 4 quadrants and no guarding or peritoneal signs  EXTERMITY: no cyanosis, clubbing or edema  In: 757 [P.O.:120; I.V.:637]  Out: 200 [Urine:200]  Date 05/15/19 0000 - 05/15/19 2359   Shift 4231-8686 5102-5870 6757-8019 24 Hour Total   INTAKE   P.O.(mL/kg/hr) 120(0.1)   120   I. V.(mL/kg) 637(4.5)   637(4.5)   Shift Total(mL/kg) 757(5.3)   757(5.3)   OUTPUT   Urine(mL/kg/hr) 200(0.2)   200   Shift Total(mL/kg) 200(1.4)   200(1.4)   Weight (kg) 142 142 142 142       LABS  Recent Labs     19  0646   WBC 8.8   HGB 10.1*   HCT 31.4*         K 3.8      CO2 30   BUN 9   CREATININE 0.62   CALCIUM 8.7      No results for input(s): PTT, INR in the last 72 hours. Invalid input(s): PT  No results for input(s): AST, ALT, BILITOT, BILIDIR, AMYLASE, LIPASE, LDH, LACTA in the last 72 hours. RADIOLOGY  Ct Abdomen Pelvis W Iv Contrast Additional Contrast? Radiologist Recommendation    Result Date: 2019  CT of the Abdomen and Pelvis with intravenous contrast medium History:  Colonic mass on colonoscopy. History of right mastectomy for invasive ductal carcinoma.  Technical Factors: CT imaging of the abdomen and pelvis were obtained and formatted as 5 mm contiguous axial images from the domes of the diaphragm to the symphysis pubis. Sagittal and coronal reconstructions were also obtained. Oral contrast medium:  Barium sulfate, 450 mL. Intravenous contrast medium:  Isovue-300, 100 mL. Comparison:  None. Findings: Lungs:  Lung bases are clear. Liver:  Normal in size, shape, and attenuation. Bile Ducts:  Normal in caliber. Gallbladder:  Surgically absent. Pancreas:  Normal without masses, cysts, ductal dilatation or calcification. Spleen:  Normal in size without masses or calcifications. No splenules. Kidneys:  Normal in size and enhancement. No hydronephrosis, masses, or stones. Adrenals:  Normal. Small bowel:  Normal in caliber. Appendix:  Surgically absent. Colon:  Normal in caliber. Peritoneum:  No ascites, free air, or fluid collections. Vessels: Aorta normal in course and caliber. Portal vein, splenic vein, superior mesenteric vein are patent. Lymph nodes:  Retroperitoneal:  No enlarged retroperitoneal lymph nodes. Mesenteric:  No enlarged mesenteric lymph nodes. Pelvic: No enlarged pelvic lymph nodes. Ureters: Normal in course and caliber. No calcifications. Bladder: No wall thickening. Reproductive organs: No pelvic masses. Abdominal Wall:  No hernia identified. Bones:  No bone lesions. Diffuse disc space narrowing lumbar spine. Vacuum disc, L4-5, L5-S1. 2 mm anterolisthesis of L3 on L4. Small anterior osteophytes lumbar spine. No post operative changes. No colonic mass identified. Grade 1 L3 spondylolisthesis. Appendectomy. Cholecystectomy. All CT scans at this facility use dose modulation, iterative reconstruction, and/or weight based dosing when appropriate to reduce radiation dose to as low as reasonably achievable.      Electronically signed by Zack Joshua MD on 5/15/2019 at 4:18 PM

## 2022-03-30 NOTE — PLAN OF CARE
Problem: Blood Glucose:  Goal: Ability to maintain appropriate glucose levels will improve  Description: Ability to maintain appropriate glucose levels will improve  Outcome: Ongoing     Problem: Wound:  Goal: Will show signs of wound healing; wound closure and no evidence of infection  Description: Will show signs of wound healing; wound closure and no evidence of infection  Outcome: Ongoing Additional Notes: Patient consent was obtained to proceed with the visit and recommended plan of care after discussion of all risks and benefits, including the risks of COVID-19 exposure. Detail Level: Simple

## (undated) DEVICE — PACK,SET UP,DRAPE: Brand: MEDLINE

## (undated) DEVICE — RELOAD STPL L75MM OPN STPL H4.5MM CLS STPL H2MM WIRE

## (undated) DEVICE — PLUMEPORT LAPAROSCOPIC SMOKE FILTRATION DEVICE: Brand: PLUMEPORT ACTIV

## (undated) DEVICE — MEDI-VAC YANKAUER SUCTION HANDLE W/BULBOUS TIP: Brand: CARDINAL HEALTH

## (undated) DEVICE — MARKER SURG SKIN GENTIAN VLT REG TIP W/ 6IN RUL

## (undated) DEVICE — KIT,ANTI FOG,W/SPONGE & FLUID,SOFT PACK: Brand: MEDLINE

## (undated) DEVICE — Z DISCONTINUED NO SUB IDED CONNECTOR SURG HEMOSTATIC OPN FLD FOR BIOMATERIAL

## (undated) DEVICE — Device

## (undated) DEVICE — GOWN,AURORA,NONREINFORCED,LARGE: Brand: MEDLINE

## (undated) DEVICE — GAUZE,SPONGE,FLUFF,6"X6.75",STRL,10/TRAY: Brand: MEDLINE

## (undated) DEVICE — COUNTER NDL 40 COUNT HLD 70 FOAM BLK ADH W/ MAG

## (undated) DEVICE — ELECTRODE PT RET AD L9FT HI MOIST COND ADH HYDRGEL CORDED

## (undated) DEVICE — SUTURE PERMA-HAND SZ 3-0 L18IN NONABSORBABLE BLK SH-1 L22MM C003D

## (undated) DEVICE — GAUZE,SPONGE,4"X4",16PLY,XRAY,STRL,LF: Brand: MEDLINE

## (undated) DEVICE — SYRINGE IRRIG 60ML SFT PLIABLE BLB EZ TO GRP 1 HND USE W/

## (undated) DEVICE — GLOVE SURG 5.5 PF POLYISOPRENE WHT STRL SENSICARE MIC

## (undated) DEVICE — APPLIER CLP L9.38IN M LIG TI DISP STR RNG HNDL LIGACLP

## (undated) DEVICE — TROCAR ENDOSCP L100MM DIA5MM BLDELSS STBL SL OBT RADLUC

## (undated) DEVICE — SUTURE ABSORBABLE BRAIDED 0 CT-1 8X27 IN UD VICRYL JJ41G

## (undated) DEVICE — TOWEL,OR,DSP,ST,BLUE,STD,4/PK,20PK/CS: Brand: MEDLINE

## (undated) DEVICE — SUTURE VCRL + SZ 3-0 L27IN ABSRB UD L26MM SH 1/2 CIR VCP416H

## (undated) DEVICE — PACK,LAPAROTOMY,NO GOWNS: Brand: MEDLINE

## (undated) DEVICE — SUTURE VCRL SZ 3-0 L54IN ABSRB VLT LIGAPAK REEL NDL J205G

## (undated) DEVICE — ELECTROSURGICAL PENCIL BUTTON SWITCH E-Z CLEAN COATED BLADE ELECTRODE 10 FT (3 M) CORD HOLSTER: Brand: MEGADYNE

## (undated) DEVICE — SUTURE PROL SZ 3-0 L30IN NONABSORBABLE BLU L30MM FS-1 3/8 8675H

## (undated) DEVICE — ELECTRODE ES L45CM DIA5MM HK MPLR L WIRE E Z CLN MEGADYNE

## (undated) DEVICE — 3M™ STERI-STRIP™ REINFORCED ADHESIVE SKIN CLOSURES, R1547, 1/2 IN X 4 IN (12 MM X 100 MM), 6 STRIPS/ENVELOPE: Brand: 3M™ STERI-STRIP™

## (undated) DEVICE — STAPLER INT L60MM REG TISS BLU B FRM 8 FIRING 2 ROW AUTO

## (undated) DEVICE — GLOVE SURG SZ 75 STD WHT LTX SYN POLYMER BEAD REINF ANTI RL

## (undated) DEVICE — TROCAR ENDOSCP L100MM DIA12MM BLDELSS OBT RADLUC STBL SL

## (undated) DEVICE — HYPODERMIC SAFETY NEEDLE: Brand: MAGELLAN

## (undated) DEVICE — WARMER SCP 2 ANTIFOG LAP DISP

## (undated) DEVICE — SPONGE,LAP,18"X18",DLX,XR,ST,5/PK,40/PK: Brand: MEDLINE

## (undated) DEVICE — DRAPE,LAP,CHOLE,W/TROUGHS,STERILE: Brand: MEDLINE

## (undated) DEVICE — SUTURE PERMAHAND SZ 3-0 L30IN NONABSORBABLE BLK SH L26MM K832H

## (undated) DEVICE — INTENDED FOR TISSUE SEPARATION, AND OTHER PROCEDURES THAT REQUIRE A SHARP SURGICAL BLADE TO PUNCTURE OR CUT.: Brand: BARD-PARKER ® CARBON RIB-BACK BLADES

## (undated) DEVICE — SUTURE VCRL SZ 2-0 L36IN ABSRB UD L36MM CT-1 1/2 CIR J945H

## (undated) DEVICE — YANKAUER,POOLE TIP,STERILE,50/CS: Brand: MEDLINE

## (undated) DEVICE — PAD,ABDOMINAL,8"X10",ST,LF: Brand: MEDLINE

## (undated) DEVICE — SEALER TISS L20CM DIA13MM ADV BPLR L CRV JAW OPN APPRCH

## (undated) DEVICE — COVER LT HNDL BLU PLAS

## (undated) DEVICE — TUBING INSUF 03UM FLTR W LUERLOCK CPC CONN

## (undated) DEVICE — SUTURE VCRL SZ 4-0 L18IN ABSRB UD L19MM PS-2 3/8 CIR PRIM J496H

## (undated) DEVICE — SUTURE VCRL + SZ 3-0 L36IN ABSRB UD L36MM CT-1 1/2 CIR VCP944H

## (undated) DEVICE — SUTURE PROL SZ 0 L30IN NONABSORBABLE BLU L36MM CT-1 1/2 CIR 8424H

## (undated) DEVICE — TUBING, SUCTION, 1/4" X 10', STRAIGHT: Brand: MEDLINE

## (undated) DEVICE — TRAY PREP DRY W/ PREM GLV 2 APPL 6 SPNG 2 UNDPD 1 OVERWRAP

## (undated) DEVICE — GOWN,SIRUS,POLYRNF,BRTHSLV,XLN/XL,20/CS: Brand: MEDLINE

## (undated) DEVICE — SUTURE VCRL SZ 0 L36IN ABSRB UD L36MM CT-1 1/2 CIR J946H

## (undated) DEVICE — KIT DRN FLAT W/ 100CC EVAC 10MM FULL PERF

## (undated) DEVICE — CHLORAPREP 26ML ORANGE

## (undated) DEVICE — SYRINGE MED 10ML LUERLOCK TIP W/O SFTY DISP

## (undated) DEVICE — GAUZE,SPONGE,2"X2",8PLY,STERILE,LF,2'S: Brand: MEDLINE

## (undated) DEVICE — TTL1LYR 16FR10ML 100%SIL TMPST TR: Brand: MEDLINE

## (undated) DEVICE — RELOAD STPL L75MM OPN H3.8MM CLS 1.5MM WIRE DIA0.2MM REG

## (undated) DEVICE — LABEL MED MINI W/ MARKER

## (undated) DEVICE — PIN SFTY LG ST